# Patient Record
Sex: MALE | Race: WHITE | HISPANIC OR LATINO | Employment: STUDENT | URBAN - METROPOLITAN AREA
[De-identification: names, ages, dates, MRNs, and addresses within clinical notes are randomized per-mention and may not be internally consistent; named-entity substitution may affect disease eponyms.]

---

## 2018-01-26 DIAGNOSIS — M26.623 BILATERAL TEMPOROMANDIBULAR JOINT PAIN: Primary | ICD-10-CM

## 2019-08-06 ASSESSMENT — MIFFLIN-ST. JEOR: SCORE: 1820.25

## 2019-08-07 ASSESSMENT — MIFFLIN-ST. JEOR: SCORE: 1772.36

## 2019-10-07 ASSESSMENT — MIFFLIN-ST. JEOR: SCORE: 1863.08

## 2019-10-11 ENCOUNTER — ANESTHESIA - HEALTHEAST (OUTPATIENT)
Dept: SURGERY | Facility: CLINIC | Age: 24
End: 2019-10-11

## 2019-10-11 ENCOUNTER — SURGERY - HEALTHEAST (OUTPATIENT)
Dept: SURGERY | Facility: CLINIC | Age: 24
End: 2019-10-11

## 2019-10-11 ASSESSMENT — MIFFLIN-ST. JEOR: SCORE: 1806.1

## 2021-06-02 NOTE — ANESTHESIA CARE TRANSFER NOTE
Last vitals:   Vitals:    10/11/19 1629   BP: 134/63   Pulse: 97   Resp: 12   Temp: 36.1  C (97  F)   SpO2: 99%     Patient's level of consciousness is awake and drowsy  Spontaneous respirations: yes  Maintains airway independently: yes  Dentition unchanged: yes  Oropharynx: oropharynx clear of all foreign objects    QCDR Measures:  ASA# 20 - Surgical Safety Checklist: WHO surgical safety checklist completed prior to induction    PQRS# 430 - Adult PONV Prevention: 4558F - Pt received => 2 anti-emetic agents (different classes) preop & intraop  ASA# 8 - Peds PONV Prevention: NA - Not pediatric patient, not GA or 2 or more risk factors NOT present  PQRS# 424 - Kath-op Temp Management: 4559F - At least one body temp DOCUMENTED => 35.5C or 95.9F within required timeframe  PQRS# 426 - PACU Transfer Protocol: - Transfer of care checklist used  ASA# 14 - Acute Post-op Pain: ASA14B - Patient did NOT experience pain >= 7 out of 10

## 2021-06-02 NOTE — ANESTHESIA PREPROCEDURE EVALUATION
Anesthesia Evaluation      Patient summary reviewed   History of anesthetic complications     Airway   Mallampati: I  Neck ROM: full   Pulmonary - normal exam   (+) a smoker                         Cardiovascular - normal exam  Exercise tolerance: > or = 4 METS   Neuro/Psych    (+) depression, anxiety/panic attacks,     Comments: ADHD.    Endo/Other - negative ROS      GI/Hepatic/Renal - negative ROS           Dental - normal exam                        Anesthesia Plan  Planned anesthetic: general endotracheal  Decadron, Zofran.  Diprivan infusion.  Tylenol.  Ketamine.  ASA 1   Induction: intravenous   Anesthetic plan and risks discussed with: patient  Anesthesia plan special considerations: antiemetics,   Post-op plan: routine recovery

## 2021-06-02 NOTE — ANESTHESIA POSTPROCEDURE EVALUATION
Patient: Navi Morales  NASAL VALVE RECONSTRUCTION WITH CADAVERIC CARTILAGE GRAFT  Anesthesia type: general    Patient location: PACU  Last vitals:   Vitals Value Taken Time   /76 10/11/2019  5:01 PM   Temp 36.1  C (97  F) 10/11/2019  4:29 PM   Pulse 97 10/11/2019  5:07 PM   Resp 24 10/11/2019  5:07 PM   SpO2 93 % 10/11/2019  5:07 PM   Vitals shown include unvalidated device data.  Post vital signs: stable  Level of consciousness: awake and responds to simple questions  Post-anesthesia pain: pain controlled  Post-anesthesia nausea and vomiting: no  Pulmonary: unassisted, return to baseline  Cardiovascular: stable and blood pressure at baseline  Hydration: adequate  Anesthetic events: no    QCDR Measures:  ASA# 11 - Kath-op Cardiac Arrest: ASA11B - Patient did NOT experience unanticipated cardiac arrest  ASA# 12 - Kath-op Mortality Rate: ASA12B - Patient did NOT die  ASA# 13 - PACU Re-Intubation Rate: ASA13B - Patient did NOT require a new airway mgmt  ASA# 10 - Composite Anes Safety: ASA10A - No serious adverse event    Additional Notes:

## 2021-06-03 VITALS — BODY MASS INDEX: 25.4 KG/M2 | WEIGHT: 177.44 LBS | HEIGHT: 70 IN

## 2021-12-27 ENCOUNTER — MEDICAL CORRESPONDENCE (OUTPATIENT)
Dept: HEALTH INFORMATION MANAGEMENT | Facility: CLINIC | Age: 26
End: 2021-12-27
Payer: COMMERCIAL

## 2021-12-28 ENCOUNTER — TRANSCRIBE ORDERS (OUTPATIENT)
Dept: MULTI SPECIALTY CLINIC | Facility: CLINIC | Age: 26
End: 2021-12-28
Payer: COMMERCIAL

## 2021-12-28 DIAGNOSIS — M25.50 MULTIPLE JOINT PAIN: Primary | ICD-10-CM

## 2023-06-30 ENCOUNTER — HOSPITAL ENCOUNTER (EMERGENCY)
Facility: CLINIC | Age: 28
Discharge: HOME | End: 2023-06-30
Attending: EMERGENCY MEDICINE | Admitting: EMERGENCY MEDICINE
Payer: COMMERCIAL

## 2023-06-30 VITALS
RESPIRATION RATE: 18 BRPM | SYSTOLIC BLOOD PRESSURE: 145 MMHG | HEIGHT: 70 IN | TEMPERATURE: 98.1 F | BODY MASS INDEX: 25.34 KG/M2 | WEIGHT: 177 LBS | HEART RATE: 54 BPM | DIASTOLIC BLOOD PRESSURE: 80 MMHG | OXYGEN SATURATION: 99 %

## 2023-06-30 DIAGNOSIS — R10.9 ABDOMINAL PAIN, UNSPECIFIED ABDOMINAL LOCATION: ICD-10-CM

## 2023-06-30 DIAGNOSIS — K22.6 MALLORY-WEISS TEAR: ICD-10-CM

## 2023-06-30 DIAGNOSIS — K92.0 HEMATEMESIS WITH NAUSEA: ICD-10-CM

## 2023-06-30 DIAGNOSIS — R11.2 NAUSEA AND VOMITING, UNSPECIFIED VOMITING TYPE: ICD-10-CM

## 2023-06-30 LAB
ALBUMIN SERPL BCG-MCNC: 5.1 G/DL (ref 3.5–5.2)
ALP SERPL-CCNC: 88 U/L (ref 40–129)
ALT SERPL W P-5'-P-CCNC: 40 U/L (ref 0–70)
ANION GAP SERPL CALCULATED.3IONS-SCNC: 13 MMOL/L (ref 7–15)
AST SERPL W P-5'-P-CCNC: 39 U/L (ref 0–45)
BASOPHILS # BLD AUTO: 0 10E3/UL (ref 0–0.2)
BASOPHILS NFR BLD AUTO: 1 %
BILIRUB SERPL-MCNC: 0.6 MG/DL
BUN SERPL-MCNC: 14 MG/DL (ref 6–20)
CALCIUM SERPL-MCNC: 10 MG/DL (ref 8.6–10)
CHLORIDE SERPL-SCNC: 99 MMOL/L (ref 98–107)
CREAT SERPL-MCNC: 0.93 MG/DL (ref 0.67–1.17)
DEPRECATED HCO3 PLAS-SCNC: 26 MMOL/L (ref 22–29)
EOSINOPHIL # BLD AUTO: 0.1 10E3/UL (ref 0–0.7)
EOSINOPHIL NFR BLD AUTO: 2 %
ERYTHROCYTE [DISTWIDTH] IN BLOOD BY AUTOMATED COUNT: 12.1 % (ref 10–15)
GFR SERPL CREATININE-BSD FRML MDRD: >90 ML/MIN/1.73M2
GLUCOSE SERPL-MCNC: 84 MG/DL (ref 70–99)
HCT VFR BLD AUTO: 44 % (ref 40–53)
HGB BLD-MCNC: 14.9 G/DL (ref 13.3–17.7)
HOLD SPECIMEN: NORMAL
HOLD SPECIMEN: NORMAL
IMM GRANULOCYTES # BLD: 0 10E3/UL
IMM GRANULOCYTES NFR BLD: 0 %
LIPASE SERPL-CCNC: 20 U/L (ref 13–60)
LYMPHOCYTES # BLD AUTO: 2.1 10E3/UL (ref 0.8–5.3)
LYMPHOCYTES NFR BLD AUTO: 27 %
MCH RBC QN AUTO: 31.7 PG (ref 26.5–33)
MCHC RBC AUTO-ENTMCNC: 33.9 G/DL (ref 31.5–36.5)
MCV RBC AUTO: 94 FL (ref 78–100)
MONOCYTES # BLD AUTO: 0.5 10E3/UL (ref 0–1.3)
MONOCYTES NFR BLD AUTO: 7 %
NEUTROPHILS # BLD AUTO: 5 10E3/UL (ref 1.6–8.3)
NEUTROPHILS NFR BLD AUTO: 63 %
NRBC # BLD AUTO: 0 10E3/UL
NRBC BLD AUTO-RTO: 0 /100
PLATELET # BLD AUTO: 170 10E3/UL (ref 150–450)
POTASSIUM SERPL-SCNC: 4.1 MMOL/L (ref 3.4–5.3)
PROT SERPL-MCNC: 7.3 G/DL (ref 6.4–8.3)
RBC # BLD AUTO: 4.7 10E6/UL (ref 4.4–5.9)
SODIUM SERPL-SCNC: 138 MMOL/L (ref 136–145)
WBC # BLD AUTO: 7.7 10E3/UL (ref 4–11)

## 2023-06-30 PROCEDURE — 250N000013 HC RX MED GY IP 250 OP 250 PS 637: Performed by: EMERGENCY MEDICINE

## 2023-06-30 PROCEDURE — 258N000003 HC RX IP 258 OP 636: Performed by: EMERGENCY MEDICINE

## 2023-06-30 PROCEDURE — 83690 ASSAY OF LIPASE: CPT | Performed by: EMERGENCY MEDICINE

## 2023-06-30 PROCEDURE — 99284 EMERGENCY DEPT VISIT MOD MDM: CPT | Mod: 25

## 2023-06-30 PROCEDURE — 96361 HYDRATE IV INFUSION ADD-ON: CPT

## 2023-06-30 PROCEDURE — 250N000011 HC RX IP 250 OP 636: Mod: JZ | Performed by: EMERGENCY MEDICINE

## 2023-06-30 PROCEDURE — 85025 COMPLETE CBC W/AUTO DIFF WBC: CPT | Performed by: EMERGENCY MEDICINE

## 2023-06-30 PROCEDURE — 96374 THER/PROPH/DIAG INJ IV PUSH: CPT

## 2023-06-30 PROCEDURE — 93005 ELECTROCARDIOGRAM TRACING: CPT | Mod: RTG

## 2023-06-30 PROCEDURE — 80053 COMPREHEN METABOLIC PANEL: CPT | Performed by: EMERGENCY MEDICINE

## 2023-06-30 PROCEDURE — 36415 COLL VENOUS BLD VENIPUNCTURE: CPT | Performed by: EMERGENCY MEDICINE

## 2023-06-30 RX ORDER — MAGNESIUM HYDROXIDE/ALUMINUM HYDROXICE/SIMETHICONE 120; 1200; 1200 MG/30ML; MG/30ML; MG/30ML
30 SUSPENSION ORAL ONCE
Status: COMPLETED | OUTPATIENT
Start: 2023-06-30 | End: 2023-06-30

## 2023-06-30 RX ORDER — ONDANSETRON 2 MG/ML
4 INJECTION INTRAMUSCULAR; INTRAVENOUS ONCE
Status: COMPLETED | OUTPATIENT
Start: 2023-06-30 | End: 2023-06-30

## 2023-06-30 RX ORDER — FAMOTIDINE 20 MG/1
20 TABLET, FILM COATED ORAL 2 TIMES DAILY
Qty: 14 TABLET | Refills: 0 | Status: SHIPPED | OUTPATIENT
Start: 2023-06-30 | End: 2023-07-07

## 2023-06-30 RX ORDER — ONDANSETRON 4 MG/1
4 TABLET, ORALLY DISINTEGRATING ORAL EVERY 8 HOURS PRN
Qty: 10 TABLET | Refills: 0 | Status: SHIPPED | OUTPATIENT
Start: 2023-06-30 | End: 2023-07-03

## 2023-06-30 RX ADMIN — SODIUM CHLORIDE, POTASSIUM CHLORIDE, SODIUM LACTATE AND CALCIUM CHLORIDE 1000 ML: 600; 310; 30; 20 INJECTION, SOLUTION INTRAVENOUS at 10:49

## 2023-06-30 RX ADMIN — ONDANSETRON 4 MG: 2 INJECTION INTRAMUSCULAR; INTRAVENOUS at 10:49

## 2023-06-30 RX ADMIN — ALUMINUM HYDROXIDE, MAGNESIUM HYDROXIDE, AND DIMETHICONE 30 ML: 200; 20; 200 SUSPENSION ORAL at 10:14

## 2023-06-30 ASSESSMENT — ACTIVITIES OF DAILY LIVING (ADL)
ADLS_ACUITY_SCORE: 35
ADLS_ACUITY_SCORE: 35

## 2023-06-30 NOTE — ED PROVIDER NOTES
"[facility] ED Provider Note  [facility] Emergency Department  4:43 PM  6/30/2023    Navi HO Andrew  27 year oldmale    Chief Complaint   Patient presents with     Vomiting       HPI:  27-year-old male otherwise healthy presenting with epigastric abdominal pain and multiple episodes of emesis since this morning.  He has had small amount of blood present in the emesis.  He does state he was drinking alcohol last evening.  No black or bloody stools.  No previous abdominal surgery.  No dysuria frequency urgency.  No fever.        ROS: ROS is negative other than mentioned above in HPI        Current Outpatient Medications   Medication Instructions     acetaminophen (TYLENOL) 1,000 mg, EVERY 6 HOURS PRN     ascorbic acid 1,000 mg, DAILY     busPIRone (BUSPAR) 15 mg, 2 TIMES DAILY     famotidine (PEPCID) 20 mg, Oral, 2 TIMES DAILY     magnesium chloride (SLOW-MAG) 64 mg TbEC delayed-release tablet 64 mg, DAILY     neomycin-bacitracin-polymyxin (NEOSPORIN) ointment 1 Application., 3 TIMES DAILY PRN     ondansetron (ZOFRAN ODT) 4 mg, Oral, EVERY 8 HOURS PRN     Vitamin D3 (CHOLECALCIFEROL) 2,000 Units, DAILY     zinc gluconate 100 mg, DAILY           No Known Allergies      Physical Exam  BP (!) 145/80   Pulse 54   Temp 98.1  F (36.7  C)   Resp 18   Ht 1.778 m (5' 10\")   Wt 80.3 kg (177 lb)   SpO2 99%   BMI 25.40 kg/m        CV: ppi, regular   Resp: speaking in full sentences without any resp distress  Skin: warm dry well perfused  Neuro: Alert, no gross motor or sensory deficits,  gait stable      Labs and Imaging:    Labs Ordered and Resulted from Time of ED Arrival to Time of ED Departure   COMPREHENSIVE METABOLIC PANEL - Normal       Result Value    Sodium 138      Potassium 4.1      Chloride 99      Carbon Dioxide (CO2) 26      Anion Gap 13      Urea Nitrogen 14.0      Creatinine 0.93      Calcium 10.0      Glucose 84      Alkaline Phosphatase 88      AST 39      ALT 40      Protein Total 7.3      Albumin 5.1      " Bilirubin Total 0.6      GFR Estimate >90     LIPASE - Normal    Lipase 20     CBC WITH PLATELETS AND DIFFERENTIAL    WBC Count 7.7      RBC Count 4.70      Hemoglobin 14.9      Hematocrit 44.0      MCV 94      MCH 31.7      MCHC 33.9      RDW 12.1      Platelet Count 170      % Neutrophils 63      % Lymphocytes 27      % Monocytes 7      % Eosinophils 2      % Basophils 1      % Immature Granulocytes 0      NRBCs per 100 WBC 0      Absolute Neutrophils 5.0      Absolute Lymphocytes 2.1      Absolute Monocytes 0.5      Absolute Eosinophils 0.1      Absolute Basophils 0.0      Absolute Immature Granulocytes 0.0      Absolute NRBCs 0.0           No orders to display           Medications Administered in ED:  Medications   ondansetron (ZOFRAN) injection 4 mg (4 mg Intravenous $Given 23 1049)   lactated ringers BOLUS 1,000 mL (0 mLs Intravenous Stopped 23 1150)   alum & mag hydroxide-simethicone (MAALOX) suspension 30 mL (30 mLs Oral $Given 23 1014)             Independent Historian:      Review of External Notes:     Independent Interpretation (X-rays, CTs, rhythm strip):       Consultations/Discussion of Management or Tests:       Social Determinants of Health affecting care:  None          Medical Decision Makin-year-old male here with epigastric discomfort multiple episodes of emesis some with reported hematemesis after drinking alcohol last night.  Well-appearing not on anticoagulation no abdominal tenderness on examination.  Suspect Christina-Reed tear.  Basic blood tests are unremarkable.  I think he is safe to be discharged home with supportive care.  Outpatient endoscopy should he not improve as expected.  If symptoms are worsening he will return here to emergency room.      Diagnosis:    ICD-10-CM    1. Abdominal pain, unspecified abdominal location  R10.9 Adult GI  Referral - Procedure Only      2. Hematemesis with nausea  K92.0 Adult GI  Referral - Procedure Only       3. Nausea and vomiting, unspecified vomiting type  R11.2       4. Christina-Reed tear  K22.6 Adult GI  Referral - Procedure Only          Disposition:  Home      Greyson Sparks MD  [facility]  [facility]       Greyson Sparks MD  06/30/23 9699

## 2023-06-30 NOTE — ED TRIAGE NOTES
Patient reports he was drinking last night which caused him to vomit. Patient states he noted a small amount of blood in his vomit.  Patient denies pain or vomiting since last night.      Triage Assessment     Row Name 06/30/23 0915       Triage Assessment (Adult)    Airway WDL WDL       Respiratory WDL    Respiratory WDL WDL       Skin Circulation/Temperature WDL    Skin Circulation/Temperature WDL WDL       Cardiac WDL    Cardiac WDL WDL       Peripheral/Neurovascular WDL    Peripheral Neurovascular WDL WDL       Cognitive/Neuro/Behavioral WDL    Cognitive/Neuro/Behavioral WDL WDL

## 2023-07-03 LAB
ATRIAL RATE - MUSE: 56 BPM
DIASTOLIC BLOOD PRESSURE - MUSE: NORMAL MMHG
INTERPRETATION ECG - MUSE: NORMAL
P AXIS - MUSE: 33 DEGREES
PR INTERVAL - MUSE: 124 MS
QRS DURATION - MUSE: 92 MS
QT - MUSE: 412 MS
QTC - MUSE: 397 MS
R AXIS - MUSE: 69 DEGREES
SYSTOLIC BLOOD PRESSURE - MUSE: NORMAL MMHG
T AXIS - MUSE: 34 DEGREES
VENTRICULAR RATE- MUSE: 56 BPM

## 2023-07-05 ENCOUNTER — TELEPHONE (OUTPATIENT)
Dept: GASTROENTEROLOGY | Facility: CLINIC | Age: 28
End: 2023-07-05
Payer: COMMERCIAL

## 2023-07-05 NOTE — TELEPHONE ENCOUNTER
"  Pre Assessment Chart Review  Scheduling Recommendations:    Procedure:    Upper endoscopy (EGD)     Sedation:   MAC/Deep Sedation    Location/Preferred location per order:   No facility restrictions     Pre op required?   No    Bowel prep recommendation:   BMI: Estimated body mass index is 25.4 kg/m  as calculated from the following:    Height as of 6/30/23: 1.778 m (5' 10\").    Weight as of 6/30/23: 80.3 kg (177 lb).     Miralax prep without magnesium citrate     Reason for bowel prep recommendation: No Restrictions    Medication HOLDING Reminders:  N/A    Additional information:  N/A    ----------------------------------------------------------------------------------------------------------    Order Review:    Sedation ordered: Moderate Sedation    Ordering provider: Greyson Sparks    Indication/additional information: hematemesis    ----------------------------------------------------------------------------------------------------------    Cardiac Review:  Cardiac history reviewed.      Pulmonary Review:  Pulmonary history reviewed.       Sleep apnea/BRIANNA Review  No sleep apnea documented upon chart review.      Stroke/TIA Review  No documentation of recent TIA/Stroke in the last 6 months.      Hepatology Review  Hepatology history reviewed.       Renal Review:  Renal history reviewed.       Diabetes?  No       Medication Review:  Medications reviewed.   Please note holding recommendations above if applicable.       Sallie Dumont RN  Endoscopy Procedure Pre Assessment RN         "

## 2023-09-21 ENCOUNTER — VIRTUAL VISIT (OUTPATIENT)
Dept: PSYCHIATRY | Facility: CLINIC | Age: 28
End: 2023-09-21
Payer: COMMERCIAL

## 2023-09-21 DIAGNOSIS — F29 PSYCHOSIS, UNSPECIFIED PSYCHOSIS TYPE (H): Primary | ICD-10-CM

## 2023-09-21 NOTE — PROGRESS NOTES
"[facility] Clinician Phone Screen  A Part of the [facility] First Episode of Psychosis Program    Patient: Navi Morales (1995, 28 year old)     MRN: [id number]  Date:  9/21/23  Clinician: Lashaun bAel     Length of Actual Contact: Start Time: 1100; End Time: 1133    Reviewed limits to confidentiality: Yes    Phone screen completed with:  Navi; Relation to the patient: self  If we move forward with scheduling appointments, who should we coordinate appointments with? Navi, Phone: phone  Is it OK to leave a detailed voicemail? Yes  If we move forward with scheduling appointments via video, where would you like the link sent? email    Demographics:   What is patient's phone number: [phone] (home)   Patient's Address?  [address]  [place] [address]  Patient's Email Address?  [email or web address]    If age 18 or older, does the adult patient consent to this referral and is the patient willing to attend appointments? Yes    Diagnostic Information:  Briefly, in a few sentences, what would you/the patient like to be seen for?  \"I guess I need some guidance. It has been really rough over the past... it has been a while. I've been going through some stuff.     Have you/the patient experienced symptoms of psychosis? Yes  If yes, for how long and please describe? 6-7  In particular, are you/the patient experiencing/have experienced any of the following?   -Changes in thinking (odd ideas, grandiosity, suspiciousness, difficulty concentrating): Yes  -Changes in perception (auditory/visual/tactile/olfactory abnormalities): Yes He has a lot of negative commentary in his mind; voices; \"I'm being watched and the observations are too specific\"  -Changes in speech (disorganized communication, tangential speech): Yes  -Emotional changes (depression, mood swings, irritability, flat affect): Yes  -Dramatic reduction of overall functioning: Yes    What mental health diagnosis(es) have you/the patient received in the past? " "  MDD  Social anxiety  ADD (\"but I got cleared of it when I was 23\")      In particular, have you/the patient ever been diagnosed with:   -Autism spectrum disorder: No   -Borderline personality disorder: No    Any history of developmental delays?  No     Are any of the following applicable to the patient?   -IQ below 70? No  -Non-verbal due to developmental delays? No  -Living in a group home because of developmental disability? No  -Has a guardian because of a developmental disability? No    Service History:   Are you/the patient taking antipsychotics or have you/the patient taken antipsychotics in the past? Yes            If yes, for how long cumulatively? Seroquel (\"on and off depending on my sleep\" for 6 months)    Are you/the patient seeing any mental health providers currently? Yes              If yes, who/where? Dr. Del Cid, psychiatrist at [facility]    Specifically, have you/the patient had an ACT team in the past?  No    Have you been enrolled in a first episode psychosis outpatient program before, such as [facility], HOPE Program at [facility], or at the [facility] in [place]? No    Any current thoughts of harming yourself or others? No    What services are you/the patient interested in receiving in our clinics?   Medication management: Yes   Individual therapy: Yes   Family therapy:  \"possibly\"   Group therapy: No   Work/school support: Yes    Plan:  Is this patient eligible for a comprehensive assessment with First Episode of Psychosis Services? Yes      Lashaun Abel [facility]          Hello,     Thank you for your interest in our first episode of psychosis services. As discussed, it seems you might be eligible for one of our first episode of psychosis programs. Therefore, you were offered a series of assessment appointments (details below). Please note, enrollment in one of our first episode psychosis programs is dependent on the outcome of these assessments. " These appointments are not considered an intake into a program and enrollment is not guaranteed. Additionally, if eligible there might be wait times for enrollment into our programs. Wait times would be discussed with you during your feedback appointment. If you already have established care with community providers, continue to work with those providers until you have appointments with our program. If you do not have care established elsewhere, please consider establishing care in the interim. If you requested information on other community resources outside of our organization, those are listed below.     First Episode Psychosis Assessment Appointments:     -Diagnostic Assessment appointment with Brandie LONG and Latonia Schrader on 9/28/2023 at 3:00 PM for 120 minutes via Rodati. If via video, the video visit link will be sent via email.  A diagnostic assessment is a comprehensive structured assessment that is completed with everyone seeking services in our clinic. It helps us get to know people and determine what services might be the best fit. For this appointment you will meet with an experienced clinician and psychometrist (i.e., someone who administers questionnaires and structured assessments). During this appointment the assessment team will review your social, medical, and psychiatric history.     -Feedback appointment with Brandie LONG will be scheduled at your first visit.  A feedback appointment is where you will hear about professional impressions and treatment recommendations.     In preparation for future appointments we ask that all behavioral health records be faxed to [phone] (for adult patients) / [phone] (for child/adolescent patients).    If you have follow-up questions or need to cancel/reschedule appointments, please contact one of our clinics at [phone] (for adult patients) / [phone] (for child/adolescent patients).    As a reminder, if you need  urgent help, please call your local crisis number, 911, or go to your nearest ED. A list of crisis hotlines has been included below. Additionally, we have a new mental health emergency area at [facility] called [facility] that is very calming and helpful if you need additional support. ([address] [address]  [phone]). This is a team of mental health providers who can assess your needs and connect you with resources and medication in a timely manner and provide transitional support until obtaining a consistent provider ([facility]- [phone]).    Crisis Resources:  Crisis Hotlines:  National Suicide Prevention Lifeline at 988  Throughout  [place]: call **CRISIS (**358178)  Crisis Text Line: is available for free, 24/7 by texting [place] to 774001  With Lifeline Chat as option - connect with a counselor for emotional support and other services via web chat https://suicidepreventionlifeline.org/chat/    [place] (Child & Adult): [phone]   [place] (Child & Adult): [phone]   [place] (Child & Adult): [phone]   [place] - Child: [phone]   [place] - Adult: [phone]  [place] - Child: [phone]   [place] - Adult: [phone]  [place] (Child & Adult): [phone]   [place] (Child & Adult): [phone]  [facility] Crisis Response ([facility], [place], [place], [facility] and [facility]): [phone]    [facility] Crisis Services Fact Sheet: [email or web address]-content/[email or web address]/[email or web address]/Hdtrje-Bzyhci-Vwgkjocq__1653.06.03.pdf    Behavioral Health Emergency Room  [facility]  Phone: [phone] (Emergency Room)  Address: [address] [place] [address]    [facility] EmPATH (Emergency Psychiatric Assessment, Treatment, and Healing) is like an emergency room mental health unit. EmPATH is an innovative approach to emergency mental health care that is designed to guide people safely through a crisis while  helping them build coping skills for the future. The unit is designed for acute psychiatric patients to receive assessment and evaluation in a therapeutic and least restrictive setting.    Complementing the emergency department, the new adult EmPATH unit provides a calm and comforting environment, allowing the movement and human interaction that is vital in the first 24 hours of treatment. After a short medical evaluation in the emergency department, patients come to a calming, living room-style open space with comfortable recliners and self-serve refreshment stations. Open nursing stations and unlocked rooms create an atmosphere of trust and allyship with the staff, while the mix of daylighting, views to nature, or appropriate imagery establishes a sense of hope. In addition to providing a conducive environment for treatment, the EmPATH unit provides a gentle and benign setting for the care team to constantly evaluate a patient and discharge them with an appropriate treatment plan.

## 2023-09-28 ENCOUNTER — VIRTUAL VISIT (OUTPATIENT)
Dept: PSYCHIATRY | Facility: CLINIC | Age: 28
End: 2023-09-28
Payer: COMMERCIAL

## 2023-09-28 DIAGNOSIS — F25.1 SCHIZOAFFECTIVE DISORDER, DEPRESSIVE TYPE (H): Primary | ICD-10-CM

## 2023-09-28 NOTE — PROGRESS NOTES
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 life threatening or urgent symptoms present. Please note, writer did not receive all pertinent medical records as of the time of this assessment. Navi did not sign TAWANA's for additional records.    Billing for \"Interactive Complexity\"?    No    Plan   Next steps include intention of completing a continued multi-disciplinary assessment utilizing today's evaluation. The expertise of a PharmD, Psychologist, and Psychiatric consultation may be next steps. Informed Navi that if deemed appropriate for the First Episode of Psychosis services, care will be provided with goal of reducing distressing symptoms and improving functional recovery.    Medication Management: Navi is in need of Medication Management.  Medications will be addressed further during an MTM visit and new patient medication evaluation.  Continue to follow recommendations of current outpatient prescriber until recommendations are provided.     Therapy: Navi was interested in meeting with a therapist. Navi would benefit from therapy.   Navi was and Family was interested in participating in the Family PsychoEducation Program.     Supported Employment & Education: Navi is in need of employment and education support.     Case Management: Navi is not followed by a " .  Case Management is an identified need at this time. This writer will assist in short-term case management support as needed until care is established with ongoing providers.     Other Psychosocial Supports: Jack is interested in the  Young Adult Group.  Family would benefit from  Family Psychoeducation & Support Group.    Medical Referrals: none     Referral information for the above mentioned supports will be discussed further at our upcoming feedback visit.   Without the recommended intervention, Jack is likely to experience possible increase in psychotic symptoms requiring hospitalization.     TREATMENT RISK STATEMENT:  The risks, benefits, alternatives and potential adverse effects have been discussed and are understood by the pt. The pt understands the risks of using street drugs or alcohol. There are no medical contraindications, the pt agrees to treatment with the ability to do so. The pt knows to call the clinic for any problems or to access emergency care if needed.  Medical and substance use concerns are documented above.     PROVIDER: MERCEDES Mchugh  SUPERVISOR: Delta Pérez, PhD

## 2023-10-02 NOTE — PROGRESS NOTES
.RAMP DA Consultation Outcome    Patient Name: Navi Morales    Diagnostic Assessment Date: 9/28/23     Discussed information gathered in the diagnostic assessment process in multidisciplinary consultation on 10/2/2023 for the purpose of preparing the DA clinician for a future feedback session.     Diagnostically: A provisional diagnosis of schizoaffective disorder, depressive type, is appropriate, with additional diagnoses of PTSD and substance use disorder (currently cannabinoids, previously polysubstance). Additional dx by history include unspecified eating disorder r/o Bulimia Nervosa and unspecified anxiety disorder r/o MELY.      Treatment Recommendations:   - Continue care with currently established outpatient mental health providers  - [facility] NAVIGATE (Schedule enrollment with Brandie Catalan during feedback visit)  - [facility] Treatment/IOP, consider dual diagnosis track.    As a reminder, the smarphrase FEPRESOURCES identifies psychosis-specific resources and referrals in the community outside of [facility].     SANJEEV VAUGHN, PhD

## 2023-10-06 ENCOUNTER — VIRTUAL VISIT (OUTPATIENT)
Dept: PSYCHIATRY | Facility: CLINIC | Age: 28
End: 2023-10-06
Payer: COMMERCIAL

## 2023-10-06 DIAGNOSIS — F25.1 SCHIZOAFFECTIVE DISORDER, DEPRESSIVE TYPE (H): Primary | ICD-10-CM

## 2023-10-06 NOTE — Clinical Note
Navi Blanco has his orientation scheduled for 10/31 at 1pm. Please email him the link.  Delta, ready to be signed. LYDIA Malhotra only. Brandie

## 2023-10-06 NOTE — PROGRESS NOTES
[facility] Clinician Feedback Session  A Part of the [facility] First Episode of Psychosis Program    Patient: Navi Morales (1995, 28 year old)     MRN: [id number]  Date:  10/06/23  Clinician: MERCEDES Mchugh      People present:   Writer  Client: Leonard Mcelroy  Length of Actual Contact: Start Time: 1115; End Time: 1143     Location of contact:  Originating Location (patient location): patient home, located in [place], [place]  Distant Location (provider location): Home office, located in [place], [place], using appropriate privacy considerations and procedures  Mode of communication: [employer] (HIPAA compliant, secure platform)  Primary diagnosis:  A provisional primary diagnosis of schizoaffective disorder, depressive type, is appropriate, with additional diagnoses of PTSD and substance use disorder (currently cannabinoids, previously polysubstance). Additional dx by history include unspecified eating disorder r/o Bulimia Nervosa and unspecified anxiety disorder r/o MELY.     Navi Morales is currently participating in therapy and medication management services with [facility]. They do seem appropriate for [facility] F[facility] services. This writer has provided clinical impressions, verbal and/or written information about [facility] First Episode Pscyhosis programs in the context of treating schizophrenia spectrum and other disorders associated with psychosis. If Navigate program was identified as an option, this writer discussed Pt's ability to start NAVIGATE with later transfer of care if diagnostic clarity reveals a diagnosis other than a schizophrenia spectrum illness.    Is this patient agreeable to start services with First Episode of Psychosis Services? Yes   If Yes; which program? Navigate      What services is the Pt interested in receiving in our clinics?   Medication management: Yes   Individual therapy: Yes   Family therapy: Yes   Group therapy: Yes   Work/school support: Yes     Place Pt on waitlist(s)?  Yes    With whom can someone follow up for scheduling, etc.? Navi    Method of follow up communication preferred? Email or phone      MERCEDES Mchugh

## 2023-10-31 ENCOUNTER — VIRTUAL VISIT (OUTPATIENT)
Dept: PSYCHIATRY | Facility: CLINIC | Age: 28
End: 2023-10-31
Payer: COMMERCIAL

## 2023-10-31 DIAGNOSIS — F25.1 SCHIZOAFFECTIVE DISORDER, DEPRESSIVE TYPE (H): Primary | ICD-10-CM

## 2023-11-06 ENCOUNTER — PATIENT OUTREACH (OUTPATIENT)
Dept: PSYCHIATRY | Facility: CLINIC | Age: 28
End: 2023-11-06

## 2023-11-06 ENCOUNTER — TELEPHONE (OUTPATIENT)
Dept: PSYCHIATRY | Facility: CLINIC | Age: 28
End: 2023-11-06

## 2023-11-06 NOTE — PROGRESS NOTES
NAVIGATE Telephone Call or E-mail Correspondence    NAVIGATE Enrollee: Navi Morales (1995)     MRN: [id number]    Writer called Pt to introduce self and offere a visit to discuss family support service in NAVIGATE. No answer, writer left voice message with contact information. Writer invited Navi to call me back to either have a phone conversation or schedule a video visit to discuss family support, answer any questions Navi has, and determine if Navi is interested in family involvement at this time.     LEENA Lay    Health NAVIGATE

## 2023-11-09 ENCOUNTER — VIRTUAL VISIT (OUTPATIENT)
Dept: PSYCHIATRY | Facility: CLINIC | Age: 28
End: 2023-11-09
Payer: COMMERCIAL

## 2023-11-09 DIAGNOSIS — F29 PSYCHOSIS, UNSPECIFIED PSYCHOSIS TYPE (H): Primary | ICD-10-CM
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
## 2023-11-14 NOTE — PROGRESS NOTES
NAVIGATE Enrollment  A Part of the [facility] First Episode of Psychosis Program     Patient Name: Navi Morales  /Age:  1995 (28 year old)    Met with patient virtually to discuss Navigate services and enrollment. Discussed the Navigate services listed below and the patient's agreeableness/interest in and goals for each service. Writer provided verbal and/or written information about [facility] NAVIGATE in the context of treating schizophrenia spectrum disorders. Identified ability to start NAVIGATE with later transfer of care if diagnostic clarity reveals a diagnosis other than a schizophrenia spectrum illness. Informed of program expectations to include at minimum monthly psychiatry and psychotherapy visits and the need to transfer previous community services (e.g. med mgmt and psychotherapy) to Navigate providers. Address questions/concerns.     NAVIGATE Services:  -Medication Management (Psychiatry)  -Individual Resiliency Training/IRT (Psychotherapy or Counseling)  -Supported Employment and Education/SEE  -Family Psychoeducation and Support  -Family Peer Support  -Social Work Care Coordination  -Psychometry  -Groups    Plan:  Collaboratively planned to the patient to enroll in Navigate.     SANTOS Blevins, [facility]  NAVIGATE

## 2023-11-16 ENCOUNTER — VIRTUAL VISIT (OUTPATIENT)
Dept: PSYCHIATRY | Facility: CLINIC | Age: 28
End: 2023-11-16

## 2023-11-16 DIAGNOSIS — F45.22 BODY DYSMORPHIC DISORDER WITH MUSCLE DYSMORPHIA: ICD-10-CM

## 2023-11-16 DIAGNOSIS — F29 PSYCHOSIS, UNSPECIFIED PSYCHOSIS TYPE (H): Primary | ICD-10-CM

## 2023-11-16 DIAGNOSIS — F12.90 CANNABIS USE DISORDER: ICD-10-CM

## 2023-11-16 DIAGNOSIS — F32.A DEPRESSION, UNSPECIFIED DEPRESSION TYPE: ICD-10-CM
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
## 2023-11-20 ENCOUNTER — PATIENT OUTREACH (OUTPATIENT)
Dept: PSYCHIATRY | Facility: CLINIC | Age: 28
End: 2023-11-20

## 2023-11-20 NOTE — PROGRESS NOTES
NAVIGATE Telephone Call or E-mail Correspondence    NAVIGATE Enrollee: Navi HO Lincoln (1995)     MRN: [id number]    Writer called Pt to discuss family support and whether Navi wants family involvement. No answer, left voice message invited pt to call back to discuss. Offered to have a phone conversation or a visits with just Pt and this writer to discuss what family support looks like. Also indicated that I would send an email about connecting and invited pt to return this call, respond to the email I will send, or discuss with IRT.     Email sent 1:30 on 11/20/23:  Anh Mcelroy,   My name is Tesha Hartmann and I do family education with the NAVIGATE program. My supervisor asked me to reach out to you to discuss whether you would be interested in me working with some of your family. It would be great if you and I could have a brief call or a visit to discuss things. My direct number is [phone]. If you call and get my voicemail, please let me know what times of day may be best for having a brief phone call. Thanks and have a great day!  Warmest regards,   SANTOS Lay, LEENA Ordoñez   [facility]IG[facility]

## 2023-11-21 ENCOUNTER — TELEPHONE (OUTPATIENT)
Dept: PSYCHIATRY | Facility: CLINIC | Age: 28
End: 2023-11-21

## 2023-11-28 ENCOUNTER — TELEPHONE (OUTPATIENT)
Dept: PSYCHIATRY | Facility: CLINIC | Age: 28
End: 2023-11-28

## 2023-12-11 ENCOUNTER — VIRTUAL VISIT (OUTPATIENT)
Dept: PSYCHIATRY | Facility: CLINIC | Age: 28
End: 2023-12-11
Payer: COMMERCIAL

## 2023-12-11 DIAGNOSIS — F29 PSYCHOSIS, UNSPECIFIED PSYCHOSIS TYPE (H): Primary | ICD-10-CM

## 2023-12-11 DIAGNOSIS — F12.90 CANNABIS USE DISORDER: ICD-10-CM

## 2023-12-11 RX ORDER — QUETIAPINE FUMARATE 50 MG/1
50 TABLET, FILM COATED ORAL 2 TIMES DAILY PRN
Qty: 60 TABLET | Refills: 1 | Status: SHIPPED | OUTPATIENT
Start: 2023-12-11 | End: 2024-02-19

## 2023-12-11 RX ORDER — FLUPHENAZINE HYDROCHLORIDE 2.5 MG/1
TABLET ORAL 2 TIMES DAILY PRN
COMMUNITY
Start: 2023-10-09 | End: 2023-12-27

## 2023-12-11 RX ORDER — QUETIAPINE FUMARATE 400 MG/1
600 TABLET, FILM COATED ORAL AT BEDTIME
Qty: 45 TABLET | Refills: 1 | Status: SHIPPED | OUTPATIENT
Start: 2023-12-11 | End: 2023-12-27

## 2023-12-11 RX ORDER — QUETIAPINE FUMARATE 400 MG/1
1 TABLET, FILM COATED ORAL AT BEDTIME
COMMUNITY
End: 2023-12-11

## 2023-12-11 NOTE — PROGRESS NOTES
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  Clinician Rating Form in [facility] - Side Effect Assessment  Address side effects reported by the patient and rate using this scale  0 Not present   1 Minimal, may be extreme normal   2 Mild   3 Moderate   4 Severe   U Unable to assess   P Present, but not related      Feeling dizzy or faint: 0  Blurred vision: 0  Dry mouth: 0  Too much saliva/droolin  Nausea:  0  Constipation: 0  Increased appetite: 0  Weight gain: 0  Weight loss: 0  Feeling tired/fatigue: 2  Daytime sedation: 3  Hypersomnia: 3  Insomnia: 0  Low libido: 0  Other problems with sex: 0  Breast enlargement or discharge:  0  Irregular Menstruation or amenorrhea: 0  Other (please list and rate): 0     RECENT SUBSTANCE USE:  Clinician Rating Form in [facility] - Substance Use Assessment  Alcohol Use Severity: Ratin  0 none   1 use without impairment: drinks but no immediate social or medical impairment   2 use with impairment: e.g. becomes grossly intoxicated; alcohol use or withdrawal compromises school, work or social functioning; alcohol use or withdrawal exacerbates symptoms (e.g. gets depressed when drinking)      Marijuana Use Severity: Rating: 3  0 none   1 occasional use without impairment: e.g. uses marijuana a few days a month and has no immediate social or medical impairment   2 frequent use without impairment: e.g. uses marijuana  several or more days a week but has no immediate social or medical impairment   3 use with impairment: e.g. becomes grossly intoxicated; marijuana use compromises school, work or social functioning; marijuana use exacerbates symptoms (e.g. gets paranoid when using)      Other Drug Use Severity: Ratin  0 none   1 occasional use without impairment: e.g. uses drug(s) a few days a month and has no immediate social or medical impairment   2 frequent use without impairment: e.g. uses drug(s) several or more days a week but has no immediate social or medical impairment   3 use with impairment: e.g. becomes grossly intoxicated; drug use compromises school, work or social functioning; drug use exacerbates symptoms (e.g. gets paranoid when using)       PSYCHOTROPIC DRUG INTERACTIONS     Quetiapine/fluphenazine: additive QTc prolongation and CNS depression    MANAGEMENT:  limit med redundancy and use lowest therapeutic doses of both    RISK STATEMENT for SAFETY   Navi A Burke endorsed suicidal ideation. SUICIDE RISK ASSESSMENT-  Risk factors for self-harm: previous suicide attempt, substance use/pending treatment, hallucinations, severe anxiety, and lives alone/ isolated.  Mitigating factors: h/o seeking help , recent symptom improvement, feeling hopeful, future oriented, and stable housing.  The patient does not appear to be at imminent risk for self-harm, hospitalization is not recommended which the pt does  agree to. No hospitalization will be arranged. Based on degree of symptoms close psych follow-up was/were recommended which the pt does  agree to. Additional steps to minimize risk: med changes.      DIAGNOSES                                                                                  Psychosis: schizophrenia vs schizoaffective disorder, depressed type  H/o Body dysmorphic disorder--somatic delusions remain on differential for this  Alcohol use disorder, severe, with active use  Cannabis use disorder, with  "active use    ASSESSMENT                                                                                 Navi Morales is a 28 year old single White Not  or  male who presented for a comprehensive assessment of psychiatric symptoms by the First Episode of Psychosis Navigate Program. He presented as tangential and disorganized on interview.     Navi reports first onset of psychiatric symptoms at age 7 (\"social anxiety\"), and psychotic symptoms at age 23. The above duration of untreated psychosis was approximately 4 years.  Prodromal symptoms seem to have been present since at least college (notable substance use) though patient does note a substantially longer history of depression and physical health concerns. Unclear if reported physical health conditions contributed to symptoms of depression or are actually a product of psychosis. Patient was quite fixated on this during the interview, stating \"my schizophrenia is directly related to my jaw.\"      Based on today's assessment past symptoms of mental illness represent  depression, anxiety, substance use disorder. Presenting symptoms appear to include hallucinations, delusional thoughts. Navi attributes symptoms to physical health issues and history of trauma.  Substance use does seem to be a present concern. There are possible medical comorbidities which impact this treatment [suicide attempt , suicidal ideation, SIB , psychosis , aggression, and substance use: alcohol].      Navi s reported symptoms of psychosis could be consistent with an episode of major depression with psychotic features, bipolar disorder with psychotic features, schizoaffective disorder or a manifestation of positive/negative symptoms in a schizophrenia spectrum disorder.  A substance induced component is also possible given history of cannabis, cocaine, hallucinogenic and opiod use. As this is reportedly Navi s first psychotic episode and he is unable to give a clear history, " more time and information is required to distinguish between these conditions. Diagnosis of schizoaffective disorder, depressed type seems supported by patient report, collateral records.       Currently he reports little to no benefit from fluphenazine but thinks quetiapine helps with sleep when he takes it. Interested in a trial of taking this consistently and at a higher dose.    [place]:   .Indicates no controlled prescriptions in the past 12 months.    Navi agrees to treatment with the capacity to do so. Agrees to call clinic for any problems. The patient understands to call 911 or come to the nearest ED if life threatening or urgent symptoms present. Please note, writer did receive all pertinent medical records as of the time of this assessment. Navi did not sign TAWANA's for additional records.  PLAN                                                                                               1) Medications  - Increase quetiapine to 450 mg PO at bedtime and take consistently on a nightly basis  - stop fluphenazine and switch to quetiapine 50 mg PO BID PRN    2) Therapy-  start IRT with new therapist    3) Next Due   Labs- due 6/2024  EKG- last done on current meds  Rating scales- AIMS: due     4) Referrals  none      5) RTC: 2 weeks    6) Crisis Numbers:   Provided routinely in AVS     After hours:  [phone]      TREATMENT RISK STATEMENT:  The risks, benefits, alternatives and potential adverse effects have been discussed and are understood by the pt. The pt understands the risks of using street drugs or alcohol. There are no medical contraindications, the pt agrees to treatment with the ability to do so. The pt knows to call the clinic for any problems or to access emergency care if needed.  Medical and substance use concerns are documented above.  Psychotropic drug interaction check was done, including changes made today.    PROVIDER: Heidi Vega MD

## 2023-12-11 NOTE — NURSING NOTE
Unable to complete any rooming of Allergies, Medications, Vitals, Nicotine, PHQ-9, [facility] due to time restraint.       Is the patient currently in the state of [place]? YES    Visit mode:VIDEO    If the visit is dropped, the patient can be reconnected by: VIDEO VISIT: Text to cell phone:   Telephone Information:   Mobile [phone]       Will anyone else be joining the visit? NO  (If patient encounters technical issues they should call [phone])    How would you like to obtain your AVS? MyChart    Are changes needed to the allergy or medication list?  Unable to review allergies and medications due to time restraint.    Reason for visit: OSWALD GURROLAF

## 2023-12-11 NOTE — PROGRESS NOTES
"Virtual Visit Details    Type of service:  Video Visit   Video Start Time: {video visit start/end time for provider to select:228461}  Video End Time:{video visit start/end time for provider to select:707118}    Originating Location (pt. Location): {video visit patient location:417906::\"Home\"}  {PROVIDER LOCATION On-site should be selected for visits conducted from your clinic location or adjoining [facility] hospital, academic office, or other nearby [facility] building. Off-site should be selected for all other provider locations, including home:537763}  Distant Location (provider location):  {virtual location provider:234568}  Platform used for Video Visit: {Virtual Visit Platforms:962990::\"[employer]\"}    "

## 2023-12-18 ENCOUNTER — PATIENT OUTREACH (OUTPATIENT)
Dept: PSYCHIATRY | Facility: CLINIC | Age: 28
End: 2023-12-18

## 2023-12-18 NOTE — PROGRESS NOTES
LILIANA Telephone Call or E-mail Correspondence    NAVIGATE Enrollee: Navi Morales (1995)     MRN: [id number]    Writer called Pt to introduce self and discuss scheduling individual therapy visits. No answer, left voice message introducing myself and indicating that I hope he was expecting a call from me based on a previous message from the prior individual therapist. Indicated that I am calling to discuss scheduling individual therapy and left my direct number, inviting Pt to call me back.     LEENA Lay   [facility] NAVIGGALINDO

## 2023-12-22 ENCOUNTER — PATIENT OUTREACH (OUTPATIENT)
Dept: PSYCHIATRY | Facility: CLINIC | Age: 28
End: 2023-12-22

## 2023-12-22 ENCOUNTER — TELEPHONE (OUTPATIENT)
Dept: PSYCHIATRY | Facility: CLINIC | Age: 28
End: 2023-12-22

## 2023-12-22 NOTE — TELEPHONE ENCOUNTER
Writer returned call to pts mother. Mother was concerned that what patient was telling her differed from what patient was telling doctor and psychiatrist. Pt mother was also concerned about pt drinking when on current medication of seroquel and vaping. Pts mother also expressed concern over increased sedation and altered sleep schedule when patient takes high dose of seroquel, making it harder for patient to do chores and other activities throughout the day. Pt mother also verbalized knowledge that we do not have consent to communicate, and she understands that. Writer actively listened and told mother that her observations/concerns would be passed on to Dr Vega.

## 2023-12-22 NOTE — TELEPHONE ENCOUNTER
Navi HO Van's mother Mona is calling to advise the following :      Patient was admitted to [facility] on 12/01/2023 after having an episode possible induced by drinking on the evening before. Mom called police and patient was taken by ambulance to [facility] where he stayed overnight. Mona also has concerns with patient's prescription for seroquel. Mona states that patient sleeps excessively and is not able to help with daily chores, patient has been texting mother thoughts of hopelessness and hasn't been in a good space.

## 2023-12-22 NOTE — PROGRESS NOTES
NAVIGATE Outreach  A Part of the [facility] First Episode of Psychosis Program     Patient Name: Navi Morales  /Age:  1995 (28 year old)    Writer sent email to Pt to notify him that I will be out of the office - and to reiterate that request to connect and discuss scheduling IRT:    Anh Mcelroy,   My name is Tesha Hartmann. I reached out previously to introduce myself in my role as family clinician and now I am reaching out in my role as an individual therapist in the NAVIGATE program. I understand that you have spoken with Dr. Stephenie Jon, and she explained that you would be transferring your individual therapy from working with her, to working with me in that capacity. I am hoping to connect to discuss scheduling individual therapy visits.   I will be out of the office from 23 through 24, returning to the office on 24 and hope to be able to speak with you about scheduling at that time.   If you are in need of immediate assistance, please call the clinic at [phone]. I look forward to speaking with you soon.   Warmest regards,   SANTOS Lay, LICSW        LEENA Lay [facility] [place] [facility]

## 2023-12-27 ENCOUNTER — VIRTUAL VISIT (OUTPATIENT)
Dept: PSYCHIATRY | Facility: CLINIC | Age: 28
End: 2023-12-27
Payer: COMMERCIAL

## 2023-12-27 ENCOUNTER — TELEPHONE (OUTPATIENT)
Dept: PSYCHIATRY | Facility: CLINIC | Age: 28
End: 2023-12-27

## 2023-12-27 DIAGNOSIS — F10.90 ALCOHOL USE DISORDER: Primary | ICD-10-CM

## 2023-12-27 DIAGNOSIS — F29 PSYCHOSIS, UNSPECIFIED PSYCHOSIS TYPE (H): ICD-10-CM

## 2023-12-27 RX ORDER — NALTREXONE HYDROCHLORIDE 50 MG/1
50 TABLET, FILM COATED ORAL DAILY
Qty: 30 TABLET | Refills: 1 | Status: SHIPPED | OUTPATIENT
Start: 2023-12-27 | End: 2024-03-18

## 2023-12-27 RX ORDER — TADALAFIL 10 MG/1
10 TABLET ORAL DAILY
COMMUNITY
Start: 2023-10-13

## 2023-12-27 RX ORDER — KETOCONAZOLE 20 MG/ML
SHAMPOO TOPICAL DAILY PRN
COMMUNITY

## 2023-12-27 RX ORDER — QUETIAPINE FUMARATE 400 MG/1
800 TABLET, FILM COATED ORAL AT BEDTIME
Qty: 30 TABLET | Refills: 1 | Status: SHIPPED | OUTPATIENT
Start: 2023-12-27 | End: 2024-01-16

## 2023-12-27 NOTE — PROGRESS NOTES
"Virtual Visit Details    Originating Location (pt. Location): Home    Distant Location (provider location):  On-site  Platform used for Video Visit: Mirriad    [employer] Medication Management Progress Note  A Part of the [facility] First Episode of Psychosis Program    NAVIGATE Enrollee: Navi Morales (1995)     MRN: [id number]  Date:  23         Contributors to the Assessment     Chart Reviewed.   Interview completed with Navi Morales.  Collateral information obtained from n/a.         Chief Complaint      \"No change\"         Interim History      Navi Morales is a 28 year old male who was last seen in MD clinic on 23 at which time Seroquel was increased to 600 mg. The patient reports adequate treatment adherence; occasionally taking 800 mg. History was provided by Navi who was a good historian.  Since the last visit:  Has been consistently taking the 600 mg of Seroquel, but now feels it is more sedating than when he takes 800 mg.  Has been feeling worse about himself, thinks this might be related to the holidays, having to socialize more  Has been drinking less recently, didn't have any alcohol over the last few weeks, except a beer on the holidays. Feels he has never found the right amount of alcohol, either doesn't feel anything or gets blackout drunk.  He has been thinking about SI more often, triggered by AH typically. Does not want to act on these thoughts but feels desperate, tortured by AH    PSYCH ROS:  Clinician Rating Form in COMPASS  1. Depressed Mood: Ratin  0 Not reported     1 Very mild occasionally feels sad or  down ; of questionable clinical significance   2 Mild occasionally feels moderately depressed or often feels sad or  down    3 Moderate occasionally feels very depressed or often feels moderately depressed   4 Moderately severe often feels very depressed   5 Severe feels very depressed most of the time   6 Very severe constant extremely painful feelings of depression   U " Unable to assess        2. Anxiety/Worry: Ratin  0 Not reported     1 Very mild occasionally feels a little anxious; of questionable clinical significance   2 Mild occasionally feels moderately anxious or often feels a little anxious or worried   3 Moderate occasionally feels very anxious or often feels moderately anxious   4 Moderately severe often feels very anxious or often feels moderately anxious   5 Severe feels very anxious or worried most of the time   6 Very severe patient is continually preoccupied with severe anxiety   U Unable to assess        3. Suicidal Ideation/Behavior: Ratin          0 Not reported     1 Very mild occasional thoughts of dying,  I d be better off dead  or  I wish I were dead    2 Mild frequents thoughts of dying or occasional thoughts of killing self, without plan or method   3 Moderate often thinks of suicide or has though of a specific method   4 Moderately severe has mentally rehearsed a specific method of suicide or has made a suicide attempt with questionable intent to die (e.r. takes aspirins and then tells family)   5 Severe has made preparations for a potentially lethal suicide attempt (e.g acquires a gun and bullets for an attempt)   6 Very severe has made a suicide attempt with an intent to die   U Unable to assess                      4. Elevated/Expansive Mood: Ratin  0 Not at all     1 Very mild questionable; more cheerful than most people in his/her circumstances but of only possible clinical significance   2 Mild brief elevated/expansive mood but only somewhat out of proportion to the circumstances   3 Moderate brief/occasional elevation of mood which is clearly out of proportion to the circumstances   4 Moderately severe sustained/frequent elevation of mood which is clearly out of proportion to the circumstances   5 Severe mood is euphoric most of the time   6 Very severe sustained elevation;  everything is wonderful  almost all of the time   U Unable to  assess        5. Hostility/Anger/Irritability/Aggressiveness: Ratin  0 Not at all     1 Very mild occasional irritability of doubtful clinical significance   2 Mild occasionally feels angry or mild or indirect expressions of anger, e.g. sarcasm, disrespect or hostile gestures   3 Moderate frequently feels angry, frequent irritability or occasional direct expression of anger, e.g. yelling at others   4 Moderately severe often feels very angry, often yells at others or occasionally threatens to harm others   5 Severe has acted on his anger by becoming physically abusive on one or two occasions or makes frequent threats to harm others or is very angry most of the time   6 Very severe has been physically aggressive and/or required intervention to prevent assaultiveness on several occasions; or any serious assaultive act   U Unable to assess        6. Impulsive Behavior: Ratin  0 Not at all     1 Very mild one instance of impulsive behavior which is of doubtful clinical significance   2 Mild occasional impulsive acts, e.g. making phone calls at odd hours   3 Moderate occasional impulsive acts with some potential negative consequence, e.g. leaving work abruptly; changing plans without thinking   4 Moderately severe impulsive acts with definite negative consequences, e.g. overspending on non-essentials; repeated reckless sexual behavior   5 Severe impulsive acts with direct negative consequences, e.g. spends entire income on nonessentials without regard for basic needs   6 Very severe impulsive behavior which is potentially life threatening, e.g. jumps from dangerous height (without suicidal intent) or criminal behavior, e.g. impulsive robbery   U Unable to assess        7. Suspiciousness: Ratin  0 Not present     1 Very mild Seems on guard. Reluctant to respond to some  personal  questions. Reports being overly self-conscious in public   2 Mild Describes incidents in which others have harmed or wanted to harm  him/her that sound plausible. Patient feels as if others are watching, laughing, or criticizing him/her in public, but this occurs only occasionally or rarely. Little or no preoccupation   3 Moderate Says others are talking about him/her maliciously, have negative intentions, or may harm him/her. Beyond the likelihood of plausibility, but not delusional. Incidents of suspected persecution occur occasionally (less than once per week) with some preoccupation   4 Moderately severe Same as 3, but incidents occur frequently such as more than once a week. Patient is moderately preoccupied with ideas of persecution OR patient reports persecutory delusions expressed with much doubt (e.g. partial delusion)   5 Severe Delusional -- speaks of Mafia plots, the FBI, or others poisoning his/her food, persecution by supernatural forces   6 Extremely severe Same as 5, but the beliefs are bizarre or more preoccupying. Patient tends to disclose or act on persecutory delusions.   U Unable to assess        8. Unusual Thought Content: Ratin  0 Not present     1 Very mild Ideas of reference (people may stare or may laugh at him), ideas of persecution (people may mistreat him). Unusual beliefs in psychic tyler, spirits, UFOs, or unrealistic beliefs in one's own abilities. Not strongly held. Some doubt   2 Mild Same as 1, but degree of reality distortion is more severe as indicated by highly unusual ideas or greater conviction. Content may be typical of delusions (even bizarre), but without full conviction. The delusion does not seem to have fully formed, but is considered as one possible explanation for an unusual experience   3 Moderate Delusion present but no preoccupation or functional impairment. May be an encapsulated delusion or a firmly endorsed absurd belief about past delusional circumstances   4 Moderately severe Full delusion(s) present with some preoccupation OR some areas of functioning disrupted by delusional thinking    5 Severe Full delusion(s) present with much preoccupation OR many areas of functioning are disrupted by delusional thinking   6 Extremely severe Full delusions present with almost   U Unable to assess        9. Hallucinations: Ratin  0 Not present     1 Very mild While resting or going to sleep, sees visions, smells odors, or hears voices, sounds or whispers in the absence of external stimulation, but no impairment in functioning   2 Mild While in a clear state of consciousness, hears a voice calling the subject s name, experiences non-verbal auditory hallucinations (e.g., sounds or whispers), formless visual hallucinations, or has sensory experiences in the presence of a modality-relevant stimulus (e.g., visual illusions) infrequently (e.g., 1-2 times per week) and with no functional impairment   3 Moderate Occasional verbal, visual, gustatory, olfactory, or tactile hallucinations with no functional impairment OR non-verbal auditory hallucinations/visual illusions more than infrequently or with impairment   4 Moderately severe Experiences daily hallucinations OR some areas of functioning are disrupted by hallucinations   5 Severe Experiences verbal or visual hallucinations several times a day OR many areas of functioning are disrupted by these hallucinations   6 Extremely severe Persistent verbal or visual hallucinations throughout the day OR most areas of functioning are disrupted by these hallucinations   U Unable to assess        10. Conceptual Disorganization: Ratin  0 Not present     1 Very mild Peculiar use of words or rambling but speech is comprehensible   2 Mild Speech a bit hard to understand or make sense of due to tangentiality, circumstantiality, or sudden topic shifts   3 Moderate Speech difficult to understand due to tangentiality, circumstantiality, idiosyncratic speech, or topic shifts on many occasions OR 1-2 instances of incoherent phrases   4 Moderately severe Speech difficult to  understand due to circumstantiality, tangentiality, neologisms, blocking, or topic shifts most of the time OR 3-5 instances of incoherent phrases   5 Severe Speech is incomprehensible due to severe impairments most of the time. Many PSRS items cannot be rated by self-report alone   6 Extremely severe Speech is incomprehensible throughout interview   U Unable to assess        11. Avolition/Apathy: Ratin  0 Not at all     1 Very mild questionable decrease in time spent in goal-directed activities   2 Mild spends less time in goal-directed activities than is appropriate for situation and age   3 Moderate initiates activities at times but does not follow through   4 Moderately severe rarely initiates activity but will passively engage with encouragement   5 Severe almost never initiates activities; requires assistance to accomplish basic activities   6 Very severe does not initiate or persist in any goal-directed activity even with outside assistance   U Unable to assess        12. Asociality/Low Social Drive: Ratin  0 Not at all     1 Very mild questionable   2 Mild slow to initiate social interactions but usually responds to overtures by others   3 Moderate rarely initiates social interactions; sometimes responds to overtures by others   4 Moderately severe does not initiate but sometimes responds to overtures by others; little social interaction outside close family members   5 Severe never initiates and rarely encourages conversations or activities; avoids being with others unless prodded, may have contacts with family   6 Very severe avoids being with others (even family members) whenever possible, extreme social isolation   U Unable to assess        13. Adherence: Days: 0  The longest, continuous amount of time in days, since the last visit when the subject did not take medication      14. EPS Part I: Ratin  Rate Elbow Rigidity for all subjects  0 Normal   1 Slight stiffness and resistance   2 Moderate  stiffness and resistance   3 Marked rigidity with difficulty in passive movement   4 Extreme stiffness and rigidity with almost a frozen joint   U Unable to assess            EPS Part 2: Signs of EPS: 0  Are there are other signs of EPS (eg diminished arm swing, postural instability, cogwheeling, tremor, akinesia) present based upon patient report or exam?  0 No   1 Yes      15. Akathesia: Ratin  0 No restlessness reported or observed   1 Mild restlessness observed; e.g., occasional jiggling of the foot occurs when subject is seated   2 Moderate restlessness observed; e.g., on several occasions, jiggles foot, crosses and uncrosses legs or twists a part of the body   3 Restlessness is frequently observed; e.g., the foot or legs moving most of the time   4 Restlessness persistently observed; e.g., subject cannot sit still, may get up and walk   U Unable to assess      16. Dyskinetic Movement Ratings: Ratin  0 None   1 Minimal, may be extreme normal   2 Mild   3 Moderate   4 Severe   U Unable to assess      SIDE EFFECT ASSESSMENT:  Clinician Rating Form in COMPASS - Side Effect Assessment  Address side effects reported by the patient and rate using this scale  0 Not present   1 Minimal, may be extreme normal   2 Mild   3 Moderate   4 Severe   U Unable to assess   P Present, but not related      Feeling dizzy or faint: 0  Blurred vision: 0  Dry mouth: 0  Too much saliva/droolin  Nausea:  0  Constipation: 0  Increased appetite: 0  Weight gain: 0  Weight loss: 0  Feeling tired/fatigue: 2  Daytime sedation: 2  Hypersomnia: 2  Insomnia: 0  Low libido: 0  Other problems with sex: 0  Breast enlargement or discharge:  0  Irregular Menstruation or amenorrhea: 0  Other (please list and rate): 0     RECENT SUBSTANCE USE:  Clinician Rating Form in [facility] - Substance Use Assessment  Alcohol Use Severity: Ratin  0 none   1 use without impairment: drinks but no immediate social or medical impairment   2 use with  impairment: e.g. becomes grossly intoxicated; alcohol use or withdrawal compromises school, work or social functioning; alcohol use or withdrawal exacerbates symptoms (e.g. gets depressed when drinking)      Marijuana Use Severity: Rating: 3  0 none   1 occasional use without impairment: e.g. uses marijuana a few days a month and has no immediate social or medical impairment   2 frequent use without impairment: e.g. uses marijuana several or more days a week but has no immediate social or medical impairment   3 use with impairment: e.g. becomes grossly intoxicated; marijuana use compromises school, work or social functioning; marijuana use exacerbates symptoms (e.g. gets paranoid when using)      Other Drug Use Severity: Ratin  0 none   1 occasional use without impairment: e.g. uses drug(s) a few days a month and has no immediate social or medical impairment   2 frequent use without impairment: e.g. uses drug(s) several or more days a week but has no immediate social or medical impairment   3 use with impairment: e.g. becomes grossly intoxicated; drug use compromises school, work or social functioning; drug use exacerbates symptoms (e.g. gets paranoid when using)      RECENT SOCIAL HISTORY:  SEE HPI    Medical ROS:  none         First Episode of Psychosis History      DUP (duration untreated psychosis):  4 years  Route to initial care: outpatient  Medication adherence overall:  See above, Clinician Rating Form in COMPASS Item 13  General frequency of visits:  weekly IRT, monthly med management  Participation in groups:  No  Cognitive Remediation:  No  Other treatment history:      Reviewed for completion of First Episode work-up:  Yes  First episode workup:  Not Done (if completed, see LABS for results)  MATRICS Consensus Cognitive Battery:  Not Done (if completed, see LABS for results)         Medical/Surgical History     Patient has no known allergies.    Patient Active Problem List   Diagnosis   (none) - all  problems resolved or deleted            Medications     Current Outpatient Medications   Medication Sig Dispense Refill    cholecalciferol, vitamin D3, 1,000 unit (25 mcg) tablet [CHOLECALCIFEROL, VITAMIN D3, 1,000 UNIT (25 MCG) TABLET] Take 2,000 Units by mouth daily.      ketoconazole (NIZORAL) 2 % external shampoo Apply topically daily as needed LATHER INTO SCALP AND RINSE AFTER 2-3 MINUTES. USE THREE TIMES A WEEK.      magnesium chloride (SLOW-MAG) 64 mg TbEC delayed-release tablet [MAGNESIUM CHLORIDE (SLOW-MAG) 64 MG TBEC DELAYED-RELEASE TABLET] Take 64 mg by mouth daily.      naltrexone (DEPADE/REVIA) 50 MG tablet Take 1 tablet (50 mg) by mouth daily 30 tablet 1    tadalafil (CIALIS) 10 MG tablet Take 10 mg by mouth daily      zinc gluconate 50 mg tablet [ZINC GLUCONATE 50 MG TABLET] Take 100 mg by mouth daily.      dutasteride (AVODART) 0.5 MG capsule Take 0.5 mg by mouth daily      OLANZapine (ZYPREXA) 10 MG tablet Take 2.5 tablets (25 mg) by mouth at bedtime 75 tablet 1    QUEtiapine (SEROQUEL) 100 MG tablet Take 200 mg at bedtime for one week, then decrease to 100 mg at bedtime. 60 tablet 1    QUEtiapine (SEROQUEL) 400 MG tablet Take 2 tablets (800 mg) by mouth at bedtime 60 tablet 1             Vitals     There were no vitals taken for this visit.      Weight prior to medication: unknown         Mental Status Exam     Alertness: alert  and oriented  Appearance: well groomed  Behavior/Demeanor: cooperative, pleasant, and calm, with good  eye contact   Speech:  regular rate, normal volume, not pressured  Language: no obvious problem  Psychomotor: normal or unremarkable  Mood: depressed and anxious  Affect: blunted and appropriate; was congruent to mood; was congruent to content  Thought Process/Associations:  still circumstantial at times but somewhat improved since intake  Thought Content:  Reports suicidal ideation without plan; without intent [details in Interim History];  Denies violent ideation and  "delusions  Perception:  Reports auditory hallucinations;  Denies visual hallucinations  Insight: good  Judgment: good and adequate for safety  Cognition: does  appear grossly intact; formal cognitive testing was not done         Labs and Data     RATING SCALES:  AIMS: due    PHQ9 TODAY =       2/6/2024     3:00 PM 2/19/2024     1:44 PM 2/27/2024     3:00 PM   PHQ-9 SCORE   PHQ-9 Total Score MyChart 21 (Severe depression) 20 (Severe depression) 24 (Severe depression)   PHQ-9 Total Score 21 20    20 24       ANTIPSYCHOTIC LABS ROUTINE    [glu, A1C, lipids (focus LDL), liver enzymes, WBC, ANEU, Hgb, plts]   q12 mo  Recent Labs   Lab Test 06/30/23  1048   GLC 84     No lab results found.  Recent Labs   Lab Test 06/30/23  1048   AST 39   ALT 40   ALKPHOS 88     Recent Labs   Lab Test 06/30/23  1048   WBC 7.7   HGB 14.9               Psychiatric Diagnoses     Psychosis, schizophrenia vs schizoaffective disorder, r/o substance-induced  AUD with active use  Cannabis use disorder with active use         Assessment     From intake, 12/11/23: Navi HO Camillus is a 28 year old single White Not  or  male who presented for a comprehensive assessment of psychiatric symptoms by the First Episode of Psychosis Navigate Program. He presented as tangential and disorganized on interview.      Navi reports first onset of psychiatric symptoms at age 7 (\"social anxiety\"), and psychotic symptoms at age 23. The above duration of untreated psychosis was approximately 4 years.  Prodromal symptoms seem to have been present since at least college (notable substance use) though patient does note a substantially longer history of depression and physical health concerns. Unclear if reported physical health conditions contributed to symptoms of depression or are actually a product of psychosis. Patient was quite fixated on this during the interview, stating \"my schizophrenia is directly related to my jaw.\"       Based on today's " assessment past symptoms of mental illness represent  depression, anxiety, substance use disorder. Presenting symptoms appear to include hallucinations, delusional thoughts. Navi attributes symptoms to physical health issues and history of trauma.  Substance use does seem to be a present concern (alcohol, THC). Navi s reported symptoms of psychosis could be consistent with an episode of major depression with psychotic features, bipolar disorder with psychotic features, schizoaffective disorder or a manifestation of positive/negative symptoms in a schizophrenia spectrum disorder.  A substance induced component is also possible given history of cannabis, cocaine, hallucinogenic and opiod use. As this is reportedly Navi s first psychotic episode and he is unable to give a clear history, more time and information is required to distinguish between these conditions. Diagnosis of schizoaffective disorder, depressed type seems supported by patient report, collateral records    TODAY Navi reports no significant improvement in psychosis or mood symptoms, but does feel he is tolerating the quetiapine well apart from sedation; he has been taking 600 mg and 800 mg sometimes and finds the 800 mg less sedating than the 600 mg. He has been taking it every night.     I noted that his mother had called to give information and expressed her concern about him taking medications at the same time as drinking and using THC. I advised him that I wish to partner with him in reducing substance use when he is ready, and that the risk in this scenario is related to additive sedation from combining alcohol and quetiapine, but not suppression of central respiratory drive by quetiapine. I encouraged him to take his medication every day regardless of whether or not he uses substances. He voiced his understanding of this.     He did express interest in cutting back on alcohol use, which I support as I think it will help his depression. We  discussed naltrexone to help facilitate this and he agrees. Discussed the importance of him letting medical providers know he is on this in case opioids are in his treatment plan in the future and he voiced his understanding.              SUICIDE RISK ASSESSMENT-  Risk factors for self-harm: previous suicide attempt, substance use/pending treatment, recent symptom worsening, hallucinations, recent loss, and lives alone/ isolated.  Mitigating factors: describes a safety plan, h/o seeking help , future oriented, commitment to family, and stable housing.  The patient does not appear to be at imminent risk for self-harm, hospitalization is not recommended which the pt does  agree with. No hospitalization will be arranged. Based on degree of symptoms close psych follow-up was/were recommended which the pt does  agree to. Additional steps to minimize risk: med changes.     [place] PRESCRIPTION MONITORING PROGRAM [] was not checked today: not using controlled substances.    PSYCHOTROPIC DRUG INTERACTIONS:   None.  MANAGEMENT:  N/A         Plan     1) PSYCHOTROPIC MEDICATIONS:  - Increase quetiapine to 800 mg PO at bedtime   - continue quetiapine 50 mg PO BID PRN   - start naltrexone 50 mg PO daily    2) THERAPY:  Continue    3) NEXT DUE:    Labs- FEP workup due  Rating Scales- AIMS due at in person appt    4) REFERRALS:    none    5) RTC: 1 month    6) CRISIS NUMBERS:   Provided routinely in AVS.  Especially emphasized:  National Suicide Prevention Lifeline: 0-336-153-TALK (183-373-9719)  [facility]/resources for a list of additional resources (SOS)            [facility] - [phone]   Urgent Care Adult Mental [facility]th[facility]t-[phone] mobile unit/ 24/7 crisis line  [facility] -[phone]   COPE 24/7 [place] Mobile Team -[phone] (adults)/ [phone] (child)  Poison Control Center - [phone]    OR  go to nearest ER  Crisis Text Line for any crisis 24/7 send this-   To:  [phone]   [facility] ([facility]) [facility]  [phone]    TREATMENT RISK STATEMENT:  The risks, benefits, alternatives and potential adverse effects have been discussed and are understood by the pt. The pt understands the risks of using street drugs or alcohol. There are no medical contraindications, the pt agrees to treatment with the ability to do so. The pt knows to call the clinic for any problems or to access emergency care if needed.  Medical and substance use concerns are documented above.  Psychotropic drug interaction check was done, including changes made today.    PROVIDER:  Heidi Vega MD     The longitudinal plan of care for the diagnosis(es)/condition(s) as documented were addressed during this visit. Due to the added complexity in care, I will continue to support Navi in the subsequent management and with ongoing continuity of care.

## 2023-12-27 NOTE — NURSING NOTE
Unable to complete QNRS due to time restraint.     Is the patient currently in the state of [place]? YES    Visit mode:VIDEO    If the visit is dropped, the patient can be reconnected by: VIDEO VISIT: Send to e-mail at: [email or web address]    Will anyone else be joining the visit? NO  (If patient encounters technical issues they should call [phone]    How would you like to obtain your AVS? Mail a copy    Are changes needed to the allergy or medication list? Yes Pt states taking Dutasteride 0.5MG capsule- 1 capsule daily.     Reason for visit: RECHECK    Medications and allergies have been reviewed.     [place] BASURTO

## 2023-12-29 ENCOUNTER — TELEPHONE (OUTPATIENT)
Dept: PSYCHIATRY | Facility: CLINIC | Age: 28
End: 2023-12-29

## 2023-12-29 NOTE — TELEPHONE ENCOUNTER
Writer returned call to Navi.      Navi reports that he wants to get some appointments scheduled.  We reviewed his Navigate team and davidr scheduled him with Tesha.

## 2023-12-29 NOTE — TELEPHONE ENCOUNTER
Navi HO [place] is calling stating that he has things he needs to address with his Navigate providers. Patient is requesting a call back at [phone] or a [employer] message to discuss further.

## 2024-01-02 ENCOUNTER — VIRTUAL VISIT (OUTPATIENT)
Dept: PSYCHIATRY | Facility: CLINIC | Age: 29
End: 2024-01-02
Payer: COMMERCIAL

## 2024-01-02 DIAGNOSIS — F29 PSYCHOSIS, UNSPECIFIED PSYCHOSIS TYPE (H): Primary | ICD-10-CM

## 2024-01-03 NOTE — PROGRESS NOTES
NAVIGGALINDO Clinician Contact & Progress Note  For Individual Resiliency Training (IRT)  A Part of the [facility] First Episode of Psychosis Program    NAVIGATE Enrollee: Navi Morales (1995)     MRN: [id number]  Date:  1/02/24  Diagnosis: Psychosis, unspecifed  Clinician: LILIANA Individual Resiliency Trainer, LEENA Lay     1. Type of contact: (majority of time spent)  IRT Session via telehealth  Mode of communication: American Well (HIPAA compliant, secure platform). Patient consented verbally to this mode of therapy today.  Reason for telehealth: COVID-19. This patient visit was converted to a telehealth visit to minimize exposure to COVID-19.    2. People  present:   Writer  Client: Yes     3. Length of Actual Contact: Start Time: 3:03; End Time: 4:01     4. Location of contact:  Originating Location (patient location): family home, located in [place], [place]  Distant Location (provider location): Home office, located in [place], [place], using appropriate privacy considerations and procedures    5. Did the client complete the home practice option(s) from the previous session: Not Applicable    6. Motivational Teaching Strategies:  Connect info and skills with personal goals  Promote hope and positive expectations  Explore pros and cons of change  Re-frame experiences in positive light    7. Educational Teaching Strategies:  Review of written material/education  Relate information to client's experience    8. CBT Teaching Strategies:  Reinforcement and shaping (positive feedback for steps towards goals and gains in knowledge & skills)    9. IRT Module(s) Addressed:  Module 1 - Orientation    10. Techniques utilized:   [place] announced at beginning of session  Present new material  Summarize progress made in current session  Other techniques (please specify):   -Built rapport with patient by inviting him to share his experiences and discuss his concerns  -Clarified patient's feelings and  perspectives  -Discussed crisis intervention plan and safety plan  -Elicited more details about current and recent circumstances  -Emotional support via active and reflective listening, responding to feelings etc.  -Normalized and validated feelings and experiences  -Provided verbal overview of First Episode Program NAVIGATE services  -Provided positive feedback and encouragement  -Provided psychoeducation related to psychosis and related concerns.   -Supportive counseling    11. Measures:    Mental Status Exam  Alertness: alert  and oriented  Behavior/Demeanor: cooperative and pleasant  Speech: regular rate and rhythm  Language: no obvious problem.   Mood: description consistent with euthymia  Thought Process/Associations: unremarkable  Thought Content:  Reports none;  Denies suicidal ideation and violent ideation  Perception:  Reports auditory hallucinations;  Denies none  Insight: adequate  Judgment: adequate for safety  Cognition: does  appear grossly intact; formal cognitive testing was not done    MELY-7  Not completed today.      PHQ-9  Not completed today.      [place] Protocol Risk Identification  Not completed today.      12. Assessment/Progress Note:     Writer and client met for IRT visit via [employer]. Set agenda to introduce new provider, review program components and gauge interest and discuss IRT specific work. During check in, client reported having some confusion over who is providers are and what their roles are. Writer spent some time clarifying my previous role of family clinician having shifted to that of IRT. Writer also discussed role of SEE, family clinician, and psychometrist. Reported current symptoms to include: AH. Explored symptoms and coping from a stress-vulnerability lens. Current stressors include: symptoms. In regards to medications, client reports: some concerns about dosage of meds and amount of sedation experienced. Client noted he was at 400 mg and was sleeping a typical 7-8  hours, then increased to 600mg and was sleeping 12+ hours, then increased to 800mg and is sleeping 7-8 hours a night again. Client plans to continue to discuss this with psychiatrist.  Substance use: history of issues steming from substance use. Will continue to discuss and assess. Assessed safety. Client reports SI, denies intent, and denies plan. HI was not present. SIB was not present.  Safety plan has not been created in the past. Safety plan was reviewed today and includes calling 911, going to the emergency department (client specified [facility] as his preferred hospital if needed) and calling crisis lines if needed if SI/HI/SIB becomes overwhelming, worsens and/or becomes active. Client discussed his experiences in the past using crisis phone lines and [facility]. Client did not identify urgent concerns to be addressed today.     Continued IRT material on NAVIGATE program and it's components. Client appeared engaged in conversation and content. Client identified a desire to work with SEE - writer will send email with SEE contact information in it. Client stated that his mother may want to receive family support. Writer identified and need for a consent to communicate form and TAWANA - client  Indicated that he had physical copies of those forms that he plans to fill out and mail in. Writer will check in again with client about status of consents. Client discussed his experiences in the past with therapy and psychiatry and had questions about documentation and how information is shared within NAVIGATE program. Writer identified next step for IRT as reviewed areas of life satisfaction and making a treatment plan with client's goals.    Writer used relational and interpersonal approach to explore feelings, motivations, and behavior. Writer offered support, feedback, validation, and reinforced use of skills taught in IRT from modalities including cognitive behavioral therapy, psycho education, and skills  training. Promoted understanding of their experiences of psychosis and how it impacts them in important areas of their life and in recovery goals. Reflected on client's strengths and resiliency factors and facilitated discussion on how these can assist in symptom management, recovery, and well-being.     Home practice identified as: NA    Follow-up email sent 1/3/24:  Anh Mcelroy,     Thanks again for meeting with me yesterday. We discussed me sending you contact information for the supported education and  - here that is:  Delmi Davis, SEE. Work mobile: [phone]    We also talked about crisis resources at the very end and I mentioned a few phone numbers. I know you mentioned that you are not certain they seem like resources you would need, but since I am sending other contact information I wanted to share crisis resources as well just in case you ever needed to refer to a short list of options (see below).    CRISIS RESOURCES:      Immediate safety concerns (medical or safety emergency)   o Call [address] [place] [address] Emergency Department  o First consider going to the nearest hospital emergency department if it is a medical emergency  o Then consider [facility], as it is your preferred hospital.   - [address] [place] [id number]  - [phone]  o Also consider going to [facility] emergency department  -  to the EmPATH unit within the ED  - Specialty ED department for persons 18 and older experiencing a mental health crisis, staffed by mental health professionals  - EmPath helps get people stabilize (acute crisis, SI, psychosis, etc.) and get set up with outpatient services if they don t already have them.   - [facility] [phone]. Address is [facility] [place] [address]  - [email or web address]   o Or the [facility] emergency  "department  - Has the inpatient behavioral health units on site for consultations or inpatient admissions if needed .  - [facility] [phone]. Address is [address], [place] [address]    Crisis lines  o [facility] Crisis Line: [phone] (call 24/7)  o [place] Suicide Prevention Hotline: 131-550-RHOX (8209)    - [place]  new national number - call 988     o [place] Crisis Text Line: Text  [place]  to 587 581 (if outside of your home county - to be connected to the nearest county crisis staff)    o [facility] [place] warm line:  Call [phone], or text  Support  to [address]. Provides a snje-xc-nkyh approach to mental health recovery, support and wellness. Calls are answered by a team of professionally trained Certified Peer Specialists, who have first-hand experience living with a mental health condition. The Warmline provides a safe, anonymous and confidential environment to connect with people who are here to listen and help. Open Monday-Saturday, 5:00 to 10 :00 p.m.   o [facility] warmline: you can call or text to 973-225-9736 from 5:00 p.m. to 9:00 a.m.      [place] After Hours number:  o [phone]. For evenings and weekends, when NAVIGATE is closed. Ask for the resident on call, they can field medication questions after hours.      Warmest regards,     SANTOS Lay, LEENA       13. Plan/Referrals:     Next visit schedule for one week from today.     Billing for \"Interactive Complexity\"?    No      LEENA Lay Individual Resiliency Trainer    "

## 2024-01-09 ENCOUNTER — VIRTUAL VISIT (OUTPATIENT)
Dept: PSYCHIATRY | Facility: CLINIC | Age: 29
End: 2024-01-09
Payer: COMMERCIAL

## 2024-01-09 DIAGNOSIS — F29 PSYCHOSIS, UNSPECIFIED PSYCHOSIS TYPE (H): Primary | ICD-10-CM

## 2024-01-09 ASSESSMENT — ANXIETY QUESTIONNAIRES
GAD7 TOTAL SCORE: 17
8. IF YOU CHECKED OFF ANY PROBLEMS, HOW DIFFICULT HAVE THESE MADE IT FOR YOU TO DO YOUR WORK, TAKE CARE OF THINGS AT HOME, OR GET ALONG WITH OTHER PEOPLE?: EXTREMELY DIFFICULT
7. FEELING AFRAID AS IF SOMETHING AWFUL MIGHT HAPPEN: MORE THAN HALF THE DAYS
6. BECOMING EASILY ANNOYED OR IRRITABLE: MORE THAN HALF THE DAYS
7. FEELING AFRAID AS IF SOMETHING AWFUL MIGHT HAPPEN: MORE THAN HALF THE DAYS
1. FEELING NERVOUS, ANXIOUS, OR ON EDGE: MORE THAN HALF THE DAYS
IF YOU CHECKED OFF ANY PROBLEMS ON THIS QUESTIONNAIRE, HOW DIFFICULT HAVE THESE PROBLEMS MADE IT FOR YOU TO DO YOUR WORK, TAKE CARE OF THINGS AT HOME, OR GET ALONG WITH OTHER PEOPLE: EXTREMELY DIFFICULT
7. FEELING AFRAID AS IF SOMETHING AWFUL MIGHT HAPPEN: MORE THAN HALF THE DAYS
6. BECOMING EASILY ANNOYED OR IRRITABLE: MORE THAN HALF THE DAYS
1. FEELING NERVOUS, ANXIOUS, OR ON EDGE: MORE THAN HALF THE DAYS
7. FEELING AFRAID AS IF SOMETHING AWFUL MIGHT HAPPEN: MORE THAN HALF THE DAYS
3. WORRYING TOO MUCH ABOUT DIFFERENT THINGS: NEARLY EVERY DAY
5. BEING SO RESTLESS THAT IT IS HARD TO SIT STILL: MORE THAN HALF THE DAYS
GAD7 TOTAL SCORE: 17
2. NOT BEING ABLE TO STOP OR CONTROL WORRYING: NEARLY EVERY DAY
GAD7 TOTAL SCORE: 17
5. BEING SO RESTLESS THAT IT IS HARD TO SIT STILL: MORE THAN HALF THE DAYS
2. NOT BEING ABLE TO STOP OR CONTROL WORRYING: NEARLY EVERY DAY
3. WORRYING TOO MUCH ABOUT DIFFERENT THINGS: NEARLY EVERY DAY
8. IF YOU CHECKED OFF ANY PROBLEMS, HOW DIFFICULT HAVE THESE MADE IT FOR YOU TO DO YOUR WORK, TAKE CARE OF THINGS AT HOME, OR GET ALONG WITH OTHER PEOPLE?: EXTREMELY DIFFICULT
4. TROUBLE RELAXING: NEARLY EVERY DAY
4. TROUBLE RELAXING: NEARLY EVERY DAY
GAD7 TOTAL SCORE: 17
IF YOU CHECKED OFF ANY PROBLEMS ON THIS QUESTIONNAIRE, HOW DIFFICULT HAVE THESE PROBLEMS MADE IT FOR YOU TO DO YOUR WORK, TAKE CARE OF THINGS AT HOME, OR GET ALONG WITH OTHER PEOPLE: EXTREMELY DIFFICULT

## 2024-01-09 NOTE — TELEPHONE ENCOUNTER
Writer returned call to patients mother. Mother wanted to express a few concerns she has been noticing with patient recently to be passed along to care team. Patients mother has been noticing patient having a more negative attitude. Additionally, patients mother has noticed that patient has been fixated on eliminating social media presence and digital footprint. This includes wanting to change phone number, email, and being fixated on what others can see if they were to obtain a background check.     Patients mother also mentioned discussion she had with patient about release of information. Per moms understanding, patient has sent in TAWANA document but is only wanting billing information to be shared and nothing else.     Writer explained to patients mother that they will pass along message to patients care team as an FYI.

## 2024-01-09 NOTE — TELEPHONE ENCOUNTER
Navi A Clutier's mother, Mona is calling stating that she has information she'd like to provide to Navigate Team. Mona declined to provide any further information. Mona can be reached at [phone]

## 2024-01-10 ENCOUNTER — DOCUMENTATION ONLY (OUTPATIENT)
Dept: PSYCHIATRY | Facility: CLINIC | Age: 29
End: 2024-01-10

## 2024-01-10 ENCOUNTER — TELEPHONE (OUTPATIENT)
Dept: PSYCHIATRY | Facility: CLINIC | Age: 29
End: 2024-01-10

## 2024-01-10 NOTE — PROGRESS NOTES
NAVIGGALINDO Clinician Contact & Progress Note  For Individual Resiliency Training (IRT)  A Part of the [facility] First Episode of Psychosis Program    NAVIGATE Enrollee: Navi Morales (1995)     MRN: [id number]  Date:  1/09/24  Diagnosis: Psychosis, unspecifed  Clinician: LILIANA Individual Resiliency Trainer, LEENA Lay     1. Type of contact: (majority of time spent)  IRT Session via telehealth  Mode of communication: American Well (HIPAA compliant, secure platform). Patient consented verbally to this mode of therapy today.  Reason for telehealth: COVID-19. This patient visit was converted to a telehealth visit to minimize exposure to COVID-19.    2. People  present:   Writer  Client: Yes     3. Length of Actual Contact: Start Time: 3:01; End Time: 4:03     4. Location of contact:  Originating Location (patient location): family home, located in [place], [place]  Distant Location (provider location): Home office, located in [place], [place], using appropriate privacy considerations and procedures    5. Did the client complete the home practice option(s) from the previous session: Not Applicable    6. Motivational Teaching Strategies:  Connect info and skills with personal goals  Promote hope and positive expectations  Explore pros and cons of change  Re-frame experiences in positive light    7. Educational Teaching Strategies:  Review of written material/education  Relate information to client's experience    8. CBT Teaching Strategies:  Reinforcement and shaping (positive feedback for steps towards goals and gains in knowledge & skills)    9. IRT Module(s) Addressed:  Module 1 - Orientation    10. Techniques utilized:   [place] announced at beginning of session  Present new material  Summarize progress made in current session  Other techniques (please specify):   -Built rapport with patient by inviting him to share his experiences and discuss his concerns  -Clarified patient's feelings and  perspectives  -Discussed crisis intervention plan and safety plan  -Elicited more details about current and recent circumstances  -Emotional support via active and reflective listening, responding to feelings etc.  -Normalized and validated feelings and experiences  -Provided verbal overview of First Episode Program NAVIGATE services  -Provided positive feedback and encouragement  -Provided psychoeducation related to psychosis and related concerns.   -Supportive counseling    11. Measures:    Mental Status Exam  Alertness: alert  and oriented  Behavior/Demeanor: cooperative and pleasant  Speech: regular rate and rhythm  Language: no obvious problem.   Mood: description consistent with euthymia  Thought Process/Associations: unremarkable  Thought Content:  Reports none;  Denies suicidal ideation and violent ideation  Perception:  Reports auditory hallucinations;  Denies none  Insight: adequate  Judgment: adequate for safety  Cognition: does  appear grossly intact; formal cognitive testing was not done    MELY-7  Not completed today.      PHQ-9  Not completed today.      [place] Protocol Risk Identification  Not completed today.      12. Assessment/Progress Note:     Writer and client met for IRT visit via [employer]. Set agenda to introduce new provider, review program components and gauge interest and discuss IRT specific work. During check in, client reported having some confusion over who is providers are and what their roles are. Writer spent some time clarifying my previous role of family clinician having shifted to that of IRT. Writer also discussed role of SEE, family clinician, and psychometrist. Reported current symptoms to include: AH. Explored symptoms and coping from a stress-vulnerability lens. Current stressors include: symptoms. In regards to medications, client reports: some concerns about dosage of meds and amount of sedation experienced. Client noted he was at 400 mg and was sleeping a typical 7-8  "hours, then increased to 600mg and was sleeping 12+ hours, then increased to 800mg and is sleeping 7-8 hours a night again. Client plans to continue to discuss this with psychiatrist.  Substance use: history of issues steming from substance use. Will continue to discuss and assess. Assessed safety. Client reports SI, denies intent, and denies plan. HI was not present. SIB was not present.  Safety plan has not been created in the past. Safety plan was reviewed today and includes calling 911, going to the emergency department (client specified [facility] as his preferred hospital if needed) and calling crisis lines if needed if SI/HI/SIB becomes overwhelming, worsens and/or becomes active. Client discussed his experiences in the past using crisis phone lines and [facility]. Client did not identify urgent concerns to be addressed today.     Continued IRT material on NAVIGATE program and it's components, as well as rapport building and assessment. Client appeared engaged in conversation and content.  Client stated that his mother may want to receive family support. Client submitted electronic consent to communicate form with \"billing information only\" having checked only \"billing information\" as topicsallowable to communicate with his mother about.  Client discussed his experiences of both physical and mental health supports related to various symptoms. Writer used active listening skills to build and increase understanding of Patient's experiences. Writer identified next step for IRT as reviewed areas of life satisfaction and making a treatment plan with client's goals.    Writer used relational and interpersonal approach to explore feelings, motivations, and behavior. Writer offered support, feedback, validation, and reinforced use of skills taught in IRT from modalities including cognitive behavioral therapy, psycho education, and skills training. Promoted understanding of their experiences of psychosis and how it " "impacts them in important areas of their life and in recovery goals. Reflected on client's strengths and resiliency factors and facilitated discussion on how these can assist in symptom management, recovery, and well-being.     Home practice identified as: NA    13. Plan/Referrals:     Next visit schedule for one week from today.     Billing for \"Interactive Complexity\"?    No      LEENA Lay    NAVIGATE Individual Resiliency Trainer  "

## 2024-01-10 NOTE — PROGRESS NOTES
Sent the following email in preparation for psychometry appt:    Vern Mcelroy,    Here is the link for the cognitive testing:    [email or web address]    This should take about 15-20 minutes, and can be done on a computer, tablet, or phone! Please complete this in a quiet space and by yourself. If you could complete this by the time that we meet, that would be great :)     Let me know if you have any questions,  Marya

## 2024-01-15 DIAGNOSIS — F29 PSYCHOSIS, UNSPECIFIED PSYCHOSIS TYPE (H): ICD-10-CM

## 2024-01-16 ENCOUNTER — VIRTUAL VISIT (OUTPATIENT)
Dept: PSYCHIATRY | Facility: CLINIC | Age: 29
End: 2024-01-16
Payer: COMMERCIAL

## 2024-01-16 DIAGNOSIS — F29 PSYCHOSIS, UNSPECIFIED PSYCHOSIS TYPE (H): Primary | ICD-10-CM

## 2024-01-16 RX ORDER — QUETIAPINE FUMARATE 400 MG/1
800 TABLET, FILM COATED ORAL AT BEDTIME
Qty: 60 TABLET | Refills: 1 | Status: SHIPPED | OUTPATIENT
Start: 2024-01-16 | End: 2024-03-18

## 2024-01-17 ENCOUNTER — VIRTUAL VISIT (OUTPATIENT)
Dept: PSYCHIATRY | Facility: CLINIC | Age: 29
End: 2024-01-17

## 2024-01-17 DIAGNOSIS — F29 PSYCHOSIS, UNSPECIFIED PSYCHOSIS TYPE (H): Primary | ICD-10-CM

## 2024-01-17 NOTE — PROGRESS NOTES
NAVIGGALINDO Clinician Contact & Progress Note  For Individual Resiliency Training (IRT)  A Part of the [facility] First Episode of Psychosis Program    NAVIGATE Enrollee: Navi Morales (1995)     MRN: [id number]  Date:  1/16/24  Diagnosis: Psychosis, unspecifed  Clinician: LILIANA Individual Resiliency Trainer, LEENA Lay     1. Type of contact: (majority of time spent)  IRT Session via telehealth  Mode of communication: American Well (HIPAA compliant, secure platform). Patient consented verbally to this mode of therapy today.  Reason for telehealth: COVID-19. This patient visit was converted to a telehealth visit to minimize exposure to COVID-19.    2. People  present:   Writer  Client: Yes     3. Length of Actual Contact: Start Time: 3:01; End Time: 4:03     4. Location of contact:  Originating Location (patient location): family home, located in [place], [place]  Distant Location (provider location): Home office, located in [place], [place], using appropriate privacy considerations and procedures    5. Did the client complete the home practice option(s) from the previous session: Not Applicable    6. Motivational Teaching Strategies:  Connect info and skills with personal goals  Promote hope and positive expectations  Explore pros and cons of change  Re-frame experiences in positive light    7. Educational Teaching Strategies:  Review of written material/education  Relate information to client's experience    8. CBT Teaching Strategies:  Reinforcement and shaping (positive feedback for steps towards goals and gains in knowledge & skills)    9. IRT Module(s) Addressed:  Module 1 - Orientation    10. Techniques utilized:   [place] announced at beginning of session  Present new material  Summarize progress made in current session  Other techniques (please specify):   -Built rapport with patient by inviting him to share his experiences and discuss his concerns  -Clarified patient's feelings and  perspectives  -Discussed crisis intervention plan and safety plan  -Elicited more details about current and recent circumstances  -Emotional support via active and reflective listening, responding to feelings etc.  -Normalized and validated feelings and experiences  -Provided verbal overview of First Episode Program NAVIGATE services  -Provided positive feedback and encouragement  -Provided psychoeducation related to psychosis and related concerns.   -Supportive counseling    11. Measures:    Mental Status Exam  Alertness: alert  and oriented  Behavior/Demeanor: cooperative and pleasant  Speech: regular rate and rhythm  Language: no obvious problem.   Mood: description consistent with euthymia  Thought Process/Associations: unremarkable  Thought Content:  Reports none;  Denies suicidal ideation and violent ideation  Perception:  Reports auditory hallucinations;  Denies none  Insight: adequate  Judgment: adequate for safety  Cognition: does  appear grossly intact; formal cognitive testing was not done    MELY-7  Not completed today.      PHQ-9  Not completed today.      [place] Protocol Risk Identification  Not completed today.      12. Assessment/Progress Note:     Writer and client met for IRT visit via Simpler. Set agenda to check in, discuss treatment plan and client's goals, and discuss IRT specific work. Client consented to this agenda. Reported current symptoms to include: AH and depression, both slightly increased. Explored symptoms and coping from a stress-vulnerability lens. Current stressors include: symptoms. In regards to medications, client reports: some concerns about dosage of meds and amount of sedation experienced. Client noted he was at 400 mg and was sleeping a typical 7-8 hours, then increased to 600mg and was sleeping 12+ hours, then increased to 800mg and is sleeping 7-8 hours a night again. Client plans to continue to discuss this with psychiatrist.  Substance use: history of issues steming  from substance use. Reports vaping tobacco and cannabis (delta 9) products as a coping strategy. Will continue to discuss and assess. Assessed safety. Client reports SI, denies intent, and denies plan. HI was not present. SIB was not present.  Safety plan has been created in the past. Safety plan was not  reviewed today and includes calling 911, going to the emergency department (client specified [facility] as his preferred hospital if needed) and calling crisis lines if needed if SI/HI/SIB becomes overwhelming, worsens and/or becomes active. Client did not identify urgent concerns to be addressed today.     Today's visit:  Continued IRT material on NAVIGATE program and it's components, as well as rapport building and assessment. Client appeared engaged in conversation and content.  Client discussed his experiences of both physical and mental health symptoms. Writer used active listening skills to build and increase understanding of Patient's experiences. Writer clarified my understanding of Navi's thoughts and perspectives. Writer requested to discuss and identify goals - Navi agreed and discuss how he has an overall goal of more stability mentally and physically. Writer discussed various components of education in the program and goals that could be linked to those components and worked with Navi to identify three goals (see treatment plan below).    Writer used relational and interpersonal approach to explore feelings, motivations, and behavior. Writer offered support, feedback, validation, and reinforced use of skills taught in IRT from modalities including cognitive behavioral therapy, psycho education, and skills training. Promoted understanding of their experiences of psychosis and how it impacts them in important areas of their life and in recovery goals. Reflected on client's strengths and resiliency factors and facilitated discussion on how these can assist in symptom management, recovery, and well-being.      Home practice identified as: NA    Email sent 1/3/2024 regarding crisis resources, see below:  CRISIS RESOURCES:      Immediate safety concerns (medical or safety emergency)   o Call 911      Hospital Emergency Department  o First consider going to the nearest hospital emergency department if it is a medical emergency  o Then consider [facility], as it is your preferred hospital.   - [address] [place] [address]  - [phone]  o Also consider going to [facility] emergency department  -  to the EmPATH unit within the ED  - Specialty ED department for persons 18 and older experiencing a mental health crisis, staffed by mental health professionals  - EmPath helps get people stabilize (acute crisis, SI, psychosis, etc.) and get set up with outpatient services if they don t already have them.   - [facility] [phone]. Address is [facility] [place] [address]  - [email or web address]   o Or the [facility] emergency department  - Has the inpatient behavioral health units on site for consultations or inpatient admissions if needed .  - [facility] [phone]. Address is [address] [place] [address]    Crisis lines  o [facility] Crisis Line: [phone] (call 24/7)  o National Suicide Prevention Hotline: 180-838-WENE (8455)    - OR  new national number - call 988     o [place] Crisis Text Line: Text  [place]  to 736 764 (if outside of your home county - to be connected to the nearest [facility] crisis staff)    o [place] warm line:  Call 878-007-7975, or text  Support  to 6[address]. Provides a uiqf-cc-rxak approach to mental health recovery, support and wellness. Calls are answered by a team of professionally trained Certified Peer Specialists, who have first-hand experience living with a mental health condition. The Warmline provides a safe, anonymous and  "confidential environment to connect with people who are here to listen and help. Open Monday-Saturday, 5:00 to 10 :00 p.m.   o [facility] warmline: you can call or text to 840-944-1475 from 5:00 p.m. to 9:00 a.m.      [place] After Hours number:  o [phone]. For evenings and weekends, when NAVIGATE is closed. Ask for the resident on call, they can field medication questions after hours.    13. Treatment Plan:     Treatment Plan________________________________________________________________________  Reviewed 1/16/2024    Pt s measurable objectives (list 3)  o Reduce intensity of psychosis symptoms  o Demonstrate understanding of symptoms regarding causes, treatment  o Learn two skills to manage symptoms of psychosis    In Pt s own words   o  I want to have stability both mentally and psychically.     Interventions  o IRT  o Medication Management  o SEE  o Family support and education  o Social work care coordination    Target date of discharge  o 12-36 months from enrollment    Discharge criteria  o Marked and sustained symptom improvement  o Demonstrated understanding of mental illness  o Successful implementation of strategies to cope with stressors/symptoms to mitigate risk for increase in symptoms severity or relapse    Gains made (listed out objectives accomplished)    Frequency of sessions and expected duration of treatment:   o 6-12 months of weekly IRT/Individual Psychotherapy followed by 12-24 months of biweekly or monthly IRT    Participants in therapy plan: Navi Morales and IRT LEENA Lay    Support System: parents, particularly mother and providers    14. Plan/Referrals:     Next visit schedule for one week from today.     Billing for \"Interactive Complexity\"?    No      LEENA Lay Individual Resiliency Trainer  "

## 2024-01-17 NOTE — PROGRESS NOTES
[facility]  Psychiatry Clinic  Psychological Testing by Psychometrist     Navi Morales MRN# [id number]   Age: 28 year old YOB: 1995     Date:  1/17/24    Video- Visit Details   Type of service:  video visit  Video start time:  2:00pm  Video end time:  3:10pm  Originating location (patient location):  [facility]   Location- Home  Distant Site (provider location): HIPAA compliant location Off-site  Platform used for video visit:  Secure real time interactive audio and visual telecommunication system via [employer]     Attendees: Patient attended the session alone  : No, English    Identifying Information/Reason for Referral  Navi Morales is a 28 year old male who prefers the name Navi & pronouns he, him, his.  Navi has a history of possible psychosis  .  The patient was seen for psychological testing. The purpose of the current psychological evaluation was to provide information about Navi's social, emotional and cognitive functioning, to assist with diagnostic clarification and to guide his treatment and recovery planning. Results of the following assessments are to be used in conjunction with the standard diagnostic evaluation.    A total of 70 minutes were spent in test administration and scoring by this writer, veronica Boyd.  See Testing Evaluation Report on future date for a full interpretation of the findings and data.     Summary of Services/Procedures  Navi was administered a psychological test battery tailored to his age and the referral questions.     Tests Administered  [place] Assessment of Need - Short Appraisal Schedule (CANSAS)  [place] Symptom Index  MIRECC Global Assessment of Functioning  Illness Management and Recovery - Practitioner Rating  Illness Management and Recovery - Self Rating  [place] Symptom Severity Scale  Post Traumatic Stress Disorder Checklist (PCL-5 8 Item)  Audit-C  DAST-10  PROMIS-Sleep  "Disturbance  International Physical Activity Questionnaire (IPAQ)  Behavioral Inhibition and Activation Scale- BIS/BAS  Brief [place]-28 Item  Life Event Checklist- LEC Intake / Follow Up  EPINET Core Assessment Battery Intake / Ongoing  Shared Decision Making in Clinical Encounters  Intersectional Discrimination Index    Behavioral Observations  In this appt, Jack did mention some SI. Writer checked in, and he said that his thoughts were \"at ease\" today. No need to check in with clinicians, but did mention that he should maybe talk to Tesha Hartmann about a safety plan if he has not already. Overall, Jack demonstrated good effort throughout testing. Therefore, the current evaluation results are thought to provide a accurate representation of his functioning in the areas assessed.     Plan  Testing is NOT complete. Patient is scheduled to return to complete additional testing on 1/22/2024    Will coordinate care with other treatment providers to assist with planning as needed.      Latonia Schrader  Psychometrist      Billing for this encounter (CPT 57489, 78922) will occur on a later date when a qualified health professional reviews the findings with the patient in a psychological evaluation appointment (CPT 55039, 63065).  "

## 2024-01-22 ENCOUNTER — VIRTUAL VISIT (OUTPATIENT)
Dept: PSYCHIATRY | Facility: CLINIC | Age: 29
End: 2024-01-22

## 2024-01-22 DIAGNOSIS — F29 PSYCHOSIS, UNSPECIFIED PSYCHOSIS TYPE (H): Primary | ICD-10-CM

## 2024-01-23 ENCOUNTER — VIRTUAL VISIT (OUTPATIENT)
Dept: PSYCHIATRY | Facility: CLINIC | Age: 29
End: 2024-01-23
Payer: COMMERCIAL

## 2024-01-23 DIAGNOSIS — F29 PSYCHOSIS, UNSPECIFIED PSYCHOSIS TYPE (H): Primary | ICD-10-CM

## 2024-01-23 ASSESSMENT — ANXIETY QUESTIONNAIRES
1. FEELING NERVOUS, ANXIOUS, OR ON EDGE: NEARLY EVERY DAY
8. IF YOU CHECKED OFF ANY PROBLEMS, HOW DIFFICULT HAVE THESE MADE IT FOR YOU TO DO YOUR WORK, TAKE CARE OF THINGS AT HOME, OR GET ALONG WITH OTHER PEOPLE?: EXTREMELY DIFFICULT
GAD7 TOTAL SCORE: 20
GAD7 TOTAL SCORE: 20
7. FEELING AFRAID AS IF SOMETHING AWFUL MIGHT HAPPEN: NEARLY EVERY DAY
2. NOT BEING ABLE TO STOP OR CONTROL WORRYING: NEARLY EVERY DAY
6. BECOMING EASILY ANNOYED OR IRRITABLE: NEARLY EVERY DAY
4. TROUBLE RELAXING: NEARLY EVERY DAY
5. BEING SO RESTLESS THAT IT IS HARD TO SIT STILL: NEARLY EVERY DAY
3. WORRYING TOO MUCH ABOUT DIFFERENT THINGS: MORE THAN HALF THE DAYS
7. FEELING AFRAID AS IF SOMETHING AWFUL MIGHT HAPPEN: NEARLY EVERY DAY
IF YOU CHECKED OFF ANY PROBLEMS ON THIS QUESTIONNAIRE, HOW DIFFICULT HAVE THESE PROBLEMS MADE IT FOR YOU TO DO YOUR WORK, TAKE CARE OF THINGS AT HOME, OR GET ALONG WITH OTHER PEOPLE: EXTREMELY DIFFICULT

## 2024-01-24 NOTE — PROGRESS NOTES
NAVIGGALINDO Clinician Contact & Progress Note  For Individual Resiliency Training (IRT)  A Part of the [facility] First Episode of Psychosis Program    NAVIGATE Enrollee: Navi Morales (1995)     MRN: [id number]  Date:  1/23/24  Diagnosis: Psychosis, unspecifed  Clinician: LILIANA Individual Resiliency Trainer, LEENA Lay     1. Type of contact: (majority of time spent)  IRT Session via telehealth  Mode of communication: American Well (HIPAA compliant, secure platform). Patient consented verbally to this mode of therapy today.  Reason for telehealth: COVID-19. This patient visit was converted to a telehealth visit to minimize exposure to COVID-19.    2. People  present:   Writer  Client: Yes     3. Length of Actual Contact: Start Time: 3:12; End Time: 4:02     4. Location of contact:  Originating Location (patient location): family home, located in [place], [place]  Distant Location (provider location): Home office, located in [place], [place], using appropriate privacy considerations and procedures    5. Did the client complete the home practice option(s) from the previous session: Not Applicable    6. Motivational Teaching Strategies:  Connect info and skills with personal goals  Promote hope and positive expectations  Explore pros and cons of change  Re-frame experiences in positive light    7. Educational Teaching Strategies:  Review of written material/education  Relate information to client's experience    8. CBT Teaching Strategies:  Reinforcement and shaping (positive feedback for steps towards goals and gains in knowledge & skills)    9. IRT Module(s) Addressed:  Module 1 - Orientation    10. Techniques utilized:   [place] announced at beginning of session  Present new material  Summarize progress made in current session  Other techniques (please specify):   -Built rapport with patient by inviting him to share his experiences and discuss his concerns  -Clarified patient's feelings and  perspectives  -Discussed crisis intervention plan and safety plan  -Elicited more details about current and recent circumstances  -Emotional support via active and reflective listening, responding to feelings etc.  -Normalized and validated feelings and experiences  -Provided verbal overview of First Episode Program NAVIGATE services  -Provided positive feedback and encouragement  -Provided psychoeducation related to psychosis and related concerns.   -Supportive counseling    11. Measures:    Mental Status Exam  Alertness: alert  and oriented  Behavior/Demeanor: cooperative and pleasant  Speech: regular rate and rhythm  Language: no obvious problem.   Mood: description consistent with euthymia  Thought Process/Associations: unremarkable  Thought Content:  Reports none;  Denies suicidal ideation and violent ideation  Perception:  Reports auditory hallucinations;  Denies none  Insight: adequate  Judgment: adequate for safety  Cognition: does  appear grossly intact; formal cognitive testing was not done    MELY-7  Not completed today.      PHQ-9  Not completed today.      [place] Protocol Risk Identification  Not completed today.      12. Assessment/Progress Note:     Writer and client met for IRT visit via IP Ghoster. Set agenda to check in, discuss treatment plan and client's goals, and discuss IRT specific work. Client consented to this agenda. Reported current symptoms to include: AH and depression, both at an increased level. Explored symptoms and coping from a stress-vulnerability lens. Current stressors include: symptoms. In regards to medications, client reports: some concerns about dosage of meds and amount of sedation experienced. Client noted he was at 400 mg and was sleeping a typical 7-8 hours, then increased to 600mg and was sleeping 12+ hours, then increased to 800mg and is sleeping 7-8 hours a night again. Client plans to continue to discuss this with psychiatrist.  Substance use: history of issues steming  from substance use. Reports vaping tobacco and cannabis (delta 9) products as a coping strategy. Will continue to discuss and assess. Assessed safety. Client reports SI, denies intent, and denies plan. HI was not present. SIB was not present.  Safety plan has been created in the past. Safety plan was not  reviewed today and includes calling 911, going to the emergency department (client specified [facility] as his preferred hospital if needed) and calling crisis lines if needed if SI/HI/SIB becomes overwhelming, worsens and/or becomes active.     Client did identify urgent concerns to be addressed today - particularly client reported that his dog has been very ill for a couple of days and the dog was euthanized at home with the client an hour before this visit - client wanted to process this experience and it's impacts.     Today's visit:  Continued IRT material on NAVIGATE program and it's components, as well as rapport building and assessment. Client appeared engaged in conversation and content. Client discussed th events of the previous few days that led to the loss of his dog Milo. Client shared history with the dog, the experience of learning about the ill health of the dog and difficulty coming to terms with it, and the grief process, both as an individual and with his family. Client discussed previous experiences with loss and grief (the loss of his grandparents) and the impact that grief has on his psychosis symptoms, as well as the impact of on-going baseline psychosis symptoms on his expeience of loss and grief.     Writer and client explicitly discuss safety - client endorse SI (which he has identities as passive and daily as his baseline). Client denied plan or intent. Writer and client dicussed coping strategies of distraction, soothing, and redirecting attention. Writer sent client information about grounding techniques of using an ice pack on the sternum, the 5-4-3-2-1 sensory grounding technique and the  physiological sigh, discussed it's impact on soothing the vagus nerve. Will continue at next visit.     Client mentioned substance use (regular vaping of nicotine and delta 9 and occasional alcohol use, with a Hx of issues related to use). Writer inquired about Navi's openness to discussion of his substance use with this writer. Navi reports that he feels that substance use is an effective way to cope with symptoms and seems to also report having experienced negative consequences of use in the past. Navi was open to further analysis and discussion with this writer in the future.     Writer used relational and interpersonal approach to explore feelings, motivations, and behavior. Writer offered support, feedback, validation, and reinforced use of skills taught in IRT from modalities including cognitive behavioral therapy, psycho education, and skills training. Promoted understanding of their experiences of psychosis and how it impacts them in important areas of their life and in recovery goals. Reflected on client's strengths and resiliency factors and facilitated discussion on how these can assist in symptom management, recovery, and well-being.     Home practice identified as: NA      Email sent 1/3/2024 regarding crisis resources, see below:  CRISIS RESOURCES:      Immediate safety concerns (medical or safety emergency)   o Call 911      Hospital Emergency Department  o First consider going to the nearest hospital emergency department if it is a medical emergency  o Then consider [facility], as it is your preferred hospital.   - [address] [place] [address]  - [phone]  o Also consider going to [facility] emergency department  -  to the EmPATH unit within the ED  - Specialty ED department for persons 18 and older experiencing a mental health crisis, staffed by mental health professionals  - EmPath helps get people stabilize (acute crisis, SI, psychosis, etc.) and get set up with  outpatient services if they don t already have them.   - [facility] [phone]. Address is [address] [place] [address]  - [email or web address]   o Or the [facility] emergency department  - Has the inpatient behavioral health units on site for consultations or inpatient admissions if needed .  - [facility] [phone]. Address is [address] [place] [address]    Crisis lines  o [facility] Crisis Line: [phone] (call 24/7)  o [place] Suicide Prevention Hotline: 447-550-BXHY (0336)    - OR  new national number - call 988     o [place] Crisis Text [place]: Text  [place]  to 135 734 (if outside of your home county - to be connected to the nearest county crisis staff)    o [place] warm line:  Call 619-438-6081, or text  Support  to [address]. Provides a aiow-ds-toma approach to mental health recovery, support and wellness. Calls are answered by a team of professionally trained Certified Peer Specialists, who have first-hand experience living with a mental health condition. The Warmline provides a safe, anonymous and confidential environment to connect with people who are here to listen and help. Open Monday-Saturday, 5:00 to 10 :00 p.m.   o [facility] warmline: you can call or text to 768-454-6378 from 5:00 p.m. to 9:00 a.m.      [place] After Hours number:  o [phone]. For evenings and weekends, when NAVIGATE is closed. Ask for the resident on call, they can field medication questions after hours.    13. Treatment Plan:     Treatment Plan________________________________________________________________________  Reviewed 1/16/2024    Pt s measurable objectives (list 3)  o Reduce intensity of psychosis symptoms  o Demonstrate understanding of symptoms regarding causes, treatment  o Learn two skills to manage symptoms of psychosis    In Pt s own words   o  I want to have  "stability both mentally and psychically.     Interventions  o IRT  o Medication Management  o SEE  o Family support and education  o Social work care coordination    Target date of discharge  o 12-36 months from enrollment    Discharge criteria  o Marked and sustained symptom improvement  o Demonstrated understanding of mental illness  o Successful implementation of strategies to cope with stressors/symptoms to mitigate risk for increase in symptoms severity or relapse    Gains made (listed out objectives accomplished)    Frequency of sessions and expected duration of treatment:   o 6-12 months of weekly IRT/Individual Psychotherapy followed by 12-24 months of biweekly or monthly IRT    Participants in therapy plan: Navi Morales and IRT LEENA Lay    Support System: parents, particularly mother and providers    14. Plan/Referrals:     Next visit schedule for one week from today.     Billing for \"Interactive Complexity\"?    No      LEENA Lay    NAVIGATE Individual Resiliency Trainer  "

## 2024-01-30 ENCOUNTER — VIRTUAL VISIT (OUTPATIENT)
Dept: PSYCHIATRY | Facility: CLINIC | Age: 29
End: 2024-01-30
Payer: COMMERCIAL

## 2024-01-30 ENCOUNTER — TELEPHONE (OUTPATIENT)
Dept: PSYCHIATRY | Facility: CLINIC | Age: 29
End: 2024-01-30

## 2024-01-30 DIAGNOSIS — F29 PSYCHOSIS, UNSPECIFIED PSYCHOSIS TYPE (H): Primary | ICD-10-CM

## 2024-01-30 NOTE — TELEPHONE ENCOUNTER
Navi A Seaford's mother, Mona called in to inform clinical staff that patient's family pet recently passed away and patient seemed to be having a hard time with this. Mona states that patient has initiated hanging out with the family more but after pet passed away he retracted and wanted to be in his room more, away from family. Mona states that Navi's increased activity with family lasted for 2 days,

## 2024-01-31 ENCOUNTER — VIRTUAL VISIT (OUTPATIENT)
Dept: PSYCHIATRY | Facility: CLINIC | Age: 29
End: 2024-01-31
Payer: COMMERCIAL

## 2024-01-31 VITALS — WEIGHT: 160 LBS | BODY MASS INDEX: 22.9 KG/M2 | HEIGHT: 70 IN

## 2024-01-31 DIAGNOSIS — F10.90 ALCOHOL USE DISORDER: ICD-10-CM

## 2024-01-31 DIAGNOSIS — F29 PSYCHOSIS, UNSPECIFIED PSYCHOSIS TYPE (H): Primary | ICD-10-CM

## 2024-01-31 DIAGNOSIS — F12.90 CANNABIS USE DISORDER: ICD-10-CM

## 2024-01-31 RX ORDER — DUTASTERIDE 0.5 MG/1
0.5 CAPSULE, LIQUID FILLED ORAL DAILY
COMMUNITY
Start: 2024-01-03

## 2024-01-31 RX ORDER — OLANZAPINE 15 MG/1
TABLET ORAL
Qty: 30 TABLET | Refills: 1 | Status: SHIPPED | OUTPATIENT
Start: 2024-01-31 | End: 2024-02-19

## 2024-01-31 ASSESSMENT — PAIN SCALES - GENERAL: PAINLEVEL: NO PAIN (0)

## 2024-01-31 ASSESSMENT — PATIENT HEALTH QUESTIONNAIRE - PHQ9: SUM OF ALL RESPONSES TO PHQ QUESTIONS 1-9: 15

## 2024-01-31 NOTE — PROGRESS NOTES
"Virtual Visit Details    Type of service:  Video Visit   Video Start Time:  3:30  Video End Time: 4:00    Originating Location (pt. Location): Home    Distant Location (provider location):  On-site  Platform used for Video Visit: Unifyo    [facility] Medication Management Progress Note  A Part of the [facility] First Episode of Psychosis Program    NAVIGATE Enrollee: Navi Morales (1995)     MRN: [id number]  Date:  24         Contributors to the Assessment     Chart Reviewed.   Interview completed with Navi Morales.  Collateral information obtained from none.         Chief Complaint     \"The same\"         Interim History      Navi Morales is a 28 year old male who was last seen in [facility] clinic on 23 at which time naltrexone was started. The patient reports good treatment adherence. History was provided by Navi who was a good historian.  Since the last visit:  -feels \"the same\" with even worse self-concept concept than before.   -\"Just going through a lot of other stuff where I have spent an enormous amount of time trying to be OK with.\" Struggling after his dog had to be put down last week. It was a huge loss. He spent 48 hours crying. With his dog \"something just connected with me,\" being able to express himself emotionally and be there. It felt   -Feels like he can just \"put all my depression into\" the grief. Feels like he has never been able to put it into that before  -Feels like \"everything is just a huge obstacle.\"   -Has been taking the 800 mg of quetiapine nightly with no real change in symptoms. Only has missed 4 times in the past month    PSYCH ROS:  Clinician Rating Form in COMPASS  1. Depressed Mood: Ratin  0 Not reported     1 Very mild occasionally feels sad or  down ; of questionable clinical significance   2 Mild occasionally feels moderately depressed or often feels sad or  down    3 Moderate occasionally feels very depressed or often feels moderately depressed   4 Moderately severe " often feels very depressed   5 Severe feels very depressed most of the time   6 Very severe constant extremely painful feelings of depression   U Unable to assess        2. Anxiety/Worry: Ratin  0 Not reported     1 Very mild occasionally feels a little anxious; of questionable clinical significance   2 Mild occasionally feels moderately anxious or often feels a little anxious or worried   3 Moderate occasionally feels very anxious or often feels moderately anxious   4 Moderately severe often feels very anxious or often feels moderately anxious   5 Severe feels very anxious or worried most of the time   6 Very severe patient is continually preoccupied with severe anxiety   U Unable to assess        3. Suicidal Ideation/Behavior: Ratin          0 Not reported     1 Very mild occasional thoughts of dying,  I d be better off dead  or  I wish I were dead    2 Mild frequents thoughts of dying or occasional thoughts of killing self, without plan or method   3 Moderate often thinks of suicide or has though of a specific method   4 Moderately severe has mentally rehearsed a specific method of suicide or has made a suicide attempt with questionable intent to die (e.r. takes aspirins and then tells family)   5 Severe has made preparations for a potentially lethal suicide attempt (e.g acquires a gun and bullets for an attempt)   6 Very severe has made a suicide attempt with an intent to die   U Unable to assess                      4. Elevated/Expansive Mood: Ratin  0 Not at all     1 Very mild questionable; more cheerful than most people in his/her circumstances but of only possible clinical significance   2 Mild brief elevated/expansive mood but only somewhat out of proportion to the circumstances   3 Moderate brief/occasional elevation of mood which is clearly out of proportion to the circumstances   4 Moderately severe sustained/frequent elevation of mood which is clearly out of proportion to the circumstances    5 Severe mood is euphoric most of the time   6 Very severe sustained elevation;  everything is wonderful  almost all of the time   U Unable to assess        5. Hostility/Anger/Irritability/Aggressiveness: Ratin  0 Not at all     1 Very mild occasional irritability of doubtful clinical significance   2 Mild occasionally feels angry or mild or indirect expressions of anger, e.g. sarcasm, disrespect or hostile gestures   3 Moderate frequently feels angry, frequent irritability or occasional direct expression of anger, e.g. yelling at others   4 Moderately severe often feels very angry, often yells at others or occasionally threatens to harm others   5 Severe has acted on his anger by becoming physically abusive on one or two occasions or makes frequent threats to harm others or is very angry most of the time   6 Very severe has been physically aggressive and/or required intervention to prevent assaultiveness on several occasions; or any serious assaultive act   U Unable to assess        6. Impulsive Behavior: Ratin  0 Not at all     1 Very mild one instance of impulsive behavior which is of doubtful clinical significance   2 Mild occasional impulsive acts, e.g. making phone calls at odd hours   3 Moderate occasional impulsive acts with some potential negative consequence, e.g. leaving work abruptly; changing plans without thinking   4 Moderately severe impulsive acts with definite negative consequences, e.g. overspending on non-essentials; repeated reckless sexual behavior   5 Severe impulsive acts with direct negative consequences, e.g. spends entire income on nonessentials without regard for basic needs   6 Very severe impulsive behavior which is potentially life threatening, e.g. jumps from dangerous height (without suicidal intent) or criminal behavior, e.g. impulsive robbery   U Unable to assess        7. Suspiciousness: Ratin  0 Not present     1 Very mild Seems on guard. Reluctant to respond to  some  personal  questions. Reports being overly self-conscious in public   2 Mild Describes incidents in which others have harmed or wanted to harm him/her that sound plausible. Patient feels as if others are watching, laughing, or criticizing him/her in public, but this occurs only occasionally or rarely. Little or no preoccupation   3 Moderate Says others are talking about him/her maliciously, have negative intentions, or may harm him/her. Beyond the likelihood of plausibility, but not delusional. Incidents of suspected persecution occur occasionally (less than once per week) with some preoccupation   4 Moderately severe Same as 3, but incidents occur frequently such as more than once a week. Patient is moderately preoccupied with ideas of persecution OR patient reports persecutory delusions expressed with much doubt (e.g. partial delusion)   5 Severe Delusional -- speaks of Mafia plots, the FBI, or others poisoning his/her food, persecution by supernatural forces   6 Extremely severe Same as 5, but the beliefs are bizarre or more preoccupying. Patient tends to disclose or act on persecutory delusions.   U Unable to assess        8. Unusual Thought Content: Ratin  0 Not present     1 Very mild Ideas of reference (people may stare or may laugh at him), ideas of persecution (people may mistreat him). Unusual beliefs in psychic tyler, spirits, UFOs, or unrealistic beliefs in one's own abilities. Not strongly held. Some doubt   2 Mild Same as 1, but degree of reality distortion is more severe as indicated by highly unusual ideas or greater conviction. Content may be typical of delusions (even bizarre), but without full conviction. The delusion does not seem to have fully formed, but is considered as one possible explanation for an unusual experience   3 Moderate Delusion present but no preoccupation or functional impairment. May be an encapsulated delusion or a firmly endorsed absurd belief about past delusional  circumstances   4 Moderately severe Full delusion(s) present with some preoccupation OR some areas of functioning disrupted by delusional thinking   5 Severe Full delusion(s) present with much preoccupation OR many areas of functioning are disrupted by delusional thinking   6 Extremely severe Full delusions present with almost   U Unable to assess        9. Hallucinations: Ratin  0 Not present     1 Very mild While resting or going to sleep, sees visions, smells odors, or hears voices, sounds or whispers in the absence of external stimulation, but no impairment in functioning   2 Mild While in a clear state of consciousness, hears a voice calling the subject s name, experiences non-verbal auditory hallucinations (e.g., sounds or whispers), formless visual hallucinations, or has sensory experiences in the presence of a modality-relevant stimulus (e.g., visual illusions) infrequently (e.g., 1-2 times per week) and with no functional impairment   3 Moderate Occasional verbal, visual, gustatory, olfactory, or tactile hallucinations with no functional impairment OR non-verbal auditory hallucinations/visual illusions more than infrequently or with impairment   4 Moderately severe Experiences daily hallucinations OR some areas of functioning are disrupted by hallucinations   5 Severe Experiences verbal or visual hallucinations several times a day OR many areas of functioning are disrupted by these hallucinations   6 Extremely severe Persistent verbal or visual hallucinations throughout the day OR most areas of functioning are disrupted by these hallucinations   U Unable to assess        10. Conceptual Disorganization: Ratin  0 Not present     1 Very mild Peculiar use of words or rambling but speech is comprehensible   2 Mild Speech a bit hard to understand or make sense of due to tangentiality, circumstantiality, or sudden topic shifts   3 Moderate Speech difficult to understand due to tangentiality,  circumstantiality, idiosyncratic speech, or topic shifts on many occasions OR 1-2 instances of incoherent phrases   4 Moderately severe Speech difficult to understand due to circumstantiality, tangentiality, neologisms, blocking, or topic shifts most of the time OR 3-5 instances of incoherent phrases   5 Severe Speech is incomprehensible due to severe impairments most of the time. Many PSRS items cannot be rated by self-report alone   6 Extremely severe Speech is incomprehensible throughout interview   U Unable to assess        11. Avolition/Apathy: Ratin  0 Not at all     1 Very mild questionable decrease in time spent in goal-directed activities   2 Mild spends less time in goal-directed activities than is appropriate for situation and age   3 Moderate initiates activities at times but does not follow through   4 Moderately severe rarely initiates activity but will passively engage with encouragement   5 Severe almost never initiates activities; requires assistance to accomplish basic activities   6 Very severe does not initiate or persist in any goal-directed activity even with outside assistance   U Unable to assess        12. Asociality/Low Social Drive: Ratin  0 Not at all     1 Very mild questionable   2 Mild slow to initiate social interactions but usually responds to overtures by others   3 Moderate rarely initiates social interactions; sometimes responds to overtures by others   4 Moderately severe does not initiate but sometimes responds to overtures by others; little social interaction outside close family members   5 Severe never initiates and rarely encourages conversations or activities; avoids being with others unless prodded, may have contacts with family   6 Very severe avoids being with others (even family members) whenever possible, extreme social isolation   U Unable to assess        13. Adherence: Days: 4  The longest, continuous amount of time in days, since the last visit when the  subject did not take medication      14. EPS Part I: Rating: U  Rate Elbow Rigidity for all subjects  0 Normal   1 Slight stiffness and resistance   2 Moderate stiffness and resistance   3 Marked rigidity with difficulty in passive movement   4 Extreme stiffness and rigidity with almost a frozen joint   U Unable to assess            EPS Part 2: Signs of EPS: 0  Are there are other signs of EPS (eg diminished arm swing, postural instability, cogwheeling, tremor, akinesia) present based upon patient report or exam?  0 No   1 Yes      15. Akathesia: Ratin  0 No restlessness reported or observed   1 Mild restlessness observed; e.g., occasional jiggling of the foot occurs when subject is seated   2 Moderate restlessness observed; e.g., on several occasions, jiggles foot, crosses and uncrosses legs or twists a part of the body   3 Restlessness is frequently observed; e.g., the foot or legs moving most of the time   4 Restlessness persistently observed; e.g., subject cannot sit still, may get up and walk   U Unable to assess      16. Dyskinetic Movement Ratings: Ratin  0 None   1 Minimal, may be extreme normal   2 Mild   3 Moderate   4 Severe   U Unable to assess      SIDE EFFECT ASSESSMENT:  Clinician Rating Form in COMPASS - Side Effect Assessment  Address side effects reported by the patient and rate using this scale  0 Not present   1 Minimal, may be extreme normal   2 Mild   3 Moderate   4 Severe   U Unable to assess   P Present, but not related      Feeling dizzy or faint: 2  Blurred vision: 0  Dry mouth: 2  Too much saliva/droolin  Nausea:  0  Constipation: 0  Increased appetite: 0  Weight gain: 0  Weight loss: 0  Feeling tired/fatigue: 0  Daytime sedation: 0  Hypersomnia: 2  Insomnia: 2  Low libido: 2  Other problems with sex: 2  Breast enlargement or discharge:  0  Irregular Menstruation or amenorrhea: 0  Other (please list and rate):      RECENT SUBSTANCE USE:  Clinician Rating Form in COMPASS -  Substance Use Assessment  Alcohol Use Severity: Ratin  0 none   1 use without impairment: drinks but no immediate social or medical impairment   2 use with impairment: e.g. becomes grossly intoxicated; alcohol use or withdrawal compromises school, work or social functioning; alcohol use or withdrawal exacerbates symptoms (e.g. gets depressed when drinking)      Marijuana Use Severity: Rating: 3  0 none   1 occasional use without impairment: e.g. uses marijuana a few days a month and has no immediate social or medical impairment   2 frequent use without impairment: e.g. uses marijuana several or more days a week but has no immediate social or medical impairment   3 use with impairment: e.g. becomes grossly intoxicated; marijuana use compromises school, work or social functioning; marijuana use exacerbates symptoms (e.g. gets paranoid when using)      Other Drug Use Severity: Ratin  0 none   1 occasional use without impairment: e.g. uses drug(s) a few days a month and has no immediate social or medical impairment   2 frequent use without impairment: e.g. uses drug(s) several or more days a week but has no immediate social or medical impairment   3 use with impairment: e.g. becomes grossly intoxicated; drug use compromises school, work or social functioning; drug use exacerbates symptoms (e.g. gets paranoid when using)      RECENT SOCIAL HISTORY:  SEE HPI    Medical ROS:  none         First Episode of Psychosis History      DUP (duration untreated psychosis):  4 years  Route to initial care: outpatient  Medication adherence overall:  See above, Clinician Rating Form in COMPASS Item 13  General frequency of visits:  weekly IRT, monthly med management  Participation in groups:  No  Cognitive Remediation:  No  Other treatment history:     Reviewed for completion of First Episode work-up:  Yes  First episode workup:  Not Done (if completed, see LABS for results)  MATRICS Consensus Cognitive Battery:  Not Done (if  "completed, see LABS for results)         Medical/Surgical History     Patient has no known allergies.    Patient Active Problem List   Diagnosis   (none) - all problems resolved or deleted            Medications     Current Outpatient Medications   Medication Sig Dispense Refill    cholecalciferol, vitamin D3, 1,000 unit (25 mcg) tablet [CHOLECALCIFEROL, VITAMIN D3, 1,000 UNIT (25 MCG) TABLET] Take 2,000 Units by mouth daily.      dutasteride (AVODART) 0.5 MG capsule Take 0.5 mg by mouth daily      ketoconazole (NIZORAL) 2 % external shampoo Apply topically daily as needed LATHER INTO SCALP AND RINSE AFTER 2-3 MINUTES. USE THREE TIMES A WEEK.      magnesium chloride (SLOW-MAG) 64 mg TbEC delayed-release tablet [MAGNESIUM CHLORIDE (SLOW-MAG) 64 MG TBEC DELAYED-RELEASE TABLET] Take 64 mg by mouth daily.      naltrexone (DEPADE/REVIA) 50 MG tablet Take 1 tablet (50 mg) by mouth daily 30 tablet 1    QUEtiapine (SEROQUEL) 400 MG tablet Take 2 tablets (800 mg) by mouth at bedtime 60 tablet 1    tadalafil (CIALIS) 10 MG tablet Take 10 mg by mouth daily      zinc gluconate 50 mg tablet [ZINC GLUCONATE 50 MG TABLET] Take 100 mg by mouth daily.      QUEtiapine (SEROQUEL) 50 MG tablet Take 1 tablet (50 mg) by mouth 2 times daily as needed (Patient not taking: Reported on 12/27/2023) 60 tablet 1             Vitals     Ht 1.778 m (5' 10\")   Wt 72.6 kg (160 lb)   BMI 22.96 kg/m        Weight prior to medication: unknown         Mental Status Exam     Alertness: alert  and oriented  Appearance: well groomed  Behavior/Demeanor: cooperative, pleasant, and calm, with good  eye contact   Speech: regular rate and rhythm, not pressured  Language: no obvious problem  Psychomotor: normal or unremarkable  Mood: depressed  Affect: blunted; was congruent to mood; was congruent to content  Thought Process/Associations:  more linear than at last assessment  Thought Content:  Reports suicidal ideation without plan; without intent [details in " Interim History];  Denies violent ideation  Perception:  Reports auditory hallucinations;  Denies visual hallucinations  Insight: good  Judgment: fair and adequate for safety  Cognition: does  appear grossly intact; formal cognitive testing was not done         Labs and Data     RATING SCALES:  AIMS: next in person visit    PHQ9 TODAY =       1/31/2024     3:03 PM   PHQ-9 SCORE   PHQ-9 Total Score 15       ANTIPSYCHOTIC LABS ROUTINE    [glu, A1C, lipids (focus LDL), liver enzymes, WBC, ANEU, Hgb, plts]   q12 mo  Recent Labs   Lab Test 06/30/23  1048   GLC 84     No lab results found.  Recent Labs   Lab Test 06/30/23  1048   AST 39   ALT 40   ALKPHOS 88     Recent Labs   Lab Test 06/30/23  1048   WBC 7.7   HGB 14.9               Psychiatric Diagnoses     Psychosis, schizophrenia vs schizoaffective disorder, r/o substance-induced  AUD in early remission  Cannabis use disorder with active use         Assessment     TODAY Navi reports worsening depression in the context of losing his dog last week. He is also very tormented by AH still. However, objectively on MSE his thought process is more linear and speech pattern easier to follow than at the last appointment. However, he is requesting a medication change as 800 mg of Seroquel for one month has not helped with AH. I do feel this is reasonable especially as he is experiencing worsening depression in response to the AH. Proposed cross-taper to Zyprexa, to which he was amenable.     We discussed the utility of antidepressants in this situation, and he described adverse psychiatric responses to fluoxetine and sertraline, no significant benefit/decreased high from THC on bupropion, and fluvoxamine ineffective. I recommended against use of SSRIs for depressive symptoms that are caused by or in response to positive symptoms of psychosis, as it is very likely that mood symptoms will independently improve with treatment of the underlying thought disorder and SSRIs are  not an evidence-based treatment for depressive symptoms in schizophrenia or for schizoaffective disorder. Evidence supports antidepressant use in patients with schizoaffective disorder, depressed type, who experience a major depressive episode while in full remission from psychosis, or for treatment of OCD. He voiced his understanding of this.              SUICIDE RISK ASSESSMENT-  Risk factors for self-harm: previous suicide attempt, substance use/pending treatment, recent symptom worsening, hallucinations, recent loss, and lives alone/ isolated.  Mitigating factors: describes a safety plan, h/o seeking help , future oriented, commitment to family, and stable housing.  The patient does not appear to be at imminent risk for self-harm, hospitalization is not recommended which the pt does  agree with. No hospitalization will be arranged. Based on degree of symptoms close psych follow-up was/were recommended which the pt does  agree to. Additional steps to minimize risk: med changes.      [place] PRESCRIPTION MONITORING PROGRAM [] was not checked today: not using controlled substances.    PSYCHOTROPIC DRUG INTERACTIONS:   Seroquel/Zyprexa: additive CNS depression, QTc prolongation.    MANAGEMENT:  use lowest therapeutic doses of both and routine monitoring         Plan     1) PSYCHOTROPIC MEDICATIONS:  - quetiapine 600 mg at bedtime and olanzapine 7.5 mg at bedtime x 1 week, then  - quetiapine 400 mg at bedtime and olanzapine 15 mg at bedtime until next appointment  - naltrexone 50 mg daily    2) THERAPY:  Continue weekly IRT with LEENA Lay    3) NEXT DUE:    Labs- FEP workup due  Rating Scales- AIMS due    4) REFERRALS:    none    5) RTC: 3 weeks    6) CRISIS NUMBERS:   Provided routinely in AVS.  Especially emphasized:  National Suicide Prevention Lifeline: 0-742-810-TALK (341-854-7625)  SpeakingStoryful/resources for a list of additional resources (SOS)            [facility] - [phone]    Urgent Care Adult Mental [facility]-[phone] mobile unit/ 24/7 crisis line  [facility] -[phone]   COPE 24/7 [place] Mobile Team -[phone] (adults)/ [phone] (child)  Poison Control Center - [phone]    OR  go to nearest ER  Crisis Text Line for any crisis 24/7 send this-   To: [phone]   [facility] ([facility]) [facility]  [phone]    TREATMENT RISK STATEMENT:  The risks, benefits, alternatives and potential adverse effects have been discussed and are understood by the pt. The pt understands the risks of using street drugs or alcohol. There are no medical contraindications, the pt agrees to treatment with the ability to do so. The pt knows to call the clinic for any problems or to access emergency care if needed.  Medical and substance use concerns are documented above.  Psychotropic drug interaction check was done, including changes made today.      PROVIDER:  Heidi Vega MD

## 2024-01-31 NOTE — PROGRESS NOTES
NAVIGGALINDO Clinician Contact & Progress Note  For Individual Resiliency Training (IRT)  A Part of the [facility] First Episode of Psychosis Program    NAVIGATE Enrollee: Navi Morales (1995)     MRN: [id number]  Date:  1/30/24  Diagnosis: Psychosis, unspecifed  Clinician: LILIANA Individual Resiliency Trainer, LEENA Lay     1. Type of contact: (majority of time spent)  IRT Session via telehealth  Mode of communication: [employer] (HIPAA compliant, secure platform). Patient consented verbally to this mode of therapy today.  Reason for telehealth: COVID-19. This patient visit was converted to a telehealth visit to minimize exposure to COVID-19.    2. People  present:   Writer  Client: Yes     3. Length of Actual Contact: Start Time: 3:02; End Time: 3:59     4. Location of contact:  Originating Location (patient location): family home, located in [place], [place]  Distant Location (provider location): Home office, located in [place], [place], using appropriate privacy considerations and procedures    5. Did the client complete the home practice option(s) from the previous session: Not Applicable    6. Motivational Teaching Strategies:  Connect info and skills with personal goals  Promote hope and positive expectations  Explore pros and cons of change  Re-frame experiences in positive light    7. Educational Teaching Strategies:  Review of written material/education  Relate information to client's experience    8. CBT Teaching Strategies:  Reinforcement and shaping (positive feedback for steps towards goals and gains in knowledge & skills)    9. IRT Module(s) Addressed:  Module 1 - Orientation    10. Techniques utilized:   [place] announced at beginning of session  Present new material  Summarize progress made in current session  Other techniques (please specify):   -Built rapport with patient by inviting him to share his experiences and discuss his concerns  -Clarified patient's feelings and  perspectives  -Discussed crisis intervention plan and safety plan  -Elicited more details about current and recent circumstances  -Emotional support via active and reflective listening, responding to feelings etc.  -Normalized and validated feelings and experiences  -Provided verbal overview of First Episode Program NAVIGATE services  -Provided positive feedback and encouragement  -Provided psychoeducation related to psychosis and related concerns.   -Supportive counseling    11. Measures:    Mental Status Exam  Alertness: alert  and oriented  Behavior/Demeanor: cooperative and pleasant  Speech: regular rate and rhythm  Language: no obvious problem.   Mood: description consistent with euthymia  Thought Process/Associations: unremarkable  Thought Content:  Reports none;  Denies suicidal ideation and violent ideation  Perception:  Reports auditory hallucinations;  Denies none  Insight: adequate  Judgment: adequate for safety  Cognition: does  appear grossly intact; formal cognitive testing was not done    MELY-7  Not completed today.      PHQ-9  Not completed today.      [place] Protocol Risk Identification  Not completed today.      12. Assessment/Progress Note:     Writer and client met for IRT visit via [employer]. Set agenda to check in, discuss treatment plan and client's goals, and discuss IRT specific work. Client consented to this agenda. Reported current symptoms to include: AH and depression, both at an increased level. Explored symptoms and coping from a stress-vulnerability lens. Current stressors include: symptoms. In regards to medications, client reports: some concerns about dosage of meds and amount of sedation experienced. Client noted he was at 400 mg and was sleeping a typical 7-8 hours, then increased to 600mg and was sleeping 12+ hours, then increased to 800mg and is sleeping 7-8 hours a night again. Client plans to continue to discuss this with psychiatrist.  Substance use: history of issues steming  from substance use. Reports vaping tobacco and cannabis (delta 9) products as a coping strategy. Will continue to discuss and assess. Assessed safety. Client reports SI, denies intent, and denies plan. HI was not present. SIB was not present.  Safety plan has been created in the past. Safety plan was not  reviewed today and includes calling 911, going to the emergency department (client specified [facility] as his preferred hospital if needed) and calling crisis lines if needed if SI/HI/SIB becomes overwhelming, worsens and/or becomes active.     Client did not identify urgent concerns to be addressed today.    Today's visit:  Continued IRT material on NAVIGATE program and it's components, as well as rapport building and assessment. Client appeared engaged in conversation and content.Client expressed preference to check in and talk about recent events. Writer and client reviewed symptoms, medications, and coping strategies. Client expressed interest in checking in weekly about progress related to breathing practices (diaphramatic breathing). Explored various coping strategies and their efficacy. Discuss medication and the things that client wished to speak with the doctor about at the next medication management visit. Writer inquired about family and client's level of interacting with them - client stated that they have been inviting him to spend time with them, but that he has not wanted to do so. Writer inquired about family participating in NAVIGATE family support - client agreed it would be good and that he is open to it. Writer resent a blank consent to communicate form to client with instructions on filling a new one out that allows us to discuss more than just billing information.   Writer and client explicitly discuss safety - client endorse SI (which he has identities as passive and daily as his baseline). Client denied plan or intent. Writer and client dicussed coping strategies of distraction, soothing, and  redirecting attention. Writer sent client information about grounding techniques of using an ice pack on the sternum, the 5-4-3-2-1 sensory grounding technique and the physiological sigh, discussed it's impact on soothing the vagus nerve.     Client mentioned substance use (regular vaping of nicotine and delta 9 and occasional alcohol use, with a Hx of issues related to use). Writer inquired about Navi's openness to discussion of his substance use with this writer. Navi reports that he feels that substance use is an effective way to cope with symptoms and seems to also report having experienced negative consequences of use in the past. Writer provided education about the impact of substance use on efficacy of antipsychotic medications (e.g. can reduce efficacy). Navi was open to further analysis and discussion with this writer in the future and also expressed some reservation about discontinuing use of substances.     Writer used relational and interpersonal approach to explore feelings, motivations, and behavior. Writer offered support, feedback, validation, and reinforced use of skills taught in IRT from modalities including cognitive behavioral therapy, psycho education, and skills training. Promoted understanding of their experiences of psychosis and how it impacts them in important areas of their life and in recovery goals. Reflected on client's strengths and resiliency factors and facilitated discussion on how these can assist in symptom management, recovery, and well-being.     Home practice identified as: NA    Emailed Pt on 1/31:  Anh Mcelroy,   I have attached a blank consent form here. You provided one previously and in  box 2 person-to-person communication  the only thing checked was  billing information. If you would fill this out again and check all topics of information in both boxes, we will be able to reach out to your mother and offer the education and support element of NAVIGATE. You can also  specify any specific restrictions about what is shared by hand writing in to the second bullet point below the boxes.   You can complete this and submit it via [employer] like last time, or if it would be helpful, we can discuss it further at our next visit on Tuesday next week.   Warmest regards,   SANTOS Lay, [facility]     Email sent 1/3/2024 regarding crisis resources, see below:  CRISIS RESOURCES:      Immediate safety concerns (medical or safety emergency)   o Call 911      Hospital Emergency Department  o First consider going to the nearest \[facility] [place] [facility]\"" emergency department if it is a medical emergency  o Then consider [facility], as it is your preferred hospital.   - [address] [place] [id number]  - [phone]  o Also consider going to [facility] emergency department  -  to the EmPATH unit within the ED  - Specialty ED department for persons 18 and older experiencing a mental health crisis, staffed by mental health professionals  - EmPath helps get people stabilize (acute crisis, SI, psychosis, etc.) and get set up with outpatient services if they don t already have them.   - [facility] [phone]. Address is [facility] [place] [address]  - [email or web address]   o Or the [facility] emergency department  - Has the inpatient behavioral health units on site for consultations or inpatient admissions if needed .  - [facility] [phone]. Address is [facility] [address] [address]    Crisis lines  o [facility] Crisis Line: [phone] (call 24/7)  o National Suicide Prevention Hotline: 634-976-YXPA (6112)    - OR  new national number - call 988     o [place] Crisis Text Line: Text  [place]  to 617 990 (if outside of your home county - to be connected to the nearest [facility] crisis staff)    o [facility] warm line:  Call [phone], or text  Support  to  [address]. Provides a veyr-gg-ghur approach to mental health recovery, support and wellness. Calls are answered by a team of professionally trained Certified Peer Specialists, who have first-hand experience living with a mental health condition. The Warmline provides a safe, anonymous and confidential environment to connect with people who are here to listen and help. Open Monday-Saturday, 5:00 to 10 :00 p.m.   o [facility]: you can call or text to [phone] from 5:00 p.m. to 9:00 a.m.      [place] After Hours number:  o [phone]. For evenings and weekends, when NAVIGATE is closed. Ask for the resident on call, they can field medication questions after hours.    13. Treatment Plan:     Treatment Plan________________________________________________________________________  Reviewed 1/16/2024    Pt s measurable objectives (list 3)  o Reduce intensity of psychosis symptoms  o Demonstrate understanding of symptoms regarding causes, treatment  o Learn two skills to manage symptoms of psychosis    In Pt s own words   o  I want to have stability both mentally and psychically.     Interventions  o IRT  o Medication Management  o SEE  o Family support and education  o Social work care coordination    Target date of discharge  o 12-36 months from enrollment    Discharge criteria  o Marked and sustained symptom improvement  o Demonstrated understanding of mental illness  o Successful implementation of strategies to cope with stressors/symptoms to mitigate risk for increase in symptoms severity or relapse    Gains made (listed out objectives accomplished)    Frequency of sessions and expected duration of treatment:   o 6-12 months of weekly IRT/Individual Psychotherapy followed by 12-24 months of biweekly or monthly IRT    Participants in therapy plan: Navi Morales and [facility]    Support System: parents, particularly mother and providers    14. Plan/Referrals:     Next visit schedule for  "one week from today.     Billing for \"Interactive Complexity\"?    No      LEENA Lay    NAVIGATE Individual Resiliency Trainer  "

## 2024-01-31 NOTE — PATIENT INSTRUCTIONS
Navi-It was nice to meet with you today. Here is what we discussed:    Cross taper:  -week 1: Seroquel 600 mg (1.5 pills) and Zyprexa 7.5 mg (1/2 pill)  -week 2: Seroquel 400 mg (1 pill) and Zyprexa 15 mg (1 pill)    -See you 2/19 at 1:45 PM!    Heidi Vega MD  Navigate Psychiatrist  , Department of Psychiatry and Behavioral Sciences  [facility]      **For crisis resources, please see the information at the end of this document**   Patient Education    Thank you for coming to the [facility].     Lab Testing:  If you had lab testing today and your results are reassuring or normal they will be mailed to you or sent through Lollipuff within 7 days. If the lab tests need quick action we will call you with the results. The phone number we will call with results is # [phone]. If this is not the best number please call our clinic and change the number.     Medication Refills:  If you need any refills please call your pharmacy and they will contact us. Our fax number for refills is [phone].   Three business days of notice are needed for general medication refill requests.   Five business days of notice are needed for controlled substance refill requests.   If you need to change to a different pharmacy, please contact the new pharmacy directly. The new pharmacy will help you get your medications transferred.     Contact Us:  Please call [phone] during business hours (8-5:00 M-F).   If you have medication related questions after clinic hours, or on the weekend, please call [phone].     Financial Assistance [phone]   Medical Records [phone]       MENTAL HEALTH CRISIS RESOURCES:  For a emergency help, please call 911 or go to the nearest Emergency Department.     Emergency Walk-In Options:   EmPATH Unit @ [facility]): [phone] - Specialized mental health emergency area designed to be calming  [facility]  [facility] ([place]): [phone]  [facility] ([place]): [phone]  [facility] ([place]): [phone]    [facility] Crisis Information:   [place]: [phone]  [place]: [phone]  [place] ([place]) - Adult: [phone]     Child: [phone]  [place] - Adult: [phone]     Child: [phone]  [place]: [phone]  List of all [facility] resources:   [email or web address]://[email or web address]/dhs/people-we-serve/adults/health-care/mental-health/resources/crisis-contacts.jsp    National Crisis Information:   Crisis Text Line: Text  [place]  to 411483  Suicide & Crisis Lifeline: 988  National Suicide Prevention Lifeline: 6-806-794-TALK (1-366.380.6250)       For online chat options, visit https://suicidepreventionlifeline.org/chat/  Poison Control Center: 5-324-165-9632  Trans Lifeline: 1-312-718-2092 - Hotline for transgender people of all ages  The [employer]: 1-103.310.1526 - Hotline for LGBT youth     For Non-Emergency Support:   Fast Tracker: Mental Health & Substance Use Disorder Resources -   [email or web address]

## 2024-01-31 NOTE — NURSING NOTE
PHQ-9 score is 15 and #9 is 1, several days.     Is the patient currently in the state of [place]? YES    Visit mode:VIDEO    If the visit is dropped, the patient can be reconnected by: VIDEO VISIT: Text to cell phone:   Telephone Information:   Mobile [phone]       Will anyone else be joining the visit? NO  (If patient encounters technical issues they should call [phone] :[phone])    How would you like to obtain your AVS? MyChart    Are changes needed to the allergy or medication list? No    Reason for visit: RECHECK    Medications and allergies have been reviewed.     Houston Gillespie VVF    Depression Response    Patient completed the PHQ-9 assessment for depression and scored >9? Yes  Question 9 on the PHQ-9 was positive for suicidality? Yes  Does patient have current mental health provider? Yes    Is this a virtual visit? Yes   Does patient have suicidal ideation (positive question 9)? Yes, pt has a current mental health provider.     I personally notified the following: visit provider- PHQ-9 score placed in the message column for the provider.      NAVIGATE Patient Self-Rating Form    Since your last medication management visit--    Have you been feeling depressed, sad, or down? Yes  Have you been feeling anxious, worried or nervous? Yes  Have you been thinking about death or have you had any feelings that you would be better off dead? Yes   Have you been feeling particularly good? No  Have you been feeling annoyed, angry, or resentful (whether you showed it or not)? Yes  Did you do anything that could have gotten you in trouble? No  Have you felt dizzy or faint?  Sometimes  Have you had blurred vision? No  Have you had dry mouth? Yes  Have you had too much saliva in your mouth or had drooling? No  Have you felt nauseous? No  Have you been constipated? No  Has you appetite for food been increased? No  Have you gained weight? No  Have you lost weight?  I don't know per pt   Have you felt restless or like you  cannot sit still? No  Any shaking of your hands, legs, or other muscles? No  Any problems walking or moving or any problems feeling stiff or rigid? No  Have you felt tired or fatigued? Yes  Have you felt drowsy during the day? Yes  Have you been sleeping too much at night?  Sometimes  Have you been sleeping too little or had problems sleeping at night?  Sometimes  Any decrease in your interest in sex? Yes  Any other problems with sex? Yes  Any problems with your breasts such as swelling or discharge? No  For women, any problems with your period? N/A  Are there other medical or side effect problems you wish to discuss with your prescriber?  Discuss depression per pt   Since your last visit, how many days have you not taken your medication? 4 days  Have you had trouble remembering to take your medication? No  Do you find the number of medicines or the times when you are supposed to take then confusing or burdensome?  Possibly burdensome   Are you afraid of the medication? No  Do you think that you have an illness that requires taking medication?  Pt would say so   Do you think that other people would think poorly of you if they knew that you take medication?  Maybe sometimes   On average, how many cigarettes do you smoke per day? 0  Since you last visit, did you drink alcohol? No  Since your last visit, have you used any marijuana? Yes  Since your last visit, have you used any stress drugs other than marijuana? No  Between now and your next visit, do you think we should keep your medication the same or consider changing the medications? Consider changing medication.

## 2024-02-06 ENCOUNTER — VIRTUAL VISIT (OUTPATIENT)
Dept: PSYCHIATRY | Facility: CLINIC | Age: 29
End: 2024-02-06
Payer: COMMERCIAL

## 2024-02-06 DIAGNOSIS — F29 PSYCHOSIS, UNSPECIFIED PSYCHOSIS TYPE (H): Primary | ICD-10-CM

## 2024-02-06 ASSESSMENT — ANXIETY QUESTIONNAIRES
2. NOT BEING ABLE TO STOP OR CONTROL WORRYING: NEARLY EVERY DAY
3. WORRYING TOO MUCH ABOUT DIFFERENT THINGS: NEARLY EVERY DAY
8. IF YOU CHECKED OFF ANY PROBLEMS, HOW DIFFICULT HAVE THESE MADE IT FOR YOU TO DO YOUR WORK, TAKE CARE OF THINGS AT HOME, OR GET ALONG WITH OTHER PEOPLE?: EXTREMELY DIFFICULT
4. TROUBLE RELAXING: NEARLY EVERY DAY
1. FEELING NERVOUS, ANXIOUS, OR ON EDGE: NEARLY EVERY DAY
7. FEELING AFRAID AS IF SOMETHING AWFUL MIGHT HAPPEN: MORE THAN HALF THE DAYS
GAD7 TOTAL SCORE: 17
IF YOU CHECKED OFF ANY PROBLEMS ON THIS QUESTIONNAIRE, HOW DIFFICULT HAVE THESE PROBLEMS MADE IT FOR YOU TO DO YOUR WORK, TAKE CARE OF THINGS AT HOME, OR GET ALONG WITH OTHER PEOPLE: EXTREMELY DIFFICULT
6. BECOMING EASILY ANNOYED OR IRRITABLE: MORE THAN HALF THE DAYS
GAD7 TOTAL SCORE: 17
5. BEING SO RESTLESS THAT IT IS HARD TO SIT STILL: SEVERAL DAYS
7. FEELING AFRAID AS IF SOMETHING AWFUL MIGHT HAPPEN: MORE THAN HALF THE DAYS
GAD7 TOTAL SCORE: 17

## 2024-02-06 ASSESSMENT — PATIENT HEALTH QUESTIONNAIRE - PHQ9
SUM OF ALL RESPONSES TO PHQ QUESTIONS 1-9: 21
10. IF YOU CHECKED OFF ANY PROBLEMS, HOW DIFFICULT HAVE THESE PROBLEMS MADE IT FOR YOU TO DO YOUR WORK, TAKE CARE OF THINGS AT HOME, OR GET ALONG WITH OTHER PEOPLE: EXTREMELY DIFFICULT
SUM OF ALL RESPONSES TO PHQ QUESTIONS 1-9: 21

## 2024-02-07 NOTE — PROGRESS NOTES
NAVIGGALINDO Clinician Contact & Progress Note  For Individual Resiliency Training (IRT)  A Part of the [facility] First Episode of Psychosis Program    NAVIGATE Enrollee: Navi Morales (1995)     MRN: [id number]  Date:  2/06/24  Diagnosis: Psychosis, unspecifed  Clinician: LILIANA Individual Resiliency Trainer, LEENA Lay     1. Type of contact: (majority of time spent)  IRT Session via telehealth  Mode of communication: [employer] (HIPAA compliant, secure platform). Patient consented verbally to this mode of therapy today.  Reason for telehealth: COVID-19. This patient visit was converted to a telehealth visit to minimize exposure to COVID-19.    2. People  present:   Writer  Client: Yes     3. Length of Actual Contact: Start Time: 3:03; End Time: 4:00     4. Location of contact:  Originating Location (patient location): family home, located in [place], [place]  Distant Location (provider location): Home office, located in [place], [place], using appropriate privacy considerations and procedures    5. Did the client complete the home practice option(s) from the previous session: Not Applicable    6. Motivational Teaching Strategies:  Connect info and skills with personal goals  Promote hope and positive expectations  Explore pros and cons of change  Re-frame experiences in positive light    7. Educational Teaching Strategies:  Review of written material/education  Relate information to client's experience    8. CBT Teaching Strategies:  Reinforcement and shaping (positive feedback for steps towards goals and gains in knowledge & skills)    9. IRT Module(s) Addressed:  Module 1 - Orientation    10. Techniques utilized:   [place] announced at beginning of session  Present new material  Summarize progress made in current session  Other techniques (please specify):   -Built rapport with patient by inviting him to share his experiences and discuss his concerns  -Clarified patient's feelings and  perspectives  -Discussed crisis intervention plan and safety plan  -Elicited more details about current and recent circumstances  -Emotional support via active and reflective listening, responding to feelings etc.  -Normalized and validated feelings and experiences  -Provided verbal overview of First Episode Program NAVIGATE services  -Provided positive feedback and encouragement  -Provided psychoeducation related to psychosis and related concerns.   -Supportive counseling    11. Measures:    Mental Status Exam  Alertness: alert  and oriented  Behavior/Demeanor: cooperative and pleasant  Speech: regular rate and rhythm  Language: no obvious problem.   Mood: description consistent with euthymia  Thought Process/Associations: unremarkable  Thought Content:  Reports none;  Denies suicidal ideation and violent ideation  Perception:  Reports auditory hallucinations;  Denies none  Insight: adequate  Judgment: adequate for safety  Cognition: does  appear grossly intact; formal cognitive testing was not done    MELY-7  Not completed today.      PHQ-9  Not completed today.      Gray Protocol Risk Identification  Not completed today.      12. Assessment/Progress Note:     Writer and client met for IRT visit via [employer]. Set agenda to check in, discuss treatment plan and client's goals, and discuss IRT specific work. Client consented to this agenda. Reported current symptoms to include: AH and depression, both at an increased level. Explored symptoms and coping from a stress-vulnerability lens. Current stressors include: symptoms. In regards to medications, client reports: some concerns about dosage of meds and amount of sedation experienced. Client noted he has begun titrating from Seroquel to Zyprexa with the doctor's supervision. Substance use: history of issues steming from substance use. Reports vaping tobacco and cannabis (delta 9) products as a coping strategy. Will continue to discuss and assess. Assessed safety.  "Client reports SI, denies intent, and denies plan. HI was not present. SIB was not present. Safety plan was not reviewed today and includes calling 911, going to the emergency department (client specified [facility] as his preferred hospital if needed) and calling crisis lines if needed if SI/HI/SIB becomes overwhelming, worsens and/or becomes active.     Client did not identify urgent concerns to be addressed today.    Today's visit:  Continued IRT material on NAVIGATE program and it's components, as well as rapport building and assessment. Client appeared engaged in conversation and content. Writer and client reviewed symptoms, medications, and coping strategies. Client previously expressed interest in checking in weekly about progress related to breathing practices (diaphramatic breathing). Explored various coping strategies and their efficacy. Discuss medication  - client reports that he has started titrating from one antipsychotic medication to a new antipsychotic medication, and noted that hit is too early to tell if there are benefits. He does report sedation noting that hit \"feels more natural a tiredness than on the other one\" when discussing it. Writer inquired about family and client's level of interacting with family - client stated that they have been inviting him to spend time with them, but that he has not wanted to do so. Writer inquired about family participating in NAVIGATE family support - client agreed it would be good and that he is open to it. Confirmed receiving a blank consent to communicate form tbut has not yet filled it out to submit. Writer and client explicitly discussed safety - client endorse SI (which he has identities as passive and daily as his baseline). Client denied plan or intent. Client shared more detail of his experiences of symptoms, will continue to discuss at future visits. Writer will bring back the FBR to review at next visit.     Writer and client previously discussed " coping strategies of distraction, soothing, and redirecting attention. Writer sent client information about grounding techniques of using an ice pack on the sternum, the 5-4-3-2-1 sensory grounding technique and the physiological sigh, discussed it's impact on soothing the vagus nerve.     Previously discussed substance use (regular vaping of nicotine and delta 9 and occasional alcohol use, with a Hx of issues related to use). Writer inquired about Navi's openness to discussion of his substance use with this writer. Navi reports that he feels that substance use is an effective way to cope with symptoms and seems to also report having experienced negative consequences of use in the past. Writer provided education about the impact of substance use on efficacy of antipsychotic medications (e.g. can reduce efficacy). Navi was open to further analysis and discussion with this writer in the future and also expressed some reservation about discontinuing use of substances.     Writer used relational and interpersonal approach to explore feelings, motivations, and behavior. Writer offered support, feedback, validation, and reinforced use of skills taught in IRT from modalities including cognitive behavioral therapy, psycho education, and skills training. Promoted understanding of their experiences of psychosis and how it impacts them in important areas of their life and in recovery goals. Reflected on client's strengths and resiliency factors and facilitated discussion on how these can assist in symptom management, recovery, and well-being.     Home practice identified as: pay attention to and make note of the coping strategies you use.     13. Treatment Plan:     Treatment Plan________________________________________________________________________  Reviewed 1/16/2024    Pt s measurable objectives (list 3)  o Reduce intensity of psychosis symptoms  o Demonstrate understanding of symptoms regarding causes, treatment  o Learn  "two skills to manage symptoms of psychosis    In Pt s own words   o  I want to have stability both mentally and psychically.     Interventions  o IRT  o Medication Management  o SEE  o Family support and education  o Social work care coordination    Target date of discharge  o 12-36 months from enrollment    Discharge criteria  o Marked and sustained symptom improvement  o Demonstrated understanding of mental illness  o Successful implementation of strategies to cope with stressors/symptoms to mitigate risk for increase in symptoms severity or relapse    Gains made (listed out objectives accomplished)    Frequency of sessions and expected duration of treatment:   o 6-12 months of weekly IRT/Individual Psychotherapy followed by 12-24 months of biweekly or monthly IRT    Participants in therapy plan: Navi Morales and IRT LEENA Lay    Support System: parents, particularly mother and providers    14. Plan/Referrals:     Next visit schedule for one week from today.     Billing for \"Interactive Complexity\"?    No      LEENA Lay    NAVIGATE Individual Resiliency Trainer  "

## 2024-02-18 ENCOUNTER — HEALTH MAINTENANCE LETTER (OUTPATIENT)
Age: 29
End: 2024-02-18

## 2024-02-19 ENCOUNTER — VIRTUAL VISIT (OUTPATIENT)
Dept: PSYCHIATRY | Facility: CLINIC | Age: 29
End: 2024-02-19
Payer: COMMERCIAL

## 2024-02-19 DIAGNOSIS — F12.90 CANNABIS USE DISORDER: ICD-10-CM

## 2024-02-19 DIAGNOSIS — F10.90 ALCOHOL USE DISORDER: ICD-10-CM

## 2024-02-19 DIAGNOSIS — F29 PSYCHOSIS, UNSPECIFIED PSYCHOSIS TYPE (H): Primary | ICD-10-CM

## 2024-02-19 RX ORDER — QUETIAPINE FUMARATE 100 MG/1
TABLET, FILM COATED ORAL
Qty: 60 TABLET | Refills: 1 | Status: SHIPPED | OUTPATIENT
Start: 2024-02-19 | End: 2024-04-01

## 2024-02-19 RX ORDER — OLANZAPINE 10 MG/1
25 TABLET ORAL AT BEDTIME
Qty: 75 TABLET | Refills: 1 | Status: SHIPPED | OUTPATIENT
Start: 2024-02-19 | End: 2024-04-01

## 2024-02-19 ASSESSMENT — ANXIETY QUESTIONNAIRES
IF YOU CHECKED OFF ANY PROBLEMS ON THIS QUESTIONNAIRE, HOW DIFFICULT HAVE THESE PROBLEMS MADE IT FOR YOU TO DO YOUR WORK, TAKE CARE OF THINGS AT HOME, OR GET ALONG WITH OTHER PEOPLE: EXTREMELY DIFFICULT
4. TROUBLE RELAXING: NEARLY EVERY DAY
8. IF YOU CHECKED OFF ANY PROBLEMS, HOW DIFFICULT HAVE THESE MADE IT FOR YOU TO DO YOUR WORK, TAKE CARE OF THINGS AT HOME, OR GET ALONG WITH OTHER PEOPLE?: EXTREMELY DIFFICULT
5. BEING SO RESTLESS THAT IT IS HARD TO SIT STILL: SEVERAL DAYS
3. WORRYING TOO MUCH ABOUT DIFFERENT THINGS: NEARLY EVERY DAY
7. FEELING AFRAID AS IF SOMETHING AWFUL MIGHT HAPPEN: MORE THAN HALF THE DAYS
GAD7 TOTAL SCORE: 17
GAD7 TOTAL SCORE: 17
7. FEELING AFRAID AS IF SOMETHING AWFUL MIGHT HAPPEN: MORE THAN HALF THE DAYS
6. BECOMING EASILY ANNOYED OR IRRITABLE: MORE THAN HALF THE DAYS
1. FEELING NERVOUS, ANXIOUS, OR ON EDGE: NEARLY EVERY DAY
GAD7 TOTAL SCORE: 17
2. NOT BEING ABLE TO STOP OR CONTROL WORRYING: NEARLY EVERY DAY

## 2024-02-19 ASSESSMENT — PATIENT HEALTH QUESTIONNAIRE - PHQ9
10. IF YOU CHECKED OFF ANY PROBLEMS, HOW DIFFICULT HAVE THESE PROBLEMS MADE IT FOR YOU TO DO YOUR WORK, TAKE CARE OF THINGS AT HOME, OR GET ALONG WITH OTHER PEOPLE: VERY DIFFICULT
SUM OF ALL RESPONSES TO PHQ QUESTIONS 1-9: 20
SUM OF ALL RESPONSES TO PHQ QUESTIONS 1-9: 20
10. IF YOU CHECKED OFF ANY PROBLEMS, HOW DIFFICULT HAVE THESE PROBLEMS MADE IT FOR YOU TO DO YOUR WORK, TAKE CARE OF THINGS AT HOME, OR GET ALONG WITH OTHER PEOPLE: VERY DIFFICULT
SUM OF ALL RESPONSES TO PHQ QUESTIONS 1-9: 20
SUM OF ALL RESPONSES TO PHQ QUESTIONS 1-9: 20

## 2024-02-19 NOTE — PATIENT INSTRUCTIONS
Navi-It was nice to meet with you today. Here is what we discussed:    -Increase olanzapine to 25 mg at bedtime (2.5 pills)    -Decrease quetiapine to 200 mg for one week, then decrease further to 100 mg.    -Follow up on Friday, March 3/8 at 8:30 AM      Heidi Vega MD  Navigate Psychiatrist  , Department of Psychiatry and Behavioral Sciences  [employer]    **For crisis resources, please see the information at the end of this document**   Patient Education    Thank you for coming to the [facility].     Lab Testing:  If you had lab testing today and your results are reassuring or normal they will be mailed to you or sent through musiXmatch within 7 days. If the lab tests need quick action we will call you with the results. The phone number we will call with results is # [phone]. If this is not the best number please call our clinic and change the number.     Medication Refills:  If you need any refills please call your pharmacy and they will contact us. Our fax number for refills is [phone].   Three business days of notice are needed for general medication refill requests.   Five business days of notice are needed for controlled substance refill requests.   If you need to change to a different pharmacy, please contact the new pharmacy directly. The new pharmacy will help you get your medications transferred.     Contact Us:  Please call [phone] during business hours (8-5:00 M-F).   If you have medication related questions after clinic hours, or on the weekend, please call [phone].     Financial Assistance [phone]   Medical Records [phone]       MENTAL HEALTH CRISIS RESOURCES:  For a emergency help, please call 911 or go to the nearest Emergency Department.     Emergency Walk-In Options:   EmPATH Unit @ [facility] ([place]): [phone] - Specialized mental health emergency area designed to be calming  [facility]  [facility] ([place]): [phone]  [facility] ([place]): [phone]  [facility] ([place]): [phone]    [facility] Crisis Information:   [place]: [phone]  [place]: [phone]  [place] ([place]) - Adult: [phone]     Child: [phone]  [place] - Adult: [phone]     Child: [phone]  [place]: [phone]  List of all [facility] resources:   https://[place].[facility]/dhs/people-we-serve/adults/health-care/mental-health/resources/crisis-contacts.jsp    National Crisis Information:   Crisis Text Line: Text  [place]  to 956773  Suicide & Crisis Lifeline: 988  National Suicide Prevention Lifeline: 5-945-304-TALK (1-254.946.9528)       For online chat options, visit https://suicidepreventionlifeline.org/chat/  Poison Control Center: 1-774.943.4626  Trans Lifeline: 4-588-992-7392 - Hotline for transgender people of all ages  [employer]: 1-396.950.3032 - Hotline for LGBT youth     For Non-Emergency Support:   Fast Tracker: Mental Health & Substance Use Disorder Resources -   https://www.[employer].org/

## 2024-02-19 NOTE — PROGRESS NOTES
"Virtual Visit Details    Type of service:  Video Visit   Video Start Time: {video visit start/end time for provider to select:489589}  Video End Time:{video visit start/end time for provider to select:878637}    Originating Location (pt. Location): {video visit patient location:974815::\"Home\"}  {PROVIDER LOCATION On-site should be selected for visits conducted from your clinic location or adjoining [facility] hospital, academic office, or other nearby [facility] building. Off-site should be selected for all other provider locations, including home:926256}  Distant Location (provider location):  {virtual location provider:562436}  Platform used for Video Visit: {Virtual Visit Platforms:968107::\"[employer]\"}    "

## 2024-02-19 NOTE — NURSING NOTE
Unable to complete any rooming of allergies, medications, tobacco, vitals, PHQ-9, [facility] due to time restraint.       Is the patient currently in the state of [place]? YES    Visit mode:VIDEO    If the visit is dropped, the patient can be reconnected by: VIDEO VISIT: Send to e-mail at: [email or web address] - [email or web address]    Will anyone else be joining the visit? NO  (If patient encounters technical issues they should call [phone])    How would you like to obtain your AVS? MyChart    Are changes needed to the allergy or medication list?  Unable to review due ot time restraint.    Reason for visit: RECHJAYLON BASURTO

## 2024-02-19 NOTE — PROGRESS NOTES
"Virtual Visit Details  Type of service:  Video Visit   Video Start Time: 1:45pm  Video End Time: 2:15pm  Originating Location (pt. Location): Home  Provider Location: On-site   Distant Location (provider location):  On-site  Platform used for Video Visit: [facility] Medication Management Progress Note  A Part of the [facility] First Episode of Psychosis Program    NAVIGATE Enrollee: Navi Morales (1995)     MRN: [id number]  Date:  24         Contributors to the Assessment     Chart Reviewed.   Interview completed with Navi Morales.  Collateral information obtained from none.         Chief Complaint     \"The same\"         Interim History      Navi Morales is a 28 year old male who was last seen in [facility] on 24 at which time a cross-taper was initiated to work towards discontinuation of quetiapine and initiation of olanzapine. The patient reports good treatment adherence. History was provided by Navi who was a good historian.  Since the last visit:  - going through with cross-taper. Had some increased hunger at first but that seems to have resolved. Also had some increased sedation. Sleeps about 7-9 hours and still feels drowsy/tired throughout the day. Drinks up to 12 cups of coffee throughout the day. This is similar to how much coffee he was drinking when he first started taking 800mg of quetiapine but that seemed to improve over time  - \"overall pretty mild side effects\"  - has noticed a little more restlessness  - hasn't noticed any significant change in AH, says it seems like it may be too early to tell. Voices don't seem to be as constant but may be more intense when he does hear them  - has not used any PRN quetiapine    PSYCH ROS:  Clinician Rating Form in COMPASS  1. Depressed Mood: Ratin  0 Not reported     1 Very mild occasionally feels sad or  down ; of questionable clinical significance   2 Mild occasionally feels moderately depressed or often feels sad or  down    3 Moderate " occasionally feels very depressed or often feels moderately depressed   4 Moderately severe often feels very depressed   5 Severe feels very depressed most of the time   6 Very severe constant extremely painful feelings of depression   U Unable to assess        2. Anxiety/Worry: Ratin  0 Not reported     1 Very mild occasionally feels a little anxious; of questionable clinical significance   2 Mild occasionally feels moderately anxious or often feels a little anxious or worried   3 Moderate occasionally feels very anxious or often feels moderately anxious   4 Moderately severe often feels very anxious or often feels moderately anxious   5 Severe feels very anxious or worried most of the time   6 Very severe patient is continually preoccupied with severe anxiety   U Unable to assess        3. Suicidal Ideation/Behavior: Ratin          0 Not reported     1 Very mild occasional thoughts of dying,  I d be better off dead  or  I wish I were dead    2 Mild frequents thoughts of dying or occasional thoughts of killing self, without plan or method   3 Moderate often thinks of suicide or has though of a specific method   4 Moderately severe has mentally rehearsed a specific method of suicide or has made a suicide attempt with questionable intent to die (e.r. takes aspirins and then tells family)   5 Severe has made preparations for a potentially lethal suicide attempt (e.g acquires a gun and bullets for an attempt)   6 Very severe has made a suicide attempt with an intent to die   U Unable to assess                      4. Elevated/Expansive Mood: Ratin  0 Not at all     1 Very mild questionable; more cheerful than most people in his/her circumstances but of only possible clinical significance   2 Mild brief elevated/expansive mood but only somewhat out of proportion to the circumstances   3 Moderate brief/occasional elevation of mood which is clearly out of proportion to the circumstances   4 Moderately severe  sustained/frequent elevation of mood which is clearly out of proportion to the circumstances   5 Severe mood is euphoric most of the time   6 Very severe sustained elevation;  everything is wonderful  almost all of the time   U Unable to assess        5. Hostility/Anger/Irritability/Aggressiveness: Ratin  0 Not at all     1 Very mild occasional irritability of doubtful clinical significance   2 Mild occasionally feels angry or mild or indirect expressions of anger, e.g. sarcasm, disrespect or hostile gestures   3 Moderate frequently feels angry, frequent irritability or occasional direct expression of anger, e.g. yelling at others   4 Moderately severe often feels very angry, often yells at others or occasionally threatens to harm others   5 Severe has acted on his anger by becoming physically abusive on one or two occasions or makes frequent threats to harm others or is very angry most of the time   6 Very severe has been physically aggressive and/or required intervention to prevent assaultiveness on several occasions; or any serious assaultive act   U Unable to assess        6. Impulsive Behavior: Ratin  0 Not at all     1 Very mild one instance of impulsive behavior which is of doubtful clinical significance   2 Mild occasional impulsive acts, e.g. making phone calls at odd hours   3 Moderate occasional impulsive acts with some potential negative consequence, e.g. leaving work abruptly; changing plans without thinking   4 Moderately severe impulsive acts with definite negative consequences, e.g. overspending on non-essentials; repeated reckless sexual behavior   5 Severe impulsive acts with direct negative consequences, e.g. spends entire income on nonessentials without regard for basic needs   6 Very severe impulsive behavior which is potentially life threatening, e.g. jumps from dangerous height (without suicidal intent) or criminal behavior, e.g. impulsive robbery   U Unable to assess        7.  Suspiciousness: Ratin  0 Not present     1 Very mild Seems on guard. Reluctant to respond to some  personal  questions. Reports being overly self-conscious in public   2 Mild Describes incidents in which others have harmed or wanted to harm him/her that sound plausible. Patient feels as if others are watching, laughing, or criticizing him/her in public, but this occurs only occasionally or rarely. Little or no preoccupation   3 Moderate Says others are talking about him/her maliciously, have negative intentions, or may harm him/her. Beyond the likelihood of plausibility, but not delusional. Incidents of suspected persecution occur occasionally (less than once per week) with some preoccupation   4 Moderately severe Same as 3, but incidents occur frequently such as more than once a week. Patient is moderately preoccupied with ideas of persecution OR patient reports persecutory delusions expressed with much doubt (e.g. partial delusion)   5 Severe Delusional -- speaks of Mafia plots, the FBI, or others poisoning his/her food, persecution by supernatural forces   6 Extremely severe Same as 5, but the beliefs are bizarre or more preoccupying. Patient tends to disclose or act on persecutory delusions.   U Unable to assess        8. Unusual Thought Content: Ratin  0 Not present     1 Very mild Ideas of reference (people may stare or may laugh at him), ideas of persecution (people may mistreat him). Unusual beliefs in psychic tyler, spirits, UFOs, or unrealistic beliefs in one's own abilities. Not strongly held. Some doubt   2 Mild Same as 1, but degree of reality distortion is more severe as indicated by highly unusual ideas or greater conviction. Content may be typical of delusions (even bizarre), but without full conviction. The delusion does not seem to have fully formed, but is considered as one possible explanation for an unusual experience   3 Moderate Delusion present but no preoccupation or functional  impairment. May be an encapsulated delusion or a firmly endorsed absurd belief about past delusional circumstances   4 Moderately severe Full delusion(s) present with some preoccupation OR some areas of functioning disrupted by delusional thinking   5 Severe Full delusion(s) present with much preoccupation OR many areas of functioning are disrupted by delusional thinking   6 Extremely severe Full delusions present with almost   U Unable to assess        9. Hallucinations: Ratin  0 Not present     1 Very mild While resting or going to sleep, sees visions, smells odors, or hears voices, sounds or whispers in the absence of external stimulation, but no impairment in functioning   2 Mild While in a clear state of consciousness, hears a voice calling the subject s name, experiences non-verbal auditory hallucinations (e.g., sounds or whispers), formless visual hallucinations, or has sensory experiences in the presence of a modality-relevant stimulus (e.g., visual illusions) infrequently (e.g., 1-2 times per week) and with no functional impairment   3 Moderate Occasional verbal, visual, gustatory, olfactory, or tactile hallucinations with no functional impairment OR non-verbal auditory hallucinations/visual illusions more than infrequently or with impairment   4 Moderately severe Experiences daily hallucinations OR some areas of functioning are disrupted by hallucinations   5 Severe Experiences verbal or visual hallucinations several times a day OR many areas of functioning are disrupted by these hallucinations   6 Extremely severe Persistent verbal or visual hallucinations throughout the day OR most areas of functioning are disrupted by these hallucinations   U Unable to assess        10. Conceptual Disorganization: Ratin  0 Not present     1 Very mild Peculiar use of words or rambling but speech is comprehensible   2 Mild Speech a bit hard to understand or make sense of due to tangentiality, circumstantiality, or  sudden topic shifts   3 Moderate Speech difficult to understand due to tangentiality, circumstantiality, idiosyncratic speech, or topic shifts on many occasions OR 1-2 instances of incoherent phrases   4 Moderately severe Speech difficult to understand due to circumstantiality, tangentiality, neologisms, blocking, or topic shifts most of the time OR 3-5 instances of incoherent phrases   5 Severe Speech is incomprehensible due to severe impairments most of the time. Many PSRS items cannot be rated by self-report alone   6 Extremely severe Speech is incomprehensible throughout interview   U Unable to assess        11. Avolition/Apathy: Ratin  0 Not at all     1 Very mild questionable decrease in time spent in goal-directed activities   2 Mild spends less time in goal-directed activities than is appropriate for situation and age   3 Moderate initiates activities at times but does not follow through   4 Moderately severe rarely initiates activity but will passively engage with encouragement   5 Severe almost never initiates activities; requires assistance to accomplish basic activities   6 Very severe does not initiate or persist in any goal-directed activity even with outside assistance   U Unable to assess        12. Asociality/Low Social Drive: Ratin  0 Not at all     1 Very mild questionable   2 Mild slow to initiate social interactions but usually responds to overtures by others   3 Moderate rarely initiates social interactions; sometimes responds to overtures by others   4 Moderately severe does not initiate but sometimes responds to overtures by others; little social interaction outside close family members   5 Severe never initiates and rarely encourages conversations or activities; avoids being with others unless prodded, may have contacts with family   6 Very severe avoids being with others (even family members) whenever possible, extreme social isolation   U Unable to assess        13. Adherence:  Days: 4  The longest, continuous amount of time in days, since the last visit when the subject did not take medication      14. EPS Part I: Rating: U  Rate Elbow Rigidity for all subjects  0 Normal   1 Slight stiffness and resistance   2 Moderate stiffness and resistance   3 Marked rigidity with difficulty in passive movement   4 Extreme stiffness and rigidity with almost a frozen joint   U Unable to assess            EPS Part 2: Signs of EPS: 0  Are there are other signs of EPS (eg diminished arm swing, postural instability, cogwheeling, tremor, akinesia) present based upon patient report or exam?  0 No   1 Yes      15. Akathesia: Ratin  0 No restlessness reported or observed   1 Mild restlessness observed; e.g., occasional jiggling of the foot occurs when subject is seated   2 Moderate restlessness observed; e.g., on several occasions, jiggles foot, crosses and uncrosses legs or twists a part of the body   3 Restlessness is frequently observed; e.g., the foot or legs moving most of the time   4 Restlessness persistently observed; e.g., subject cannot sit still, may get up and walk   U Unable to assess      16. Dyskinetic Movement Ratings: Ratin  0 None   1 Minimal, may be extreme normal   2 Mild   3 Moderate   4 Severe   U Unable to assess      SIDE EFFECT ASSESSMENT:  Clinician Rating Form in [facility]PASS - Side Effect Assessment  Address side effects reported by the patient and rate using this scale  0 Not present   1 Minimal, may be extreme normal   2 Mild   3 Moderate   4 Severe   U Unable to assess   P Present, but not related      Feeling dizzy or faint: 2  Blurred vision: 0  Dry mouth: 2  Too much saliva/droolin  Nausea:  0  Constipation: 0  Increased appetite: 0  Weight gain: 0  Weight loss: 0  Feeling tired/fatigue: 0  Daytime sedation: 0  Hypersomnia: 2  Insomnia: 2  Low libido: 2  Other problems with sex: 2  Breast enlargement or discharge:  0  Irregular Menstruation or amenorrhea: 0  Other  (please list and rate):      RECENT SUBSTANCE USE:  Clinician Rating Form in [facility]PASS - Substance Use Assessment  Alcohol Use Severity: Ratin  0 none   1 use without impairment: drinks but no immediate social or medical impairment   2 use with impairment: e.g. becomes grossly intoxicated; alcohol use or withdrawal compromises school, work or social functioning; alcohol use or withdrawal exacerbates symptoms (e.g. gets depressed when drinking)      Marijuana Use Severity: Rating: 3  0 none   1 occasional use without impairment: e.g. uses marijuana a few days a month and has no immediate social or medical impairment   2 frequent use without impairment: e.g. uses marijuana several or more days a week but has no immediate social or medical impairment   3 use with impairment: e.g. becomes grossly intoxicated; marijuana use compromises school, work or social functioning; marijuana use exacerbates symptoms (e.g. gets paranoid when using)      Other Drug Use Severity: Ratin  0 none   1 occasional use without impairment: e.g. uses drug(s) a few days a month and has no immediate social or medical impairment   2 frequent use without impairment: e.g. uses drug(s) several or more days a week but has no immediate social or medical impairment   3 use with impairment: e.g. becomes grossly intoxicated; drug use compromises school, work or social functioning; drug use exacerbates symptoms (e.g. gets paranoid when using)      RECENT SOCIAL HISTORY:  SEE HPI    Medical ROS:  none         First Episode of Psychosis History      DUP (duration untreated psychosis):  4 years  Route to initial care: outpatient  Medication adherence overall:  See above, Clinician Rating Form in COMPASS Item 13  General frequency of visits:  weekly IRT, monthly med management  Participation in groups:  No  Cognitive Remediation:  No  Other treatment history:     Reviewed for completion of First Episode work-up:  Yes  First episode workup:  Not Done (if  completed, see LABS for results)  MATRICS Consensus Cognitive Battery:  Not Done (if completed, see LABS for results)       Medical/Surgical History     Patient has no known allergies.    Patient Active Problem List   Diagnosis   (none) - all problems resolved or deleted            Medications     Current Outpatient Medications   Medication Sig Dispense Refill    cholecalciferol, vitamin D3, 1,000 unit (25 mcg) tablet [CHOLECALCIFEROL, VITAMIN D3, 1,000 UNIT (25 MCG) TABLET] Take 2,000 Units by mouth daily.      dutasteride (AVODART) 0.5 MG capsule Take 0.5 mg by mouth daily      ketoconazole (NIZORAL) 2 % external shampoo Apply topically daily as needed LATHER INTO SCALP AND RINSE AFTER 2-3 MINUTES. USE THREE TIMES A WEEK.      magnesium chloride (SLOW-MAG) 64 mg TbEC delayed-release tablet [MAGNESIUM CHLORIDE (SLOW-MAG) 64 MG TBEC DELAYED-RELEASE TABLET] Take 64 mg by mouth daily.      naltrexone (DEPADE/REVIA) 50 MG tablet Take 1 tablet (50 mg) by mouth daily 30 tablet 1    OLANZapine (ZYPREXA) 15 MG tablet Take 7.5 mg (1/2 tab) for one week, then increase to 15 mg (1 tab) at bedtime. 30 tablet 1    QUEtiapine (SEROQUEL) 400 MG tablet Take 2 tablets (800 mg) by mouth at bedtime 60 tablet 1    QUEtiapine (SEROQUEL) 50 MG tablet Take 1 tablet (50 mg) by mouth 2 times daily as needed (Patient not taking: Reported on 12/27/2023) 60 tablet 1    tadalafil (CIALIS) 10 MG tablet Take 10 mg by mouth daily      zinc gluconate 50 mg tablet [ZINC GLUCONATE 50 MG TABLET] Take 100 mg by mouth daily.               Vitals     There were no vitals taken for this visit.      Weight prior to medication: unknown         Mental Status Exam     Alertness: alert  and oriented  Appearance: well groomed  Behavior/Demeanor: cooperative, pleasant, and calm, with good  eye contact   Speech: regular rate and rhythm, not pressured  Language: no obvious problem  Psychomotor: normal or unremarkable  Mood: depressed  Affect: blunted; was  "congruent to mood; was congruent to content  Thought Process/Associations:  more linear than at last assessment  Thought Content:  Reports suicidal ideation without plan; without intent [details in Interim History];  Denies violent ideation  Perception:  Reports auditory hallucinations;  Denies visual hallucinations  Insight: good  Judgment: fair and adequate for safety  Cognition: does  appear grossly intact; formal cognitive testing was not done         Labs and Data     RATING SCALES:  AIMS: next in person visit    PHQ9 TODAY =       1/31/2024     3:03 PM 2/6/2024     3:00 PM   PHQ-9 SCORE   PHQ-9 Total Score MyChart  21 (Severe depression)   PHQ-9 Total Score 15 21     ANTIPSYCHOTIC LABS ROUTINE    [glu, A1C, lipids (focus LDL), liver enzymes, WBC, ANEU, Hgb, plts]   q12 mo  Recent Labs   Lab Test 06/30/23  1048   GLC 84     No lab results found.  Recent Labs   Lab Test 06/30/23  1048   AST 39   ALT 40   ALKPHOS 88     Recent Labs   Lab Test 06/30/23  1048   WBC 7.7   HGB 14.9               Psychiatric Diagnoses     Psychosis, schizophrenia vs schizoaffective disorder, r/o substance-induced  AUD in early remission  Cannabis use disorder with active use         Assessment   Navi HO Andrew is a 28 year old male with first onset of psychiatric symptoms at age 7 (\"social anxiety\"), and psychotic symptoms at age 23. The above duration of untreated psychosis was approximately 4 years.  Prodromal symptoms seem to have been present since at least college (notable substance use) though patient does note a substantially longer history of depression and physical health concerns. Presenting symptoms appear to include hallucinations, delusional thoughts. Navi attributes symptoms to physical health issues and history of trauma.  Substance use does seem to be a present concern. There are possible medical comorbidities which impact this treatment [suicide attempt, suicidal ideation, SIB, psychosis, aggression, and " substance use: alcohol].     Today, Navi reports continued AH that are extremely distressing, no significant change in side effects on olanzapine. Plan to continue cross-titration. He is drinking a large amount of coffee and we discussed how that may be affecting anxiety.                SUICIDE RISK ASSESSMENT-  Risk factors for self-harm: previous suicide attempt, substance use/pending treatment, recent symptom worsening, hallucinations, recent loss, and lives alone/ isolated.  Mitigating factors: describes a safety plan, h/o seeking help , future oriented, commitment to family, and stable housing.  The patient does not appear to be at imminent risk for self-harm, hospitalization is not recommended which the pt does  agree with. No hospitalization will be arranged. Based on degree of symptoms close psych follow-up was/were recommended which the pt does  agree to. Additional steps to minimize risk: med changes.      [place] PRESCRIPTION MONITORING PROGRAM [] was not checked today: not using controlled substances.    PSYCHOTROPIC DRUG INTERACTIONS:   Seroquel/Zyprexa: additive CNS depression, QTc prolongation.    MANAGEMENT:  use lowest therapeutic doses of both and routine monitoring         Plan     1) PSYCHOTROPIC MEDICATIONS:  - Decrease quetiapine from 400mg to 200mg qhs  - Increase olanzapine from 15mg to 25mg qhs  - naltrexone 50 mg daily    2) THERAPY:  Continue weekly IRT with LEENA Lay    3) NEXT DUE:    Labs: FEP workup due  Rating Scales: AIMS due    4) REFERRALS:    none    5) RTC: 3 weeks    6) CRISIS NUMBERS:   Provided routinely in AVS.    TREATMENT RISK STATEMENT:  The risks, benefits, alternatives and potential adverse effects have been discussed and are understood by the pt. The pt understands the risks of using street drugs or alcohol. There are no medical contraindications, the pt agrees to treatment with the ability to do so. The pt knows to call the clinic for any problems or to access  emergency care if needed.  Medical and substance use concerns are documented above.  Psychotropic drug interaction check was done, including changes made today.    PROVIDER:  Paula Dutta MD    Patient staffed in clinic with Dr. Vega who will sign the note.  Supervisor is Dr. Vega.      TELEHEALTH ATTENDING ATTESTATION  Following the [facility] guidelines on telemedicine and direct supervision, I was concurrently participating in and/or monitoring the patient care through appropriate telecommunication technology - including participation during a portion of the video session that involved clinical data collection and treatment planning discussion.  I discussed the key portions of the service with the resident, including history, presentation, the mental status examination and developing the plan of care. I agree with the findings and plan as documented in this note.  Heidi Vega MD     The longitudinal plan of care for the diagnosis(es)/condition(s) as documented were addressed during this visit. Due to the added complexity in care, I will continue to support Navi in the subsequent management and with ongoing continuity of care.

## 2024-02-20 ENCOUNTER — VIRTUAL VISIT (OUTPATIENT)
Dept: PSYCHIATRY | Facility: CLINIC | Age: 29
End: 2024-02-20
Payer: COMMERCIAL

## 2024-02-20 DIAGNOSIS — F29 PSYCHOSIS, UNSPECIFIED PSYCHOSIS TYPE (H): Primary | ICD-10-CM

## 2024-02-21 NOTE — PROGRESS NOTES
NAVIGGALINDO Clinician Contact & Progress Note  For Individual Resiliency Training (IRT)  A Part of the [facility] First Episode of Psychosis Program    NAVIGATE Enrollee: Navi Morales (1995)     MRN: [id number]  Date:  2/20/24  Diagnosis: Psychosis, unspecifed  Clinician: LILIANA Individual Resiliency Trainer, LEENA Lay     1. Type of contact: (majority of time spent)  IRT Session via telehealth  Mode of communication: [employer] (HIPAA compliant, secure platform). Patient consented verbally to this mode of therapy today.  Reason for telehealth: COVID-19. This patient visit was converted to a telehealth visit to minimize exposure to COVID-19.    2. People  present:   Writer  Client: Yes     3. Length of Actual Contact: Start Time: 3:04; End Time: 4:00     4. Location of contact:  Originating Location (patient location): family home, located in [place], [place]  Distant Location (provider location): Home office, located in [place], [place], using appropriate privacy considerations and procedures    5. Did the client complete the home practice option(s) from the previous session: Not Applicable    6. Motivational Teaching Strategies:  Connect info and skills with personal goals  Promote hope and positive expectations  Explore pros and cons of change  Re-frame experiences in positive light    7. Educational Teaching Strategies:  Review of written material/education  Relate information to client's experience    8. CBT Teaching Strategies:  Reinforcement and shaping (positive feedback for steps towards goals and gains in knowledge & skills)    9. IRT Module(s) Addressed:  Module 1 - Orientation    10. Techniques utilized:   [place] announced at beginning of session  Present new material  Summarize progress made in current session  Other techniques (please specify):   -Built rapport with patient by inviting him to share his experiences and discuss his concerns  -Clarified patient's feelings and  perspectives  -Discussed crisis intervention plan and safety plan  -Elicited more details about current and recent circumstances  -Emotional support via active and reflective listening, responding to feelings etc.  -Normalized and validated feelings and experiences  -Provided verbal overview of First Episode Program NAVIGATE services  -Provided positive feedback and encouragement  -Provided psychoeducation related to psychosis and related concerns.   -Supportive counseling    11. Measures:    Mental Status Exam  Alertness: alert  and oriented  Behavior/Demeanor: cooperative and pleasant  Speech: regular rate and rhythm  Language: no obvious problem.   Mood: description consistent with euthymia  Thought Process/Associations: unremarkable  Thought Content:  Reports none;  Denies suicidal ideation and violent ideation  Perception:  Reports auditory hallucinations;  Denies none  Insight: adequate  Judgment: adequate for safety  Cognition: does  appear grossly intact; formal cognitive testing was not done    MELY-7  Not completed today.      PHQ-9  Not completed today.      [place] Protocol Risk Identification  Not completed today.      12. Assessment/Progress Note:     Writer and client met for IRT visit via [facility]. Set agenda to check in, discuss treatment plan and client's goals, and discuss IRT specific work. Client consented to this agenda. Reported current symptoms to include: AH and depression, both at an increased level. Explored symptoms and coping from a stress-vulnerability lens. Current stressors include: symptoms. In regards to medications, client reports: some concerns about dosage of meds and amount of sedation experienced. Client noted he has begun titrating from Seroquel to Zyprexa with the doctor's supervision. Substance use: history of issues steming from substance use. Reports vaping tobacco and cannabis (delta 9) products as a coping strategy. Will continue to discuss and assess. Assessed safety.  "Client reports SI, denies intent, and denies plan. HI was not present. SIB was not present. Safety plan was not reviewed today and includes calling 911, going to the emergency department (client specified [facility] as his preferred hospital if needed) and calling crisis lines if needed if SI/HI/SIB becomes overwhelming, worsens and/or becomes active.     Client did not identify urgent concerns to be addressed today.    Today's visit:  Continued IRT material on NAVIGATE program and it's components, as well as rapport building and assessment. Client appeared engaged in conversation and content. Writer and client reviewed symptoms, medications, and coping strategies. Client previously expressed interest in checking in weekly about progress related to breathing practices (diaphramatic breathing). Explored various coping strategies and their efficacy. Discuss medication  - client reports that he has started titrating from one antipsychotic medication to a new antipsychotic medication, and noted that it is too early to tell if there are benefits. He does report sedation, noting that hit \"feels more natural a tiredness than on the other one\" when discussing it. Also reports an increased ease when reading over the past two weeks. Writer inquired about family and client's level of interacting with family - client reported that there is going to be a baby shower for his brother's wife soon at the house, discussed feeling uncomfortable with participation in this event. Writer and client began reviewing FBR. Client expressing that it seems accurate but symptoms may have been reported too high at the time. Will continue to review FBR at next visit.     Writer and client previously discussed coping strategies of distraction, soothing, and redirecting attention. Writer sent client information about grounding techniques of using an ice pack on the sternum, the 5-4-3-2-1 sensory grounding technique and the physiological sigh, " discussed it's impact on soothing the vagus nerve.     Previously discussed substance use (regular vaping of nicotine and delta 9 and occasional alcohol use, with a Hx of issues related to use). Writer inquired about Navi's openness to discussion of his substance use with this writer. Navi reports that he feels that substance use is an effective way to cope with symptoms and seems to also report having experienced negative consequences of use in the past. Writer provided education about the impact of substance use on efficacy of antipsychotic medications (e.g. can reduce efficacy). Navi was open to further analysis and discussion with this writer in the future and also expressed some reservation about discontinuing use of substances.     Writer used relational and interpersonal approach to explore feelings, motivations, and behavior. Writer offered support, feedback, validation, and reinforced use of skills taught in IRT from modalities including cognitive behavioral therapy, psycho education, and skills training. Promoted understanding of their experiences of psychosis and how it impacts them in important areas of their life and in recovery goals. Reflected on client's strengths and resiliency factors and facilitated discussion on how these can assist in symptom management, recovery, and well-being.     Home practice identified as: pay attention to and make note of the coping strategies you use.     13. Treatment Plan:     Treatment Plan________________________________________________________________________  Reviewed 1/16/2024    Pt s measurable objectives (list 3)  o Reduce intensity of psychosis symptoms  o Demonstrate understanding of symptoms regarding causes, treatment  o Learn two skills to manage symptoms of psychosis    In Pt s own words   o  I want to have stability both mentally and psychically.     Interventions  o IRT  o Medication Management  o SEE  o Family support and education  o Social work care  "coordination    Target date of discharge  o 12-36 months from enrollment    Discharge criteria  o Marked and sustained symptom improvement  o Demonstrated understanding of mental illness  o Successful implementation of strategies to cope with stressors/symptoms to mitigate risk for increase in symptoms severity or relapse    Gains made (listed out objectives accomplished)    Frequency of sessions and expected duration of treatment:   o 6-12 months of weekly IRT/Individual Psychotherapy followed by 12-24 months of biweekly or monthly IRT    Participants in therapy plan: Navi Morales and IRT LEENA Lay    Support System: parents, particularly mother and providers    14. Plan/Referrals:     Next visit schedule for one week from today.     Billing for \"Interactive Complexity\"?    No      LEENA Lay    NAVIGATE Individual Resiliency Trainer  "

## 2024-02-27 ENCOUNTER — VIRTUAL VISIT (OUTPATIENT)
Dept: PSYCHIATRY | Facility: CLINIC | Age: 29
End: 2024-02-27
Payer: COMMERCIAL

## 2024-02-27 DIAGNOSIS — F29 PSYCHOSIS, UNSPECIFIED PSYCHOSIS TYPE (H): Primary | ICD-10-CM

## 2024-02-27 ASSESSMENT — PATIENT HEALTH QUESTIONNAIRE - PHQ9
10. IF YOU CHECKED OFF ANY PROBLEMS, HOW DIFFICULT HAVE THESE PROBLEMS MADE IT FOR YOU TO DO YOUR WORK, TAKE CARE OF THINGS AT HOME, OR GET ALONG WITH OTHER PEOPLE: VERY DIFFICULT
SUM OF ALL RESPONSES TO PHQ QUESTIONS 1-9: 24
SUM OF ALL RESPONSES TO PHQ QUESTIONS 1-9: 24

## 2024-02-28 NOTE — PROGRESS NOTES
NAVIGGALINDO Clinician Contact & Progress Note  For Individual Resiliency Training (IRT)  A Part of the [facility] First Episode of Psychosis Program    NAVIGATE Enrollee: Navi Morales (1995)     MRN: [id number]  Date:  2/27/24  Diagnosis: Psychosis, unspecifed  Clinician: LILIANA Individual Resiliency Trainer, LEENA Lay     1. Type of contact: (majority of time spent)  IRT Session via telehealth  Mode of communication: [employer] (HIPAA compliant, secure platform). Patient consented verbally to this mode of therapy today.  Reason for telehealth: COVID-19. This patient visit was converted to a telehealth visit to minimize exposure to COVID-19.    2. People  present:   Writer  Client: Yes     3. Length of Actual Contact: Start Time: 3:02; End Time: 3:59     4. Location of contact:  Originating Location (patient location): family home, located in [place], [place]  Distant Location (provider location): Home office, located in [place], [place], using appropriate privacy considerations and procedures    5. Did the client complete the home practice option(s) from the previous session: Not Applicable    6. Motivational Teaching Strategies:  Connect info and skills with personal goals  Promote hope and positive expectations  Explore pros and cons of change  Re-frame experiences in positive light    7. Educational Teaching Strategies:  Review of written material/education  Relate information to client's experience    8. CBT Teaching Strategies:  Reinforcement and shaping (positive feedback for steps towards goals and gains in knowledge & skills)    9. IRT Module(s) Addressed:  Module 1 - Orientation    10. Techniques utilized:   [place] announced at beginning of session  Present new material  Summarize progress made in current session  Other techniques (please specify):   -Built rapport with patient by inviting him to share his experiences and discuss his concerns  -Clarified patient's feelings and  perspectives  -Discussed crisis intervention plan and safety plan  -Elicited more details about current and recent circumstances  -Emotional support via active and reflective listening, responding to feelings etc.  -Normalized and validated feelings and experiences  -Provided verbal overview of First Episode Program NAVIGATE services  -Provided positive feedback and encouragement  -Provided psychoeducation related to psychosis and related concerns.   -Supportive counseling    11. Measures:    Mental Status Exam  Alertness: alert  and oriented  Behavior/Demeanor: cooperative and pleasant  Speech: regular rate and rhythm  Language: no obvious problem.   Mood: description consistent with euthymia  Thought Process/Associations: unremarkable  Thought Content:  Reports none;  Denies suicidal ideation and violent ideation  Perception:  Reports auditory hallucinations;  Denies none  Insight: adequate  Judgment: adequate for safety  Cognition: does  appear grossly intact; formal cognitive testing was not done    MELY-7  Not completed today.      PHQ-9  Not completed today.      [place] Protocol Risk Identification  Not completed today.      12. Assessment/Progress Note:     Writer and client met for IRT visit via [employer]. Set agenda to check in, discuss treatment plan and client's goals, and discuss IRT specific work. Client consented to this agenda. Reported current symptoms to include: AH and depression, both at an increased level. Explored symptoms and coping from a stress-vulnerability lens. Current stressors include: symptoms. In regards to medications, client reports: some concerns about dosage of meds and amount of sedation experienced. Client noted he has begun titrating from Seroquel to Zyprexa with the doctor's supervision. Substance use: history of issues steming from substance use. Reports vaping tobacco and cannabis (delta 9) products as a coping strategy. Will continue to discuss and assess. Assessed safety.  "Client reports SI, denies intent, and denies plan. HI was not present. SIB was not present. Safety plan was not reviewed today and includes calling 911, going to the emergency department (client specified [facility] as his preferred hospital if needed) and calling crisis lines if needed if SI/HI/SIB becomes overwhelming, worsens and/or becomes active.     Client did not identify urgent concerns to be addressed today.    Today's visit:  Continued IRT material on NAVIGATE program and it's components, as well as rapport building and assessment. Client appeared engaged in conversation and content. Writer and client reviewed symptoms, medications, and coping strategies. Client previously expressed interest in checking in weekly about progress related to breathing practices (diaphramatic breathing). Discussed medication  - client reports that he has started titrating from one antipsychotic medication to a new antipsychotic medication, and noted that he is experiencing less frequent AH and less depression, but an increase in anxiety. He does report sedation, noting that hit \"feels more natural a tiredness than on the other one\" when discussing it. Also reports an increased ease when reading over the past two weeks. Regarding safety - client reported no change from last week, endorsed SI and idea of a plan, denied intent, denied action towards any plan. Writer inquired about family and client's level of interacting with family - client reported avoiding interactions with family or leaving the house. Writer and client continued reviewing FBR. Client expressing that it seems accurate but symptoms may have been reported too high at the time. Will continue to review FBR at next visit.     Home practice identified as: practice diaphragmatic breathing and notice how it impacts symptoms.     Writer and client previously discussed coping strategies of distraction, soothing, and redirecting attention. Writer sent client information " about grounding techniques of using an ice pack on the sternum, the 5-4-3-2-1 sensory grounding technique and the physiological sigh, discussed it's impact on soothing the vagus nerve.     Previously discussed substance use (regular vaping of nicotine and delta 9 and occasional alcohol use, with a Hx of issues related to use). Writer inquired about Navi's openness to discussion of his substance use with this writer. Navi reports that he feels that substance use is an effective way to cope with symptoms and seems to also report having experienced negative consequences of use in the past. Writer provided education about the impact of substance use on efficacy of antipsychotic medications (e.g. can reduce efficacy). Navi was open to further analysis and discussion with this writer in the future and also expressed some reservation about discontinuing use of substances.     Writer used relational and interpersonal approach to explore feelings, motivations, and behavior. Writer offered support, feedback, validation, and reinforced use of skills taught in IRT from modalities including cognitive behavioral therapy, psycho education, and skills training. Promoted understanding of their experiences of psychosis and how it impacts them in important areas of their life and in recovery goals. Reflected on client's strengths and resiliency factors and facilitated discussion on how these can assist in symptom management, recovery, and well-being.       13. Treatment Plan:     Treatment Plan________________________________________________________________________  Reviewed 1/16/2024    Pt s measurable objectives (list 3)  o Reduce intensity of psychosis symptoms  o Demonstrate understanding of symptoms regarding causes, treatment  o Learn two skills to manage symptoms of psychosis    In Pt s own words   o  I want to have stability both mentally and psychically.     Interventions  o IRT  o Medication Management  o SEE  o Family  "support and education  o Social work care coordination    Target date of discharge  o 12-36 months from enrollment    Discharge criteria  o Marked and sustained symptom improvement  o Demonstrated understanding of mental illness  o Successful implementation of strategies to cope with stressors/symptoms to mitigate risk for increase in symptoms severity or relapse    Gains made (listed out objectives accomplished)    Frequency of sessions and expected duration of treatment:   o 6-12 months of weekly IRT/Individual Psychotherapy followed by 12-24 months of biweekly or monthly IRT    Participants in therapy plan: Navi Morales and IRT LEENA Lay    Support System: parents, particularly mother and providers    14. Plan/Referrals:     Next visit schedule for one week from today.     Billing for \"Interactive Complexity\"?    No      LEENA Lay    NAVIGATE Individual Resiliency Trainer  "

## 2024-03-05 ENCOUNTER — VIRTUAL VISIT (OUTPATIENT)
Dept: PSYCHIATRY | Facility: CLINIC | Age: 29
End: 2024-03-05
Payer: COMMERCIAL

## 2024-03-05 DIAGNOSIS — F29 PSYCHOSIS, UNSPECIFIED PSYCHOSIS TYPE (H): Primary | ICD-10-CM

## 2024-03-05 ASSESSMENT — PATIENT HEALTH QUESTIONNAIRE - PHQ9
SUM OF ALL RESPONSES TO PHQ QUESTIONS 1-9: 22
10. IF YOU CHECKED OFF ANY PROBLEMS, HOW DIFFICULT HAVE THESE PROBLEMS MADE IT FOR YOU TO DO YOUR WORK, TAKE CARE OF THINGS AT HOME, OR GET ALONG WITH OTHER PEOPLE: VERY DIFFICULT
SUM OF ALL RESPONSES TO PHQ QUESTIONS 1-9: 22

## 2024-03-05 ASSESSMENT — ANXIETY QUESTIONNAIRES
8. IF YOU CHECKED OFF ANY PROBLEMS, HOW DIFFICULT HAVE THESE MADE IT FOR YOU TO DO YOUR WORK, TAKE CARE OF THINGS AT HOME, OR GET ALONG WITH OTHER PEOPLE?: EXTREMELY DIFFICULT
IF YOU CHECKED OFF ANY PROBLEMS ON THIS QUESTIONNAIRE, HOW DIFFICULT HAVE THESE PROBLEMS MADE IT FOR YOU TO DO YOUR WORK, TAKE CARE OF THINGS AT HOME, OR GET ALONG WITH OTHER PEOPLE: EXTREMELY DIFFICULT
GAD7 TOTAL SCORE: 19
6. BECOMING EASILY ANNOYED OR IRRITABLE: MORE THAN HALF THE DAYS
IF YOU CHECKED OFF ANY PROBLEMS ON THIS QUESTIONNAIRE, HOW DIFFICULT HAVE THESE PROBLEMS MADE IT FOR YOU TO DO YOUR WORK, TAKE CARE OF THINGS AT HOME, OR GET ALONG WITH OTHER PEOPLE: EXTREMELY DIFFICULT
GAD7 TOTAL SCORE: 19
GAD7 TOTAL SCORE: 19
7. FEELING AFRAID AS IF SOMETHING AWFUL MIGHT HAPPEN: NEARLY EVERY DAY
GAD7 TOTAL SCORE: 19
5. BEING SO RESTLESS THAT IT IS HARD TO SIT STILL: MORE THAN HALF THE DAYS
7. FEELING AFRAID AS IF SOMETHING AWFUL MIGHT HAPPEN: NEARLY EVERY DAY
1. FEELING NERVOUS, ANXIOUS, OR ON EDGE: NEARLY EVERY DAY
6. BECOMING EASILY ANNOYED OR IRRITABLE: MORE THAN HALF THE DAYS
GAD7 TOTAL SCORE: 19
6. BECOMING EASILY ANNOYED OR IRRITABLE: MORE THAN HALF THE DAYS
7. FEELING AFRAID AS IF SOMETHING AWFUL MIGHT HAPPEN: NEARLY EVERY DAY
3. WORRYING TOO MUCH ABOUT DIFFERENT THINGS: NEARLY EVERY DAY
GAD7 TOTAL SCORE: 19
IF YOU CHECKED OFF ANY PROBLEMS ON THIS QUESTIONNAIRE, HOW DIFFICULT HAVE THESE PROBLEMS MADE IT FOR YOU TO DO YOUR WORK, TAKE CARE OF THINGS AT HOME, OR GET ALONG WITH OTHER PEOPLE: EXTREMELY DIFFICULT
4. TROUBLE RELAXING: NEARLY EVERY DAY
2. NOT BEING ABLE TO STOP OR CONTROL WORRYING: NEARLY EVERY DAY
GAD7 TOTAL SCORE: 19
1. FEELING NERVOUS, ANXIOUS, OR ON EDGE: NEARLY EVERY DAY
3. WORRYING TOO MUCH ABOUT DIFFERENT THINGS: NEARLY EVERY DAY
1. FEELING NERVOUS, ANXIOUS, OR ON EDGE: NEARLY EVERY DAY
7. FEELING AFRAID AS IF SOMETHING AWFUL MIGHT HAPPEN: NEARLY EVERY DAY
2. NOT BEING ABLE TO STOP OR CONTROL WORRYING: NEARLY EVERY DAY
4. TROUBLE RELAXING: NEARLY EVERY DAY
5. BEING SO RESTLESS THAT IT IS HARD TO SIT STILL: MORE THAN HALF THE DAYS
8. IF YOU CHECKED OFF ANY PROBLEMS, HOW DIFFICULT HAVE THESE MADE IT FOR YOU TO DO YOUR WORK, TAKE CARE OF THINGS AT HOME, OR GET ALONG WITH OTHER PEOPLE?: EXTREMELY DIFFICULT
5. BEING SO RESTLESS THAT IT IS HARD TO SIT STILL: MORE THAN HALF THE DAYS
7. FEELING AFRAID AS IF SOMETHING AWFUL MIGHT HAPPEN: NEARLY EVERY DAY
3. WORRYING TOO MUCH ABOUT DIFFERENT THINGS: NEARLY EVERY DAY
7. FEELING AFRAID AS IF SOMETHING AWFUL MIGHT HAPPEN: NEARLY EVERY DAY
8. IF YOU CHECKED OFF ANY PROBLEMS, HOW DIFFICULT HAVE THESE MADE IT FOR YOU TO DO YOUR WORK, TAKE CARE OF THINGS AT HOME, OR GET ALONG WITH OTHER PEOPLE?: EXTREMELY DIFFICULT
2. NOT BEING ABLE TO STOP OR CONTROL WORRYING: NEARLY EVERY DAY
4. TROUBLE RELAXING: NEARLY EVERY DAY

## 2024-03-05 NOTE — Clinical Note
Update regarding meds - Navi is reporting 20% decrease in AH, decrease in depression. Reports an increase in anxiety, but also discussed that if he keeps to 2 cups of a coffee a day that his anxiety is improved. He is still smoking both nicotine and cannabis, but a bit less with the improved AH.He described occasionally skipping the seroquel due to sedation. He expressed worry that these positive impacts will only last a couple of months and they the olanzapine with 'stop workking'.  Thanks! Tesha

## 2024-03-06 NOTE — PROGRESS NOTES
NAVIGGALINDO Clinician Contact & Progress Note  For Individual Resiliency Training (IRT)  A Part of the [facility] First Episode of Psychosis Program    NAVIGATE Enrollee: Navi Morales (1995)     MRN: [id number]  Date:  3/05/24  Diagnosis: Psychosis, unspecifed  Clinician: LILIANA Individual Resiliency Trainer, LEENA Lay     1. Type of contact: (majority of time spent)  IRT Session via telehealth  Mode of communication: [employer] (HIPAA compliant, secure platform). Patient consented verbally to this mode of therapy today.  Reason for telehealth: COVID-19. This patient visit was converted to a telehealth visit to minimize exposure to COVID-19.    2. People  present:   Writer  Client: Yes     3. Length of Actual Contact: Start Time: 3:02; End Time: 3:59     4. Location of contact:  Originating Location (patient location): family home, located in [place], [place]  Distant Location (provider location): Home office, located in [place], [place], using appropriate privacy considerations and procedures    5. Did the client complete the home practice option(s) from the previous session: Not Applicable    6. Motivational Teaching Strategies:  Connect info and skills with personal goals  Promote hope and positive expectations  Explore pros and cons of change  Re-frame experiences in positive light    7. Educational Teaching Strategies:  Review of written material/education  Relate information to client's experience    8. CBT Teaching Strategies:  Reinforcement and shaping (positive feedback for steps towards goals and gains in knowledge & skills)    9. IRT Module(s) Addressed:  Module 1 - Orientation    10. Techniques utilized:   [place] announced at beginning of session  Present new material  Summarize progress made in current session  Other techniques (please specify):   -Built rapport with patient by inviting him to share his experiences and discuss his concerns  -Clarified patient's feelings and  perspectives  -Discussed crisis intervention plan and safety plan  -Elicited more details about current and recent circumstances  -Emotional support via active and reflective listening, responding to feelings etc.  -Normalized and validated feelings and experiences  -Provided verbal overview of First Episode Program NAVIGATE services  -Provided positive feedback and encouragement  -Provided psychoeducation related to psychosis and related concerns.   -Supportive counseling    11. Measures:    Mental Status Exam  Alertness: alert  and oriented  Behavior/Demeanor: cooperative and pleasant  Speech: regular rate and rhythm  Language: no obvious problem.   Mood: description consistent with euthymia  Thought Process/Associations: unremarkable  Thought Content:  Reports none;  Denies suicidal ideation and violent ideation  Perception:  Reports auditory hallucinations;  Denies none  Insight: adequate  Judgment: adequate for safety  Cognition: does  appear grossly intact; formal cognitive testing was not done    MELY-7  Not completed today.      PHQ-9  Not completed today.      [place] Protocol Risk Identification  Not completed today.      12. Assessment/Progress Note:     Writer and client met for IRT visit via [email or web address]. Set agenda to check in, discuss treatment plan and client's goals, and discuss IRT specific work. Client consented to this agenda. Reported current symptoms to include: AH and depression, both at an increased level. Explored symptoms and coping from a stress-vulnerability lens. Current stressors include: symptoms. In regards to medications, client reports: some concerns about dosage of meds and amount of sedation experienced. Client noted he has begun titrating from Seroquel to Zyprexa with the doctor's supervision. Substance use: history of issues steming from substance use. Reports vaping tobacco and cannabis (delta 9) products as a coping strategy. Will continue to discuss and assess. Assessed safety.  Client reports SI, denies intent, and denies plan. HI was not present. SIB was not present. Safety plan was not reviewed today and includes calling 911, going to the emergency department (client specified [facility] as his preferred hospital if needed) and calling crisis lines if needed if SI/HI/SIB becomes overwhelming, worsens and/or becomes active.     Client did not identify urgent concerns to be addressed today.    Today's visit:  Continued IRT material on NAVIGATE program and it's components, as well as rapport building and assessment. Client appeared engaged in conversation and content. Writer and client reviewed symptoms, medications, and coping strategies. Client previously expressed interest in checking in weekly about progress related to breathing practices (diaphramatic breathing). Discussed medication  - client reports that he continues to titrate from one antipsychotic medication to a new antipsychotic medication, and noted that he is experiencing less frequent AH and less depression, but an increase in anxiety. He further discussed discovering that his caffeine intake has been increasing his anxiety dn he is subsequently decreasing his caffeine intake. He does report sedation, noting that the Seroquel is more sedating that olanzapine, such that he occasionally skips a dose of seroquel. Writer encouraged josé miguel to report all of this to the doctor and the next medication management visit. Regarding safety - client reported no change from last week, endorsed SI and idea of a plan, denied intent, denied action towards any plan. Writer inquired about family and client's level of interacting with family - client reported avoiding interactions with family or leaving the house. José Miguel still plans to fill out the consent to communicate form and resubmit to allow us to work with his mother. Writer and client completed reviewing FBR. Client expressing that it seems accurate but symptoms may have been reported too high  at the time.     Home practice identified as: practice diaphragmatic breathing and notice how it impacts symptoms.     Writer and client previously discussed coping strategies of distraction, soothing, and redirecting attention. Writer sent client information about grounding techniques of using an ice pack on the sternum, the 5-4-3-2-1 sensory grounding technique and the physiological sigh, discussed it's impact on soothing the vagus nerve.     Previously discussed substance use (regular vaping of nicotine and delta 9 and occasional alcohol use, with a Hx of issues related to use). Writer inquired about Navi's openness to discussion of his substance use with this writer. Navi reports that he feels that substance use is an effective way to cope with symptoms and seems to also report having experienced negative consequences of use in the past. Writer provided education about the impact of substance use on efficacy of antipsychotic medications (e.g. can reduce efficacy). Navi was open to further analysis and discussion with this writer in the future and also expressed some reservation about discontinuing use of substances.     Writer used relational and interpersonal approach to explore feelings, motivations, and behavior. Writer offered support, feedback, validation, and reinforced use of skills taught in IRT from modalities including cognitive behavioral therapy, psycho education, and skills training. Promoted understanding of their experiences of psychosis and how it impacts them in important areas of their life and in recovery goals. Reflected on client's strengths and resiliency factors and facilitated discussion on how these can assist in symptom management, recovery, and well-being.     Email sent 3/6:  Vern Mcelroy,   Here is an electronic copy of the feedback report we have been reviewing.   Warmest regards,   SANTOS Lay, LICSW     13. Treatment Plan:     Treatment  "Plan________________________________________________________________________  Reviewed 1/16/2024    Pt s measurable objectives (list 3)  o Reduce intensity of psychosis symptoms  o Demonstrate understanding of symptoms regarding causes, treatment  o Learn two skills to manage symptoms of psychosis    In Pt s own words   o  I want to have stability both mentally and psychically.     Interventions  o IRT  o Medication Management  o SEE  o Family support and education  o Social work care coordination    Target date of discharge  o 12-36 months from enrollment    Discharge criteria  o Marked and sustained symptom improvement  o Demonstrated understanding of mental illness  o Successful implementation of strategies to cope with stressors/symptoms to mitigate risk for increase in symptoms severity or relapse    Gains made (listed out objectives accomplished)    Frequency of sessions and expected duration of treatment:   o 6-12 months of weekly IRT/Individual Psychotherapy followed by 12-24 months of biweekly or monthly IRT    Participants in therapy plan: Navi Morales and IRT LEENA Lay    Support System: parents, particularly mother and providers    14. Plan/Referrals:     Next visit schedule for one week from today.     Billing for \"Interactive Complexity\"?    No      LEENA Lay    NAVIGATE Individual Resiliency Trainer  "

## 2024-03-08 ENCOUNTER — TELEPHONE (OUTPATIENT)
Dept: PSYCHIATRY | Facility: CLINIC | Age: 29
End: 2024-03-08

## 2024-03-08 NOTE — TELEPHONE ENCOUNTER
What is the concern that needs to be addressed by a nurse? Mom Mona called to report that she thinks Navi has been drinking. Has higher anxiety,more depressed,anxious being around people, barley say hi, might be vaping.     May a detailed message be left on voicemail?     Date of last office visit: 3/8/24    Message routed to: Raza Adamson

## 2024-03-11 NOTE — TELEPHONE ENCOUNTER
No consent to communicate on file.       Writer called mom back, however received voicemail.  Left message asking for a return call.  Clinic number provided.

## 2024-03-12 ENCOUNTER — VIRTUAL VISIT (OUTPATIENT)
Dept: PSYCHIATRY | Facility: CLINIC | Age: 29
End: 2024-03-12
Payer: COMMERCIAL

## 2024-03-12 DIAGNOSIS — F29 PSYCHOSIS, UNSPECIFIED PSYCHOSIS TYPE (H): Primary | ICD-10-CM

## 2024-03-12 ASSESSMENT — PATIENT HEALTH QUESTIONNAIRE - PHQ9
10. IF YOU CHECKED OFF ANY PROBLEMS, HOW DIFFICULT HAVE THESE PROBLEMS MADE IT FOR YOU TO DO YOUR WORK, TAKE CARE OF THINGS AT HOME, OR GET ALONG WITH OTHER PEOPLE: EXTREMELY DIFFICULT
SUM OF ALL RESPONSES TO PHQ QUESTIONS 1-9: 25
SUM OF ALL RESPONSES TO PHQ QUESTIONS 1-9: 25

## 2024-03-12 NOTE — Clinical Note
FYI - update after IRT: -currently at 25mg of Olanzapine and 100mg of seroquel. He feels that the olanzapine is better at controling AH and depression, but he is having more anxiety on it. He feels that the Seroquel is more sedating and as such it helps with sleep some and it helps with anxiety. He also noticed that more than 2 cups of coffee seems to make anxiety worse.   -Also reported drinking alcohol last week several days (to treat anxiety) and noticed a significant return of AH after that. Is worried that we will be mad at him for the drinking.   LMK if you have questions for me Tesha

## 2024-03-13 NOTE — PROGRESS NOTES
NAVIGGALINDO Clinician Contact & Progress Note  For Individual Resiliency Training (IRT)  A Part of the [facility] First Episode of Psychosis Program    NAVIGATE Enrollee: Navi Morales (1995)     MRN: [id number]  Date:  3/12/24  Diagnosis: Psychosis, unspecifed  Clinician: LILIANA Individual Resiliency Trainer, LEENA Lay     1. Type of contact: (majority of time spent)  IRT Session via telehealth  Mode of communication: [employer] (HIPAA compliant, secure platform). Patient consented verbally to this mode of therapy today.  Reason for telehealth: COVID-19. This patient visit was converted to a telehealth visit to minimize exposure to COVID-19.    2. People  present:   Writer  Client: Yes     3. Length of Actual Contact: Start Time: 3:01; End Time: 3:59     4. Location of contact:  Originating Location (patient location): family home, located in [place], [place]  Distant Location (provider location): Home office, located in [place], [place], using appropriate privacy considerations and procedures    5. Did the client complete the home practice option(s) from the previous session: Not Applicable    6. Motivational Teaching Strategies:  Connect info and skills with personal goals  Promote hope and positive expectations  Explore pros and cons of change  Re-frame experiences in positive light    7. Educational Teaching Strategies:  Review of written material/education  Relate information to client's experience    8. CBT Teaching Strategies:  Reinforcement and shaping (positive feedback for steps towards goals and gains in knowledge & skills)    9. IRT Module(s) Addressed:  Module 1 - Orientation    10. Techniques utilized:   [place] announced at beginning of session  Present new material  Summarize progress made in current session  Other techniques (please specify):   -Built rapport with patient by inviting him to share his experiences and discuss his concerns  -Clarified patient's feelings and  perspectives  -Discussed crisis intervention plan and safety plan  -Elicited more details about current and recent circumstances  -Emotional support via active and reflective listening, responding to feelings etc.  -Normalized and validated feelings and experiences  -Provided verbal overview of First Episode Program NAVIGATE services  -Provided positive feedback and encouragement  -Provided psychoeducation related to psychosis and related concerns.   -Supportive counseling    11. Measures:    Mental Status Exam  Alertness: alert  and oriented  Behavior/Demeanor: cooperative and pleasant  Speech: regular rate and rhythm  Language: no obvious problem.   Mood: description consistent with euthymia  Thought Process/Associations: unremarkable  Thought Content:  Reports none;  Denies suicidal ideation and violent ideation  Perception:  Reports auditory hallucinations;  Denies none  Insight: adequate  Judgment: adequate for safety  Cognition: does  appear grossly intact; formal cognitive testing was not done    MELY-7  Not completed today.      PHQ-9  Not completed today.      [place] Protocol Risk Identification  Not completed today.      12. Assessment/Progress Note:     Writer and client met for IRT visit via [employer]. Set agenda to check in, discuss treatment plan and client's goals, and discuss IRT specific work. Client consented to this agenda. Reported current symptoms to include: AH and depression, both at an increased level. Explored symptoms and coping from a stress-vulnerability lens. Current stressors include: symptoms. In regards to medications, client reports: some concerns about dosage of meds and amount of sedation experienced. Client noted he has begun titrating from Seroquel to Zyprexa with the doctor's supervision. Substance use: history of issues steming from substance use. Reports vaping tobacco and cannabis (delta 9) products as a coping strategy. Will continue to discuss and assess. Assessed safety.  Client reports SI, denies intent, and denies plan. HI was not present. SIB was not present. Safety plan was not reviewed today and includes calling 911, going to the emergency department (client specified [facility] as his preferred hospital if needed) and calling crisis lines if needed if SI/HI/SIB becomes overwhelming, worsens and/or becomes active.     Client did not identify urgent concerns to be addressed today.    Today's visit:  Continued IRT material on NAVIGATE program and it's components, as well as rapport building and assessment. Client appeared engaged in conversation and content. Writer and client reviewed symptoms, medications, and coping strategies. Client previously expressed interest in checking in weekly about progress related to breathing practices (diaphramatic breathing). Discussed medication  - client reports that he continues to titrate from Serquel to Olanzapine, and noted that he was experiencing less frequent AH and less depression, but an increase in anxiety. He further discussed discovering that his caffeine intake has been increasing his anxiety dn he is subsequently decreasing his caffeine intake. He does report sedation, noting that the Seroquel is more sedating that olanzapine, such that he occasionally skips a dose of seroquel. He feels that his can be helpful at times, and also helpful with his anxiety. Writer encouraged José Miguel to report all of this to the doctor and the next medication management visit. Regarding safety - client reported no change from last week, endorsed SI and idea of a plan, denied intent, denied action towards any plan. Writer inquired about family and client's level of interacting with family - client reported avoiding interactions with family or leaving the house. José Miguel still plans to fill out the consent to communicate form and resubmit to allow us to work with his mother. Regarding substances, josé miguel discussed having gotten alcohol last week and drinking over the  "course of \"a few days\" noting that the decrease in AH was changed at that time and he is currently experiencing a high level of AH again since drinking alcohol. Writer and client explored his motivations for drinking alcohol and writer provided education about the negative health outcomes of consuming alcohol. Regarding sleep, Navi reports sleeping about 11-12 hours a day and wakes 1-2 times currently.     Home practice identified as: practice diaphragmatic breathing and notice how it impacts symptoms. Today, Navi reports nightly practice of about 5 minutes before going to sleep.     Writer and client previously discussed coping strategies of distraction, soothing, and redirecting attention. Writer sent client information about grounding techniques of using an ice pack on the sternum, the 5-4-3-2-1 sensory grounding technique and the physiological sigh, discussed it's impact on soothing the vagus nerve.     Previously discussed substance use (regular vaping of nicotine and delta 9 and occasional alcohol use, with a Hx of issues related to use). Writer inquired about Navi's openness to discussion of his substance use with this writer. Navi reports that he feels that substance use is an effective way to cope with symptoms and seems to also report having experienced negative consequences of use in the past. Writer provided education about the impact of substance use on efficacy of antipsychotic medications (e.g. can reduce efficacy). Navi was open to further analysis and discussion with this writer in the future and also expressed some reservation about discontinuing use of substances.     Writer used relational and interpersonal approach to explore feelings, motivations, and behavior. Writer offered support, feedback, validation, and reinforced use of skills taught in IRT from modalities including cognitive behavioral therapy, psycho education, and skills training. Promoted understanding of their experiences of " "psychosis and how it impacts them in important areas of their life and in recovery goals. Reflected on client's strengths and resiliency factors and facilitated discussion on how these can assist in symptom management, recovery, and well-being.       13. Treatment Plan:     Treatment Plan________________________________________________________________________  Reviewed 1/16/2024    Pt s measurable objectives (list 3)  o Reduce intensity of psychosis symptoms  o Demonstrate understanding of symptoms regarding causes, treatment  o Learn two skills to manage symptoms of psychosis    In Pt s own words   o  I want to have stability both mentally and psychically.     Interventions  o IRT  o Medication Management  o SEE  o Family support and education  o Social work care coordination    Target date of discharge  o 12-36 months from enrollment    Discharge criteria  o Marked and sustained symptom improvement  o Demonstrated understanding of mental illness  o Successful implementation of strategies to cope with stressors/symptoms to mitigate risk for increase in symptoms severity or relapse    Gains made (listed out objectives accomplished)    Frequency of sessions and expected duration of treatment:   o 6-12 months of weekly IRT/Individual Psychotherapy followed by 12-24 months of biweekly or monthly IRT    Participants in therapy plan: Navi Morales and LEENA Puckett    Support System: parents, particularly mother and providers    14. Plan/Referrals:     Next visit schedule for one week from today.     Billing for \"Interactive Complexity\"?    No      LEENA Lay    NAVIGATE Individual Resiliency Trainer  "

## 2024-03-18 ENCOUNTER — VIRTUAL VISIT (OUTPATIENT)
Dept: PSYCHIATRY | Facility: CLINIC | Age: 29
End: 2024-03-18
Payer: COMMERCIAL

## 2024-03-18 VITALS — WEIGHT: 160 LBS | BODY MASS INDEX: 22.9 KG/M2 | HEIGHT: 70 IN

## 2024-03-18 DIAGNOSIS — F29 PSYCHOSIS, UNSPECIFIED PSYCHOSIS TYPE (H): Primary | ICD-10-CM

## 2024-03-18 DIAGNOSIS — F10.90 ALCOHOL USE DISORDER: ICD-10-CM

## 2024-03-18 DIAGNOSIS — F12.90 CANNABIS USE DISORDER: ICD-10-CM

## 2024-03-18 RX ORDER — NALTREXONE HYDROCHLORIDE 50 MG/1
50 TABLET, FILM COATED ORAL DAILY
Qty: 30 TABLET | Refills: 1 | Status: SHIPPED | OUTPATIENT
Start: 2024-03-18 | End: 2024-06-19

## 2024-03-18 RX ORDER — OLANZAPINE 15 MG/1
30 TABLET ORAL AT BEDTIME
Qty: 60 TABLET | Refills: 2 | Status: SHIPPED | OUTPATIENT
Start: 2024-03-18 | End: 2024-06-17

## 2024-03-18 ASSESSMENT — PATIENT HEALTH QUESTIONNAIRE - PHQ9: SUM OF ALL RESPONSES TO PHQ QUESTIONS 1-9: 20

## 2024-03-18 ASSESSMENT — PAIN SCALES - GENERAL: PAINLEVEL: NO PAIN (0)

## 2024-03-18 NOTE — PROGRESS NOTES
"NAVIGATE Medication Management Progress Note  A Part of the [facility] First Episode of Psychosis Program    NAVIGATE Enrollee: Navi Morales (1995)     MRN: [id number]  Date:  3/18/24         Contributors to the Assessment     Chart Reviewed.   Interview completed with Navi Morales.  Collateral information obtained from none.         Chief Complaint     \"Same old, same old\"         Interim History      Navi Morales is a 28 year old male who was last seen in [facility] clinic on 2/19/24 at which time cross-taper from quetiapine to olanzapine was continued. The patient reports good treatment adherence. History was provided by Navi who was a good historian.  Since the last visit:  - He reports that for a while he was doing better and AH were very rare, though \"sharper\" when they did break through. Then he had a time when he drank for 2-4 days in a row, consumed a handle of liquor in total, and missed meds during that time (reports this is due to concerns about additive sedation despite me counseling him multiple times to take his medications whether or not he drinks)  - anxiety is really bad, especially after coffee, noticing that he is having so much anxiety, restlessness--  - He had a period of time where he was better, was on olanzapine, AH were much less frequent  - Disappointed in himself for drinking, still \"bringing me down\"  - Anxiety was higher and that led to him drinking,  - cannabis is unchanged but he is using lower potency juice because that is all that is available  - Will take naltrexone sometimes when he is \"provoked\" by the hallucinations  - he has been drinking by himself, there is no social regulation.   - Mom is in [place] right now, could have gone with her but still not confident in hallucinations being controlled enough--and also paranoia. doesn't feel safe when going outside, feels that he is constantly being watched. Even when no AH, she   - \"don't think I'll ever let go of the shame\" related to " not just drinking with living with parents, etd  -talks about black and white thinking  - feels stuck, overwhelmed, like he isn't able to leave    PSYCH ROS:  Clinician Rating Form in COMPASS  1. Depressed Mood: Ratin  0 Not reported     1 Very mild occasionally feels sad or  down ; of questionable clinical significance   2 Mild occasionally feels moderately depressed or often feels sad or  down    3 Moderate occasionally feels very depressed or often feels moderately depressed   4 Moderately severe often feels very depressed   5 Severe feels very depressed most of the time   6 Very severe constant extremely painful feelings of depression   U Unable to assess        2. Anxiety/Worry: Ratin  0 Not reported     1 Very mild occasionally feels a little anxious; of questionable clinical significance   2 Mild occasionally feels moderately anxious or often feels a little anxious or worried   3 Moderate occasionally feels very anxious or often feels moderately anxious   4 Moderately severe often feels very anxious or often feels moderately anxious   5 Severe feels very anxious or worried most of the time   6 Very severe patient is continually preoccupied with severe anxiety   U Unable to assess        3. Suicidal Ideation/Behavior: Ratin          0 Not reported     1 Very mild occasional thoughts of dying,  I d be better off dead  or  I wish I were dead    2 Mild frequents thoughts of dying or occasional thoughts of killing self, without plan or method   3 Moderate often thinks of suicide or has though of a specific method   4 Moderately severe has mentally rehearsed a specific method of suicide or has made a suicide attempt with questionable intent to die (e.r. takes aspirins and then tells family)   5 Severe has made preparations for a potentially lethal suicide attempt (e.g acquires a gun and bullets for an attempt)   6 Very severe has made a suicide attempt with an intent to die   U Unable to assess                       4. Elevated/Expansive Mood: Ratin  0 Not at all     1 Very mild questionable; more cheerful than most people in his/her circumstances but of only possible clinical significance   2 Mild brief elevated/expansive mood but only somewhat out of proportion to the circumstances   3 Moderate brief/occasional elevation of mood which is clearly out of proportion to the circumstances   4 Moderately severe sustained/frequent elevation of mood which is clearly out of proportion to the circumstances   5 Severe mood is euphoric most of the time   6 Very severe sustained elevation;  everything is wonderful  almost all of the time   U Unable to assess        5. Hostility/Anger/Irritability/Aggressiveness: Ratin  0 Not at all     1 Very mild occasional irritability of doubtful clinical significance   2 Mild occasionally feels angry or mild or indirect expressions of anger, e.g. sarcasm, disrespect or hostile gestures   3 Moderate frequently feels angry, frequent irritability or occasional direct expression of anger, e.g. yelling at others   4 Moderately severe often feels very angry, often yells at others or occasionally threatens to harm others   5 Severe has acted on his anger by becoming physically abusive on one or two occasions or makes frequent threats to harm others or is very angry most of the time   6 Very severe has been physically aggressive and/or required intervention to prevent assaultiveness on several occasions; or any serious assaultive act   U Unable to assess        6. Impulsive Behavior: Ratin  0 Not at all     1 Very mild one instance of impulsive behavior which is of doubtful clinical significance   2 Mild occasional impulsive acts, e.g. making phone calls at odd hours   3 Moderate occasional impulsive acts with some potential negative consequence, e.g. leaving work abruptly; changing plans without thinking   4 Moderately severe impulsive acts with definite negative consequences, e.g.  overspending on non-essentials; repeated reckless sexual behavior   5 Severe impulsive acts with direct negative consequences, e.g. spends entire income on nonessentials without regard for basic needs   6 Very severe impulsive behavior which is potentially life threatening, e.g. jumps from dangerous height (without suicidal intent) or criminal behavior, e.g. impulsive robbery   U Unable to assess        7. Suspiciousness: Ratin  0 Not present     1 Very mild Seems on guard. Reluctant to respond to some  personal  questions. Reports being overly self-conscious in public   2 Mild Describes incidents in which others have harmed or wanted to harm him/her that sound plausible. Patient feels as if others are watching, laughing, or criticizing him/her in public, but this occurs only occasionally or rarely. Little or no preoccupation   3 Moderate Says others are talking about him/her maliciously, have negative intentions, or may harm him/her. Beyond the likelihood of plausibility, but not delusional. Incidents of suspected persecution occur occasionally (less than once per week) with some preoccupation   4 Moderately severe Same as 3, but incidents occur frequently such as more than once a week. Patient is moderately preoccupied with ideas of persecution OR patient reports persecutory delusions expressed with much doubt (e.g. partial delusion)   5 Severe Delusional -- speaks of Mafia plots, the FBI, or others poisoning his/her food, persecution by supernatural forces   6 Extremely severe Same as 5, but the beliefs are bizarre or more preoccupying. Patient tends to disclose or act on persecutory delusions.   U Unable to assess        8. Unusual Thought Content: Ratin  0 Not present     1 Very mild Ideas of reference (people may stare or may laugh at him), ideas of persecution (people may mistreat him). Unusual beliefs in psychic tyler, spirits, UFOs, or unrealistic beliefs in one's own abilities. Not strongly held.  Some doubt   2 Mild Same as 1, but degree of reality distortion is more severe as indicated by highly unusual ideas or greater conviction. Content may be typical of delusions (even bizarre), but without full conviction. The delusion does not seem to have fully formed, but is considered as one possible explanation for an unusual experience   3 Moderate Delusion present but no preoccupation or functional impairment. May be an encapsulated delusion or a firmly endorsed absurd belief about past delusional circumstances   4 Moderately severe Full delusion(s) present with some preoccupation OR some areas of functioning disrupted by delusional thinking   5 Severe Full delusion(s) present with much preoccupation OR many areas of functioning are disrupted by delusional thinking   6 Extremely severe Full delusions present with almost   U Unable to assess        9. Hallucinations: Ratin  0 Not present     1 Very mild While resting or going to sleep, sees visions, smells odors, or hears voices, sounds or whispers in the absence of external stimulation, but no impairment in functioning   2 Mild While in a clear state of consciousness, hears a voice calling the subject s name, experiences non-verbal auditory hallucinations (e.g., sounds or whispers), formless visual hallucinations, or has sensory experiences in the presence of a modality-relevant stimulus (e.g., visual illusions) infrequently (e.g., 1-2 times per week) and with no functional impairment   3 Moderate Occasional verbal, visual, gustatory, olfactory, or tactile hallucinations with no functional impairment OR non-verbal auditory hallucinations/visual illusions more than infrequently or with impairment   4 Moderately severe Experiences daily hallucinations OR some areas of functioning are disrupted by hallucinations   5 Severe Experiences verbal or visual hallucinations several times a day OR many areas of functioning are disrupted by these hallucinations   6  Extremely severe Persistent verbal or visual hallucinations throughout the day OR most areas of functioning are disrupted by these hallucinations   U Unable to assess        10. Conceptual Disorganization: Ratin  0 Not present     1 Very mild Peculiar use of words or rambling but speech is comprehensible   2 Mild Speech a bit hard to understand or make sense of due to tangentiality, circumstantiality, or sudden topic shifts   3 Moderate Speech difficult to understand due to tangentiality, circumstantiality, idiosyncratic speech, or topic shifts on many occasions OR 1-2 instances of incoherent phrases   4 Moderately severe Speech difficult to understand due to circumstantiality, tangentiality, neologisms, blocking, or topic shifts most of the time OR 3-5 instances of incoherent phrases   5 Severe Speech is incomprehensible due to severe impairments most of the time. Many PSRS items cannot be rated by self-report alone   6 Extremely severe Speech is incomprehensible throughout interview   U Unable to assess        11. Avolition/Apathy: Ratin  0 Not at all     1 Very mild questionable decrease in time spent in goal-directed activities   2 Mild spends less time in goal-directed activities than is appropriate for situation and age   3 Moderate initiates activities at times but does not follow through   4 Moderately severe rarely initiates activity but will passively engage with encouragement   5 Severe almost never initiates activities; requires assistance to accomplish basic activities   6 Very severe does not initiate or persist in any goal-directed activity even with outside assistance   U Unable to assess        12. Asociality/Low Social Drive: Ratin  0 Not at all     1 Very mild questionable   2 Mild slow to initiate social interactions but usually responds to overtures by others   3 Moderate rarely initiates social interactions; sometimes responds to overtures by others   4 Moderately severe does not  initiate but sometimes responds to overtures by others; little social interaction outside close family members   5 Severe never initiates and rarely encourages conversations or activities; avoids being with others unless prodded, may have contacts with family   6 Very severe avoids being with others (even family members) whenever possible, extreme social isolation   U Unable to assess        13. Adherence: Days: 4  The longest, continuous amount of time in days, since the last visit when the subject did not take medication      14. EPS Part I: Rating: U  Rate Elbow Rigidity for all subjects  0 Normal   1 Slight stiffness and resistance   2 Moderate stiffness and resistance   3 Marked rigidity with difficulty in passive movement   4 Extreme stiffness and rigidity with almost a frozen joint   U Unable to assess            EPS Part 2: Signs of EPS: 0  Are there are other signs of EPS (eg diminished arm swing, postural instability, cogwheeling, tremor, akinesia) present based upon patient report or exam?  0 No   1 Yes      15. Akathesia: Ratin  0 No restlessness reported or observed   1 Mild restlessness observed; e.g., occasional jiggling of the foot occurs when subject is seated   2 Moderate restlessness observed; e.g., on several occasions, jiggles foot, crosses and uncrosses legs or twists a part of the body   3 Restlessness is frequently observed; e.g., the foot or legs moving most of the time   4 Restlessness persistently observed; e.g., subject cannot sit still, may get up and walk   U Unable to assess      16. Dyskinetic Movement Ratings: Ratin  0 None   1 Minimal, may be extreme normal   2 Mild   3 Moderate   4 Severe   U Unable to assess      SIDE EFFECT ASSESSMENT:  Clinician Rating Form in [facility] - Side Effect Assessment  Address side effects reported by the patient and rate using this scale  0 Not present   1 Minimal, may be extreme normal   2 Mild   3 Moderate   4 Severe   U Unable to assess   P  Present, but not related      Feeling dizzy or faint: 2  Blurred vision: 0  Dry mouth: 2  Too much saliva/droolin  Nausea:  0  Constipation: 0  Increased appetite: 0  Weight gain: 0  Weight loss: 0  Feeling tired/fatigue: 0  Daytime sedation: 0  Hypersomnia: 2  Insomnia: 2  Low libido: 2  Other problems with sex: 2  Breast enlargement or discharge:  0  Irregular Menstruation or amenorrhea: 0  Other (please list and rate):      RECENT SUBSTANCE USE:  Clinician Rating Form in [facility]PASS - Substance Use Assessment  Alcohol Use Severity: Ratin  0 none   1 use without impairment: drinks but no immediate social or medical impairment   2 use with impairment: e.g. becomes grossly intoxicated; alcohol use or withdrawal compromises school, work or social functioning; alcohol use or withdrawal exacerbates symptoms (e.g. gets depressed when drinking)      Marijuana Use Severity: Rating: 3  0 none   1 occasional use without impairment: e.g. uses marijuana a few days a month and has no immediate social or medical impairment   2 frequent use without impairment: e.g. uses marijuana several or more days a week but has no immediate social or medical impairment   3 use with impairment: e.g. becomes grossly intoxicated; marijuana use compromises school, work or social functioning; marijuana use exacerbates symptoms (e.g. gets paranoid when using)      Other Drug Use Severity: Ratin  0 none   1 occasional use without impairment: e.g. uses drug(s) a few days a month and has no immediate social or medical impairment   2 frequent use without impairment: e.g. uses drug(s) several or more days a week but has no immediate social or medical impairment   3 use with impairment: e.g. becomes grossly intoxicated; drug use compromises school, work or social functioning; drug use exacerbates symptoms (e.g. gets paranoid when using)      RECENT SOCIAL HISTORY:  SEE HPI    Medical ROS:  none         First Episode of Psychosis History      DUP  (duration untreated psychosis):  4 years  Route to initial care: outpatient  Medication adherence overall:  See above, Clinician Rating Form in COMPASS Item 13  General frequency of visits:  weekly IRT, monthly med management  Participation in groups:  No  Cognitive Remediation:  No  Other treatment history:     Reviewed for completion of First Episode work-up:  Yes  First episode workup:  Not Done (if completed, see LABS for results)  MATRICS Consensus Cognitive Battery:  Not Done (if completed, see LABS for results)       Medical/Surgical History     Patient has no known allergies.    Patient Active Problem List   Diagnosis   (none) - all problems resolved or deleted            Medications     Current Outpatient Medications   Medication Sig Dispense Refill    cholecalciferol, vitamin D3, 1,000 unit (25 mcg) tablet [CHOLECALCIFEROL, VITAMIN D3, 1,000 UNIT (25 MCG) TABLET] Take 2,000 Units by mouth daily.      dutasteride (AVODART) 0.5 MG capsule Take 0.5 mg by mouth daily      ketoconazole (NIZORAL) 2 % external shampoo Apply topically daily as needed LATHER INTO SCALP AND RINSE AFTER 2-3 MINUTES. USE THREE TIMES A WEEK.      magnesium chloride (SLOW-MAG) 64 mg TbEC delayed-release tablet [MAGNESIUM CHLORIDE (SLOW-MAG) 64 MG TBEC DELAYED-RELEASE TABLET] Take 64 mg by mouth daily.      naltrexone (DEPADE/REVIA) 50 MG tablet Take 1 tablet (50 mg) by mouth daily 30 tablet 1    OLANZapine (ZYPREXA) 10 MG tablet Take 2.5 tablets (25 mg) by mouth at bedtime 75 tablet 1    QUEtiapine (SEROQUEL) 100 MG tablet Take 200 mg at bedtime for one week, then decrease to 100 mg at bedtime. (Patient taking differently: Take 100 mg by mouth at bedtime Take 200 mg at bedtime for one week, then decrease to 100 mg at bedtime.) 60 tablet 1    tadalafil (CIALIS) 10 MG tablet Take 10 mg by mouth daily      zinc gluconate 50 mg tablet [ZINC GLUCONATE 50 MG TABLET] Take 100 mg by mouth daily.      QUEtiapine (SEROQUEL) 400 MG tablet Take 2  "tablets (800 mg) by mouth at bedtime (Patient not taking: Reported on 3/18/2024) 60 tablet 1             Vitals     Ht 1.778 m (5' 10\")   Wt 72.6 kg (160 lb)   BMI 22.96 kg/m        Weight prior to medication: unknown         Mental Status Exam     Alertness: alert  and oriented  Appearance: well groomed  Behavior/Demeanor: cooperative, pleasant, and calm, with good  eye contact   Speech: regular rate and rhythm, not pressured  Language: no obvious problem  Psychomotor: normal or unremarkable  Mood: depressed  Affect: blunted; was congruent to mood; was congruent to content  Thought Process/Associations:  overinclusive at times  Thought Content:  Reports suicidal ideation without plan; without intent [details in Interim History];  Denies violent ideation  Perception:  Reports auditory hallucinations;  Denies visual hallucinations  Insight: good  Judgment: fair and adequate for safety  Cognition: does  appear grossly intact; formal cognitive testing was not done         Labs and Data     RATING SCALES:  AIMS: next in person visit    PHQ9 TODAY =       3/5/2024     2:14 PM 3/12/2024     2:54 PM 3/18/2024     3:32 PM   PHQ-9 SCORE   PHQ-9 Total Score MyChart 22 (Severe depression) 25 (Severe depression)    PHQ-9 Total Score 22 25 20     ANTIPSYCHOTIC LABS ROUTINE    [glu, A1C, lipids (focus LDL), liver enzymes, WBC, ANEU, Hgb, plts]   q12 mo  Recent Labs   Lab Test 06/30/23  1048   GLC 84     No lab results found.  Recent Labs   Lab Test 06/30/23  1048   AST 39   ALT 40   ALKPHOS 88     Recent Labs   Lab Test 06/30/23  1048   WBC 7.7   HGB 14.9               Psychiatric Diagnoses     Psychosis, schizophrenia vs schizoaffective disorder, r/o substance-induced  AUD in early remission  Cannabis use disorder with active use         Assessment   Navi A Andrew is a 28 year old male with first onset of psychiatric symptoms at age 7 (\"social anxiety\"), and psychotic symptoms at age 23. The above duration of untreated " "psychosis was approximately 4 years.  Prodromal symptoms seem to have been present since at least college (notable substance use) though patient does note a substantially longer history of depression and physical health concerns. Presenting symptoms appear to include hallucinations, delusional thoughts. Navi attributes symptoms to physical health issues and history of trauma.  Substance use does seem to be a present concern. There are possible medical comorbidities which impact this treatment [suicide attempt, suicidal ideation, SIB, psychosis, aggression, and substance use: alcohol].     Today, Navi reports that he had a period of time when his AH were greatly reduced, but he had an episode of drinking for 2-4 days in a row and not taking meds, and subsequently AH flared up again. He is very disappointed with himself and feeling \"stuck\" about this. Also feeling stuck in light of paranoia that prevents him from traveling or leaving the house when he wants ti. We named and discussed shame as a painful emotion that can keep people stuck in addictions, and discussed reframes including how easy it is to get back on the wagon after \"falling off.\" He has been taking naltrexone when he gets an urge to use a substance in response to distress. Discussed that this is best taken scheduled while also commending him for noticing the behavior chain around substance use. Given his insight and use of term black and white thinking, I brought up DBT and will ask him to discuss it further with his IRT.              SUICIDE RISK ASSESSMENT-  Risk factors for self-harm: previous suicide attempt, substance use/pending treatment, recent symptom worsening, hallucinations, recent loss, and lives alone/ isolated.  Mitigating factors: describes a safety plan, h/o seeking help , future oriented, commitment to family, and stable housing.  The patient does not appear to be at imminent risk for self-harm, hospitalization is not recommended which " the pt does  agree with. No hospitalization will be arranged. Based on degree of symptoms close psych follow-up was/were recommended which the pt does  agree to. Additional steps to minimize risk: med changes.      [place] PRESCRIPTION MONITORING PROGRAM [] was not checked today: not using controlled substances.    PSYCHOTROPIC DRUG INTERACTIONS:   Seroquel/Zyprexa: additive CNS depression, QTc prolongation.    MANAGEMENT:  use lowest therapeutic doses of both and routine monitoring         Plan     1) PSYCHOTROPIC MEDICATIONS:  - Continue quetiapine 100 mg PO at bedtime   - Increase olanzapine to 30 mg PO at bedtime   - naltrexone 50 mg daily    2) THERAPY:  Continue weekly IRT with LEENA Lay    3) NEXT DUE:    Labs: FEP workup due  Rating Scales: AIMS due    4) REFERRALS:    none    5) RTC: 3 weeks    6) CRISIS NUMBERS:   Provided routinely in AVS.    TREATMENT RISK STATEMENT:  The risks, benefits, alternatives and potential adverse effects have been discussed and are understood by the pt. The pt understands the risks of using street drugs or alcohol. There are no medical contraindications, the pt agrees to treatment with the ability to do so. The pt knows to call the clinic for any problems or to access emergency care if needed.  Medical and substance use concerns are documented above.  Psychotropic drug interaction check was done, including changes made today.    PROVIDER:     Heidi Vega MD  [facility]  , Department of Psychiatry and Behavioral Sciences  [facility]    Answers submitted by the patient for this visit:  MELY-7 (Submitted on 3/5/2024)  MELY 7 TOTAL SCORE: 19    Psychiatry Individual Psychotherapy Note   Psychotherapy start time 4:20 PM  Psychotherapy end time -4:50 PM  Date treatment plan last reviewed with patient - 1/16/24, with LEENA Lay  Subjective: This supportive psychotherapy session addressed issues related to  goals of therapy and current psychosocial stressors. Patient's reaction: Preparatory and Relapse in the context of mental status appropriate for ambulatory setting.    Interactive complexity indicated? No  Plan: RTC in timeframe noted above  Psychotherapy services during this visit included myself and the patient.   Treatment Plan      SYMPTOMS; PROBLEMS   MEASURABLE GOALS;    FUNCTIONAL IMPROVEMENT / GAINS INTERVENTIONS DISCHARGE CRITERIA   Depression: depressed mood, concentration problems, suicidal ideation, and feeling hopelesss  Psychosis: auditory hallucinations without commands [details in Interim History] and paranoia  Substance Use: alcohol  and cannabis      Pt s measurable objectives (list 3)  Reduce intensity of psychosis symptoms  Demonstrate understanding of symptoms regarding causes, treatment  Learn two skills to manage symptoms of psychosis  In Pt s own words    I want to have stability both mentally and psychically.   Interventions  IRT  Medication Management  SEE  Family support and education  Social work care coordination    Supportive / CBT marked symptom improvement and transition to stepdown therapy     The longitudinal plan of care for the diagnosis(es)/condition(s) as documented were addressed during this visit. Due to the added complexity in care, I will continue to support Navi in the subsequent management and with ongoing continuity of care.

## 2024-03-18 NOTE — PROGRESS NOTES
"Virtual Visit Details    Type of service:  Video Visit   Video Start Time: {video visit start/end time for provider to select:802315}  Video End Time:{video visit start/end time for provider to select:083125}    Originating Location (pt. Location): {video visit patient location:912480::\"Home\"}  {PROVIDER LOCATION On-site should be selected for visits conducted from your clinic location or adjoining [facility] hospital, academic office, or other nearby [facility] building. Off-site should be selected for all other provider locations, including home:488206}  Distant Location (provider location):  {virtual location provider:731196}  Platform used for Video Visit: {Virtual Visit Platforms:556448::\"[employer]\"}  "

## 2024-03-18 NOTE — NURSING NOTE
PHQ-9 score is 20 and #9 is 2, more than half they days.    Is the patient currently in the state of [place]? YES    Visit mode:VIDEO    If the visit is dropped, the patient can be reconnected by: VIDEO VISIT: Send to e-mail at: [email or web address]    Will anyone else be joining the visit? NO  (If patient encounters technical issues they should call [phone])    How would you like to obtain your AVS? MyChart    Are changes needed to the allergy or medication list? No    Reason for visit: RECHECK    Medications and allergies have been reviewed.      [facility] Gillespie VVF    Depression Response    Patient completed the PHQ-9 assessment for depression and scored >9? Yes  Question 9 on the PHQ-9 was positive for suicidality? Yes  Does patient have current mental health provider? Yes    Is this a virtual visit? Yes   Does patient have suicidal ideation (positive question 9)? Yes, patient has a current mental health provider.     I personally notified the following: visit provider- PHQ-9 score placed in the message column for the provider.       NAVIGATE Patient Self-Rating Form    Since your last medication management visit--    Have you been feeling depressed, sad, or down? Yes  Have you been feeling anxious, worried or nervous? Yes  Have you been thinking about death or have you had any feelings that you would be better off dead? Yes   Have you been feeling particularly good? No  Have you been feeling annoyed, angry, or resentful (whether you showed it or not)?  Not sure  Did you do anything that could have gotten you in trouble? No  Have you felt dizzy or faint? No  Have you had blurred vision? No  Have you had dry mouth? Yes  Have you had too much saliva in your mouth or had drooling? No  Have you felt nauseous? No  Have you been constipated? No  Has you appetite for food been increased? Yes  Have you gained weight? Yes  Have you lost weight? No  Have you felt restless or like you cannot sit still? No  Any shaking  of your hands, legs, or other muscles? No  Any problems walking or moving or any problems feeling stiff or rigid? No  Have you felt tired or fatigued? Yes  Have you felt drowsy during the day? Yes  Have you been sleeping too much at night? Yes  Have you been sleeping too little or had problems sleeping at night? Yes  Any decrease in your interest in sex? Yes  Any other problems with sex?  Not that pt is aware of.   Any problems with your breasts such as swelling or discharge? No  For women, any problems with your period? N/A  Are there other medical or side effect problems you wish to discuss with your prescriber? No  Since your last visit, how many days have you not taken your medication? 2   Have you had trouble remembering to take your medication? No  Do you find the number of medicines or the times when you are supposed to take then confusing or burdensome?  A little bit  Are you afraid of the medication? No  Do you think that you have an illness that requires taking medication? Yes  Do you think that other people would think poorly of you if they knew that you take medication? Yes  On average, how many cigarettes do you smoke per day? 0  Since you last visit, did you drink alcohol? Yes  Since your last visit, have you used any marijuana? Yes  Since your last visit, have you used any stress drugs other than marijuana? No  Between now and your next visit, do you think we should keep your medication the same or consider changing the medications? Keep medication the same.

## 2024-03-18 NOTE — PROGRESS NOTES
Virtual Visit Details    Type of service:  Video Visit   Video Start Time:  4:00 PM  Video End Time: 4:50 PM    Originating Location (pt. Location): Home    Distant Location (provider location):  On-site  Platform used for Video Visit: [employer]  Answers submitted by the patient for this visit:  MELY-7 (Submitted on 3/5/2024)  MELY 7 TOTAL SCORE: 19

## 2024-03-19 ENCOUNTER — VIRTUAL VISIT (OUTPATIENT)
Dept: PSYCHIATRY | Facility: CLINIC | Age: 29
End: 2024-03-19
Payer: COMMERCIAL

## 2024-03-19 DIAGNOSIS — F29 PSYCHOSIS, UNSPECIFIED PSYCHOSIS TYPE (H): Primary | ICD-10-CM

## 2024-03-20 NOTE — PROGRESS NOTES
NAVIGGALINDO Clinician Contact & Progress Note  For Individual Resiliency Training (IRT)  A Part of the [facility] First Episode of Psychosis Program    NAVIGATE Enrollee: Navi Morales (1995)     MRN: [id number]  Date:  3/19/24  Diagnosis: Psychosis, unspecifed  Clinician: LILIANA Individual Resiliency Trainer, LEENA Lay     1. Type of contact: (majority of time spent)  IRT Session via telehealth  Mode of communication: [employer] (HIPAA compliant, secure platform). Patient consented verbally to this mode of therapy today.  Reason for telehealth: COVID-19. This patient visit was converted to a telehealth visit to minimize exposure to COVID-19.    2. People  present:   Writer  Client: Yes     3. Length of Actual Contact: Start Time: 3:08; End Time: 3:58     4. Location of contact:  Originating Location (patient location): family home, located in [place], [place]  Distant Location (provider location): Home office, located in [place], [place], using appropriate privacy considerations and procedures    5. Did the client complete the home practice option(s) from the previous session: Not Applicable    6. Motivational Teaching Strategies:  Connect info and skills with personal goals  Promote hope and positive expectations  Explore pros and cons of change  Re-frame experiences in positive light    7. Educational Teaching Strategies:  Review of written material/education  Relate information to client's experience    8. CBT Teaching Strategies:  Reinforcement and shaping (positive feedback for steps towards goals and gains in knowledge & skills)    9. IRT Module(s) Addressed:  Module 3 - What is Psychosis    10. Techniques utilized:   [place] announced at beginning of session  Present new material  Summarize progress made in current session  Other techniques (please specify):   -Built rapport with patient by inviting him to share his experiences and discuss his concerns  -Clarified patient's feelings and  perspectives  -Discussed crisis intervention plan and safety plan  -Elicited more details about current and recent circumstances  -Emotional support via active and reflective listening, responding to feelings etc.  -Normalized and validated feelings and experiences  -Provided verbal overview of First Episode Program NAVIGATE services  -Provided positive feedback and encouragement  -Provided psychoeducation related to psychosis and related concerns.   -Supportive counseling    11. Measures:    Mental Status Exam  Alertness: alert  and oriented  Behavior/Demeanor: cooperative and pleasant  Speech: regular rate and rhythm  Language: no obvious problem.   Mood: description consistent with euthymia  Thought Process/Associations: unremarkable  Thought Content:  Reports none;  Denies suicidal ideation and violent ideation  Perception:  Reports auditory hallucinations;  Denies none  Insight: adequate  Judgment: adequate for safety  Cognition: does  appear grossly intact; formal cognitive testing was not done    MEYL-7  Not completed today.      PHQ-9  Not completed today.      [place] Protocol Risk Identification  Not completed today.      12. Assessment/Progress Note:     Writer and client met for IRT visit via [employer]. Set agenda to check in, discuss treatment plan and client's goals, and discuss IRT specific work. Client consented to this agenda. Reported current symptoms to include: AH, depression, anxiety all at an increased level. Explored symptoms and coping from a stress-vulnerability lens. Current stressors include: symptoms. In regards to medications, client reports: . Client noted he continues titrating from Seroquel to Zyprexa with the doctor's supervision, reports having had a helpful conversation with  about shame. Substance use: history of issues steming from substance use. Reports vaping tobacco and cannabis (delta 9) products as a coping strategy. Will continue to discuss and assess. Assessed safety.  Client reports SI, denies intent, and denies plan. HI was not present. SIB was not present. Safety plan was not reviewed today and includes calling 911, going to the emergency department (client specified [facility] as his preferred hospital if needed) and calling crisis lines if needed if SI/HI/SIB becomes overwhelming, worsens and/or becomes active.     Client did not identify urgent concerns to be addressed today.    Today's visit:  Continued IRT material on NAVIGATE program and it's components, as well as rapport building and assessment. Client appeared engaged in conversation and content. Writer and client reviewed symptoms, medications, and coping strategies. Client reports continued breathing practice nightly as he is falling asleep of diaphramatic breathing. Discussed medication  - client reports that he continues to titrate from Serquel to Olanzapine, and noted that he was experiencing less frequent AH. NO medication concerns at this time. He discussed  caffeine intake with effort regarding decreasing his caffeine intake.  Regarding safety - client reported no change from last week, endorsed SI and idea of a plan, denied intent, denied action towards any plan. Writer inquired about family and client's level of interacting with family - client reported avoiding interactions with family or leaving the house. Navi still plans to fill out the consent to communicate form and resubmit to allow us to work with his mother. Regarding substances, denied alcohol use in past week, reports continued cannabis and nicotine vaping in past week. Regarding sleep, Navi reports sleeping about 11-12 hours a day and wakes 1-2 times currently. Reports continued sedation. Began discussing symptoms of psychosis. Writer provided definition, description of average age on onset and rate of occuranc ein the population. Described hallucinations, will continue at next visit.    Home practice identified as: practice diaphragmatic breathing  and notice how it impacts symptoms. Today, Navi reports nightly practice of about 5 minutes before going to sleep.     Writer and client previously discussed coping strategies of distraction, soothing, and redirecting attention. Writer has sent client information about grounding techniques of using an ice pack on the sternum, the 5-4-3-2-1 sensory grounding technique and the physiological sigh, discussed it's impact on soothing the vagus nerve.     Previously discussed substance use (regular vaping of nicotine and delta 9 and occasional alcohol use, with a Hx of issues related to use). Writer inquired about Navi's openness to discussion of his substance use with this writer. Navi reports that he feels that substance use is an effective way to cope with symptoms and seems to also report having experienced negative consequences of use in the past. Writer provided education about the impact of substance use on efficacy of antipsychotic medications (e.g. can reduce efficacy). Navi was open to further analysis and discussion with this writer in the future and also expressed some reservation about discontinuing use of substances.     Writer used relational and interpersonal approach to explore feelings, motivations, and behavior. Writer offered support, feedback, validation, and reinforced use of skills taught in IRT from modalities including cognitive behavioral therapy, psycho education, and skills training. Promoted understanding of their experiences of psychosis and how it impacts them in important areas of their life and in recovery goals. Reflected on client's strengths and resiliency factors and facilitated discussion on how these can assist in symptom management, recovery, and well-being.       13. Treatment Plan:     Treatment Plan________________________________________________________________________  Reviewed 1/16/2024    Pt s measurable objectives (list 3)  o Reduce intensity of psychosis  "symptoms  o Demonstrate understanding of symptoms regarding causes, treatment  o Learn two skills to manage symptoms of psychosis    In Pt s own words   o  I want to have stability both mentally and psychically.     Interventions  o IRT  o Medication Management  o SEE  o Family support and education  o Social work care coordination    Target date of discharge  o 12-36 months from enrollment    Discharge criteria  o Marked and sustained symptom improvement  o Demonstrated understanding of mental illness  o Successful implementation of strategies to cope with stressors/symptoms to mitigate risk for increase in symptoms severity or relapse    Gains made (listed out objectives accomplished)    Frequency of sessions and expected duration of treatment:   o 6-12 months of weekly IRT/Individual Psychotherapy followed by 12-24 months of biweekly or monthly IRT    Participants in therapy plan: Navi Morales and IRT LEENA Lya    Support System: parents, particularly mother and providers    14. Plan/Referrals:     Next visit schedule for one week from today.     Billing for \"Interactive Complexity\"?    No      LEENA Lay    NAVIGATE Individual Resiliency Trainer  "

## 2024-03-26 ENCOUNTER — VIRTUAL VISIT (OUTPATIENT)
Dept: PSYCHIATRY | Facility: CLINIC | Age: 29
End: 2024-03-26
Payer: COMMERCIAL

## 2024-03-26 DIAGNOSIS — F29 PSYCHOSIS, UNSPECIFIED PSYCHOSIS TYPE (H): Primary | ICD-10-CM

## 2024-03-26 ASSESSMENT — ANXIETY QUESTIONNAIRES
5. BEING SO RESTLESS THAT IT IS HARD TO SIT STILL: MORE THAN HALF THE DAYS
GAD7 TOTAL SCORE: 19
4. TROUBLE RELAXING: NEARLY EVERY DAY
1. FEELING NERVOUS, ANXIOUS, OR ON EDGE: NEARLY EVERY DAY
IF YOU CHECKED OFF ANY PROBLEMS ON THIS QUESTIONNAIRE, HOW DIFFICULT HAVE THESE PROBLEMS MADE IT FOR YOU TO DO YOUR WORK, TAKE CARE OF THINGS AT HOME, OR GET ALONG WITH OTHER PEOPLE: EXTREMELY DIFFICULT
7. FEELING AFRAID AS IF SOMETHING AWFUL MIGHT HAPPEN: NEARLY EVERY DAY
7. FEELING AFRAID AS IF SOMETHING AWFUL MIGHT HAPPEN: NEARLY EVERY DAY
GAD7 TOTAL SCORE: 19
8. IF YOU CHECKED OFF ANY PROBLEMS, HOW DIFFICULT HAVE THESE MADE IT FOR YOU TO DO YOUR WORK, TAKE CARE OF THINGS AT HOME, OR GET ALONG WITH OTHER PEOPLE?: EXTREMELY DIFFICULT
GAD7 TOTAL SCORE: 19
8. IF YOU CHECKED OFF ANY PROBLEMS, HOW DIFFICULT HAVE THESE MADE IT FOR YOU TO DO YOUR WORK, TAKE CARE OF THINGS AT HOME, OR GET ALONG WITH OTHER PEOPLE?: EXTREMELY DIFFICULT
3. WORRYING TOO MUCH ABOUT DIFFERENT THINGS: NEARLY EVERY DAY
GAD7 TOTAL SCORE: 19
7. FEELING AFRAID AS IF SOMETHING AWFUL MIGHT HAPPEN: NEARLY EVERY DAY
6. BECOMING EASILY ANNOYED OR IRRITABLE: MORE THAN HALF THE DAYS
6. BECOMING EASILY ANNOYED OR IRRITABLE: MORE THAN HALF THE DAYS
3. WORRYING TOO MUCH ABOUT DIFFERENT THINGS: NEARLY EVERY DAY
2. NOT BEING ABLE TO STOP OR CONTROL WORRYING: NEARLY EVERY DAY
5. BEING SO RESTLESS THAT IT IS HARD TO SIT STILL: MORE THAN HALF THE DAYS
7. FEELING AFRAID AS IF SOMETHING AWFUL MIGHT HAPPEN: NEARLY EVERY DAY
1. FEELING NERVOUS, ANXIOUS, OR ON EDGE: NEARLY EVERY DAY
GAD7 TOTAL SCORE: 19
2. NOT BEING ABLE TO STOP OR CONTROL WORRYING: NEARLY EVERY DAY
4. TROUBLE RELAXING: NEARLY EVERY DAY
IF YOU CHECKED OFF ANY PROBLEMS ON THIS QUESTIONNAIRE, HOW DIFFICULT HAVE THESE PROBLEMS MADE IT FOR YOU TO DO YOUR WORK, TAKE CARE OF THINGS AT HOME, OR GET ALONG WITH OTHER PEOPLE: EXTREMELY DIFFICULT

## 2024-03-26 ASSESSMENT — PATIENT HEALTH QUESTIONNAIRE - PHQ9
SUM OF ALL RESPONSES TO PHQ QUESTIONS 1-9: 23
SUM OF ALL RESPONSES TO PHQ QUESTIONS 1-9: 23
10. IF YOU CHECKED OFF ANY PROBLEMS, HOW DIFFICULT HAVE THESE PROBLEMS MADE IT FOR YOU TO DO YOUR WORK, TAKE CARE OF THINGS AT HOME, OR GET ALONG WITH OTHER PEOPLE: EXTREMELY DIFFICULT

## 2024-03-27 NOTE — PROGRESS NOTES
NAVIGGALINDO Clinician Contact & Progress Note  For Individual Resiliency Training (IRT)  A Part of the [facility] First Episode of Psychosis Program    NAVIGATE Enrollee: Navi Morales (1995)     MRN: [id number]  Date:  3/26/24  Diagnosis: Psychosis, unspecifed  Clinician: LILIANA Individual Resiliency Trainer, LEENA Lay     1. Type of contact: (majority of time spent)  IRT Session via telehealth  Mode of communication: [employer] (HIPAA compliant, secure platform). Patient consented verbally to this mode of therapy today.  Reason for telehealth: COVID-19. This patient visit was converted to a telehealth visit to minimize exposure to COVID-19.    2. People  present:   Writer  Client: Yes     3. Length of Actual Contact: Start Time: 3:02; End Time: 3:58     4. Location of contact:  Originating Location (patient location): family home, located in [place], [place]  Distant Location (provider location): Home office, located in [place], [place], using appropriate privacy considerations and procedures    5. Did the client complete the home practice option(s) from the previous session: Not Applicable    6. Motivational Teaching Strategies:  Connect info and skills with personal goals  Promote hope and positive expectations  Explore pros and cons of change  Re-frame experiences in positive light    7. Educational Teaching Strategies:  Review of written material/education  Relate information to client's experience    8. CBT Teaching Strategies:  Reinforcement and shaping (positive feedback for steps towards goals and gains in knowledge & skills)    9. IRT Module(s) Addressed:  Module 3 - What is Psychosis    10. Techniques utilized:   [place] announced at beginning of session  Present new material  Summarize progress made in current session  Other techniques (please specify):   -Built rapport with patient by inviting him to share his experiences and discuss his concerns  -Clarified patient's feelings and  perspectives  -Discussed crisis intervention plan and safety plan  -Elicited more details about current and recent circumstances  -Emotional support via active and reflective listening, responding to feelings etc.  -Normalized and validated feelings and experiences  -Provided verbal overview of First Episode Program NAVIGATE services  -Provided positive feedback and encouragement  -Provided psychoeducation related to psychosis and related concerns.   -Supportive counseling    11. Measures:    Mental Status Exam  Alertness: alert  and oriented  Behavior/Demeanor: cooperative and pleasant  Speech: regular rate and rhythm  Language: no obvious problem.   Mood: description consistent with euthymia  Thought Process/Associations: unremarkable  Thought Content:  Reports none;  Denies suicidal ideation and violent ideation  Perception:  Reports auditory hallucinations;  Denies none  Insight: adequate  Judgment: adequate for safety  Cognition: does  appear grossly intact; formal cognitive testing was not done    MELY-7  Not completed today.      PHQ-9  Not completed today.      [place] Protocol Risk Identification  Not completed today.      12. Assessment/Progress Note:     Writer and client met for IRT visit via [employer]. Set agenda to check in, discuss treatment plan and client's goals, and discuss IRT specific work. Client consented to this agenda. Reported current symptoms to include: AH, depression, anxiety all at an increased level. Explored symptoms and coping from a stress-vulnerability lens. Current stressors include: symptoms. In regards to medications, client reports: . Client noted he continues titrating from Seroquel to Zyprexa with the doctor's supervision, reports having had a helpful conversation with  about shame. Substance use: history of issues steming from substance use. Reports vaping tobacco and cannabis (delta 9) products as a coping strategy. Will continue to discuss and assess. Assessed safety.  Client reports SI, denies intent, and denies plan. HI was not present. SIB was not present. Safety plan was not reviewed today and includes calling 911, going to the emergency department (client specified [facility] as his preferred hospital if needed) and calling crisis lines if needed if SI/HI/SIB becomes overwhelming, worsens and/or becomes active.     Client did not identify urgent concerns to be addressed today.    Today's visit:  Continued IRT material on NAVIGATE program and it's components, as well as rapport building and assessment. Client appeared engaged in conversation and content. Writer and client reviewed symptoms, medications, and coping strategies. Client reports continued breathing practice nightly as he is falling asleep of diaphramatic breathing. Discussed medication  - client reports that he continues to titrate from Serquel to Olanzapine, and noted that he was experiencing less frequent AH. No medication concerns at this time. He discussed  caffeine intake with effort regarding decreasing his caffeine intake.  Regarding safety - client reported no change from last week, endorsed SI and idea of a plan, denied intent, denied action towards any plan. Navi still plans to fill out the consent to communicate form and resubmit to allow us to work with his mother. Regarding substances, denied alcohol use in past week, reports continued cannabis and nicotine vaping in past week. Regarding sleep, Navi reports adjusting his schedule to laying down at midnight, asleep by 2:00 am, and waking around 10:00 am.. Reports continued sedation and need for extra coffee, which increases his anxiety. Continued discussing symptoms of psychosis. Writer provided definition, description of average age on onset and rate of occurrence in the population. Described hallucinations and delusions. Will continue at next visit.    Home practice identified as: practice diaphragmatic breathing and notice how it impacts symptoms.  Today, Navi reports nightly practice of about 5 minutes before going to sleep.     Writer and client previously discussed coping strategies of distraction, soothing, and redirecting attention. Writer has sent client information about grounding techniques of using an ice pack on the sternum, the 5-4-3-2-1 sensory grounding technique and the physiological sigh, discussed it's impact on soothing the vagus nerve.     Previously discussed substance use (regular vaping of nicotine and delta 9 and occasional alcohol use, with a Hx of issues related to use). Writer inquired about Navi's openness to discussion of his substance use with this writer. Navi reports that he feels that substance use is an effective way to cope with symptoms and seems to also report having experienced negative consequences of use in the past. Writer provided education about the impact of substance use on efficacy of antipsychotic medications (e.g. can reduce efficacy). Navi was open to further analysis and discussion with this writer in the future and also expressed some reservation about discontinuing use of substances.     Writer used relational and interpersonal approach to explore feelings, motivations, and behavior. Writer offered support, feedback, validation, and reinforced use of skills taught in IRT from modalities including cognitive behavioral therapy, psycho education, and skills training. Promoted understanding of their experiences of psychosis and how it impacts them in important areas of their life and in recovery goals. Reflected on client's strengths and resiliency factors and facilitated discussion on how these can assist in symptom management, recovery, and well-being.       13. Treatment Plan:     Treatment Plan________________________________________________________________________  Reviewed 1/16/2024    Pt s measurable objectives (list 3)  o Reduce intensity of psychosis symptoms  o Demonstrate understanding of symptoms  "regarding causes, treatment  o Learn two skills to manage symptoms of psychosis    In Pt s own words   o  I want to have stability both mentally and psychically.     Interventions  o IRT  o Medication Management  o SEE  o Family support and education  o Social work care coordination    Target date of discharge  o 12-36 months from enrollment    Discharge criteria  o Marked and sustained symptom improvement  o Demonstrated understanding of mental illness  o Successful implementation of strategies to cope with stressors/symptoms to mitigate risk for increase in symptoms severity or relapse    Gains made (listed out objectives accomplished)    Frequency of sessions and expected duration of treatment:   o 6-12 months of weekly IRT/Individual Psychotherapy followed by 12-24 months of biweekly or monthly IRT    Participants in therapy plan: Navi Morales and IRT LEENA Lay    Support System: parents, particularly mother and providers    14. Plan/Referrals:     Next visit schedule for one week from today.     Billing for \"Interactive Complexity\"?    No      LEENA Lay    NAVIGATE Individual Resiliency Trainer  "

## 2024-04-01 ENCOUNTER — VIRTUAL VISIT (OUTPATIENT)
Dept: PSYCHIATRY | Facility: CLINIC | Age: 29
End: 2024-04-01
Payer: COMMERCIAL

## 2024-04-01 VITALS — BODY MASS INDEX: 22.9 KG/M2 | WEIGHT: 160 LBS | HEIGHT: 70 IN

## 2024-04-01 DIAGNOSIS — F29 PSYCHOSIS, UNSPECIFIED PSYCHOSIS TYPE (H): Primary | ICD-10-CM

## 2024-04-01 DIAGNOSIS — F12.90 CANNABIS USE DISORDER: ICD-10-CM

## 2024-04-01 DIAGNOSIS — F10.90 ALCOHOL USE DISORDER: ICD-10-CM

## 2024-04-01 PROBLEM — S22.030A: Status: ACTIVE | Noted: 2018-12-15

## 2024-04-01 PROBLEM — F40.10 SOCIAL ANXIETY DISORDER: Status: ACTIVE | Noted: 2022-04-05

## 2024-04-01 PROBLEM — F10.10 ALCOHOL USE DISORDER, MILD, ABUSE: Status: ACTIVE | Noted: 2022-07-18

## 2024-04-01 PROBLEM — F41.1 GAD (GENERALIZED ANXIETY DISORDER): Status: ACTIVE | Noted: 2018-09-25

## 2024-04-01 PROBLEM — F33.1 MODERATE EPISODE OF RECURRENT MAJOR DEPRESSIVE DISORDER (H): Status: ACTIVE | Noted: 2022-04-05

## 2024-04-01 PROBLEM — S62.330B: Status: ACTIVE | Noted: 2017-03-07

## 2024-04-01 PROBLEM — N48.6 INDURATION PENIS PLASTICA: Status: ACTIVE | Noted: 2019-04-16

## 2024-04-01 RX ORDER — QUETIAPINE FUMARATE 100 MG/1
TABLET, FILM COATED ORAL
Qty: 60 TABLET | Refills: 3 | Status: SHIPPED | OUTPATIENT
Start: 2024-04-01 | End: 2024-06-19

## 2024-04-01 RX ORDER — QUETIAPINE FUMARATE 100 MG/1
100-200 TABLET, FILM COATED ORAL AT BEDTIME
Status: SHIPPED
Start: 2024-04-01 | End: 2024-04-01

## 2024-04-01 ASSESSMENT — PATIENT HEALTH QUESTIONNAIRE - PHQ9
SUM OF ALL RESPONSES TO PHQ QUESTIONS 1-9: 23
10. IF YOU CHECKED OFF ANY PROBLEMS, HOW DIFFICULT HAVE THESE PROBLEMS MADE IT FOR YOU TO DO YOUR WORK, TAKE CARE OF THINGS AT HOME, OR GET ALONG WITH OTHER PEOPLE: EXTREMELY DIFFICULT
SUM OF ALL RESPONSES TO PHQ QUESTIONS 1-9: 23
10. IF YOU CHECKED OFF ANY PROBLEMS, HOW DIFFICULT HAVE THESE PROBLEMS MADE IT FOR YOU TO DO YOUR WORK, TAKE CARE OF THINGS AT HOME, OR GET ALONG WITH OTHER PEOPLE: EXTREMELY DIFFICULT

## 2024-04-01 ASSESSMENT — PAIN SCALES - GENERAL: PAINLEVEL: NO PAIN (0)

## 2024-04-01 NOTE — PROGRESS NOTES
"Virtual Visit Details  Type of service:  Video Visit   Video Start Time: 4:15pm  Video End Time: 4:45pm  Originating Location (patient location): Home  Distant Location (provider location):  On-site  Platform used for Video Visit: [facility] Medication Management Progress Note  A Part of the [facility] First Episode of Psychosis Program    NAVIGATE Enrollee: Navi Morales (1995)     MRN: [id number]  Date:  4/01/24         Contributors to the Assessment     Chart Reviewed.   Interview completed with Navi Morales.  Collateral information obtained from none.         Interim History      Navi Morales is a 28 year old male who was last seen in [facility] on 3/18/24 at which time cross-taper from quetiapine to olanzapine was continued. The patient reports good treatment adherence. History was provided by Navi who was a good historian.  Since the last visit:  - Doing well. Had family over for Easter this weekend  - mood is \"pretty good\". Depression is possibly a little better but anxiety seems to be worse. Thinks anxiety was better controlled on quetiapine but hallucinations and depression were worse. Now feels like that has been reversed  - feels more sensitive to caffeine now. If he drinks more than 2 cups he gets restless. Used to drink 6-12 cups of caffeine, now more like 1-2 cups daily.  Hard to describe restless feeling but doesn't think it's related to medication. Thinks it's more likely from caffeine and pre-existing back issues  - still experiencing sedation with olanzapine but not as bad as with quetiapine  - hallucinations: hearing people talking about him. Gives example of when he is inside he will hear a voice that is trying to convince him that it's outside by being quiet and sounding like other sounds outside. He says it's always trying to be \"elusive\" to make him believe it's real. He has reality-testing strategies like opening the door/window to see if there's someone out there, but the AH can be " "very convincing that they're real  - hallucinations \"have been the best they've been in awhile\" particularly in terms of frequency. Will still have some \"sharp\" comments that he hears clearly. Usually they are negative comments about him. This affects his mood and self-esteem  - At the worst, he would hear voices constantly, hundreds per hour or thousands per day. Now it's more like a couple hundred times a day. Usually he hears things when people stop talking in pauses during conversations. Estimates that overall hallucinations are about 10% of what they used to be  - still feels affected by the voices even though they're milder. Says it's like there being a hailstorm that stops, but after it's done you don't necessarily feel comfortable running outside yet    Side effects:  - not as sedated on olanzapine as compared to quetiapine  - did notice increased appetite for the first week or so after increasing dose, but that has subsided a bit in the last couple weeks    Substance use:  - Hasn't drank since 2-3 weeks ago. Has been taking naltrexone. Has some urges to drink  - Still smoking cannabis, using some with lower potency than previously and so probably not using as much  - still using nicotine, about the same amount as previously. Vaping, no cigarettes    PSYCH ROS:  Clinician Rating Form in [facility]  1. Depressed Mood: Ratin  0 Not reported     1 Very mild occasionally feels sad or  down ; of questionable clinical significance   2 Mild occasionally feels moderately depressed or often feels sad or  down    3 Moderate occasionally feels very depressed or often feels moderately depressed   4 Moderately severe often feels very depressed   5 Severe feels very depressed most of the time   6 Very severe constant extremely painful feelings of depression   U Unable to assess        2. Anxiety/Worry: Ratin  0 Not reported     1 Very mild occasionally feels a little anxious; of questionable clinical significance   2 " Mild occasionally feels moderately anxious or often feels a little anxious or worried   3 Moderate occasionally feels very anxious or often feels moderately anxious   4 Moderately severe often feels very anxious or often feels moderately anxious   5 Severe feels very anxious or worried most of the time   6 Very severe patient is continually preoccupied with severe anxiety   U Unable to assess        3. Suicidal Ideation/Behavior: Ratin          0 Not reported     1 Very mild occasional thoughts of dying,  I d be better off dead  or  I wish I were dead    2 Mild frequents thoughts of dying or occasional thoughts of killing self, without plan or method   3 Moderate often thinks of suicide or has though of a specific method   4 Moderately severe has mentally rehearsed a specific method of suicide or has made a suicide attempt with questionable intent to die (e.r. takes aspirins and then tells family)   5 Severe has made preparations for a potentially lethal suicide attempt (e.g acquires a gun and bullets for an attempt)   6 Very severe has made a suicide attempt with an intent to die   U Unable to assess                      4. Elevated/Expansive Mood: Ratin  0 Not at all     1 Very mild questionable; more cheerful than most people in his/her circumstances but of only possible clinical significance   2 Mild brief elevated/expansive mood but only somewhat out of proportion to the circumstances   3 Moderate brief/occasional elevation of mood which is clearly out of proportion to the circumstances   4 Moderately severe sustained/frequent elevation of mood which is clearly out of proportion to the circumstances   5 Severe mood is euphoric most of the time   6 Very severe sustained elevation;  everything is wonderful  almost all of the time   U Unable to assess        5. Hostility/Anger/Irritability/Aggressiveness: Ratin  0 Not at all     1 Very mild occasional irritability of doubtful clinical significance   2  Mild occasionally feels angry or mild or indirect expressions of anger, e.g. sarcasm, disrespect or hostile gestures   3 Moderate frequently feels angry, frequent irritability or occasional direct expression of anger, e.g. yelling at others   4 Moderately severe often feels very angry, often yells at others or occasionally threatens to harm others   5 Severe has acted on his anger by becoming physically abusive on one or two occasions or makes frequent threats to harm others or is very angry most of the time   6 Very severe has been physically aggressive and/or required intervention to prevent assaultiveness on several occasions; or any serious assaultive act   U Unable to assess        6. Impulsive Behavior: Ratin  0 Not at all     1 Very mild one instance of impulsive behavior which is of doubtful clinical significance   2 Mild occasional impulsive acts, e.g. making phone calls at odd hours   3 Moderate occasional impulsive acts with some potential negative consequence, e.g. leaving work abruptly; changing plans without thinking   4 Moderately severe impulsive acts with definite negative consequences, e.g. overspending on non-essentials; repeated reckless sexual behavior   5 Severe impulsive acts with direct negative consequences, e.g. spends entire income on nonessentials without regard for basic needs   6 Very severe impulsive behavior which is potentially life threatening, e.g. jumps from dangerous height (without suicidal intent) or criminal behavior, e.g. impulsive robbery   U Unable to assess        7. Suspiciousness: Ratin  0 Not present     1 Very mild Seems on guard. Reluctant to respond to some  personal  questions. Reports being overly self-conscious in public   2 Mild Describes incidents in which others have harmed or wanted to harm him/her that sound plausible. Patient feels as if others are watching, laughing, or criticizing him/her in public, but this occurs only occasionally or rarely.  Little or no preoccupation   3 Moderate Says others are talking about him/her maliciously, have negative intentions, or may harm him/her. Beyond the likelihood of plausibility, but not delusional. Incidents of suspected persecution occur occasionally (less than once per week) with some preoccupation   4 Moderately severe Same as 3, but incidents occur frequently such as more than once a week. Patient is moderately preoccupied with ideas of persecution OR patient reports persecutory delusions expressed with much doubt (e.g. partial delusion)   5 Severe Delusional -- speaks of [employer] plots, the FBI, or others poisoning his/her food, persecution by supernatural forces   6 Extremely severe Same as 5, but the beliefs are bizarre or more preoccupying. Patient tends to disclose or act on persecutory delusions.   U Unable to assess        8. Unusual Thought Content: Ratin  0 Not present     1 Very mild Ideas of reference (people may stare or may laugh at him), ideas of persecution (people may mistreat him). Unusual beliefs in psychic tyler, spirits, UFOs, or unrealistic beliefs in one's own abilities. Not strongly held. Some doubt   2 Mild Same as 1, but degree of reality distortion is more severe as indicated by highly unusual ideas or greater conviction. Content may be typical of delusions (even bizarre), but without full conviction. The delusion does not seem to have fully formed, but is considered as one possible explanation for an unusual experience   3 Moderate Delusion present but no preoccupation or functional impairment. May be an encapsulated delusion or a firmly endorsed absurd belief about past delusional circumstances   4 Moderately severe Full delusion(s) present with some preoccupation OR some areas of functioning disrupted by delusional thinking   5 Severe Full delusion(s) present with much preoccupation OR many areas of functioning are disrupted by delusional thinking   6 Extremely severe Full delusions  present with almost   U Unable to assess        9. Hallucinations: Ratin  0 Not present     1 Very mild While resting or going to sleep, sees visions, smells odors, or hears voices, sounds or whispers in the absence of external stimulation, but no impairment in functioning   2 Mild While in a clear state of consciousness, hears a voice calling the subject s name, experiences non-verbal auditory hallucinations (e.g., sounds or whispers), formless visual hallucinations, or has sensory experiences in the presence of a modality-relevant stimulus (e.g., visual illusions) infrequently (e.g., 1-2 times per week) and with no functional impairment   3 Moderate Occasional verbal, visual, gustatory, olfactory, or tactile hallucinations with no functional impairment OR non-verbal auditory hallucinations/visual illusions more than infrequently or with impairment   4 Moderately severe Experiences daily hallucinations OR some areas of functioning are disrupted by hallucinations   5 Severe Experiences verbal or visual hallucinations several times a day OR many areas of functioning are disrupted by these hallucinations   6 Extremely severe Persistent verbal or visual hallucinations throughout the day OR most areas of functioning are disrupted by these hallucinations   U Unable to assess        10. Conceptual Disorganization: Ratin  0 Not present     1 Very mild Peculiar use of words or rambling but speech is comprehensible   2 Mild Speech a bit hard to understand or make sense of due to tangentiality, circumstantiality, or sudden topic shifts   3 Moderate Speech difficult to understand due to tangentiality, circumstantiality, idiosyncratic speech, or topic shifts on many occasions OR 1-2 instances of incoherent phrases   4 Moderately severe Speech difficult to understand due to circumstantiality, tangentiality, neologisms, blocking, or topic shifts most of the time OR 3-5 instances of incoherent phrases   5 Severe Speech is  incomprehensible due to severe impairments most of the time. Many PSRS items cannot be rated by self-report alone   6 Extremely severe Speech is incomprehensible throughout interview   U Unable to assess        11. Avolition/Apathy: Ratin  0 Not at all     1 Very mild questionable decrease in time spent in goal-directed activities   2 Mild spends less time in goal-directed activities than is appropriate for situation and age   3 Moderate initiates activities at times but does not follow through   4 Moderately severe rarely initiates activity but will passively engage with encouragement   5 Severe almost never initiates activities; requires assistance to accomplish basic activities   6 Very severe does not initiate or persist in any goal-directed activity even with outside assistance   U Unable to assess        12. Asociality/Low Social Drive: Ratin  0 Not at all     1 Very mild questionable   2 Mild slow to initiate social interactions but usually responds to overtures by others   3 Moderate rarely initiates social interactions; sometimes responds to overtures by others   4 Moderately severe does not initiate but sometimes responds to overtures by others; little social interaction outside close family members   5 Severe never initiates and rarely encourages conversations or activities; avoids being with others unless prodded, may have contacts with family   6 Very severe avoids being with others (even family members) whenever possible, extreme social isolation   U Unable to assess        13. Adherence: Days: 4  The longest, continuous amount of time in days, since the last visit when the subject did not take medication      14. EPS Part I: Rating: U  Rate Elbow Rigidity for all subjects  0 Normal   1 Slight stiffness and resistance   2 Moderate stiffness and resistance   3 Marked rigidity with difficulty in passive movement   4 Extreme stiffness and rigidity with almost a frozen joint   U Unable to assess             EPS Part 2: Signs of EPS: 0  Are there are other signs of EPS (eg diminished arm swing, postural instability, cogwheeling, tremor, akinesia) present based upon patient report or exam?  0 No   1 Yes      15. Akathisia: Ratin  0 No restlessness reported or observed   1 Mild restlessness observed; e.g., occasional jiggling of the foot occurs when subject is seated   2 Moderate restlessness observed; e.g., on several occasions, jiggles foot, crosses and uncrosses legs or twists a part of the body   3 Restlessness is frequently observed; e.g., the foot or legs moving most of the time   4 Restlessness persistently observed; e.g., subject cannot sit still, may get up and walk   U Unable to assess      16. Dyskinetic Movement Ratings: Ratin  0 None   1 Minimal, may be extreme normal   2 Mild   3 Moderate   4 Severe   U Unable to assess      SIDE EFFECT ASSESSMENT:  Clinician Rating Form in [facility] - Side Effect Assessment  Address side effects reported by the patient and rate using this scale  0 Not present   1 Minimal, may be extreme normal   2 Mild   3 Moderate   4 Severe   U Unable to assess   P Present, but not related      Feeling dizzy or faint: 2  Blurred vision: 0  Dry mouth: 2  Too much saliva/droolin  Nausea:  0  Constipation: 0  Increased appetite: 0  Weight gain: 0  Weight loss: 0  Feeling tired/fatigue: 0  Daytime sedation: 0  Hypersomnia: 2  Insomnia: 2  Low libido: 2  Other problems with sex: 2  Breast enlargement or discharge:  0  Irregular Menstruation or amenorrhea: 0  Other (please list and rate):      RECENT SUBSTANCE USE:  Clinician Rating Form in [facility] - Substance Use Assessment  Alcohol Use Severity: Ratin  0 none   1 use without impairment: drinks but no immediate social or medical impairment   2 use with impairment: e.g. becomes grossly intoxicated; alcohol use or withdrawal compromises school, work or social functioning; alcohol use or withdrawal exacerbates symptoms (e.g.  gets depressed when drinking)      Marijuana Use Severity: Rating: 3  0 none   1 occasional use without impairment: e.g. uses marijuana a few days a month and has no immediate social or medical impairment   2 frequent use without impairment: e.g. uses marijuana several or more days a week but has no immediate social or medical impairment   3 use with impairment: e.g. becomes grossly intoxicated; marijuana use compromises school, work or social functioning; marijuana use exacerbates symptoms (e.g. gets paranoid when using)      Other Drug Use Severity: Ratin  0 none   1 occasional use without impairment: e.g. uses drug(s) a few days a month and has no immediate social or medical impairment   2 frequent use without impairment: e.g. uses drug(s) several or more days a week but has no immediate social or medical impairment   3 use with impairment: e.g. becomes grossly intoxicated; drug use compromises school, work or social functioning; drug use exacerbates symptoms (e.g. gets paranoid when using)      RECENT SOCIAL HISTORY:  SEE HPI    Medical ROS:  none         First Episode of Psychosis History      DUP (duration untreated psychosis):  4 years  Route to initial care: outpatient  Medication adherence overall:  See above, Clinician Rating Form in [facility] Item 13  General frequency of visits:  weekly IRT, monthly med management  Participation in groups:  No  Cognitive Remediation:  No  Other treatment history:     Reviewed for completion of First Episode work-up:  Yes  First episode workup:  Not Done (if completed, see LABS for results)  MATRICS Consensus Cognitive Battery:  Not Done (if completed, see LABS for results)       Medical/Surgical History     Patient has no known allergies.    Patient Active Problem List   Diagnosis    Alcohol use disorder, mild, abuse    MELY (generalized anxiety disorder)    Induration penis plastica    Moderate episode of recurrent major depressive disorder (H)    Open displaced  "fracture of neck of second metacarpal bone of right hand    Psychosis (H)    Social anxiety disorder    TMJ (temporomandibular joint syndrome)    Traumatic compression fracture of T3 vertebra (H)            Medications     Current Outpatient Medications   Medication Sig Dispense Refill    cholecalciferol, vitamin D3, 1,000 unit (25 mcg) tablet [CHOLECALCIFEROL, VITAMIN D3, 1,000 UNIT (25 MCG) TABLET] Take 2,000 Units by mouth daily.      dutasteride (AVODART) 0.5 MG capsule Take 0.5 mg by mouth daily      ketoconazole (NIZORAL) 2 % external shampoo Apply topically daily as needed LATHER INTO SCALP AND RINSE AFTER 2-3 MINUTES. USE THREE TIMES A WEEK.      magnesium chloride (SLOW-MAG) 64 mg TbEC delayed-release tablet [MAGNESIUM CHLORIDE (SLOW-MAG) 64 MG TBEC DELAYED-RELEASE TABLET] Take 64 mg by mouth daily.      naltrexone (DEPADE/REVIA) 50 MG tablet Take 1 tablet (50 mg) by mouth daily 30 tablet 1    OLANZapine (ZYPREXA) 15 MG tablet Take 2 tablets (30 mg) by mouth at bedtime 60 tablet 2    QUEtiapine (SEROQUEL) 100 MG tablet Take 200 mg at bedtime for one week, then decrease to 100 mg at bedtime. (Patient taking differently: Take 100 mg by mouth at bedtime Take 200 mg at bedtime for one week, then decrease to 100 mg at bedtime.) 60 tablet 1    tadalafil (CIALIS) 10 MG tablet Take 10 mg by mouth daily      zinc gluconate 50 mg tablet [ZINC GLUCONATE 50 MG TABLET] Take 100 mg by mouth daily.            Vitals     Ht 1.778 m (5' 10\")   Wt 72.6 kg (160 lb)   BMI 22.96 kg/m        Weight prior to medication: unknown         Mental Status Exam     Alertness: alert  and oriented  Appearance: well groomed  Behavior/Demeanor: cooperative, pleasant, and calm, with good  eye contact   Speech: regular rate and rhythm, not pressured  Language: no obvious problem  Psychomotor: normal or unremarkable  Mood: depressed  Affect: blunted; was congruent to mood; was congruent to content  Thought Process/Associations:  " "overinclusive at times  Thought Content:  Reports suicidal ideation without plan; without intent [details in Interim History];  Denies violent ideation  Perception:  Reports auditory hallucinations;  Denies visual hallucinations  Insight: good  Judgment: fair and adequate for safety  Cognition: does  appear grossly intact; formal cognitive testing was not done         Labs and Data     RATING SCALES:  AIMS: next in person visit    PHQ9 TODAY =       3/18/2024     3:32 PM 3/26/2024     2:43 PM 4/1/2024     3:05 PM   PHQ-9 SCORE   PHQ-9 Total Score MyChart  23 (Severe depression) 23 (Severe depression)   PHQ-9 Total Score 20 23 23    23     ANTIPSYCHOTIC LABS ROUTINE    [glu, A1C, lipids (focus LDL), liver enzymes, WBC, ANEU, Hgb, plts]   q12 mo  Recent Labs   Lab Test 06/30/23  1048   GLC 84     No lab results found.  Recent Labs   Lab Test 06/30/23  1048   AST 39   ALT 40   ALKPHOS 88     Recent Labs   Lab Test 06/30/23  1048   WBC 7.7   HGB 14.9             Psychiatric Diagnoses     Psychosis, schizophrenia vs schizoaffective disorder, r/o substance-induced  AUD in early remission  Cannabis use disorder with active use         Assessment   Navi HO Kalama is a 28 year old male with first onset of psychiatric symptoms at age 7 (\"social anxiety\"), and psychotic symptoms at age 23. The above duration of untreated psychosis was approximately 4 years.  Prodromal symptoms seem to have been present since at least college (notable substance use) though patient does note a substantially longer history of depression and physical health concerns. Presenting symptoms appear to include hallucinations, delusional thoughts. Navi attributes symptoms to physical health issues and history of trauma.  Substance use does seem to be a present concern. There are possible medical comorbidities which impact this treatment [suicide attempt, suicidal ideation, SIB, psychosis, aggression, and substance use: alcohol].     Today, Navi " reports he is doing well with the recent changes in medications. His hallucinations have been reducing in frequency and he feels less sedated on olanzapine compared to quetiapine. He has not drank alcohol in 2-3 weeks and is continuing to take naltrexone. He feels like the quetiapine is still helpful for anxiety and would like to continue taking it for now, with additional lower doses throughout the day to use PRN for anxiety.              SUICIDE RISK ASSESSMENT:  Risk factors for self-harm: previous suicide attempt, substance use/pending treatment, recent symptom worsening, hallucinations, recent loss, and lives alone/ isolated.  Mitigating factors: describes a safety plan, h/o seeking help , future oriented, commitment to family, and stable housing.  The patient does not appear to be at imminent risk for self-harm, hospitalization is not recommended which the pt does  agree with. No hospitalization will be arranged. Based on degree of symptoms close psych follow-up was/were recommended which the pt does  agree to. Additional steps to minimize risk: med changes.      [place] PRESCRIPTION MONITORING PROGRAM [] was not checked today: not using controlled substances.    PSYCHOTROPIC DRUG INTERACTIONS:   Quetiapine/olanzapine: additive CNS depression, QTc prolongation.    MANAGEMENT:  use lowest therapeutic doses of both and routine monitoring         Plan     1) PSYCHOTROPIC MEDICATIONS:  - Quetiapine 100 mg PO at bedtime + 50-100mg BID PRN for anxiety  - Olanzapine to 30 mg PO at bedtime   - Naltrexone 50 mg daily    2) THERAPY:  Continue weekly IRT with LEENA Lay    3) NEXT DUE:    Labs: FEP workup due  Rating Scales: AIMS due    4) REFERRALS:    none    5) RTC: 4 weeks    6) CRISIS NUMBERS:   Provided routinely in AVS.    TREATMENT RISK STATEMENT:  The risks, benefits, alternatives and potential adverse effects have been discussed and are understood by the pt. The pt understands the risks of using street  drugs or alcohol. There are no medical contraindications, the pt agrees to treatment with the ability to do so. The pt knows to call the clinic for any problems or to access emergency care if needed.  Medical and substance use concerns are documented above.  Psychotropic drug interaction check was done, including changes made today.    PROVIDER:   Skylar Dutta MD, PGY-4  Patient staffed with attending psychiatrist Dr. Vega, who will sign the note.    TELEHEALTH ATTENDING ATTESTATION  Following the ACGME guidelines on telemedicine and direct supervision, I was concurrently participating in and/or monitoring the patient care through appropriate telecommunication technology - including participation during a the entire video session that involved clinical data collection and treatment planning discussion.  I discussed the key portions of the service with the resident, including history, presentation, the mental status examination and developing the plan of care. I agree with the findings and plan as documented in this note.  Heidi Vega MD       Psychiatry Individual Psychotherapy Note   Psychotherapy start time 4:20 PM  Psychotherapy end time 4:50 PM  Date treatment plan last reviewed with patient - 1/16/24, with LEENA Lay  Subjective: This supportive psychotherapy session addressed issues related to goals of therapy and current psychosocial stressors. Patient's reaction: Preparatory and Relapse in the context of mental status appropriate for ambulatory setting.    Interactive complexity indicated? No  Plan: RTC in timeframe noted above  Psychotherapy services during this visit included myself and the patient.   Treatment Plan      SYMPTOMS; PROBLEMS   MEASURABLE GOALS;    FUNCTIONAL IMPROVEMENT / GAINS INTERVENTIONS DISCHARGE CRITERIA   Depression: depressed mood, concentration problems, suicidal ideation, and feeling hopelesss  Psychosis: auditory hallucinations without commands [details in  Interim History] and paranoia  Substance Use: alcohol  and cannabis      Pt s measurable objectives (list 3)  Reduce intensity of psychosis symptoms  Demonstrate understanding of symptoms regarding causes, treatment  Learn two skills to manage symptoms of psychosis  In Pt s own words    I want to have stability both mentally and psychically.   Interventions  IRT  Medication Management  SEE  Family support and education  Social work care coordination    Supportive / CBT marked symptom improvement and transition to stepdown therapy     The longitudinal plan of care for the diagnosis(es)/condition(s) as documented were addressed during this visit. Due to the added complexity in care, I will continue to support Navi in the subsequent management and with ongoing continuity of care.

## 2024-04-01 NOTE — NURSING NOTE
PHQ-9 score is 23 and #9 is 2, more than half the days.     Is the patient currently in the state of [place]? YES    Visit mode:VIDEO    If the visit is dropped, the patient can be reconnected by: VIDEO VISIT: Send to e-mail at: [email or web address]    Will anyone else be joining the visit? NO  (If patient encounters technical issues they should call [phone]    How would you like to obtain your AVS? MyChart    Are changes needed to the allergy or medication list? No    Reason for visit: RECHECK    Medications and allergies have been reviewed.      Houston Gillespie VVF    Depression Response    Patient completed the PHQ-9 assessment for depression and scored >9? Yes  Question 9 on the PHQ-9 was positive for suicidality? Yes  Does patient have current mental health provider? Yes    Is this a virtual visit? Yes   Does patient have suicidal ideation (positive question 9)? Yes, patient has a current mental health provider.     I personally notified the following: visit provider- PHQ-9 score placed in the message column for the provider.     NAVIGATE Patient Self-Rating Form    Since your last medication management visit--    Have you been feeling depressed, sad, or down? Yes  Have you been feeling anxious, worried or nervous? Yes  Have you been thinking about death or have you had any feelings that you would be better off dead?  Maybe    Have you been feeling particularly good? No  Have you been feeling annoyed, angry, or resentful (whether you showed it or not)?  Maybe  Did you do anything that could have gotten you in trouble? No  Have you felt dizzy or faint? No  Have you had blurred vision?  Maybe  Have you had dry mouth? Yes  Have you had too much saliva in your mouth or had drooling? No  Have you felt nauseous? No  Have you been constipated? No  Has you appetite for food been increased? Yes  Have you gained weight? Yes  Have you lost weight? No  Have you felt restless or like you cannot sit still? Maybe  Any  shaking of your hands, legs, or other muscles? No  Any problems walking or moving or any problems feeling stiff or rigid? No  Have you felt tired or fatigued? Yes  Have you felt drowsy during the day? Yes  Have you been sleeping too much at night? Yes  Have you been sleeping too little or had problems sleeping at night? Yes  Any decrease in your interest in sex? Yes  Any other problems with sex? No  Any problems with your breasts such as swelling or discharge? No  For women, any problems with your period? N/A  Are there other medical or side effect problems you wish to discuss with your prescriber?  Maybe, anxiety is higher.   Since your last visit, how many days have you not taken your medication? 0  Have you had trouble remembering to take your medication? No  Do you find the number of medicines or the times when you are supposed to take then confusing or burdensome?  Maybe  Are you afraid of the medication? No  Do you think that you have an illness that requires taking medication? Yes  Do you think that other people would think poorly of you if they knew that you take medication?  Maybe  On average, how many cigarettes do you smoke per day? 0  Since you last visit, did you drink alcohol? No  Since your last visit, have you used any marijuana? Yes  Since your last visit, have you used any stress drugs other than marijuana? No  Between now and your next visit, do you think we should keep your medication the same or consider changing the medications? Keep medication the same. Discuss medication to help with anxiety.

## 2024-04-02 ENCOUNTER — VIRTUAL VISIT (OUTPATIENT)
Dept: PSYCHIATRY | Facility: CLINIC | Age: 29
End: 2024-04-02
Payer: COMMERCIAL

## 2024-04-02 DIAGNOSIS — F29 PSYCHOSIS, UNSPECIFIED PSYCHOSIS TYPE (H): Primary | ICD-10-CM

## 2024-04-02 NOTE — Clinical Note
Vern Sanchez, Pt Navi resubmitted consent to communicate form for his mom (see 4/1 MyC medical advice note) and is now okay with us reaching out to mom to offer education/support. When you have time, would you please call mom (Mona) and introduce your self and family education? We can also chat about this case before you call, if preferred.  Thanks! Tesha

## 2024-04-03 ENCOUNTER — TELEPHONE (OUTPATIENT)
Dept: PSYCHIATRY | Facility: CLINIC | Age: 29
End: 2024-04-03

## 2024-04-03 NOTE — PROGRESS NOTES
NAVIGGALINDO Clinician Contact & Progress Note  For Individual Resiliency Training (IRT)  A Part of the [facility] First Episode of Psychosis Program    NAVIGATE Enrollee: Navi Morales (1995)     MRN: [id number]  Date:  4/02/24  Diagnosis: Psychosis, unspecifed  Clinician: LILIANA Individual Resiliency Trainer, LEENA Lay     1. Type of contact: (majority of time spent)  IRT Session via telehealth  Mode of communication: [employer] (HIPAA compliant, secure platform). Patient consented verbally to this mode of therapy today.  Reason for telehealth: COVID-19. This patient visit was converted to a telehealth visit to minimize exposure to COVID-19.    2. People  present:   Writer  Client: Yes     3. Length of Actual Contact: Start Time: 3:00; End Time: 3:57     4. Location of contact:  Originating Location (patient location): family home, located in [place], [place]  Distant Location (provider location): Home office, located in [place], [place], using appropriate privacy considerations and procedures    5. Did the client complete the home practice option(s) from the previous session: Not Applicable    6. Motivational Teaching Strategies:  Connect info and skills with personal goals  Promote hope and positive expectations  Explore pros and cons of change  Re-frame experiences in positive light    7. Educational Teaching Strategies:  Review of written material/education  Relate information to client's experience    8. CBT Teaching Strategies:  Reinforcement and shaping (positive feedback for steps towards goals and gains in knowledge & skills)    9. IRT Module(s) Addressed:  Module 3 - What is Psychosis    10. Techniques utilized:   Sp[place]man announced at beginning of session  Present new material  Summarize progress made in current session  Other techniques (please specify):   -Built rapport with patient by inviting him to share his experiences and discuss his concerns  -Clarified patient's feelings and  perspectives  -Discussed crisis intervention plan and safety plan  -Elicited more details about current and recent circumstances  -Emotional support via active and reflective listening, responding to feelings etc.  -Normalized and validated feelings and experiences  -Provided verbal overview of First Episode Program NAVIGATE services  -Provided positive feedback and encouragement  -Provided psychoeducation related to psychosis and related concerns.   -Supportive counseling    11. Measures:    Mental Status Exam  Alertness: alert  and oriented  Behavior/Demeanor: cooperative and pleasant  Speech: regular rate and rhythm  Language: no obvious problem.   Mood: description consistent with euthymia  Thought Process/Associations: unremarkable  Thought Content:  Reports none;  Denies suicidal ideation and violent ideation  Perception:  Reports auditory hallucinations;  Denies none  Insight: adequate  Judgment: adequate for safety  Cognition: does  appear grossly intact; formal cognitive testing was not done    MELY-7  Not completed today.      PHQ-9  Not completed today.      [place] Protocol Risk Identification  Not completed today.      12. Assessment/Progress Note:     Writer and client met for IRT visit via [employer]. Set agenda to check in, discuss treatment plan and client's goals, and discuss IRT specific work. Client consented to this agenda. Reported current symptoms to include: AH, depression, anxiety all at an increased level. Explored symptoms and coping from a stress-vulnerability lens. Current stressors include: symptoms. In regards to medications, client reports: . Client noted he continues titrating from Seroquel to Zyprexa with the doctor's supervision, reports having had a helpful conversation with  about shame. Substance use: history of issues steming from substance use. Reports vaping tobacco and cannabis (delta 9) products as a coping strategy. Will continue to discuss and assess. Assessed safety.  Client reports SI, denies intent, and denies plan. HI was not present. SIB was not present. Safety plan was not reviewed today and includes calling 911, going to the emergency department (client specified [facility] as his preferred hospital if needed) and calling crisis lines if needed if SI/HI/SIB becomes overwhelming, worsens and/or becomes active.     Client did not identify urgent concerns to be addressed today.    Today's visit:  Continued IRT material on NAVIGATE program and it's components, as well as rapport building and assessment. Client appeared engaged in conversation and content. Writer and client reviewed symptoms, medications, and coping strategies. Client reports continued breathing practice nightly as he is falling asleep of diaphramatic breathing. Regarding symptoms, Navi reports less hallucinations and depression but increased anxiety. Discussed different types of anxiety he experienced. Discussed medication  - client reports that he continues to titrate from Serquel to Olanzapine, and noted that he was given leave to use Seroquel throughout the day to try to address the anxiety if he wants to do so. No medication concerns at this time. Regarding safety - client reported no change from last week, endorsed SI and idea of a plan, denied intent, denied action towards any plan. Navi filled out the consent to communicate form and resubmitted it to allow NAVIGATE family clinician to work with his mother. Regarding substances, denied alcohol use in past week, reports continued cannabis and nicotine vaping in past week. Regarding sleep, Navi reports adjusting his schedule to laying down at midnight, asleep by 2:00 am, and waking around 10:00 am. Notes that sleep is a bit variable. Reports continued sedation and need for extra coffee, which increases his anxiety. Continued discussing symptoms of psychosis. Writer provided definition, description of average age on onset and rate of occurrence in the  population. Described hallucinations and delusions. Will continue at next visit. Spent time today discussed history of work and interest in work in future, discussed finances and independence, and how he experiences challenges in these areas.     Home practice identified as: practice diaphragmatic breathing and notice how it impacts symptoms. Today, Navi reports nightly practice of about 5 minutes before going to sleep.     Writer and client previously discussed coping strategies of distraction, soothing, and redirecting attention. Writer has sent client information about grounding techniques of using an ice pack on the sternum, the 5-4-3-2-1 sensory grounding technique and the physiological sigh, discussed it's impact on soothing the vagus nerve.     Previously discussed substance use (regular vaping of nicotine and delta 9 and occasional alcohol use, with a Hx of issues related to use). Writer inquired about Navi's openness to discussion of his substance use with this writer. Navi reports that he feels that substance use is an effective way to cope with symptoms and seems to also report having experienced negative consequences of use in the past. Writer provided education about the impact of substance use on efficacy of antipsychotic medications (e.g. can reduce efficacy). Navi was open to further analysis and discussion with this writer in the future and also expressed some reservation about discontinuing use of substances.     Writer used relational and interpersonal approach to explore feelings, motivations, and behavior. Writer offered support, feedback, validation, and reinforced use of skills taught in IRT from modalities including cognitive behavioral therapy, psycho education, and skills training. Promoted understanding of their experiences of psychosis and how it impacts them in important areas of their life and in recovery goals. Reflected on client's strengths and resiliency factors and facilitated  "discussion on how these can assist in symptom management, recovery, and well-being.       13. Treatment Plan:     Treatment Plan________________________________________________________________________  Reviewed 1/16/2024    Pt s measurable objectives (list 3)  o Reduce intensity of psychosis symptoms  o Demonstrate understanding of symptoms regarding causes, treatment  o Learn two skills to manage symptoms of psychosis    In Pt s own words   o  I want to have stability both mentally and psychically.     Interventions  o IRT  o Medication Management  o SEE  o Family support and education  o Social work care coordination    Target date of discharge  o 12-36 months from enrollment    Discharge criteria  o Marked and sustained symptom improvement  o Demonstrated understanding of mental illness  o Successful implementation of strategies to cope with stressors/symptoms to mitigate risk for increase in symptoms severity or relapse    Gains made (listed out objectives accomplished)    Frequency of sessions and expected duration of treatment:   o 6-12 months of weekly IRT/Individual Psychotherapy followed by 12-24 months of biweekly or monthly IRT    Participants in therapy plan: Navi Morlaes and IRT LEENA Lay    Support System: parents, particularly mother and providers    14. Plan/Referrals:     Next visit schedule for 4/16.     Billing for \"Interactive Complexity\"?    No      LEENA Lay    NAVIGATE Individual Resiliency Trainer  "

## 2024-04-03 NOTE — TELEPHONE ENCOUNTER
Writer called Navi's mom to reach out regarding providing family education and support. There was no answer. Writer left contact information and requested a return call.   Laura Maldonado MA, LP  *This is a non billable encounter.

## 2024-04-16 ENCOUNTER — VIRTUAL VISIT (OUTPATIENT)
Dept: PSYCHIATRY | Facility: CLINIC | Age: 29
End: 2024-04-16
Payer: COMMERCIAL

## 2024-04-16 DIAGNOSIS — F29 PSYCHOSIS, UNSPECIFIED PSYCHOSIS TYPE (H): Primary | ICD-10-CM

## 2024-04-16 ASSESSMENT — PATIENT HEALTH QUESTIONNAIRE - PHQ9
10. IF YOU CHECKED OFF ANY PROBLEMS, HOW DIFFICULT HAVE THESE PROBLEMS MADE IT FOR YOU TO DO YOUR WORK, TAKE CARE OF THINGS AT HOME, OR GET ALONG WITH OTHER PEOPLE: EXTREMELY DIFFICULT
SUM OF ALL RESPONSES TO PHQ QUESTIONS 1-9: 24
10. IF YOU CHECKED OFF ANY PROBLEMS, HOW DIFFICULT HAVE THESE PROBLEMS MADE IT FOR YOU TO DO YOUR WORK, TAKE CARE OF THINGS AT HOME, OR GET ALONG WITH OTHER PEOPLE: EXTREMELY DIFFICULT
SUM OF ALL RESPONSES TO PHQ QUESTIONS 1-9: 24

## 2024-04-16 ASSESSMENT — ANXIETY QUESTIONNAIRES
2. NOT BEING ABLE TO STOP OR CONTROL WORRYING: NEARLY EVERY DAY
GAD7 TOTAL SCORE: 19
8. IF YOU CHECKED OFF ANY PROBLEMS, HOW DIFFICULT HAVE THESE MADE IT FOR YOU TO DO YOUR WORK, TAKE CARE OF THINGS AT HOME, OR GET ALONG WITH OTHER PEOPLE?: EXTREMELY DIFFICULT
8. IF YOU CHECKED OFF ANY PROBLEMS, HOW DIFFICULT HAVE THESE MADE IT FOR YOU TO DO YOUR WORK, TAKE CARE OF THINGS AT HOME, OR GET ALONG WITH OTHER PEOPLE?: EXTREMELY DIFFICULT
7. FEELING AFRAID AS IF SOMETHING AWFUL MIGHT HAPPEN: NEARLY EVERY DAY
6. BECOMING EASILY ANNOYED OR IRRITABLE: MORE THAN HALF THE DAYS
6. BECOMING EASILY ANNOYED OR IRRITABLE: MORE THAN HALF THE DAYS
5. BEING SO RESTLESS THAT IT IS HARD TO SIT STILL: MORE THAN HALF THE DAYS
4. TROUBLE RELAXING: NEARLY EVERY DAY
7. FEELING AFRAID AS IF SOMETHING AWFUL MIGHT HAPPEN: NEARLY EVERY DAY
3. WORRYING TOO MUCH ABOUT DIFFERENT THINGS: NEARLY EVERY DAY
4. TROUBLE RELAXING: NEARLY EVERY DAY
5. BEING SO RESTLESS THAT IT IS HARD TO SIT STILL: MORE THAN HALF THE DAYS
GAD7 TOTAL SCORE: 19
1. FEELING NERVOUS, ANXIOUS, OR ON EDGE: NEARLY EVERY DAY
2. NOT BEING ABLE TO STOP OR CONTROL WORRYING: NEARLY EVERY DAY
3. WORRYING TOO MUCH ABOUT DIFFERENT THINGS: NEARLY EVERY DAY
IF YOU CHECKED OFF ANY PROBLEMS ON THIS QUESTIONNAIRE, HOW DIFFICULT HAVE THESE PROBLEMS MADE IT FOR YOU TO DO YOUR WORK, TAKE CARE OF THINGS AT HOME, OR GET ALONG WITH OTHER PEOPLE: EXTREMELY DIFFICULT
2. NOT BEING ABLE TO STOP OR CONTROL WORRYING: NEARLY EVERY DAY
6. BECOMING EASILY ANNOYED OR IRRITABLE: MORE THAN HALF THE DAYS
8. IF YOU CHECKED OFF ANY PROBLEMS, HOW DIFFICULT HAVE THESE MADE IT FOR YOU TO DO YOUR WORK, TAKE CARE OF THINGS AT HOME, OR GET ALONG WITH OTHER PEOPLE?: EXTREMELY DIFFICULT
4. TROUBLE RELAXING: NEARLY EVERY DAY
1. FEELING NERVOUS, ANXIOUS, OR ON EDGE: NEARLY EVERY DAY
5. BEING SO RESTLESS THAT IT IS HARD TO SIT STILL: MORE THAN HALF THE DAYS
7. FEELING AFRAID AS IF SOMETHING AWFUL MIGHT HAPPEN: NEARLY EVERY DAY
7. FEELING AFRAID AS IF SOMETHING AWFUL MIGHT HAPPEN: NEARLY EVERY DAY
1. FEELING NERVOUS, ANXIOUS, OR ON EDGE: NEARLY EVERY DAY
GAD7 TOTAL SCORE: 19
7. FEELING AFRAID AS IF SOMETHING AWFUL MIGHT HAPPEN: NEARLY EVERY DAY
7. FEELING AFRAID AS IF SOMETHING AWFUL MIGHT HAPPEN: NEARLY EVERY DAY
GAD7 TOTAL SCORE: 19
GAD7 TOTAL SCORE: 19
3. WORRYING TOO MUCH ABOUT DIFFERENT THINGS: NEARLY EVERY DAY
IF YOU CHECKED OFF ANY PROBLEMS ON THIS QUESTIONNAIRE, HOW DIFFICULT HAVE THESE PROBLEMS MADE IT FOR YOU TO DO YOUR WORK, TAKE CARE OF THINGS AT HOME, OR GET ALONG WITH OTHER PEOPLE: EXTREMELY DIFFICULT
IF YOU CHECKED OFF ANY PROBLEMS ON THIS QUESTIONNAIRE, HOW DIFFICULT HAVE THESE PROBLEMS MADE IT FOR YOU TO DO YOUR WORK, TAKE CARE OF THINGS AT HOME, OR GET ALONG WITH OTHER PEOPLE: EXTREMELY DIFFICULT

## 2024-04-17 ENCOUNTER — OFFICE VISIT (OUTPATIENT)
Dept: PSYCHIATRY | Facility: CLINIC | Age: 29
End: 2024-04-17
Payer: COMMERCIAL

## 2024-04-17 DIAGNOSIS — F29 PSYCHOSIS, UNSPECIFIED PSYCHOSIS TYPE (H): Primary | ICD-10-CM

## 2024-04-17 NOTE — PROGRESS NOTES
NAVIGGALINDO Clinician Contact & Progress Note  For Individual Resiliency Training (IRT)  A Part of the [facility] First Episode of Psychosis Program    NAVIGATE Enrollee: Navi Morales (1995)     MRN: [id number]  Date:  4/16/24  Diagnosis: Psychosis, unspecifed  Clinician: LILIANA Individual Resiliency Trainer, LEENA Lay     1. Type of contact: (majority of time spent)  IRT Session via telehealth  Mode of communication: [employer] (HIPAA compliant, secure platform). Patient consented verbally to this mode of therapy today.  Reason for telehealth: COVID-19. This patient visit was converted to a telehealth visit to minimize exposure to COVID-19.    2. People  present:   Writer  Client: Yes     3. Length of Actual Contact: Start Time: 3:02; End Time: 4:00     4. Location of contact:  Originating Location (patient location): family home, located in [place], [place]  Distant Location (provider location): Home office, located in [place], [place], using appropriate privacy considerations and procedures    5. Did the client complete the home practice option(s) from the previous session: Not Applicable    6. Motivational Teaching Strategies:  Connect info and skills with personal goals  Promote hope and positive expectations  Explore pros and cons of change  Re-frame experiences in positive light    7. Educational Teaching Strategies:  Review of written material/education  Relate information to client's experience    8. CBT Teaching Strategies:  Reinforcement and shaping (positive feedback for steps towards goals and gains in knowledge & skills)    9. IRT Module(s) Addressed:  Module 3 - What is Psychosis    10. Techniques utilized:   [place] announced at beginning of session  Present new material  Summarize progress made in current session  Other techniques (please specify):   -Built rapport with patient by inviting him to share his experiences and discuss his concerns  -Clarified patient's feelings and  perspectives  -Discussed crisis intervention plan and safety plan  -Elicited more details about current and recent circumstances  -Emotional support via active and reflective listening, responding to feelings etc.  -Normalized and validated feelings and experiences  -Provided verbal overview of First Episode Program NAVIGATE services  -Provided positive feedback and encouragement  -Provided psychoeducation related to psychosis and related concerns.   -Supportive counseling    11. Measures:    Mental Status Exam  Alertness: alert  and oriented  Behavior/Demeanor: cooperative and pleasant  Speech: regular rate and rhythm  Language: no obvious problem.   Mood: description consistent with euthymia  Thought Process/Associations: unremarkable  Thought Content:  Reports none;  Denies suicidal ideation and violent ideation  Perception:  Reports auditory hallucinations;  Denies none  Insight: adequate  Judgment: adequate for safety  Cognition: does  appear grossly intact; formal cognitive testing was not done    MELY-7  Not completed today.      PHQ-9  Not completed today.      [place] Protocol Risk Identification  Not completed today.      12. Assessment/Progress Note:     Writer and client met for IRT visit via [employer]. Set agenda to check in, discuss treatment plan and client's goals, and discuss IRT specific work. Client consented to this agenda. Reported current symptoms to include: AH, depression, anxiety all at an increased level. Explored symptoms and coping from a stress-vulnerability lens. Current stressors include: symptoms. In regards to medications, client reports: . Client noted he continues titrating from Seroquel to Zyprexa with the doctor's supervision, reports having had a helpful conversations with the doctor. Substance use: history of issues steming from substance use. Reports vaping tobacco and cannabis (delta 9) products as a coping strategy. Reports occasional alcohol use often in excess leading to  "\"blackouts\". Will continue to discuss and assess. Assessed safety. Client reports SI, denies intent, and denies plan. HI was not present. SIB was not present. Safety plan was not reviewed today and includes calling 911, going to the emergency department (client specified [facility] as his preferred hospital if needed) and calling crisis lines if needed if SI/HI/SIB becomes overwhelming, worsens and/or becomes active.     Client did not identify urgent concerns to be addressed today.    Today's visit:  Continued IRT material on NAVIGATE program and it's components, as well as rapport building and assessment. Client appeared engaged in conversation and content. Writer and client reviewed symptoms, medications, and coping strategies. Client reports continued breathing practice nightly as he is falling asleep of diaphramatic breathing. Regarding symptoms, Navi reports increase in hallucinations and depression but slightly less anxiety. Discussed different types of anxiety he experienced. Discussed medication  - client reports that he continues to titrate from Serquel to Olanzapine, and noted that he was given leave to use Seroquel throughout the day to try to address the anxiety if he wants to do so. No medication concerns at this time, though he feels it may be more sedating than it has previously been. Regarding safety - client reported no change from last week, endorsed SI and idea of a plan, denied intent, denied action towards any plan. Navi filled out the consent to communicate form and resubmitted it to allow [facility] family clinician to work with his mother. Client and writer discussed him attending a family visit - writer encouraged this and Navi stated he would think about it.  Regarding substances, denied alcohol use in past week, reports continued cannabis and nicotine vaping in past week. Regarding sleep, Navi reports adjusting his schedule to laying down at midnight, asleep by 2:00 am, and waking around 10:00 " am. Notes that sleep is a bit variable and feels that he has been sleeping longer and more sedated in past two weeks. Reports continued sedation and need for extra coffee, which increases his anxiety. Continued discussing symptoms of psychosis. Writer provided definition, description of average age on onset and rate of occurrence in the population. Described hallucinations and delusions. Will continue at next visit. Spent time today discussed history of work and interest in work in future, discussed finances and independence, and how he experiences challenges in these areas.     Home practice identified as: practice diaphragmatic breathing and notice how it impacts symptoms. Today, Navi reports nightly practice of about 5 minutes before going to sleep.     Writer and client previously discussed coping strategies of distraction, soothing, and redirecting attention. Writer has sent client information about grounding techniques of using an ice pack on the sternum, the 5-4-3-2-1 sensory grounding technique and the physiological sigh, discussed it's impact on soothing the vagus nerve.     Previously discussed substance use (regular vaping of nicotine and delta 9 and occasional alcohol use, with a Hx of issues related to use). Writer inquired about Navi's openness to discussion of his substance use with this writer. Navi reports that he feels that substance use is an effective way to cope with symptoms and seems to also report having experienced negative consequences of use in the past. Writer provided education about the impact of substance use on efficacy of antipsychotic medications (e.g. can reduce efficacy). Navi was open to further analysis and discussion with this writer in the future and also expressed some reservation about discontinuing use of substances.     Writer used relational and interpersonal approach to explore feelings, motivations, and behavior. Writer offered support, feedback, validation, and  "reinforced use of skills taught in IRT from modalities including cognitive behavioral therapy, psycho education, and skills training. Promoted understanding of their experiences of psychosis and how it impacts them in important areas of their life and in recovery goals. Reflected on client's strengths and resiliency factors and facilitated discussion on how these can assist in symptom management, recovery, and well-being.       13. Treatment Plan:     Treatment Plan________________________________________________________________________  Reviewed 1/16/2024    Pt s measurable objectives (list 3)  o Reduce intensity of psychosis symptoms  o Demonstrate understanding of symptoms regarding causes, treatment  o Learn two skills to manage symptoms of psychosis    In Pt s own words   o  I want to have stability both mentally and psychically.     Interventions  o IRT  o Medication Management  o SEE  o Family support and education  o Social work care coordination    Target date of discharge  o 12-36 months from enrollment    Discharge criteria  o Marked and sustained symptom improvement  o Demonstrated understanding of mental illness  o Successful implementation of strategies to cope with stressors/symptoms to mitigate risk for increase in symptoms severity or relapse    Gains made (listed out objectives accomplished)    Frequency of sessions and expected duration of treatment:   o 6-12 months of weekly IRT/Individual Psychotherapy followed by 12-24 months of biweekly or monthly IRT    Participants in therapy plan: Navi Morales and IRT LEENA Lay    Support System: parents, particularly mother and providers    14. Plan/Referrals:     Next visit schedule for next week.     Billing for \"Interactive Complexity\"?    No      LEENA Lay    NAVIGATE Individual Resiliency Trainer  "

## 2024-04-19 NOTE — PROGRESS NOTES
NAVIGATE Clinician Contact & Progress Note   For Family Education Program    NAVIGATE Enrollee: Navi Morales (1995)     MRN: [id number]  Date:  4/17/24  Diagnosis(es):   Psychosis Disorder  Clinician: LILIANA Individual Resiliency  & Family Clinician, Laura Maldonado LP     1. Type of contact: (majority of time spent)  Family Session    2. People present:   Writer  Client: No  Significant Other/Family/Friend:  Mother    3. Total number of persons who participated in contact: 2, including writer    4. Length of Actual Contact: Start Time: 11 am; End Time: 11:55am   Traveled?    No     5. Location of contact:  [facility] [place]    6. Did the client complete the home practice option(s) from the previous session: Not Applicable    7. Motivational Teaching Strategies:  Connect info and skills with personal goals  Promote hope and positive expectations    8. Educational Teaching Strategies:  Review of written material/education  Relate information to client's experience    9. CBT Teaching Strategies:  Reinforcement and shaping (positive feedback for steps towards goals)    10. Psychoeducational Topic(s) Addressed:  Family Education Orientation & Tip Sheet    11. Techniques utilized:   [place] announced at beginning of session  Present new material  Summarize progress made in current session      12. Assessment/Progress Note:     Writer met with Navi's mom for an introduction and orientation to  NAVIGATE family education and support in the clinic.     Reviewed NAVIGATE treatment components and associated team members. Provided an introduction to the NAVIGATE Family Program, and mom had many questions about the services provided by NAVIGATE. Writer provided information about the names and qualifications of the different providers working with Navi and their roles.     Mom reported information about past concerns about Navi's mental health (including talking to himself and  paranoia), but reported no current crisis or pressing concerns about Navi's mental health. Reports that Navi's mental health has improved dramatically since his last hospitalization.    Reviewed upcoming appointments. Family was open to meeting again next week. Writer provided supportive listening and validated Mom's frustration with not being included in treatment. Mom reported that Navi's father does not see psychosis in the same way as mom and that currently mom is the only family member who is interested in family education. Mom has also invited Navi to these appointments and he may attend in the future.     Writer and mom will meet again next week to complete the introduction and orientation and to review the tip sheet.     Overall family seemed very engaged in conversation. They did express interest in continuing to meet for family therapy and psychoeducation. As of today's appt their insight into Navi's mental illness appears adequate. They seem they would benefit from continued clinical intervention aimed at assisting them implement helpful strategies at home and increase their understanding of psychosis.      13. Plan/Referrals:     Will meet with family weekly as schedule allows for evidence based family psychoeducation and therapeutic support aimed at maximizing Navi's opportunity for recovery from psychosis.     The author of this note documented a reason for not sharing it with the patient.      Laura Maldonado LP   NAVIGATE Individual Resiliency [place] & Family Clinician

## 2024-04-23 ENCOUNTER — VIRTUAL VISIT (OUTPATIENT)
Dept: PSYCHIATRY | Facility: CLINIC | Age: 29
End: 2024-04-23
Payer: COMMERCIAL

## 2024-04-23 DIAGNOSIS — F29 PSYCHOSIS, UNSPECIFIED PSYCHOSIS TYPE (H): Primary | ICD-10-CM

## 2024-04-23 ASSESSMENT — PATIENT HEALTH QUESTIONNAIRE - PHQ9
SUM OF ALL RESPONSES TO PHQ QUESTIONS 1-9: 23
10. IF YOU CHECKED OFF ANY PROBLEMS, HOW DIFFICULT HAVE THESE PROBLEMS MADE IT FOR YOU TO DO YOUR WORK, TAKE CARE OF THINGS AT HOME, OR GET ALONG WITH OTHER PEOPLE: VERY DIFFICULT
SUM OF ALL RESPONSES TO PHQ QUESTIONS 1-9: 23
10. IF YOU CHECKED OFF ANY PROBLEMS, HOW DIFFICULT HAVE THESE PROBLEMS MADE IT FOR YOU TO DO YOUR WORK, TAKE CARE OF THINGS AT HOME, OR GET ALONG WITH OTHER PEOPLE: VERY DIFFICULT
SUM OF ALL RESPONSES TO PHQ QUESTIONS 1-9: 23
SUM OF ALL RESPONSES TO PHQ QUESTIONS 1-9: 23

## 2024-04-24 ENCOUNTER — VIRTUAL VISIT (OUTPATIENT)
Dept: PSYCHIATRY | Facility: CLINIC | Age: 29
End: 2024-04-24
Payer: COMMERCIAL

## 2024-04-24 DIAGNOSIS — F29 PSYCHOSIS, UNSPECIFIED PSYCHOSIS TYPE (H): Primary | ICD-10-CM

## 2024-04-24 NOTE — PROGRESS NOTES
NAVIGGALINDO Clinician Contact & Progress Note  For Individual Resiliency Training (IRT)  A Part of the [facility] First Episode of Psychosis Program    NAVIGATE Enrollee: Navi Morales (1995)     MRN: [id number]  Date:  4/23/24  Diagnosis: Psychosis, unspecifed  Clinician: LILIANA Individual Resiliency Trainer, LEENA Lay     1. Type of contact: (majority of time spent)  IRT Session via telehealth  Mode of communication: [employer] Well (HIPAA compliant, secure platform). Patient consented verbally to this mode of therapy today.  Reason for telehealth: COVID-19. This patient visit was converted to a telehealth visit to minimize exposure to COVID-19.    2. People  present:   Writer  Client: Yes     3. Length of Actual Contact: Start Time: 3:02; End Time: 4:02     4. Location of contact:  Originating Location (patient location): family home, located in [place], [place]  Distant Location (provider location): Home office, located in [place], [place], using appropriate privacy considerations and procedures    5. Did the client complete the home practice option(s) from the previous session: Not Applicable    6. Motivational Teaching Strategies:  Connect info and skills with personal goals  Promote hope and positive expectations  Explore pros and cons of change  Re-frame experiences in positive light    7. Educational Teaching Strategies:  Review of written material/education  Relate information to client's experience    8. CBT Teaching Strategies:  Reinforcement and shaping (positive feedback for steps towards goals and gains in knowledge & skills)    9. IRT Module(s) Addressed:  Module 3 - What is Psychosis    10. Techniques utilized:   [place] announced at beginning of session  Present new material  Summarize progress made in current session  Other techniques (please specify):   -Built rapport with patient by inviting him to share his experiences and discuss his concerns  -Clarified patient's feelings and  perspectives  -Discussed crisis intervention plan and safety plan  -Elicited more details about current and recent circumstances  -Emotional support via active and reflective listening, responding to feelings etc.  -Normalized and validated feelings and experiences  -Provided verbal overview of First Episode Program NAVIGATE services  -Provided positive feedback and encouragement  -Provided psychoeducation related to psychosis and related concerns.   -Supportive counseling    11. Measures:    Mental Status Exam  Alertness: alert  and oriented  Behavior/Demeanor: cooperative and pleasant  Speech: regular rate and rhythm  Language: no obvious problem.   Mood: description consistent with euthymia  Thought Process/Associations: unremarkable  Thought Content:  Reports none;  Denies suicidal ideation and violent ideation  Perception:  Reports auditory hallucinations;  Denies none  Insight: adequate  Judgment: adequate for safety  Cognition: does  appear grossly intact; formal cognitive testing was not done    MELY-7  Not completed today.      PHQ-9  Not completed today.      [place] Protocol Risk Identification  Not completed today.      12. Assessment/Progress Note:     Writer and client met for IRT visit via [employer]. Set agenda to check in, discuss treatment plan and client's goals, and discuss IRT specific work. Client consented to this agenda. Reported current symptoms to include: AH, depression, anxiety all at an increased level. Explored symptoms and coping from a stress-vulnerability lens. Current stressors include: symptoms. In regards to medications, client reports: . Client noted he continues titrating from Seroquel to Zyprexa with the doctor's supervision, reports having had a helpful conversations with the doctor. Substance use: history of issues steming from substance use. Reports vaping tobacco and cannabis (delta 9) products as a coping strategy. Reports occasional alcohol use often in excess leading to  "\"blackouts\". Provided education about negative impact of substance use on symptoms of psychosis and depression. Will continue to discuss and assess. Assessed safety. Client reports SI, denies intent, and denies plan. HI was not present. SIB was not present. Safety plan was not reviewed today and includes calling 911, going to the emergency department (client specified [facility] as his preferred hospital if needed) and calling crisis lines if needed if SI/HI/SIB becomes overwhelming, worsens and/or becomes active.     Client did not identify urgent concerns to be addressed today.    Today's visit:  Continued IRT material from [employer] program, as well as rapport building and assessment. Client appeared engaged in conversation and content. Writer and client reviewed symptoms, medications, and coping strategies. Client reports continued breathing practice nightly as he is falling asleep of diaphramatic breathing. Regarding symptoms, Navi reports increase in hallucinations, depression, and anxiety.  Regarding medication  - client reports that he has stopped using the Serquel and is using Olanzapine, noted that he was given leave to use Seroquel throughout the day to try to address the anxiety if he wants to do so. Did discuss medication concerns including concern that it is too sedating and has become more sedating in past week or two, and concerns related to his sensitivity to medications fluxuates. Regarding safety - client reported no change from last week, endorsed SI and \"an idea of a plan\" without specifics, denied intent, denied action towards any plan. Navi reports mother attended visit with [facility] family clinician to work with his mother. Client and writer discussed him attending a family visit - writer encouraged this and Navi stated he would think about it.  Regarding substances, denied alcohol use in past week, reports continued cannabis and nicotine vaping in past week. Increased caffeine use due to " sedation. Regarding sleep, Navi reports needing about 12 hours a night and that he is now often going to lay down around 1:00 am. Notes that sleep is a bit variable and feels that he has been sleeping longer and more sedated in past two weeks. Reports continued sedation and need for extra coffee. Continued discussing symptoms of psychosis. Writer provided definition, description of average age on onset and rate of occurrence in the population. Described hallucinations and delusions. Discussed cognitive disorganization. Will continue at next visit.     Home practice identified as: practice diaphragmatic breathing and notice how it impacts symptoms. Today, Navi reports nightly practice of about 5 minutes before going to sleep.     Writer and client previously discussed coping strategies of distraction, soothing, and redirecting attention. Writer has sent client information about grounding techniques of using an ice pack on the sternum, the 5-4-3-2-1 sensory grounding technique and the physiological sigh, discussed it's impact on soothing the vagus nerve.     Previously discussed substance use (regular vaping of nicotine and delta 9 and occasional alcohol use, with a Hx of issues related to use). Writer inquired about Navi's openness to discussion of his substance use with this writer. Navi reports that he feels that substance use is an effective way to cope with symptoms and seems to also report having experienced negative consequences of use in the past. Writer provided education about the impact of substance use on efficacy of antipsychotic medications (e.g. can reduce efficacy). Navi was open to further analysis and discussion with this writer in the future and also expressed some reservation about discontinuing use of substances.     Writer used relational and interpersonal approach to explore feelings, motivations, and behavior. Writer offered support, feedback, validation, and reinforced use of skills taught in  "IRT from modalities including cognitive behavioral therapy, psycho education, and skills training. Promoted understanding of their experiences of psychosis and how it impacts them in important areas of their life and in recovery goals. Reflected on client's strengths and resiliency factors and facilitated discussion on how these can assist in symptom management, recovery, and well-being.       13. Treatment Plan:     Treatment Plan________________________________________________________________________  Reviewed 1/16/2024    Pt s measurable objectives (list 3)  o Reduce intensity of psychosis symptoms  o Demonstrate understanding of symptoms regarding causes, treatment  o Learn two skills to manage symptoms of psychosis    In Pt s own words   o  I want to have stability both mentally and psychically.     Interventions  o IRT  o Medication Management  o SEE  o Family support and education  o Social work care coordination    Target date of discharge  o 12-36 months from enrollment    Discharge criteria  o Marked and sustained symptom improvement  o Demonstrated understanding of mental illness  o Successful implementation of strategies to cope with stressors/symptoms to mitigate risk for increase in symptoms severity or relapse    Gains made (listed out objectives accomplished)    Frequency of sessions and expected duration of treatment:   o 6-12 months of weekly IRT/Individual Psychotherapy followed by 12-24 months of biweekly or monthly IRT    Participants in therapy plan: Navi Morales and IRT LEENA Lay    Support System: parents, particularly mother and providers    14. Plan/Referrals:     Next visit schedule for next week.     Billing for \"Interactive Complexity\"?    No      LEENA Lay    NAVIGATE Individual Resiliency Trainer  "

## 2024-04-24 NOTE — PROGRESS NOTES
NAVIGATE Clinician Contact & Progress Note   For Family Education Program    NAVIGATE Enrollee: Navi Morales (1995)     MRN: [id number]  Date:  4/24/24  Diagnosis(es):   Psychosis unspecified  Clinician: [facility] Family Clinician, Laura Maldonado MA, SELWYN     1. Type of contact: (majority of time spent)  Family Session via telehealth  Mode of communication: [employer] (HIPAA compliant, secure platform). Family consented verbally to this mode of therapy today.  Reason for telehealth: COVID-19. This patient visit was converted to a telehealth visit to minimize exposure to COVID-19.    2. People present:   Writer  Client: No  Significant Other/Family/Friend:  Mother    3. Length of Actual Contact: Start Time: 11:05; End Time: Noon     4. Location of contact:  Originating Location (patient location):[place]   Distant Location (provider location): Psychiatry [facility], [facility]    5. Did the client complete the home practice option(s) from the previous session: Partially Completed    6. Motivational Teaching Strategies:  Connect info and skills with personal goals  Promote hope and positive expectations    7. Educational Teaching Strategies:  Review of written material/education  Relate information to client's experience  Ask questions to check comprehension  Break down information into small chunks    8. CBT Teaching Strategies:  Reinforcement and shaping (positive feedback for steps towards goals and gains in knowledge & skills)    9. Psychoeducational Topic(s) Addressed:  Family Education Orientation & Tip Sheet    10. Techniques utilized:   Tebbetts announced at beginning of session  Review of previous meeting  Present new material  Family Therapy  Motivational Interviewing (Connect info and skills with personal goals, Promote hope and positive expectations, Explore pros and cons of change, Re-frame experiences in a positive light)  Educational Teaching Strategies (Review written  "material/education on: Psychosis, Medications for psychosis, Coping with stress, Strategies to build resiliency, Relapse prevention planning, Developing a collaboration with mental health professionals, Effective communication, A relative s guide to supporting recovery from psychosis, Basic facts about alcohol and drugs)  CBT (Reinforcement and shaping, Social skills training, Relapse prevention planning, Coping skills training, Relaxation training, Cognitive restructuring, Behavioral tailoring)  11. Assessment/Progress Note:     Writer met with Navi's mom on this day to discuss mom's observations of Navi, review engagement in Navigate, and complete Navigate introduction and review the tip sheet.     Mom reports that Navi has been continuing to have high anxiety. She reports that she doesn't see Navi much as his sleep schedule has flipped so he is awake at night and sleeping during the day. Mom reports that last night Naiv was venting that he doesn't really believe in the \"mindfulness\" stuff he is learning in therapy; rather he thinks he just needs \"to get a job.\" Mom describes an experience taking Navi to the \"vape shop\" and that Navi indicated he was almost too anxious to go inside.     Discussed safety and suicide risk being increased individuals who experience psychosis. Explored crisis intervention strategies and resources including their local [facility] crisis line, local emergency room and calling 911.  Mom reports that she has been concerned at times in the past about Navi feeling hopeless. Mom reports she does not have safety concerns at this time. Mom denies having firearms in the house and has had past conversations about suicide with Navi. Mom also reports experiencing calling [facility] crisis line.     Discussed the \"Tip Sheet for Helping People in NAVIGATE\" and identified tips that seemed relevant to family's situation, particularly:    - Keeping expectations minimal, but don't let them all go  - Encourage " but do not nag. Choose your battles  - Help your relative keep to as close to a normal routine as possible  - Don't argue with a relative over worrisome thoughts  - Continue to do enjoyable activities together    Overall family seemed very engaged in conversation. They did express interest in continuing to meet for family therapy and psychoeducation. As of today's appt their insight into Navi's mental illness appears fair. They seem they would benefit from continued clinical intervention aimed at assisting them implement helpful strategies at home and increase their understanding of psychosis.    12. Plan/Referrals:     Will meet with family weekly as schedule allows for evidence based family psychoeducation and therapeutic support aimed at maximizing Navi's opportunity for recovery from psychosis.         Laura Maldonado MA, LP   NAVIGATE   The author of this note documented a reason for not sharing it with the patient.

## 2024-04-25 DIAGNOSIS — F29 PSYCHOSIS, UNSPECIFIED PSYCHOSIS TYPE (H): ICD-10-CM

## 2024-04-25 DIAGNOSIS — F10.90 ALCOHOL USE DISORDER: ICD-10-CM

## 2024-04-29 ENCOUNTER — VIRTUAL VISIT (OUTPATIENT)
Dept: PSYCHIATRY | Facility: CLINIC | Age: 29
End: 2024-04-29
Payer: COMMERCIAL

## 2024-04-29 VITALS — WEIGHT: 165 LBS | BODY MASS INDEX: 23.62 KG/M2 | HEIGHT: 70 IN

## 2024-04-29 DIAGNOSIS — F25.1 SCHIZOAFFECTIVE DISORDER, DEPRESSIVE TYPE (H): Primary | ICD-10-CM

## 2024-04-29 DIAGNOSIS — F10.90 ALCOHOL USE DISORDER: ICD-10-CM

## 2024-04-29 RX ORDER — OLANZAPINE 15 MG/1
30 TABLET ORAL AT BEDTIME
Qty: 60 TABLET | Refills: 2 | OUTPATIENT
Start: 2024-04-29

## 2024-04-29 ASSESSMENT — PATIENT HEALTH QUESTIONNAIRE - PHQ9
10. IF YOU CHECKED OFF ANY PROBLEMS, HOW DIFFICULT HAVE THESE PROBLEMS MADE IT FOR YOU TO DO YOUR WORK, TAKE CARE OF THINGS AT HOME, OR GET ALONG WITH OTHER PEOPLE: EXTREMELY DIFFICULT
SUM OF ALL RESPONSES TO PHQ QUESTIONS 1-9: 23
SUM OF ALL RESPONSES TO PHQ QUESTIONS 1-9: 23

## 2024-04-29 ASSESSMENT — PAIN SCALES - GENERAL: PAINLEVEL: NO PAIN (0)

## 2024-04-29 NOTE — NURSING NOTE
Patient scored 23 on PHQ-9, #9 2=more than half the days.       NAVIGATE Patient Self-Rating Form    Since your last medication management visit--    Have you been feeling depressed, sad, or down? Yes  Have you been feeling anxious, worried or nervous? Yes  Have you been thinking about death or have you had any feelings that you would be better off dead? Yes   Have you been feeling particularly good? No  Have you been feeling annoyed, angry, or resentful (whether you showed it or not)? Yes  Did you do anything that could have gotten you in trouble? No  Have you felt dizzy or faint? No  Have you had blurred vision? No  Have you had dry mouth? Yes  Have you had too much saliva in your mouth or had drooling? No  Have you felt nauseous? No  Have you been constipated? No  Has you appetite for food been increased? Yes  Have you gained weight? Yes  Have you lost weight? No  Have you felt restless or like you cannot sit still? No  Any shaking of your hands, legs, or other muscles? No  Any problems walking or moving or any problems feeling stiff or rigid? No  Have you felt tired or fatigued? Yes  Have you felt drowsy during the day? Yes  Have you been sleeping too much at night? Yes  Have you been sleeping too little or had problems sleeping at night? Yes  Any decrease in your interest in sex? Yes  Any other problems with sex? No  Any problems with your breasts such as swelling or discharge? No  For women, any problems with your period? N/A  Are there other medical or side effect problems you wish to discuss with your prescriber? No  Since your last visit, how many days have you not taken your medication? 0  Have you had trouble remembering to take your medication? No  Do you find the number of medicines or the times when you are supposed to take then confusing or burdensome? No  Are you afraid of the medication? No  Do you think that you have an illness that requires taking medication? Yes  Do you think that other people  "would think poorly of you if they knew that you take medication?  Depends  On average, how many cigarettes do you smoke per day? vape  Since you last visit, did you drink alcohol? No  Since your last visit, have you used any marijuana? Yes  Since your last visit, have you used any stress drugs other than marijuana? No  Between now and your next visit, do you think we should keep your medication the same or consider changing the medications? Patient said, \"ummmm, I guess I mean maybe change because drowsy.\"      Depression Response    Patient completed the PHQ-9 assessment for depression and scored >9? Yes  Question 9 on the PHQ-9 was positive for suicidality? Yes  Does patient have current mental health provider? Yes    Is this a virtual visit? Yes   Does patient have suicidal ideation (positive question 9)? Yes    I personally notified the following: visit provider Put PHQ-9 score in MSG Column      Is the patient currently in the state of [place]? YES    Visit mode:VIDEO    If the visit is dropped, the patient can be reconnected by: VIDEO VISIT: Text to cell phone:   Telephone Information:   Mobile [phone]       Will anyone else be joining the visit? NO  (If patient encounters technical issues they should call [phone] :[phone])    How would you like to obtain your AVS? MyChart    Are changes needed to the allergy or medication list? No    Are refills needed on medications prescribed by this physician? NO    Reason for visit: RECHECK (Medication for anxiety, drowsy.)    Gayle Pierce VVF                 "

## 2024-04-29 NOTE — PROGRESS NOTES
"Virtual Visit Details    Type of service:  Video Visit   Video Start Time: {video visit start/end time for provider to select:921474}  Video End Time:{video visit start/end time for provider to select:020540}    Originating Location (pt. Location): {video visit patient location:696541::\"Home\"}  {PROVIDER LOCATION On-site should be selected for visits conducted from your clinic location or adjoining [facility] hospital, academic office, or other nearby [facility] building. Off-site should be selected for all other provider locations, including home:439099}  Distant Location (provider location):  {virtual location provider:390246}  Platform used for Video Visit: {Virtual Visit Platforms:369300::\"Bubble [employer] Balm\"}    "

## 2024-04-29 NOTE — PROGRESS NOTES
"Virtual Visit Details  Type of service:  Video Visit   Video Start Time: 2:45pm  Video End Time: 3:15pm  Originating Location (patient location): Home  Distant Location (provider location):  On-site  Platform used for Video Visit: Xero    [employer] Medication Management Progress Note  A Part of the [facility] First Episode of Psychosis Program    NAVIGATE Enrollee: Navi Morales (1995)     MRN: [id number]  Date:  4/29/24         Contributors to the Assessment     Chart Reviewed.   Interview completed with Navi Morales.  Collateral information obtained from none.         Interim History      Navi Morales is a 28 year old male who was last seen in [facility] clinic on 4/1/24 at which time cross-taper from quetiapine to olanzapine was continued. The patient reports good treatment adherence. History was provided by Navi who was a good historian.  Since the last visit:  - has a new nephew (sister-in-law had a baby). He has been spending a lot of time with family. Feels some pressure to \"get my life together\" to be a good role model for his nephew \"before he becomes self-aware\". Some of this pressure is from family but most of it is from Navi himself  - anxiety has been more severe, he wonders if that's related to lower quetiapine and higher olanzapine. Still feels on-board with the medications he's on  - feels drowsy in the mornings and throughout the day, also physically fatigued. Usually sleeps for at least 12 hours every night  - hallucinations aren't too bad. They are less frequent, though when they do happen they are very \"sharp\" and clear. When this happens his anxiety gets more severe  - does think that hallucinations are much improved on olanzapine compared to quetiapine  - also has anxiety about social situations like going to the pharmacy to  medications. Felt too anxious to hold his baby nephew  - reports some of his hallucinations feel like his own thoughts and feel intrusive, \"almost like OCD\"  - suicidal " thoughts are still there in the background but have become less frequent in the last month. This still occurs most frequently when the voices are more intense and causing distress, also occasionally when he is reflecting on his life overall    Substance use:  - hasn't been taking naltrexone mostly because he forgot to keep taking it. Hasn't been drinking and hasn't had urges or cravings to drink. Did have a beer or two on one occasion, no binging  - THC vapes are getting more expensive recently so he hasn't been using as much. Has some concern about what he'll do for his anxiety if this continues    RECENT SOCIAL HISTORY:  Lives with parents. Finished some college at [employer] but didn't graduate. Not currently working    Medical ROS:  none    PSYCH ROS:  Clinician Rating Form in COMPASS  1. Depressed Mood: Ratin  0 Not reported     1 Very mild occasionally feels sad or  down ; of questionable clinical significance   2 Mild occasionally feels moderately depressed or often feels sad or  down    3 Moderate occasionally feels very depressed or often feels moderately depressed   4 Moderately severe often feels very depressed   5 Severe feels very depressed most of the time   6 Very severe constant extremely painful feelings of depression   U Unable to assess        2. Anxiety/Worry: Ratin  0 Not reported     1 Very mild occasionally feels a little anxious; of questionable clinical significance   2 Mild occasionally feels moderately anxious or often feels a little anxious or worried   3 Moderate occasionally feels very anxious or often feels moderately anxious   4 Moderately severe often feels very anxious or often feels moderately anxious   5 Severe feels very anxious or worried most of the time   6 Very severe patient is continually preoccupied with severe anxiety   U Unable to assess        3. Suicidal Ideation/Behavior: Ratin          0 Not reported     1 Very mild occasional thoughts of  dying,  I d be better off dead  or  I wish I were dead    2 Mild frequents thoughts of dying or occasional thoughts of killing self, without plan or method   3 Moderate often thinks of suicide or has though of a specific method   4 Moderately severe has mentally rehearsed a specific method of suicide or has made a suicide attempt with questionable intent to die (e.r. takes aspirins and then tells family)   5 Severe has made preparations for a potentially lethal suicide attempt (e.g acquires a gun and bullets for an attempt)   6 Very severe has made a suicide attempt with an intent to die   U Unable to assess                      4. Elevated/Expansive Mood: Ratin  0 Not at all     1 Very mild questionable; more cheerful than most people in his/her circumstances but of only possible clinical significance   2 Mild brief elevated/expansive mood but only somewhat out of proportion to the circumstances   3 Moderate brief/occasional elevation of mood which is clearly out of proportion to the circumstances   4 Moderately severe sustained/frequent elevation of mood which is clearly out of proportion to the circumstances   5 Severe mood is euphoric most of the time   6 Very severe sustained elevation;  everything is wonderful  almost all of the time   U Unable to assess        5. Hostility/Anger/Irritability/Aggressiveness: Ratin  0 Not at all     1 Very mild occasional irritability of doubtful clinical significance   2 Mild occasionally feels angry or mild or indirect expressions of anger, e.g. sarcasm, disrespect or hostile gestures   3 Moderate frequently feels angry, frequent irritability or occasional direct expression of anger, e.g. yelling at others   4 Moderately severe often feels very angry, often yells at others or occasionally threatens to harm others   5 Severe has acted on his anger by becoming physically abusive on one or two occasions or makes frequent threats to harm others or is very angry most of  the time   6 Very severe has been physically aggressive and/or required intervention to prevent assaultiveness on several occasions; or any serious assaultive act   U Unable to assess        6. Impulsive Behavior: Ratin  0 Not at all     1 Very mild one instance of impulsive behavior which is of doubtful clinical significance   2 Mild occasional impulsive acts, e.g. making phone calls at odd hours   3 Moderate occasional impulsive acts with some potential negative consequence, e.g. leaving work abruptly; changing plans without thinking   4 Moderately severe impulsive acts with definite negative consequences, e.g. overspending on non-essentials; repeated reckless sexual behavior   5 Severe impulsive acts with direct negative consequences, e.g. spends entire income on nonessentials without regard for basic needs   6 Very severe impulsive behavior which is potentially life threatening, e.g. jumps from dangerous height (without suicidal intent) or criminal behavior, e.g. impulsive robbery   U Unable to assess        7. Suspiciousness: Ratin  0 Not present     1 Very mild Seems on guard. Reluctant to respond to some  personal  questions. Reports being overly self-conscious in public   2 Mild Describes incidents in which others have harmed or wanted to harm him/her that sound plausible. Patient feels as if others are watching, laughing, or criticizing him/her in public, but this occurs only occasionally or rarely. Little or no preoccupation   3 Moderate Says others are talking about him/her maliciously, have negative intentions, or may harm him/her. Beyond the likelihood of plausibility, but not delusional. Incidents of suspected persecution occur occasionally (less than once per week) with some preoccupation   4 Moderately severe Same as 3, but incidents occur frequently such as more than once a week. Patient is moderately preoccupied with ideas of persecution OR patient reports persecutory delusions expressed  with much doubt (e.g. partial delusion)   5 Severe Delusional -- speaks of Mafia plots, the FBI, or others poisoning his/her food, persecution by supernatural forces   6 Extremely severe Same as 5, but the beliefs are bizarre or more preoccupying. Patient tends to disclose or act on persecutory delusions.   U Unable to assess        8. Unusual Thought Content: Ratin  0 Not present     1 Very mild Ideas of reference (people may stare or may laugh at him), ideas of persecution (people may mistreat him). Unusual beliefs in psychic tyler, spirits, UFOs, or unrealistic beliefs in one's own abilities. Not strongly held. Some doubt   2 Mild Same as 1, but degree of reality distortion is more severe as indicated by highly unusual ideas or greater conviction. Content may be typical of delusions (even bizarre), but without full conviction. The delusion does not seem to have fully formed, but is considered as one possible explanation for an unusual experience   3 Moderate Delusion present but no preoccupation or functional impairment. May be an encapsulated delusion or a firmly endorsed absurd belief about past delusional circumstances   4 Moderately severe Full delusion(s) present with some preoccupation OR some areas of functioning disrupted by delusional thinking   5 Severe Full delusion(s) present with much preoccupation OR many areas of functioning are disrupted by delusional thinking   6 Extremely severe Full delusions present with almost   U Unable to assess        9. Hallucinations: Ratin  0 Not present     1 Very mild While resting or going to sleep, sees visions, smells odors, or hears voices, sounds or whispers in the absence of external stimulation, but no impairment in functioning   2 Mild While in a clear state of consciousness, hears a voice calling the subject s name, experiences non-verbal auditory hallucinations (e.g., sounds or whispers), formless visual hallucinations, or has sensory experiences in  the presence of a modality-relevant stimulus (e.g., visual illusions) infrequently (e.g., 1-2 times per week) and with no functional impairment   3 Moderate Occasional verbal, visual, gustatory, olfactory, or tactile hallucinations with no functional impairment OR non-verbal auditory hallucinations/visual illusions more than infrequently or with impairment   4 Moderately severe Experiences daily hallucinations OR some areas of functioning are disrupted by hallucinations   5 Severe Experiences verbal or visual hallucinations several times a day OR many areas of functioning are disrupted by these hallucinations   6 Extremely severe Persistent verbal or visual hallucinations throughout the day OR most areas of functioning are disrupted by these hallucinations   U Unable to assess        10. Conceptual Disorganization: Ratin  0 Not present     1 Very mild Peculiar use of words or rambling but speech is comprehensible   2 Mild Speech a bit hard to understand or make sense of due to tangentiality, circumstantiality, or sudden topic shifts   3 Moderate Speech difficult to understand due to tangentiality, circumstantiality, idiosyncratic speech, or topic shifts on many occasions OR 1-2 instances of incoherent phrases   4 Moderately severe Speech difficult to understand due to circumstantiality, tangentiality, neologisms, blocking, or topic shifts most of the time OR 3-5 instances of incoherent phrases   5 Severe Speech is incomprehensible due to severe impairments most of the time. Many PSRS items cannot be rated by self-report alone   6 Extremely severe Speech is incomprehensible throughout interview   U Unable to assess        11. Avolition/Apathy: Ratin  0 Not at all     1 Very mild questionable decrease in time spent in goal-directed activities   2 Mild spends less time in goal-directed activities than is appropriate for situation and age   3 Moderate initiates activities at times but does not follow through   4  Moderately severe rarely initiates activity but will passively engage with encouragement   5 Severe almost never initiates activities; requires assistance to accomplish basic activities   6 Very severe does not initiate or persist in any goal-directed activity even with outside assistance   U Unable to assess        12. Asociality/Low Social Drive: Ratin  0 Not at all     1 Very mild questionable   2 Mild slow to initiate social interactions but usually responds to overtures by others   3 Moderate rarely initiates social interactions; sometimes responds to overtures by others   4 Moderately severe does not initiate but sometimes responds to overtures by others; little social interaction outside close family members   5 Severe never initiates and rarely encourages conversations or activities; avoids being with others unless prodded, may have contacts with family   6 Very severe avoids being with others (even family members) whenever possible, extreme social isolation   U Unable to assess        13. Adherence: Days: 4  The longest, continuous amount of time in days, since the last visit when the subject did not take medication      14. EPS Part I: Rating: U  Rate Elbow Rigidity for all subjects  0 Normal   1 Slight stiffness and resistance   2 Moderate stiffness and resistance   3 Marked rigidity with difficulty in passive movement   4 Extreme stiffness and rigidity with almost a frozen joint   U Unable to assess            EPS Part 2: Signs of EPS: 0  Are there are other signs of EPS (eg diminished arm swing, postural instability, cogwheeling, tremor, akinesia) present based upon patient report or exam?  0 No   1 Yes      15. Akathisia: Ratin  0 No restlessness reported or observed   1 Mild restlessness observed; e.g., occasional jiggling of the foot occurs when subject is seated   2 Moderate restlessness observed; e.g., on several occasions, jiggles foot, crosses and uncrosses legs or twists a part of the  body   3 Restlessness is frequently observed; e.g., the foot or legs moving most of the time   4 Restlessness persistently observed; e.g., subject cannot sit still, may get up and walk   U Unable to assess      16. Dyskinetic Movement Ratings: Ratin  0 None   1 Minimal, may be extreme normal   2 Mild   3 Moderate   4 Severe   U Unable to assess      SIDE EFFECT ASSESSMENT:  Clinician Rating Form in [facility] - Side Effect Assessment  Address side effects reported by the patient and rate using this scale  0 Not present   1 Minimal, may be extreme normal   2 Mild   3 Moderate   4 Severe   U Unable to assess   P Present, but not related      Feeling dizzy or faint: 2  Blurred vision: 0  Dry mouth: 2  Too much saliva/droolin  Nausea:  0  Constipation: 0  Increased appetite: 0  Weight gain: 0  Weight loss: 0  Feeling tired/fatigue: 0  Daytime sedation: 0  Hypersomnia: 2  Insomnia: 2  Low libido: 2  Other problems with sex: 2  Breast enlargement or discharge:  0  Irregular Menstruation or amenorrhea: 0  Other (please list and rate):      RECENT SUBSTANCE USE:  Clinician Rating Form in [facility] - Substance Use Assessment  Alcohol Use Severity: Ratin  0 none   1 use without impairment: drinks but no immediate social or medical impairment   2 use with impairment: e.g. becomes grossly intoxicated; alcohol use or withdrawal compromises school, work or social functioning; alcohol use or withdrawal exacerbates symptoms (e.g. gets depressed when drinking)      Marijuana Use Severity: Rating: 3  0 none   1 occasional use without impairment: e.g. uses marijuana a few days a month and has no immediate social or medical impairment   2 frequent use without impairment: e.g. uses marijuana several or more days a week but has no immediate social or medical impairment   3 use with impairment: e.g. becomes grossly intoxicated; marijuana use compromises school, work or social functioning; marijuana use exacerbates symptoms (e.g.  gets paranoid when using)      Other Drug Use Severity: Ratin  0 none   1 occasional use without impairment: e.g. uses drug(s) a few days a month and has no immediate social or medical impairment   2 frequent use without impairment: e.g. uses drug(s) several or more days a week but has no immediate social or medical impairment   3 use with impairment: e.g. becomes grossly intoxicated; drug use compromises school, work or social functioning; drug use exacerbates symptoms (e.g. gets paranoid when using)           First Episode of Psychosis History      DUP (duration untreated psychosis):  4 years  Route to initial care: outpatient  Medication adherence overall:  See above, Clinician Rating Form in COMPASS Item 13  General frequency of visits:  weekly IRT, monthly med management  Participation in groups:  No  Cognitive Remediation:  No  Other treatment history:     Reviewed for completion of First Episode work-up:  Yes  First episode workup:  Not Done (if completed, see LABS for results)  MATRICS Consensus Cognitive Battery:  Not Done (if completed, see LABS for results)       Medical/Surgical History     Patient has no known allergies.    Patient Active Problem List   Diagnosis    Alcohol use disorder, mild, abuse    MELY (generalized anxiety disorder)    Induration penis plastica    Moderate episode of recurrent major depressive disorder (H)    Open displaced fracture of neck of second metacarpal bone of right hand    Psychosis (H)    Social anxiety disorder    TMJ (temporomandibular joint syndrome)    Traumatic compression fracture of T3 vertebra (H)            Medications     Current Outpatient Medications   Medication Sig Dispense Refill    cholecalciferol, vitamin D3, 1,000 unit (25 mcg) tablet [CHOLECALCIFEROL, VITAMIN D3, 1,000 UNIT (25 MCG) TABLET] Take 2,000 Units by mouth daily.      dutasteride (AVODART) 0.5 MG capsule Take 0.5 mg by mouth daily      ketoconazole (NIZORAL) 2 % external shampoo Apply  topically daily as needed LATHER INTO SCALP AND RINSE AFTER 2-3 MINUTES. USE THREE TIMES A WEEK.      magnesium chloride (SLOW-MAG) 64 mg TbEC delayed-release tablet [MAGNESIUM CHLORIDE (SLOW-MAG) 64 MG TBEC DELAYED-RELEASE TABLET] Take 64 mg by mouth daily.      naltrexone (DEPADE/REVIA) 50 MG tablet Take 1 tablet (50 mg) by mouth daily 30 tablet 1    OLANZapine (ZYPREXA) 15 MG tablet Take 2 tablets (30 mg) by mouth at bedtime 60 tablet 2    QUEtiapine (SEROQUEL) 100 MG tablet Take 1 tablet (100 mg) by mouth at bedtime. May also take 0.5-1 tablets ( mg) 2 times daily as needed (anxiety). 60 tablet 3    tadalafil (CIALIS) 10 MG tablet Take 10 mg by mouth daily      zinc gluconate 50 mg tablet [ZINC GLUCONATE 50 MG TABLET] Take 100 mg by mouth daily.            Vitals     There were no vitals taken for this visit.      Weight prior to medication: unknown         Mental Status Exam     Alertness: alert  and oriented  Appearance: well groomed  Behavior/Demeanor: cooperative, pleasant, and calm, with good  eye contact   Speech: regular rate and rhythm, not pressured  Language: no obvious problem  Psychomotor: normal or unremarkable  Mood: depressed  Affect: blunted; was congruent to mood; was congruent to content  Thought Process/Associations:  overinclusive at times  Thought Content:  Reports suicidal ideation without plan; without intent [details in Interim History];  Denies violent ideation  Perception:  Reports auditory hallucinations;  Denies visual hallucinations  Insight: good  Judgment: fair and adequate for safety  Cognition: does  appear grossly intact; formal cognitive testing was not done         Labs and Data     RATING SCALES:  AIMS: next in person visit    PHQ9 TODAY =       4/1/2024     3:05 PM 4/16/2024     2:48 PM 4/23/2024     2:55 PM   PHQ-9 SCORE   PHQ-9 Total Score MyChart 23 (Severe depression) 24 (Severe depression) 23 (Severe depression)   PHQ-9 Total Score 23    23 24    24 23    23      ANTIPSYCHOTIC LABS ROUTINE    [glu, A1C, lipids (focus LDL), liver enzymes, WBC, ANEU, Hgb, plts]   q12 mo  Recent Labs   Lab Test 06/30/23  1048   GLC 84     No lab results found.  Recent Labs   Lab Test 06/30/23  1048   AST 39   ALT 40   ALKPHOS 88     Recent Labs   Lab Test 06/30/23  1048   WBC 7.7   HGB 14.9             Psychiatric Diagnoses     Psychosis, schizophrenia vs schizoaffective disorder, r/o substance-induced  AUD in early remission  Cannabis use disorder with active use         Assessment   Navi HO Andrew is a 28 year old male with first onset of psychiatric symptoms at age 7 (social anxiety), and psychotic symptoms at age 23. The duration of untreated psychosis was approximately 4 years.  Prodromal symptoms seem to have been present since at least college (notable substance use) though patient does note a substantially longer history of depression and physical health concerns. Presenting symptoms appear to include hallucinations, delusional thoughts. Navi attributes symptoms to physical health issues and history of trauma.  Substance use does seem to be a present concern. There are possible medical comorbidities which impact this treatment [suicide attempt, suicidal ideation, SIB, psychosis, aggression, and substance use: alcohol].     Today, Navi reports his symptoms are under fairly good control with reduced frequency of hallucinations since switching to olanzapine. He hasn't had significant cravings for alcohol and was forgetting to take naltrexone so he decided to stop taking it a couple weeks ago and has been doing well without it.               SUICIDE RISK ASSESSMENT:  Risk factors for self-harm: previous suicide attempt, substance use/pending treatment, recent symptom worsening, hallucinations, recent loss, and lives alone/ isolated.  Mitigating factors: describes a safety plan, h/o seeking help , future oriented, commitment to family, and stable housing.  The patient does not  appear to be at imminent risk for self-harm, hospitalization is not recommended which the pt does  agree with. No hospitalization will be arranged. Based on degree of symptoms close psych follow-up was/were recommended which the pt does  agree to. Additional steps to minimize risk: med changes.      [place] PRESCRIPTION MONITORING PROGRAM [] was not checked today: not using controlled substances.    PSYCHOTROPIC DRUG INTERACTIONS:   Quetiapine/olanzapine: additive CNS depression, QTc prolongation.    MANAGEMENT:  use lowest therapeutic doses of both and routine monitoring         Plan     1) PSYCHOTROPIC MEDICATIONS:  - Reduce scheduled quetiapine from 100 mg to 50mg PO at bedtime  - Quetiapine 50-100mg BID PRN for anxiety  - Olanzapine to 30 mg PO at bedtime   - Discontinue naltrexone 50 mg daily (patient discontinued in the interim period)    2) THERAPY:  Continue weekly IRT with LEENA Lay    3) NEXT DUE:    Labs: FEP workup due  Rating Scales: AIMS due    4) REFERRALS:    none    5) RTC: 4 weeks    6) CRISIS NUMBERS:   Provided routinely in AVS.    TREATMENT RISK STATEMENT:  The risks, benefits, alternatives and potential adverse effects have been discussed and are understood by the pt. The pt understands the risks of using street drugs or alcohol. There are no medical contraindications, the pt agrees to treatment with the ability to do so. The pt knows to call the clinic for any problems or to access emergency care if needed.  Medical and substance use concerns are documented above.  Psychotropic drug interaction check was done, including changes made today.    PROVIDERS: Paula Dutta MD (PGY-4 resident)  Attending: Heidi Vega MD    TELEHEALTH ATTENDING ATTESTATION  Following the ACGME guidelines on telemedicine and direct supervision, I was concurrently participating in and/or monitoring the patient care through appropriate telecommunication technology - including participation during a the  entire video session that involved clinical data collection and treatment planning discussion.  I discussed the key portions of the service with the resident, including history, presentation, the mental status examination and developing the plan of care. I agree with the findings and plan as documented in this note.  Heidi Vega MD       Psychiatry Individual Psychotherapy Note   Psychotherapy start time 2:45 PM  Psychotherapy end time 3:10 PM  Date treatment plan last reviewed with patient - 1/16/24, with LEENA Lay  Subjective: This supportive psychotherapy session addressed issues related to goals of therapy and current psychosocial stressors. Patient's reaction: Preparatory and Relapse in the context of mental status appropriate for ambulatory setting.    Interactive complexity indicated? No  Plan: RTC in timeframe noted above  Psychotherapy services during this visit included myself and the patient.   Treatment Plan      SYMPTOMS; PROBLEMS   MEASURABLE GOALS;    FUNCTIONAL IMPROVEMENT / GAINS INTERVENTIONS DISCHARGE CRITERIA   Depression: depressed mood, concentration problems, suicidal ideation, and feeling hopelesss  Psychosis: auditory hallucinations without commands [details in Interim History] and paranoia  Substance Use: alcohol  and cannabis      Pt s measurable objectives (list 3)  Reduce intensity of psychosis symptoms  Demonstrate understanding of symptoms regarding causes, treatment  Learn two skills to manage symptoms of psychosis  In Pt s own words    I want to have stability both mentally and psychically.   Interventions  IRT  Medication Management  SEE  Family support and education  Social work care coordination    Supportive / CBT marked symptom improvement and transition to stepdown therapy     The longitudinal plan of care for the diagnosis(es)/condition(s) as documented were addressed during this visit. Due to the added complexity in care, I will continue to support Navi in  the subsequent management and with ongoing continuity of care.

## 2024-04-30 ENCOUNTER — TELEPHONE (OUTPATIENT)
Dept: PSYCHIATRY | Facility: CLINIC | Age: 29
End: 2024-04-30

## 2024-05-01 ENCOUNTER — VIRTUAL VISIT (OUTPATIENT)
Dept: PSYCHIATRY | Facility: CLINIC | Age: 29
End: 2024-05-01
Payer: COMMERCIAL

## 2024-05-01 DIAGNOSIS — F29 PSYCHOSIS, UNSPECIFIED PSYCHOSIS TYPE (H): Primary | ICD-10-CM

## 2024-05-01 NOTE — Clinical Note
"Just forwarding you both my note: Heidi, mom was wondering about \"antianxiety bracelets\" that she saw advertised on FaceBook : \  Tesha, I got a little insight into what mom is observing at home. Happy to talk more in team on Monday. Triston, Laura"

## 2024-05-02 ENCOUNTER — TELEPHONE (OUTPATIENT)
Dept: PSYCHIATRY | Facility: CLINIC | Age: 29
End: 2024-05-02

## 2024-05-02 NOTE — PROGRESS NOTES
NAVIGATE Clinician Contact & Progress Note   For Family Education Program    NAVIGATE Enrollee: Navi Morales (1995)     MRN: [id number]  Date:  5/01/24  Diagnosis(es):   Psychosis unspecified  Clinician: EMILIANAATE Family Clinician, Laura Maldonado MA, LP     1. Type of contact: (majority of time spent)  Family Session via telehealth  Mode of communication: Attempted to use [employer] (HIPAA compliant, secure platform), but switched to phone due to technology issues on mom's computer. Family consented verbally to this mode of therapy today.  Reason for telehealth: COVID-19. This patient visit was converted to a telehealth visit to minimize exposure to COVID-19.    2. People present:   Writer  Client: No  Significant Other/Family/Friend:  Mother    3. Length of Actual Contact: Start Time: 11:10; End Time: Noon     4. Location of contact:  Originating Location (patient location):[place]   Distant Location (provider location): Psychiatry Clinic, [facility]    5. Did the client complete the home practice option(s) from the previous session: Partially Completed    6. Motivational Teaching Strategies:  Connect info and skills with personal goals  Promote hope and positive expectations    7. Educational Teaching Strategies:  Review of written material/education  Relate information to client's experience  Ask questions to check comprehension  Break down information into small chunks    8. CBT Teaching Strategies:  Reinforcement and shaping (positive feedback for steps towards goals and gains in knowledge & skills)    9. Psychoeducational Topic(s) Addressed:  Family Education Orientation & Tip Sheet    10. Techniques utilized:   [place] announced at beginning of session  Review of previous meeting  Present new material  Family Therapy  Motivational Interviewing (Connect info and skills with personal goals, Promote hope and positive expectations, Explore pros and cons of change, Re-frame experiences  "in a positive light)  Educational Teaching Strategies (Review written material/education on: Psychosis, Medications for psychosis, Coping with stress, Strategies to build resiliency, Relapse prevention planning, Developing a collaboration with mental health professionals, Effective communication, A relative s guide to supporting recovery from psychosis, Basic facts about alcohol and drugs)  CBT (Reinforcement and shaping, Social skills training, Relapse prevention planning, Coping skills training, Relaxation training, Cognitive restructuring, Behavioral tailoring)  11. Assessment/Progress Note:     Writer met with Navi's mom on this day. Attempted to meet with mom on "[employer] but mom was struggling with connecting to the online platform. Writer and mom talked on the phone for the session.  Set agenda to discuss mom's observations of Navi, review last session,  review Navi's engagement in Navigate. Writer was unable to start family interview.     Mom reports  \"two highlights\" over the last week. She states that Navi had helped her  cut down a large tree branch on their property. Mom reported this as a positive change. Secondly, mom reported that they had a birthday party and family get-together for her son's new baby. Mom reports that Navi ate with the family and also engaged in conversation with family members. Mom reports that Navi smiled during this experience and reported that Navi's participation with the family was a welcome change.     Mom reported no urgent concerns about Navi. Mom reports that Navi has been less irritable  in the last week and has also been exhibiting less times where he will yell, \"They don't know you. They don't understand.\" Write provides supportive listening. Writer provides education about positive and negative symptoms and validate that what mom describes is consistent with psychosis.     Mom asks with specific questions about anti anxiety bracelets \"use copper technology\" that " she saw on FaceBook. Writer agrees to mention this question at team meeting.  Writer also provides education that anxiety can be intense when experiencing symptoms of hallucinations and delusions. Discuss coping strategies for anxiety and mom notes that Navi has mentioned meditation. Writer also agrees to talk with Navi's IRT to get an update about coping strategies.     Reviewed upcoming NAVIGATE appointments for family and Navi.     Overall family seemed very engaged in conversation. They did express interest in continuing to meet for family therapy and psychoeducation. As of today's appt their insight into Navi's mental illness appears fair. They seem they would benefit from continued clinical intervention aimed at assisting them implement helpful strategies at home and increase their understanding of psychosis.    12. Plan/Referrals:     Will meet with family weekly as schedule allows for evidence based family psychoeducation and therapeutic support aimed at maximizing Navi's opportunity for recovery from psychosis.         Laura Maldonado MA, LP   NAVIGATE   The author of this note documented a reason for not sharing it with the patient.

## 2024-05-03 ENCOUNTER — VIRTUAL VISIT (OUTPATIENT)
Dept: PSYCHIATRY | Facility: CLINIC | Age: 29
End: 2024-05-03
Payer: COMMERCIAL

## 2024-05-03 DIAGNOSIS — F29 PSYCHOSIS, UNSPECIFIED PSYCHOSIS TYPE (H): Primary | ICD-10-CM

## 2024-05-03 ASSESSMENT — ANXIETY QUESTIONNAIRES
4. TROUBLE RELAXING: NEARLY EVERY DAY
7. FEELING AFRAID AS IF SOMETHING AWFUL MIGHT HAPPEN: NEARLY EVERY DAY
4. TROUBLE RELAXING: NEARLY EVERY DAY
1. FEELING NERVOUS, ANXIOUS, OR ON EDGE: NEARLY EVERY DAY
GAD7 TOTAL SCORE: 19
2. NOT BEING ABLE TO STOP OR CONTROL WORRYING: NEARLY EVERY DAY
2. NOT BEING ABLE TO STOP OR CONTROL WORRYING: NEARLY EVERY DAY
IF YOU CHECKED OFF ANY PROBLEMS ON THIS QUESTIONNAIRE, HOW DIFFICULT HAVE THESE PROBLEMS MADE IT FOR YOU TO DO YOUR WORK, TAKE CARE OF THINGS AT HOME, OR GET ALONG WITH OTHER PEOPLE: EXTREMELY DIFFICULT
5. BEING SO RESTLESS THAT IT IS HARD TO SIT STILL: MORE THAN HALF THE DAYS
GAD7 TOTAL SCORE: 19
GAD7 TOTAL SCORE: 19
7. FEELING AFRAID AS IF SOMETHING AWFUL MIGHT HAPPEN: NEARLY EVERY DAY
GAD7 TOTAL SCORE: 19
7. FEELING AFRAID AS IF SOMETHING AWFUL MIGHT HAPPEN: NEARLY EVERY DAY
5. BEING SO RESTLESS THAT IT IS HARD TO SIT STILL: MORE THAN HALF THE DAYS
GAD7 TOTAL SCORE: 19
6. BECOMING EASILY ANNOYED OR IRRITABLE: MORE THAN HALF THE DAYS
3. WORRYING TOO MUCH ABOUT DIFFERENT THINGS: NEARLY EVERY DAY
8. IF YOU CHECKED OFF ANY PROBLEMS, HOW DIFFICULT HAVE THESE MADE IT FOR YOU TO DO YOUR WORK, TAKE CARE OF THINGS AT HOME, OR GET ALONG WITH OTHER PEOPLE?: EXTREMELY DIFFICULT
7. FEELING AFRAID AS IF SOMETHING AWFUL MIGHT HAPPEN: NEARLY EVERY DAY
3. WORRYING TOO MUCH ABOUT DIFFERENT THINGS: NEARLY EVERY DAY
6. BECOMING EASILY ANNOYED OR IRRITABLE: MORE THAN HALF THE DAYS
8. IF YOU CHECKED OFF ANY PROBLEMS, HOW DIFFICULT HAVE THESE MADE IT FOR YOU TO DO YOUR WORK, TAKE CARE OF THINGS AT HOME, OR GET ALONG WITH OTHER PEOPLE?: EXTREMELY DIFFICULT
IF YOU CHECKED OFF ANY PROBLEMS ON THIS QUESTIONNAIRE, HOW DIFFICULT HAVE THESE PROBLEMS MADE IT FOR YOU TO DO YOUR WORK, TAKE CARE OF THINGS AT HOME, OR GET ALONG WITH OTHER PEOPLE: EXTREMELY DIFFICULT
1. FEELING NERVOUS, ANXIOUS, OR ON EDGE: NEARLY EVERY DAY

## 2024-05-03 ASSESSMENT — PATIENT HEALTH QUESTIONNAIRE - PHQ9
SUM OF ALL RESPONSES TO PHQ QUESTIONS 1-9: 24
SUM OF ALL RESPONSES TO PHQ QUESTIONS 1-9: 24
10. IF YOU CHECKED OFF ANY PROBLEMS, HOW DIFFICULT HAVE THESE PROBLEMS MADE IT FOR YOU TO DO YOUR WORK, TAKE CARE OF THINGS AT HOME, OR GET ALONG WITH OTHER PEOPLE: VERY DIFFICULT

## 2024-05-06 NOTE — PROGRESS NOTES
NAVIGGALINDO Clinician Contact & Progress Note  For Individual Resiliency Training (IRT)  A Part of the [facility] First Episode of Psychosis Program    NAVIGATE Enrollee: Navi Morales (1995)     MRN: [id number]  Date:  5/03/24  Diagnosis: Psychosis, unspecifed  Clinician: LILIANA Individual Resiliency Trainer, LEENA Lay     1. Type of contact: (majority of time spent)  IRT Session via telehealth  Mode of communication: [employer] (HIPAA compliant, secure platform). Patient consented verbally to this mode of therapy today.  Reason for telehealth: COVID-19. This patient visit was converted to a telehealth visit to minimize exposure to COVID-19.    2. People  present:   Writer  Client: Yes     3. Length of Actual Contact: Start Time: 11:02; End Time: 11:59     4. Location of contact:  Originating Location (patient location): family home, located in [place], [place]  Distant Location (provider location): Home office, located in [place], [place], using appropriate privacy considerations and procedures    5. Did the client complete the home practice option(s) from the previous session: Not Applicable    6. Motivational Teaching Strategies:  Connect info and skills with personal goals  Promote hope and positive expectations  Explore pros and cons of change  Re-frame experiences in positive light    7. Educational Teaching Strategies:  Review of written material/education  Relate information to client's experience    8. CBT Teaching Strategies:  Reinforcement and shaping (positive feedback for steps towards goals and gains in knowledge & skills)    9. IRT Module(s) Addressed:  Module 3 - What is Psychosis    10. Techniques utilized:   [place] announced at beginning of session  Present new material  Summarize progress made in current session  Other techniques (please specify):   -Built rapport with patient by inviting him to share his experiences and discuss his concerns  -Clarified patient's feelings and  perspectives  -Discussed crisis intervention plan and safety plan  -Elicited more details about current and recent circumstances  -Emotional support via active and reflective listening, responding to feelings etc.  -Normalized and validated feelings and experiences  -Provided verbal overview of First Episode Program NAVIGATE services  -Provided positive feedback and encouragement  -Provided psychoeducation related to psychosis and related concerns.   -Supportive counseling    11. Measures:    Mental Status Exam  Alertness: alert  and oriented  Behavior/Demeanor: cooperative and pleasant  Speech: regular rate and rhythm  Language: no obvious problem.   Mood: description consistent with euthymia  Thought Process/Associations: unremarkable  Thought Content:  Reports none;  Denies suicidal ideation and violent ideation  Perception:  Reports auditory hallucinations;  Denies none  Insight: adequate  Judgment: adequate for safety  Cognition: does  appear grossly intact; formal cognitive testing was not done    MELY-7  Not completed today.      PHQ-9  Not completed today.      [place] Protocol Risk Identification  Not completed today.      12. Assessment/Progress Note:     Writer and client met for IRT visit via [employer]. Set agenda to check in, discuss treatment plan and client's goals, and discuss IRT specific work. Client consented to this agenda. Reported current symptoms to include: AH, depression, anxiety all at an increased level. Explored symptoms and coping from a stress-vulnerability lens. Current stressors include: symptoms. In regards to medications, client reports: . Client noted he continues titrating from Seroquel to Zyprexa with the doctor's supervision, reports having had a helpful conversations with the doctor. Substance use: history of issues steming from substance use. Reports vaping tobacco and cannabis (delta 9) products as a coping strategy. Reports occasional alcohol use often in excess leading to  "\"blackouts\". Provided education about negative impact of substance use on symptoms of psychosis and depression. Will continue to discuss and assess. Assessed safety. Client reports SI, denies intent, and denies plan. HI was not present. SIB was not present. Safety plan was not reviewed today and includes calling 911, going to the emergency department (client specified [facility] as his preferred hospital if needed) and calling crisis lines if needed if SI/HI/SIB becomes overwhelming, worsens and/or becomes active.     Client did not identify urgent concerns to be addressed today.     Today's visit:  Continued IRT material from [facility] program, as well as assessment. Client appeared engaged in conversation and content. Writer and client reviewed symptoms, medications, and coping strategies. Client reports continued breathing practice nightly as he is falling asleep of diaphramatic breathing. Regarding symptoms, Navi reports continued AH, depression, and anxiety (both general and social).  Regarding medication  - client reports that he has stopped using the Serquel and is using Olanzapine, noted that he was given leave to use Seroquel throughout the day to try to address the anxiety if he wants to do so. Did not discuss medication concerns today, previously discussed concerns related to how he believes his sensitivity to medications fluctuates over time. Regarding safety - client reported no change from last week, endorsed SI and \"an idea of a plan\" without specifics, denied intent, denied action towards any plan. Navi reports mother attending visits with [facility] family clinician to work with his mother. Client and writer discussed him attending a family visit - writer encouraged this and Navi stated he would think about it.  Regarding substances, denied alcohol use in past week, reports continued cannabis and nicotine vaping in past week. Increased caffeine use due to sedation. Regarding sleep, Navi reports needing " about 12 hours a night and that he is now often going to lay down around 1:00 am. Notes that sleep is a bit variable and he is working on sleeping more during times other people sleep. Reports continued sedation and need for extra coffee. Continued discussing symptoms of psychosis. Writer provided definition, description of average age on onset and rate of occurrence in the population. Described hallucinations, delusions, and cognitive impacts. Spent time discussing currently used coping strategies and coping strategies he would consider adding. Client uses distraction (listening to music, watching movies) and grounding (breathing techniques). Writer and client discuss cold therapy and the utility it has with grounding and coping with symptoms. Navi was agreeable to include cold therary (cold pack on sternum, face in ice water, cold shower) and will monitor how impactful these strategies may be. Will continue at next visit.     Home practice identified as: practice diaphragmatic breathing and notice how it impacts symptoms. Today, Navi reports nightly practice of about 5 minutes before going to sleep.     Writer and client previously discussed coping strategies of distraction, soothing, and redirecting attention. Writer has sent client information about grounding techniques of using an ice pack on the sternum, the 5-4-3-2-1 sensory grounding technique and the physiological sigh, discussed it's impact on soothing the vagus nerve.     Previously discussed substance use (regular vaping of nicotine and delta 9 and occasional alcohol use, with a Hx of issues related to use). Writer inquired about Navi's openness to discussion of his substance use with this writer. Navi reports that he feels that substance use is an effective way to cope with symptoms and seems to also report having experienced negative consequences of use in the past. Writer provided education about the impact of substance use on efficacy of  antipsychotic medications (e.g. can reduce efficacy). Navi was open to further analysis and discussion with this writer in the future and also expressed some reservation about discontinuing use of substances.     Writer used relational and interpersonal approach to explore feelings, motivations, and behavior. Writer offered support, feedback, validation, and reinforced use of skills taught in IRT from modalities including cognitive behavioral therapy, psycho education, and skills training. Promoted understanding of their experiences of psychosis and how it impacts them in important areas of their life and in recovery goals. Reflected on client's strengths and resiliency factors and facilitated discussion on how these can assist in symptom management, recovery, and well-being.       13. Treatment Plan:     Treatment Plan________________________________________________________________________  Reviewed 1/16/2024    Pt s measurable objectives (list 3)  o Reduce intensity of psychosis symptoms  o Demonstrate understanding of symptoms regarding causes, treatment  o Learn two skills to manage symptoms of psychosis    In Pt s own words   o  I want to have stability both mentally and psychically.     Interventions  o IRT  o Medication Management  o SEE  o Family support and education  o Social work care coordination    Target date of discharge  o 12-36 months from enrollment    Discharge criteria  o Marked and sustained symptom improvement  o Demonstrated understanding of mental illness  o Successful implementation of strategies to cope with stressors/symptoms to mitigate risk for increase in symptoms severity or relapse    Gains made (listed out objectives accomplished)    Frequency of sessions and expected duration of treatment:   o 6-12 months of weekly IRT/Individual Psychotherapy followed by 12-24 months of biweekly or monthly IRT    Participants in therapy plan: Navi Morales and IRT Tesha Hartmann, [facility]    Support  "System: parents, particularly mother and providers    14. Plan/Referrals:     Next visit schedule for next week.     Billing for \"Interactive Complexity\"?    No      LEENA LayATE Individual Resiliency Trainer  Answers for HPI/ROS submitted by the patient on 5/3/2024  If you checked off any problems, how difficult have these problems made it for you to do your work, take care of things at home, or get along with other people?: Very difficult  PHQ9 TOTAL SCORE: 24  MELY 7 TOTAL SCORE: 19      "

## 2024-05-07 ENCOUNTER — VIRTUAL VISIT (OUTPATIENT)
Dept: PSYCHIATRY | Facility: CLINIC | Age: 29
End: 2024-05-07
Payer: COMMERCIAL

## 2024-05-07 DIAGNOSIS — F29 PSYCHOSIS, UNSPECIFIED PSYCHOSIS TYPE (H): Primary | ICD-10-CM

## 2024-05-07 NOTE — PROGRESS NOTES
NAVIGGALINDO Clinician Contact & Progress Note  For Individual Resiliency Training (IRT)  A Part of the [facility] First Episode of Psychosis Program    NAVIGATE Enrollee: Navi Morales (1995)     MRN: [id number]  Date:  5/07/24  Diagnosis: Psychosis, unspecifed  Clinician: LILIANA Individual Resiliency Trainer, LEENA Lay     1. Type of contact: (majority of time spent)  IRT Session via telehealth  Mode of communication: [employer] (HIPAA compliant, secure platform). Patient consented verbally to this mode of therapy today.  Reason for telehealth: COVID-19. This patient visit was converted to a telehealth visit to minimize exposure to COVID-19.    2. People  present:   Writer  Client: Yes     3. Length of Actual Contact: Start Time: 3:03; End Time: 4:02     4. Location of contact:  Originating Location (patient location): family home, located in [place], [place]  Distant Location (provider location): Home office, located in [place], [place], using appropriate privacy considerations and procedures    5. Did the client complete the home practice option(s) from the previous session: Not Applicable    6. Motivational Teaching Strategies:  Connect info and skills with personal goals  Promote hope and positive expectations  Explore pros and cons of change  Re-frame experiences in positive light    7. Educational Teaching Strategies:  Review of written material/education  Relate information to client's experience    8. CBT Teaching Strategies:  Reinforcement and shaping (positive feedback for steps towards goals and gains in knowledge & skills)    9. IRT Module(s) Addressed:  Module 3 - What is Psychosis    10. Techniques utilized:   [place] announced at beginning of session  Present new material  Summarize progress made in current session  Other techniques (please specify):   -Built rapport with patient by inviting him to share his experiences and discuss his concerns  -Clarified patient's feelings and  perspectives  -Discussed crisis intervention plan and safety plan  -Elicited more details about current and recent circumstances  -Emotional support via active and reflective listening, responding to feelings etc.  -Normalized and validated feelings and experiences  -Provided verbal overview of First Episode Program NAVIGATE services  -Provided positive feedback and encouragement  -Provided psychoeducation related to psychosis and related concerns.   -Supportive counseling    11. Measures:    Mental Status Exam  Alertness: alert  and oriented  Behavior/Demeanor: cooperative and pleasant  Speech: regular rate and rhythm  Language: no obvious problem.   Mood: description consistent with euthymia  Thought Process/Associations: unremarkable  Thought Content:  Reports none;  Denies suicidal ideation and violent ideation  Perception:  Reports auditory hallucinations;  Denies none  Insight: adequate  Judgment: adequate for safety  Cognition: does  appear grossly intact; formal cognitive testing was not done    Answers for HPI/ROS submitted by the patient on 5/7/2024  If you checked off any problems, how difficult have these problems made it for you to do your work, take care of things at home, or get along with other people?: Extremely difficult  PHQ9 TOTAL SCORE: 23  MELY 7 TOTAL SCORE: 19    [place] Protocol Risk Identification  Not completed today.      12. Assessment/Progress Note:     Writer and client met for IRT visit via "[employer]. Set agenda to check in, discuss treatment plan and client's goals, and discuss IRT specific work. Client consented to this agenda. Reported current symptoms to include: AH, depression, anxiety all at an increased level. Explored symptoms and coping from a stress-vulnerability lens. Current stressors include: symptoms. In regards to medications, client reports: . Client noted he continues titrating from Seroquel to Zyprexa with the doctor's supervision, reports having had a helpful  "conversations with the doctor. Substance use: history of issues steming from substance use. Reports vaping tobacco and cannabis (delta 9) products as a coping strategy. Reports occasional alcohol use often in excess leading to \"blackouts\". Provided education about negative impact of substance use on symptoms of psychosis and depression. Will continue to discuss and assess. Assessed safety. Client reports SI, denies intent, and denies plan. HI was not present. SIB was not present. Safety plan was not reviewed today and includes calling 911, going to the emergency department (client specified [facility] as his preferred hospital if needed) and calling crisis lines if needed if SI/HI/SIB becomes overwhelming, worsens and/or becomes active.     Client did not identify urgent concerns to be addressed today.     Today's visit:  Continued IRT material from [employer] program, as well as assessment. Client appeared engaged in conversation and content. Writer and client reviewed symptoms, medications, and coping strategies. Client reports continued breathing practice nightly as he is falling asleep of diaphramatic breathing. Regarding symptoms, Navi reports continued AH, depression, and anxiety (both general and social), specified that the AH is less frequent but still more sharp and that the anxiety is worse that previous week.  Regarding medication  - client reports that he has stopped using the Seroquel and is using Olanzapine, noted that he was given leave to use Seroquel throughout the day to try to address the anxiety if he wants to do so. Did not discuss medication concerns today outside on-going sedation, previously discussed concerns related to how he believes his sensitivity to medications fluctuates over time. Regarding safety - client reported no change from last week, endorsed SI and \"an idea of a plan\" without specifics, denied intent, denied action towards any plan. Navi reports mother attending visits with " [facility] family clinician to work with his mother.  Regarding substances, denied alcohol use in past week, reports continued cannabis and nicotine vaping in past week. Increased caffeine use due to sedation. Regarding sleep, Navi reports needing about 12 hours a night and that he is now often going to lay down around 1:00 am. Notes that sleep is a bit variable and he is working on sleeping more during times other people sleep. Reports continued sedation and need for extra coffee. Navi described an interaction with his father wherein he was asked to leave the house on his own and pick something up at a store - Navi described the difficulty of this request and we discussed some of the reactions he had to it. Continued discussing coping strategies. Spent time discussing currently used coping strategies and coping strategies he would consider adding. Client uses distraction (listening to music, watching movies) and grounding (breathing techniques). Writer and client discuss cold therapy and the utility it has with grounding and coping with symptoms. Navi was agreeable to include cold therapy (cold pack on sternum, face in ice water) as well as breathing technique of physiological sigh and will monitor how impactful these strategies may be on his symptoms and distress.     Writer previously provided definition, description of average age on onset and rate of occurrence in the population. Described hallucinations, delusions, and cognitive impacts. Will continue at next visit.     Home practice identified as: practice diaphragmatic breathing and notice how it impacts symptoms. Today, Navi reports nightly practice of about 5 minutes before going to sleep.     Writer and client previously discussed coping strategies of distraction, soothing, and redirecting attention. Writer has sent client information about grounding techniques of using an ice pack on the sternum, the 5-4-3-2-1 sensory grounding technique and the  physiological sigh, discussed it's impact on soothing the vagus nerve.     Previously discussed substance use (regular vaping of nicotine and delta 9 and occasional alcohol use, with a Hx of issues related to use). Writer inquired about Navi's openness to discussion of his substance use with this writer. Navi reports that he feels that substance use is an effective way to cope with symptoms and seems to also report having experienced negative consequences of use in the past. Writer provided education about the impact of substance use on efficacy of antipsychotic medications (e.g. can reduce efficacy). Navi was open to further analysis and discussion with this writer in the future and also expressed some reservation about discontinuing use of substances.     Writer used relational and interpersonal approach to explore feelings, motivations, and behavior. Writer offered support, feedback, validation, and reinforced use of skills taught in IRT from modalities including cognitive behavioral therapy, psycho education, and skills training. Promoted understanding of their experiences of psychosis and how it impacts them in important areas of their life and in recovery goals. Reflected on client's strengths and resiliency factors and facilitated discussion on how these can assist in symptom management, recovery, and well-being.     Email sent 5/7:  Hi Navi,   I attached the PDF of breathing and grounding techniques we have been discussing here. See you next Tuesday!  Warmest regards,   SANTOS Lay, LICSW         13. Treatment Plan:     Treatment Plan________________________________________________________________________  Reviewed 1/16/2024    Pt s measurable objectives (list 3)  o Reduce intensity of psychosis symptoms  o Demonstrate understanding of symptoms regarding causes, treatment  o Learn two skills to manage symptoms of psychosis    In Pt s own words   o  I want to have stability both mentally and  "psychically.     Interventions  o IRT  o Medication Management  o SEE  o Family support and education  o Social work care coordination    Target date of discharge  o 12-36 months from enrollment    Discharge criteria  o Marked and sustained symptom improvement  o Demonstrated understanding of mental illness  o Successful implementation of strategies to cope with stressors/symptoms to mitigate risk for increase in symptoms severity or relapse    Gains made (listed out objectives accomplished)    Frequency of sessions and expected duration of treatment:   o 6-12 months of weekly IRT/Individual Psychotherapy followed by 12-24 months of biweekly or monthly IRT    Participants in therapy plan: Navi Morales and IRT LEENA aLy    Support System: parents, particularly mother and providers    14. Plan/Referrals:     Next visit schedule for next week.     Billing for \"Interactive Complexity\"?    No      LEENA Lay    NAVIGATE Individual Resiliency Trainer        Email sent 5/7:  ANTHONY Hernandez attached the PDF of breathing and grounding techniques we have been discussing here. See you next Tuesday!    Warmest regards,     Tesha Hartmann, SANTOS, LEENA   Answers for HPI/ROS submitted by the patient on 5/7/2024  If you checked off any problems, how difficult have these problems made it for you to do your work, take care of things at home, or get along with other people?: Extremely difficult  PHQ9 TOTAL SCORE: 23  MELY 7 TOTAL SCORE: 19      "

## 2024-05-14 ENCOUNTER — VIRTUAL VISIT (OUTPATIENT)
Dept: PSYCHIATRY | Facility: CLINIC | Age: 29
End: 2024-05-14
Payer: COMMERCIAL

## 2024-05-14 DIAGNOSIS — F29 PSYCHOSIS, UNSPECIFIED PSYCHOSIS TYPE (H): Primary | ICD-10-CM

## 2024-05-15 ENCOUNTER — VIRTUAL VISIT (OUTPATIENT)
Dept: PSYCHIATRY | Facility: CLINIC | Age: 29
End: 2024-05-15
Payer: COMMERCIAL

## 2024-05-15 DIAGNOSIS — F29 PSYCHOSIS, UNSPECIFIED PSYCHOSIS TYPE (H): Primary | ICD-10-CM

## 2024-05-15 NOTE — PROGRESS NOTES
NAVIGGALINDO Clinician Contact & Progress Note  For Individual Resiliency Training (IRT)  A Part of the [facility] First Episode of Psychosis Program    NAVIGATE Enrollee: Navi Morales (1995)     MRN: [id number]  Date:  5/14/24  Diagnosis: Psychosis, unspecifed  Clinician: LILIANA Individual Resiliency Trainer, LEENA Lay     1. Type of contact: (majority of time spent)  IRT Session via telehealth  Mode of communication: [employer] (HIPAA compliant, secure platform). Patient consented verbally to this mode of therapy today.  Reason for telehealth: COVID-19. This patient visit was converted to a telehealth visit to minimize exposure to COVID-19.    2. People  present:   Writer  Client: Yes     3. Length of Actual Contact: Start Time: 3:03; End Time: 4:01     4. Location of contact:  Originating Location (patient location): family home, located in [place], [place]  Distant Location (provider location): Home office, located in [place], [place], using appropriate privacy considerations and procedures    5. Did the client complete the home practice option(s) from the previous session: Not Applicable    6. Motivational Teaching Strategies:  Connect info and skills with personal goals  Promote hope and positive expectations  Explore pros and cons of change  Re-frame experiences in positive light    7. Educational Teaching Strategies:  Review of written material/education  Relate information to client's experience    8. CBT Teaching Strategies:  Reinforcement and shaping (positive feedback for steps towards goals and gains in knowledge & skills)    9. IRT Module(s) Addressed:  Module 3 - What is Psychosis    10. Techniques utilized:   [place] announced at beginning of session  Present new material  Summarize progress made in current session  Other techniques (please specify):   -Built rapport with patient by inviting him to share his experiences and discuss his concerns  -Clarified patient's feelings and  perspectives  -Discussed crisis intervention plan and safety plan  -Elicited more details about current and recent circumstances  -Emotional support via active and reflective listening, responding to feelings etc.  -Normalized and validated feelings and experiences  -Provided verbal overview of First Episode Program [facility]IGATE services  -Provided positive feedback and encouragement  -Provided psychoeducation related to psychosis and related concerns.   -Supportive counseling    11. Measures:    Mental Status Exam  Alertness: alert  and oriented  Behavior/Demeanor: cooperative and pleasant  Speech: regular rate and rhythm  Language: no obvious problem.   Mood: description consistent with euthymia  Thought Process/Associations: unremarkable  Thought Content:  Reports none;  Denies suicidal ideation and violent ideation  Perception:  Reports auditory hallucinations;  Denies none  Insight: adequate  Judgment: adequate for safety  Cognition: does  appear grossly intact; formal cognitive testing was not done    Answers for HPI/ROS submitted by the patient on 5/7/2024  If you checked off any problems, how difficult have these problems made it for you to do your work, take care of things at home, or get along with other people?: Extremely difficult  PHQ9 TOTAL SCORE: 23  MELY 7 TOTAL SCORE: 19    [place] Protocol Risk Identification  Not completed today.      12. Assessment/Progress Note:     Writer and client met for IRT visit via [employer]. Set agenda to check in, discuss treatment plan and client's goals, and discuss IRT specific work. Client consented to this agenda. Reported current symptoms to include: AH, depression, anxiety all at an increased level. Explored symptoms and coping from a stress-vulnerability lens. Current stressors include: symptoms. In regards to medications, client reports: . Client noted he continues titrating from Seroquel to Zyprexa with the doctor's supervision, reports having had a helpful  "conversations with the doctor. Substance use: history of issues steming from substance use. Reports vaping tobacco and cannabis (delta 9) products as a coping strategy. Reports occasional alcohol use often in excess leading to \"blackouts\". Provided education about negative impact of substance use on symptoms of psychosis and depression. Will continue to discuss and assess. Assessed safety. Client reports SI, denies intent, and denies plan. HI was not present. SIB was not present. Safety plan was not reviewed today and includes calling 911, going to the emergency department (client specified [facility] as his preferred hospital if needed) and calling crisis lines if needed if SI/HI/SIB becomes overwhelming, worsens and/or becomes active.     Client did not identify urgent concerns to be addressed today.     Today's visit:  Continued IRT material from [employer] program, as well as assessment. Client appeared engaged in conversation and content. Writer and client reviewed symptoms, medications, and coping strategies. Regarding symptoms, Navi reports continued AH, depression, and anxiety (both general and social), specified that the AH is less frequent but still more sharp and that the anxiety is worse that previous week.  Regarding medication  - client reports that he has stopped using the Seroquel and is using Olanzapine, noted that he was given leave to use Seroquel throughout the day to try to address the anxiety if he wants to do so. Did not discuss medication concerns today outside on-going sedation, previously discussed concerns related to how he believes his sensitivity to medications fluctuates over time. Regarding safety - client reported no change from last week, endorsed SI and \"an idea of a plan\" without specifics, denied intent, denied action towards any plan. Navi reports mother attending visits with [facility] family clinician to work with his mother.  Regarding substances, denied alcohol use in past week, " reports continued cannabis and nicotine vaping in past week. Increased caffeine use due to sedation. Regarding sleep, Navi reports needing about 12 hours a night and that he is now often going to lay down around 1:00 am. Notes that sleep is a bit variable and he is working on sleeping more during times other people sleep. Reports continued sedation and need for extra coffee. Client reports continued breathing practice nightly as he is falling asleep of diaphramatic breathing.  Continued discussing coping strategies. Spent time discussing currently used coping strategies and coping strategies he would consider adding. Client uses distraction (listening to music, watching movies) and grounding (breathing techniques). Writer and client discuss cold therapy and the utility it has with grounding and coping with symptoms. Navi was agreeable to include cold therapy (cold pack on sternum, face in ice water) as well as breathing technique of physiological sigh and will monitor how impactful these strategies may be on his symptoms and distress.     Continued to discuss symptoms of psychosis. Writer previously provided definition, description of average age on onset and rate of occurrence in the population. Described hallucinations, delusions, and cognitive impacts. Discussed Fatigue, avolition, and all or nothing thinking. Will continue at next visit.     Home practice identified as: practice diaphragmatic breathing and notice how it impacts symptoms. Today, Navi reports nightly practice of about 5 minutes before going to sleep.     Writer and client previously discussed coping strategies of distraction, soothing, and redirecting attention. Writer has sent client information about grounding techniques of using an ice pack on the sternum, the 5-4-3-2-1 sensory grounding technique and the physiological sigh, discussed it's impact on soothing the vagus nerve.     Previously discussed substance use (regular vaping of nicotine and  delta 9 and occasional alcohol use, with a Hx of issues related to use). Writer inquired about Navi's openness to discussion of his substance use with this writer. Navi reports that he feels that substance use is an effective way to cope with symptoms and seems to also report having experienced negative consequences of use in the past. Writer provided education about the impact of substance use on efficacy of antipsychotic medications (e.g. can reduce efficacy). Navi was open to further analysis and discussion with this writer in the future and also expressed some reservation about discontinuing use of substances.     Writer used relational and interpersonal approach to explore feelings, motivations, and behavior. Writer offered support, feedback, validation, and reinforced use of skills taught in IRT from modalities including cognitive behavioral therapy, psycho education, and skills training. Promoted understanding of their experiences of psychosis and how it impacts them in important areas of their life and in recovery goals. Reflected on client's strengths and resiliency factors and facilitated discussion on how these can assist in symptom management, recovery, and well-being.     Email sent 5/7:  Hi Navi,   I attached the PDF of breathing and grounding techniques we have been discussing here. See you next Tuesday!  Warmest regards,   Tesha Hartmann, MSW, LICSW         13. Treatment Plan:     Treatment Plan________________________________________________________________________  Reviewed 1/16/2024    Pt s measurable objectives (list 3)  o Reduce intensity of psychosis symptoms  o Demonstrate understanding of symptoms regarding causes, treatment  o Learn two skills to manage symptoms of psychosis    In Pt s own words   o  I want to have stability both mentally and psychically.     Interventions  o IRT  o Medication Management  o SEE  o Family support and education  o Social work care coordination    Target date of  "discharge  o 12-36 months from enrollment    Discharge criteria  o Marked and sustained symptom improvement  o Demonstrated understanding of mental illness  o Successful implementation of strategies to cope with stressors/symptoms to mitigate risk for increase in symptoms severity or relapse    Gains made (listed out objectives accomplished)    Frequency of sessions and expected duration of treatment:   o 6-12 months of weekly IRT/Individual Psychotherapy followed by 12-24 months of biweekly or monthly IRT    Participants in therapy plan: Navi Morales and IRT LEENA Lay    Support System: parents, particularly mother and providers    14. Plan/Referrals:     Next visit schedule for next week.     Billing for \"Interactive Complexity\"?    No      LEENA Lay    NAVIGATE Individual Resiliency Trainer        Email sent 5/7:  Vern Mcelroy,     I attached the PDF of breathing and grounding techniques we have been discussing here. See you next Tuesday!    Warmest regards,     SANTOS Lay, LEENA     Answers for HPI/ROS submitted by the patient on 5/14/2024  If you checked off any problems, how difficult have these problems made it for you to do your work, take care of things at home, or get along with other people?: Extremely difficult  PHQ9 TOTAL SCORE: 24      "

## 2024-05-16 NOTE — PROGRESS NOTES
NAVIG[facility] Clinician Contact & Progress Note   For Family Education Program    NAVIGATE Enrollee: Navi Morales (1995)     MRN: [id number]  Date:  5/15/24  Diagnosis(es):   Psychosis unspecified  Clinician: [facility], Laura Maldonado MA, LP     1. Type of contact: (majority of time spent)  Family Session via telehealth  Mode of communication: Attempted to use [employer], but switched to  Zoom  due to mom having trouble with speakers/computer. Family consented verbally to this mode of therapy today.  Reason for telehealth: COVID-19. This patient visit was converted to a telehealth visit to minimize exposure to COVID-19.    2. People present:   Writer  Client: No  Significant Other/Family/Friend:  Mother    3. Length of Actual Contact: Start Time: 11:10; End Time: Noon     4. Location of contact:  Originating Location (patient location):[place]   [facility]    5. Did the client complete the home practice option(s) from the previous session: Partially Completed    6. Motivational Teaching Strategies:  Connect info and skills with personal goals  Promote hope and positive expectations    7. Educational Teaching Strategies:  Review of written material/education  Relate information to client's experience  Ask questions to check comprehension  Break down information into small chunks    8. CBT Teaching Strategies:  Reinforcement and shaping (positive feedback for steps towards goals and gains in knowledge & skills)    9. Psychoeducational Topic(s) Addressed:  Family Education Orientation & Tip Sheet and Family Interview    10. Techniques utilized:   State Line announced at beginning of session  Review of previous meeting  Present new material  Family Therapy  Motivational Interviewing (Connect info and skills with personal goals, Promote hope and positive expectations, Explore pros and cons of change, Re-frame experiences in a positive  "light)  Educational Teaching Strategies (Review written material/education on: Psychosis, Medications for psychosis, Coping with stress, Strategies to build resiliency, Relapse prevention planning, Developing a collaboration with mental health professionals, Effective communication, A relative s guide to supporting recovery from psychosis, Basic facts about alcohol and drugs)  CBT (Reinforcement and shaping, Social skills training, Relapse prevention planning, Coping skills training, Relaxation training, Cognitive restructuring, Behavioral tailoring)  11. Assessment/Progress Note:     Writer met with Navi's mom on this day. Attempted to meet with mom on Com[employer], but mom was struggling with connecting to the platform. Writer and mom switched to Zoom for the session.  Set agenda to discuss mom's observations of Navi, review last session,  review Navi's engagement in [facility]. Writer started the family interview.     Mom reports that overall there have been no changes in the last couple weeks. 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 Mom reports no other incidents of yelling or anger from Navi. She does report concerns about Navi \"coughing a lot\" which she believes is due to his vaping.     Started family interview; family consists of Mom, dad, Navi's older brother who is  with a baby and Navi. Mom reports that Navi's strengths include that he is a \"hard worker\", logical, and caring.     Mom reports her understanding of her illness is that Navi has \"psychosis\" which means that he has heard voices in the past and also that he has \"anxiety\" that is \"debilitating.\" Mom reports past signs of his ilness included him talking to himself and becomes very irritable. Mom reports no signs of Navi talking to himself recently. Mom observes mostly negative symptoms including low energy, low motivation, and social isolation. Mom reports that " "Navi's symptoms worse when experiencing stress such as his dad asking him to do something around the house \"out of the blue.\" She reports that they have learned to ask Navi a day or so in advance.     Mom reports that Navi has had ongoing conflict with dad starting in childhood when Navi played baseballs and dad was the . Mom reports that Navi and his dad are \"very alike\" and that they have \"butted heads.\"     Mom reported no urgent concerns about Navi at this time. Discuss ongoing engagement with [facility]. . Reviewed upcoming [facility] appointments for family and Navi. Schedule next family visit to complete the family interview on 5/22.    Overall family seemed very engaged in conversation. They did express interest in continuing to meet for family therapy and psychoeducation. As of today's appt their insight into Navi's mental illness appears fair. They seem they would benefit from continued clinical intervention aimed at assisting them implement helpful strategies at home and increase their understanding of psychosis.    12. Plan/Referrals:     Will meet with family weekly as schedule allows for evidence based family psychoeducation and therapeutic support aimed at maximizing Navi's opportunity for recovery from psychosis.         Laura Maldonado MA, LP   LILIANA   The author of this note documented a reason for not sharing it with the patient.    "

## 2024-05-19 NOTE — PROGRESS NOTES
"NAVIGATE Medication Management Progress Note  A Part of the [facility] First Episode of Psychosis Program    NAVIGATE Enrollee: Navi Morales (1995)     MRN: [id number]  Date:  5/20/24         Contributors to the Assessment     Chart Reviewed.   Interview completed with Navi Morales.  Collateral information obtained from none.         Interim History      Navi Morales is a 28 year old male who was last seen in MD clinic on 4/29/24 at which time cross-taper from quetiapine to olanzapine was continued and naltrexone was self-discontinued. The patient reports good treatment adherence. History was provided by Navi who was a good historian.  Since the last visit:  - Noticed increase in anxiety, it is happening in social situations and has physiologic manifestations (palpitations, clamy hands), social anxiety is a horse just running wild, generalized anxiety is more logical and has markers to double check with himself and use strategies to get out of it. Getting out of his house is a bit of a struggle and typically only does it if there is a commitment needed. Even going to the park is challenging.   - Voices have possibly increased a bit from the last meeting but are still overall less since the cross-titration from quetiapine to olanzapine. Feels like the olanzapine has bean wearing off, similar to how the quetiapine wore off last year. Voices are negative about who he is and where he is at in life. They discuss about him. Makes him feel self-conscious. These voices \"run their own software\" and then he is just checking up on him. Feels like even one voice could trigger a PTSD of psychosis.   - Suicidal thoughts have been less and thinks this correlated with the cross titration to olanzapine. Thinks this goes wit the lower incidence of AH. Feels calmer and less likely to act on a plan.   - Has quit alcohol completely.  - Is noticing that he has to smoke more to not get anxious. Feels like he is getting very little " effect which is a huge turnaround from before when he was over doing it with alcohol and weed.  - Vaping 150 puffs of nicotine daily.   - Caffeine tolerance has increased over the last 1-2 months. Feels like he has 4-5 cups.   - Takes naltrexone once every 3 days.   - Has only been taking quetiapine at night, 100mg has been causing him sedation. Wondering if he could cut it in half. Confirmed that he is scheduled for 50mg at night. Can also use this during times of anxiety along with TIPPS.   - Wakes up sedated in the morning. Then gets anxious in the early afternoon often after having 3 cups of coffee.       RECENT SOCIAL HISTORY:  Lives with parents. Finished some college at [employer] but didn't graduate. Not currently working    Medical ROS:  none    PSYCH ROS:  Clinician Rating Form in [facility]  1. Depressed Mood: Ratin  0 Not reported     1 Very mild occasionally feels sad or  down ; of questionable clinical significance   2 Mild occasionally feels moderately depressed or often feels sad or  down    3 Moderate occasionally feels very depressed or often feels moderately depressed   4 Moderately severe often feels very depressed   5 Severe feels very depressed most of the time   6 Very severe constant extremely painful feelings of depression   U Unable to assess        2. Anxiety/Worry: Ratin  0 Not reported     1 Very mild occasionally feels a little anxious; of questionable clinical significance   2 Mild occasionally feels moderately anxious or often feels a little anxious or worried   3 Moderate occasionally feels very anxious or often feels moderately anxious   4 Moderately severe often feels very anxious or often feels moderately anxious   5 Severe feels very anxious or worried most of the time   6 Very severe patient is continually preoccupied with severe anxiety   U Unable to assess        3. Suicidal Ideation/Behavior: Ratin          0 Not reported     1 Very mild occasional  thoughts of dying,  I d be better off dead  or  I wish I were dead    2 Mild frequents thoughts of dying or occasional thoughts of killing self, without plan or method   3 Moderate often thinks of suicide or has though of a specific method   4 Moderately severe has mentally rehearsed a specific method of suicide or has made a suicide attempt with questionable intent to die (e.r. takes aspirins and then tells family)   5 Severe has made preparations for a potentially lethal suicide attempt (e.g acquires a gun and bullets for an attempt)   6 Very severe has made a suicide attempt with an intent to die   U Unable to assess                      4. Elevated/Expansive Mood: Ratin  0 Not at all     1 Very mild questionable; more cheerful than most people in his/her circumstances but of only possible clinical significance   2 Mild brief elevated/expansive mood but only somewhat out of proportion to the circumstances   3 Moderate brief/occasional elevation of mood which is clearly out of proportion to the circumstances   4 Moderately severe sustained/frequent elevation of mood which is clearly out of proportion to the circumstances   5 Severe mood is euphoric most of the time   6 Very severe sustained elevation;  everything is wonderful  almost all of the time   U Unable to assess        5. Hostility/Anger/Irritability/Aggressiveness: Ratin  0 Not at all     1 Very mild occasional irritability of doubtful clinical significance   2 Mild occasionally feels angry or mild or indirect expressions of anger, e.g. sarcasm, disrespect or hostile gestures   3 Moderate frequently feels angry, frequent irritability or occasional direct expression of anger, e.g. yelling at others   4 Moderately severe often feels very angry, often yells at others or occasionally threatens to harm others   5 Severe has acted on his anger by becoming physically abusive on one or two occasions or makes frequent threats to harm others or is very  angry most of the time   6 Very severe has been physically aggressive and/or required intervention to prevent assaultiveness on several occasions; or any serious assaultive act   U Unable to assess        6. Impulsive Behavior: Ratin  0 Not at all     1 Very mild one instance of impulsive behavior which is of doubtful clinical significance   2 Mild occasional impulsive acts, e.g. making phone calls at odd hours   3 Moderate occasional impulsive acts with some potential negative consequence, e.g. leaving work abruptly; changing plans without thinking   4 Moderately severe impulsive acts with definite negative consequences, e.g. overspending on non-essentials; repeated reckless sexual behavior   5 Severe impulsive acts with direct negative consequences, e.g. spends entire income on nonessentials without regard for basic needs   6 Very severe impulsive behavior which is potentially life threatening, e.g. jumps from dangerous height (without suicidal intent) or criminal behavior, e.g. impulsive robbery   U Unable to assess        7. Suspiciousness: Ratin  0 Not present     1 Very mild Seems on guard. Reluctant to respond to some  personal  questions. Reports being overly self-conscious in public   2 Mild Describes incidents in which others have harmed or wanted to harm him/her that sound plausible. Patient feels as if others are watching, laughing, or criticizing him/her in public, but this occurs only occasionally or rarely. Little or no preoccupation   3 Moderate Says others are talking about him/her maliciously, have negative intentions, or may harm him/her. Beyond the likelihood of plausibility, but not delusional. Incidents of suspected persecution occur occasionally (less than once per week) with some preoccupation   4 Moderately severe Same as 3, but incidents occur frequently such as more than once a week. Patient is moderately preoccupied with ideas of persecution OR patient reports persecutory  delusions expressed with much doubt (e.g. partial delusion)   5 Severe Delusional -- speaks of Mafia plots, the FBI, or others poisoning his/her food, persecution by supernatural forces   6 Extremely severe Same as 5, but the beliefs are bizarre or more preoccupying. Patient tends to disclose or act on persecutory delusions.   U Unable to assess        8. Unusual Thought Content: Ratin  0 Not present     1 Very mild Ideas of reference (people may stare or may laugh at him), ideas of persecution (people may mistreat him). Unusual beliefs in psychic tyler, spirits, UFOs, or unrealistic beliefs in one's own abilities. Not strongly held. Some doubt   2 Mild Same as 1, but degree of reality distortion is more severe as indicated by highly unusual ideas or greater conviction. Content may be typical of delusions (even bizarre), but without full conviction. The delusion does not seem to have fully formed, but is considered as one possible explanation for an unusual experience   3 Moderate Delusion present but no preoccupation or functional impairment. May be an encapsulated delusion or a firmly endorsed absurd belief about past delusional circumstances   4 Moderately severe Full delusion(s) present with some preoccupation OR some areas of functioning disrupted by delusional thinking   5 Severe Full delusion(s) present with much preoccupation OR many areas of functioning are disrupted by delusional thinking   6 Extremely severe Full delusions present with almost   U Unable to assess        9. Hallucinations: Ratin  0 Not present     1 Very mild While resting or going to sleep, sees visions, smells odors, or hears voices, sounds or whispers in the absence of external stimulation, but no impairment in functioning   2 Mild While in a clear state of consciousness, hears a voice calling the subject s name, experiences non-verbal auditory hallucinations (e.g., sounds or whispers), formless visual hallucinations, or has  sensory experiences in the presence of a modality-relevant stimulus (e.g., visual illusions) infrequently (e.g., 1-2 times per week) and with no functional impairment   3 Moderate Occasional verbal, visual, gustatory, olfactory, or tactile hallucinations with no functional impairment OR non-verbal auditory hallucinations/visual illusions more than infrequently or with impairment   4 Moderately severe Experiences daily hallucinations OR some areas of functioning are disrupted by hallucinations   5 Severe Experiences verbal or visual hallucinations several times a day OR many areas of functioning are disrupted by these hallucinations   6 Extremely severe Persistent verbal or visual hallucinations throughout the day OR most areas of functioning are disrupted by these hallucinations   U Unable to assess        10. Conceptual Disorganization: Ratin  0 Not present     1 Very mild Peculiar use of words or rambling but speech is comprehensible   2 Mild Speech a bit hard to understand or make sense of due to tangentiality, circumstantiality, or sudden topic shifts   3 Moderate Speech difficult to understand due to tangentiality, circumstantiality, idiosyncratic speech, or topic shifts on many occasions OR 1-2 instances of incoherent phrases   4 Moderately severe Speech difficult to understand due to circumstantiality, tangentiality, neologisms, blocking, or topic shifts most of the time OR 3-5 instances of incoherent phrases   5 Severe Speech is incomprehensible due to severe impairments most of the time. Many PSRS items cannot be rated by self-report alone   6 Extremely severe Speech is incomprehensible throughout interview   U Unable to assess        11. Avolition/Apathy: Ratin  0 Not at all     1 Very mild questionable decrease in time spent in goal-directed activities   2 Mild spends less time in goal-directed activities than is appropriate for situation and age   3 Moderate initiates activities at times but does  not follow through   4 Moderately severe rarely initiates activity but will passively engage with encouragement   5 Severe almost never initiates activities; requires assistance to accomplish basic activities   6 Very severe does not initiate or persist in any goal-directed activity even with outside assistance   U Unable to assess        12. Asociality/Low Social Drive: Ratin  0 Not at all     1 Very mild questionable   2 Mild slow to initiate social interactions but usually responds to overtures by others   3 Moderate rarely initiates social interactions; sometimes responds to overtures by others   4 Moderately severe does not initiate but sometimes responds to overtures by others; little social interaction outside close family members   5 Severe never initiates and rarely encourages conversations or activities; avoids being with others unless prodded, may have contacts with family   6 Very severe avoids being with others (even family members) whenever possible, extreme social isolation   U Unable to assess        13. Adherence: Days: 4  The longest, continuous amount of time in days, since the last visit when the subject did not take medication      14. EPS Part I: Rating: U  Rate Elbow Rigidity for all subjects  0 Normal   1 Slight stiffness and resistance   2 Moderate stiffness and resistance   3 Marked rigidity with difficulty in passive movement   4 Extreme stiffness and rigidity with almost a frozen joint   U Unable to assess            EPS Part 2: Signs of EPS: 0  Are there are other signs of EPS (eg diminished arm swing, postural instability, cogwheeling, tremor, akinesia) present based upon patient report or exam?  0 No   1 Yes      15. Akathisia: Ratin  0 No restlessness reported or observed   1 Mild restlessness observed; e.g., occasional jiggling of the foot occurs when subject is seated   2 Moderate restlessness observed; e.g., on several occasions, jiggles foot, crosses and uncrosses legs or  twists a part of the body   3 Restlessness is frequently observed; e.g., the foot or legs moving most of the time   4 Restlessness persistently observed; e.g., subject cannot sit still, may get up and walk   U Unable to assess      16. Dyskinetic Movement Ratings: Ratin  0 None   1 Minimal, may be extreme normal   2 Mild   3 Moderate   4 Severe   U Unable to assess      SIDE EFFECT ASSESSMENT:  Clinician Rating Form in [facility] - Side Effect Assessment  Address side effects reported by the patient and rate using this scale  0 Not present   1 Minimal, may be extreme normal   2 Mild   3 Moderate   4 Severe   U Unable to assess   P Present, but not related      Feeling dizzy or faint: 2  Blurred vision: 0  Dry mouth: 2  Too much saliva/droolin  Nausea:  0  Constipation: 0  Increased appetite: 0  Weight gain: 0  Weight loss: 0  Feeling tired/fatigue: 0  Daytime sedation: 0  Hypersomnia: 2  Insomnia: 2  Low libido: 2  Other problems with sex: 2  Breast enlargement or discharge:  0  Irregular Menstruation or amenorrhea: 0  Other (please list and rate):      RECENT SUBSTANCE USE:  Clinician Rating Form in [facility] - Substance Use Assessment  Alcohol Use Severity: Ratin  0 none   1 use without impairment: drinks but no immediate social or medical impairment   2 use with impairment: e.g. becomes grossly intoxicated; alcohol use or withdrawal compromises school, work or social functioning; alcohol use or withdrawal exacerbates symptoms (e.g. gets depressed when drinking)      Marijuana Use Severity: Rating: 3  0 none   1 occasional use without impairment: e.g. uses marijuana a few days a month and has no immediate social or medical impairment   2 frequent use without impairment: e.g. uses marijuana several or more days a week but has no immediate social or medical impairment   3 use with impairment: e.g. becomes grossly intoxicated; marijuana use compromises school, work or social functioning; marijuana use  exacerbates symptoms (e.g. gets paranoid when using)      Other Drug Use Severity: Ratin  0 none   1 occasional use without impairment: e.g. uses drug(s) a few days a month and has no immediate social or medical impairment   2 frequent use without impairment: e.g. uses drug(s) several or more days a week but has no immediate social or medical impairment   3 use with impairment: e.g. becomes grossly intoxicated; drug use compromises school, work or social functioning; drug use exacerbates symptoms (e.g. gets paranoid when using)           First Episode of Psychosis History      DUP (duration untreated psychosis):  4 years  Route to initial care: outpatient  Medication adherence overall:  See above, Clinician Rating Form in [facility] Item 13  General frequency of visits:  weekly IRT, monthly med management  Participation in groups:  No  Cognitive Remediation:  No  Other treatment history:     Reviewed for completion of First Episode work-up:  Yes  First episode workup:  Not Done (if completed, see LABS for results)  MATRICS Consensus Cognitive Battery:  Not Done (if completed, see LABS for results)       Medical/Surgical History     Patient has no known allergies.    Patient Active Problem List   Diagnosis    Alcohol use disorder, mild, abuse    MELY (generalized anxiety disorder)    Induration penis plastica    Moderate episode of recurrent major depressive disorder (H)    Open displaced fracture of neck of second metacarpal bone of right hand    Psychosis (H)    Social anxiety disorder    TMJ (temporomandibular joint syndrome)    Traumatic compression fracture of T3 vertebra (H)            Medications     Current Outpatient Medications   Medication Sig Dispense Refill    cholecalciferol, vitamin D3, 1,000 unit (25 mcg) tablet [CHOLECALCIFEROL, VITAMIN D3, 1,000 UNIT (25 MCG) TABLET] Take 2,000 Units by mouth daily.      dutasteride (AVODART) 0.5 MG capsule Take 0.5 mg by mouth daily      ketoconazole (NIZORAL) 2 %  external shampoo Apply topically daily as needed LATHER INTO SCALP AND RINSE AFTER 2-3 MINUTES. USE THREE TIMES A WEEK.      magnesium chloride (SLOW-MAG) 64 mg TbEC delayed-release tablet [MAGNESIUM CHLORIDE (SLOW-MAG) 64 MG TBEC DELAYED-RELEASE TABLET] Take 64 mg by mouth daily.      naltrexone (DEPADE/REVIA) 50 MG tablet Take 1 tablet (50 mg) by mouth daily 30 tablet 1    OLANZapine (ZYPREXA) 15 MG tablet Take 2 tablets (30 mg) by mouth at bedtime 60 tablet 2    QUEtiapine (SEROQUEL) 100 MG tablet Take 1 tablet (100 mg) by mouth at bedtime. May also take 0.5-1 tablets ( mg) 2 times daily as needed (anxiety). 60 tablet 3    tadalafil (CIALIS) 10 MG tablet Take 10 mg by mouth daily      zinc gluconate 50 mg tablet [ZINC GLUCONATE 50 MG TABLET] Take 100 mg by mouth daily.            Vitals     There were no vitals taken for this visit.      Weight prior to medication: unknown         Mental Status Exam     Alertness: alert  and oriented  Appearance: well groomed  Behavior/Demeanor: cooperative, pleasant, and calm, with good  eye contact   Speech: regular rate and rhythm, not pressured  Language: no obvious problem  Psychomotor: normal or unremarkable  Mood: depressed  Affect: blunted; was congruent to mood; was congruent to content  Thought Process/Associations:  overinclusive at times  Thought Content:  Reports suicidal ideation without plan; without intent [details in Interim History];  Denies violent ideation  Perception:  Reports auditory hallucinations;  Denies visual hallucinations  Insight: good  Judgment: fair and adequate for safety  Cognition: does  appear grossly intact; formal cognitive testing was not done         Labs and Data     RATING SCALES:  AIMS: next in person visit    PHQ9 TODAY =       5/3/2024    10:35 AM 5/7/2024     2:34 PM 5/14/2024     2:58 PM   PHQ-9 SCORE   PHQ-9 Total Score MyChart 24 (Severe depression) 23 (Severe depression) 24 (Severe depression)   PHQ-9 Total Score 24 23 24     24     ANTIPSYCHOTIC LABS ROUTINE    [glu, A1C, lipids (focus LDL), liver enzymes, WBC, ANEU, Hgb, plts]   q12 mo  Recent Labs   Lab Test 06/30/23  1048   GLC 84     No lab results found.  Recent Labs   Lab Test 06/30/23  1048   AST 39   ALT 40   ALKPHOS 88     Recent Labs   Lab Test 06/30/23  1048   WBC 7.7   HGB 14.9             Psychiatric Diagnoses     Psychosis, schizophrenia vs schizoaffective disorder, r/o substance-induced  AUD in early remission  Cannabis use disorder with active use         Assessment   Navi HO Sobieski is a 28 year old male with first onset of psychiatric symptoms at age 7 (social anxiety), and psychotic symptoms at age 23. The duration of untreated psychosis was approximately 4 years.  Prodromal symptoms seem to have been present since at least college (notable substance use) though patient does note a substantially longer history of depression and physical health concerns. Presenting symptoms appear to include hallucinations, delusional thoughts. Navi attributes symptoms to physical health issues and history of trauma.  Substance use does seem to be a present concern. There are possible medical comorbidities which impact this treatment [suicide attempt, suicidal ideation, SIB, psychosis, aggression, and substance use: alcohol].     Today, Navi endorses experiencing an increase in anxiety since the last visit; however, it is hard to parse out if the symptoms are indicative of social anxiety, panic with agoraphobia, or transient change with quitting alcohol. Other substance use including THC, nicotine, and caffeine are also blurring the picture. In the meantime, quetiapine can be used 50-100mg PRN for these spikes in anxiety and will be less likely to cause sedation at those times. He also notes an interim increase in VH, but these are less distressing, has found coping strategies to deal with them, and is consistently reality testing.                 SUICIDE RISK ASSESSMENT:   Risk factors for self-harm: previous suicide attempt, substance use/pending treatment, recent symptom worsening, hallucinations, recent loss, and lives alone/ isolated.  Mitigating factors: describes a safety plan, h/o seeking help , future oriented, commitment to family, and stable housing.  The patient does not appear to be at imminent risk for self-harm, hospitalization is not recommended which the pt does  agree with. No hospitalization will be arranged. Based on degree of symptoms close psych follow-up was/were recommended which the pt does  agree to. Additional steps to minimize risk: med changes.      [place] PRESCRIPTION MONITORING PROGRAM [] was not checked today: not using controlled substances.    PSYCHOTROPIC DRUG INTERACTIONS:   Quetiapine/olanzapine: additive CNS depression, QTc prolongation.    MANAGEMENT:  use lowest therapeutic doses of both and routine monitoring         Plan     1) PSYCHOTROPIC MEDICATIONS:  - Reduce scheduled quetiapine from 100 mg to 50mg PO at bedtime  - Quetiapine 50-100mg BID PRN for anxiety  - Olanzapine to 30 mg PO at bedtime   - Discontinue naltrexone 50 mg daily (patient discontinued in the interim period)    2) THERAPY:  Continue weekly IRT with LEENA Lay    3) NEXT DUE:    Labs: FEP workup due  Rating Scales: AIMS due    4) REFERRALS:    none    5) RTC: 4 weeks    6) CRISIS NUMBERS:   Provided routinely in AVS.    TREATMENT RISK STATEMENT:  The risks, benefits, alternatives and potential adverse effects have been discussed and are understood by the pt. The pt understands the risks of using street drugs or alcohol. There are no medical contraindications, the pt agrees to treatment with the ability to do so. The pt knows to call the clinic for any problems or to access emergency care if needed.  Medical and substance use concerns are documented above.  Psychotropic drug interaction check was done, including changes made today.    PROVIDERS: Paula Dutta MD  (PGY-4 resident)  Attending: Hedii Vega MD    TELEHEALTH ATTENDING ATTESTATION  Following the ACGME guidelines on telemedicine and direct supervision, I was concurrently participating in and/or monitoring the patient care through appropriate telecommunication technology - including participation during a the entire video session that involved clinical data collection and treatment planning discussion.  I discussed the key portions of the service with the resident, including history, presentation, the mental status examination and developing the plan of care. I agree with the findings and plan as documented in this note.  Heidi Vega MD       Psychiatry Individual Psychotherapy Note   Psychotherapy start time 2:45 PM  Psychotherapy end time 3:10 PM  Date treatment plan last reviewed with patient - 1/16/24, with LEENA Lay  Subjective: This supportive psychotherapy session addressed issues related to goals of therapy and current psychosocial stressors. Patient's reaction: Preparatory and Relapse in the context of mental status appropriate for ambulatory setting.    Interactive complexity indicated? No  Plan: RTC in timeframe noted above  Psychotherapy services during this visit included myself and the patient.   Treatment Plan      SYMPTOMS; PROBLEMS   MEASURABLE GOALS;    FUNCTIONAL IMPROVEMENT / GAINS INTERVENTIONS DISCHARGE CRITERIA   Depression: depressed mood, concentration problems, suicidal ideation, and feeling hopelesss  Psychosis: auditory hallucinations without commands [details in Interim History] and paranoia  Substance Use: alcohol  and cannabis      Pt s measurable objectives (list 3)  Reduce intensity of psychosis symptoms  Demonstrate understanding of symptoms regarding causes, treatment  Learn two skills to manage symptoms of psychosis  In Pt s own words    I want to have stability both mentally and psychically.   Interventions  IRT  Medication Management  SEE  Family  support and education  Social work care coordination    Supportive / CBT marked symptom improvement and transition to stepdown therapy     The longitudinal plan of care for the diagnosis(es)/condition(s) as documented were addressed during this visit. Due to the added complexity in care, I will continue to support Navi in the subsequent management and with ongoing continuity of care.

## 2024-05-20 ENCOUNTER — VIRTUAL VISIT (OUTPATIENT)
Dept: PSYCHIATRY | Facility: CLINIC | Age: 29
End: 2024-05-20
Payer: COMMERCIAL

## 2024-05-20 VITALS — WEIGHT: 165 LBS | HEIGHT: 70 IN | BODY MASS INDEX: 23.62 KG/M2

## 2024-05-20 DIAGNOSIS — F10.90 ALCOHOL USE DISORDER: ICD-10-CM

## 2024-05-20 DIAGNOSIS — F25.1 SCHIZOAFFECTIVE DISORDER, DEPRESSIVE TYPE (H): Primary | ICD-10-CM

## 2024-05-20 ASSESSMENT — ANXIETY QUESTIONNAIRES
7. FEELING AFRAID AS IF SOMETHING AWFUL MIGHT HAPPEN: NEARLY EVERY DAY
GAD7 TOTAL SCORE: 19
3. WORRYING TOO MUCH ABOUT DIFFERENT THINGS: NEARLY EVERY DAY
2. NOT BEING ABLE TO STOP OR CONTROL WORRYING: NEARLY EVERY DAY
7. FEELING AFRAID AS IF SOMETHING AWFUL MIGHT HAPPEN: NEARLY EVERY DAY
6. BECOMING EASILY ANNOYED OR IRRITABLE: MORE THAN HALF THE DAYS
GAD7 TOTAL SCORE: 19
3. WORRYING TOO MUCH ABOUT DIFFERENT THINGS: NEARLY EVERY DAY
1. FEELING NERVOUS, ANXIOUS, OR ON EDGE: NEARLY EVERY DAY
GAD7 TOTAL SCORE: 19
8. IF YOU CHECKED OFF ANY PROBLEMS, HOW DIFFICULT HAVE THESE MADE IT FOR YOU TO DO YOUR WORK, TAKE CARE OF THINGS AT HOME, OR GET ALONG WITH OTHER PEOPLE?: EXTREMELY DIFFICULT
5. BEING SO RESTLESS THAT IT IS HARD TO SIT STILL: MORE THAN HALF THE DAYS
8. IF YOU CHECKED OFF ANY PROBLEMS, HOW DIFFICULT HAVE THESE MADE IT FOR YOU TO DO YOUR WORK, TAKE CARE OF THINGS AT HOME, OR GET ALONG WITH OTHER PEOPLE?: EXTREMELY DIFFICULT
5. BEING SO RESTLESS THAT IT IS HARD TO SIT STILL: MORE THAN HALF THE DAYS
GAD7 TOTAL SCORE: 19
7. FEELING AFRAID AS IF SOMETHING AWFUL MIGHT HAPPEN: NEARLY EVERY DAY
2. NOT BEING ABLE TO STOP OR CONTROL WORRYING: NEARLY EVERY DAY
IF YOU CHECKED OFF ANY PROBLEMS ON THIS QUESTIONNAIRE, HOW DIFFICULT HAVE THESE PROBLEMS MADE IT FOR YOU TO DO YOUR WORK, TAKE CARE OF THINGS AT HOME, OR GET ALONG WITH OTHER PEOPLE: EXTREMELY DIFFICULT
4. TROUBLE RELAXING: NEARLY EVERY DAY
6. BECOMING EASILY ANNOYED OR IRRITABLE: MORE THAN HALF THE DAYS
4. TROUBLE RELAXING: NEARLY EVERY DAY
6. BECOMING EASILY ANNOYED OR IRRITABLE: MORE THAN HALF THE DAYS
2. NOT BEING ABLE TO STOP OR CONTROL WORRYING: NEARLY EVERY DAY
7. FEELING AFRAID AS IF SOMETHING AWFUL MIGHT HAPPEN: NEARLY EVERY DAY
7. FEELING AFRAID AS IF SOMETHING AWFUL MIGHT HAPPEN: NEARLY EVERY DAY
5. BEING SO RESTLESS THAT IT IS HARD TO SIT STILL: MORE THAN HALF THE DAYS
GAD7 TOTAL SCORE: 19
IF YOU CHECKED OFF ANY PROBLEMS ON THIS QUESTIONNAIRE, HOW DIFFICULT HAVE THESE PROBLEMS MADE IT FOR YOU TO DO YOUR WORK, TAKE CARE OF THINGS AT HOME, OR GET ALONG WITH OTHER PEOPLE: EXTREMELY DIFFICULT
GAD7 TOTAL SCORE: 19
GAD7 TOTAL SCORE: 19
8. IF YOU CHECKED OFF ANY PROBLEMS, HOW DIFFICULT HAVE THESE MADE IT FOR YOU TO DO YOUR WORK, TAKE CARE OF THINGS AT HOME, OR GET ALONG WITH OTHER PEOPLE?: EXTREMELY DIFFICULT
3. WORRYING TOO MUCH ABOUT DIFFERENT THINGS: NEARLY EVERY DAY
GAD7 TOTAL SCORE: 19
7. FEELING AFRAID AS IF SOMETHING AWFUL MIGHT HAPPEN: NEARLY EVERY DAY
1. FEELING NERVOUS, ANXIOUS, OR ON EDGE: NEARLY EVERY DAY
4. TROUBLE RELAXING: NEARLY EVERY DAY
1. FEELING NERVOUS, ANXIOUS, OR ON EDGE: NEARLY EVERY DAY
IF YOU CHECKED OFF ANY PROBLEMS ON THIS QUESTIONNAIRE, HOW DIFFICULT HAVE THESE PROBLEMS MADE IT FOR YOU TO DO YOUR WORK, TAKE CARE OF THINGS AT HOME, OR GET ALONG WITH OTHER PEOPLE: EXTREMELY DIFFICULT

## 2024-05-20 ASSESSMENT — PATIENT HEALTH QUESTIONNAIRE - PHQ9
SUM OF ALL RESPONSES TO PHQ QUESTIONS 1-9: 23
SUM OF ALL RESPONSES TO PHQ QUESTIONS 1-9: 23
10. IF YOU CHECKED OFF ANY PROBLEMS, HOW DIFFICULT HAVE THESE PROBLEMS MADE IT FOR YOU TO DO YOUR WORK, TAKE CARE OF THINGS AT HOME, OR GET ALONG WITH OTHER PEOPLE: EXTREMELY DIFFICULT
SUM OF ALL RESPONSES TO PHQ QUESTIONS 1-9: 23
10. IF YOU CHECKED OFF ANY PROBLEMS, HOW DIFFICULT HAVE THESE PROBLEMS MADE IT FOR YOU TO DO YOUR WORK, TAKE CARE OF THINGS AT HOME, OR GET ALONG WITH OTHER PEOPLE: EXTREMELY DIFFICULT
SUM OF ALL RESPONSES TO PHQ QUESTIONS 1-9: 23

## 2024-05-20 ASSESSMENT — PAIN SCALES - GENERAL: PAINLEVEL: NO PAIN (0)

## 2024-05-20 NOTE — NURSING NOTE
Patient reviewed medications and allergies in [employer] during e-check in and said everything looked correct.      Patient scored 23 on PHQ-9, #2=more than half the days.      NAVIGATE Patient Self-Rating Form    Since your last medication management visit--    Have you been feeling depressed, sad, or down? Yes  Have you been feeling anxious, worried or nervous? Yes  Have you been thinking about death or have you had any feelings that you would be better off dead? No   Have you been feeling particularly good? Yes  Have you been feeling annoyed, angry, or resentful (whether you showed it or not)? No  Did you do anything that could have gotten you in trouble? No  Have you felt dizzy or faint? No  Have you had blurred vision? No  Have you had dry mouth? Yes  Have you had too much saliva in your mouth or had drooling? No  Have you felt nauseous? No  Have you been constipated? No  Has you appetite for food been increased? Yes  Have you gained weight? Yes  Have you lost weight? No  Have you felt restless or like you cannot sit still? No  Any shaking of your hands, legs, or other muscles? No  Any problems walking or moving or any problems feeling stiff or rigid? No  Have you felt tired or fatigued? Yes  Have you felt drowsy during the day? Yes  Have you been sleeping too much at night? Yes  Have you been sleeping too little or had problems sleeping at night? No  Any decrease in your interest in sex? Yes  Any other problems with sex? No  Any problems with your breasts such as swelling or discharge? No  For women, any problems with your period? N/A  Are there other medical or side effect problems you wish to discuss with your prescriber? No  Since your last visit, how many days have you not taken your medication? 0  Have you had trouble remembering to take your medication? No  Do you find the number of medicines or the times when you are supposed to take then confusing or burdensome? No  Are you afraid of the medication?  "No  Do you think that you have an illness that requires taking medication? Yes  Do you think that other people would think poorly of you if they knew that you take medication? Yes  On average, how many cigarettes do you smoke per day? 0  Since you last visit, did you drink alcohol? No  Since your last visit, have you used any marijuana? Yes  Since your last visit, have you used any stress drugs other than marijuana? No  Between now and your next visit, do you think we should keep your medication the same or consider changing the medications? Patient said, \" ummm, for the most part keep it the same, but my anxiety is a little worse so maybe change and keep one and drop the other or something like that. \"       Depression Response    Patient completed the PHQ-9 assessment for depression and scored >9? Yes  Question 9 on the PHQ-9 was positive for suicidality? Yes  Does patient have current mental health provider? Yes    Is this a virtual visit? Yes   Does patient have suicidal ideation (positive question 9)? Yes    I personally notified the following: visit provider Put PHQ-9 score in MSG Column for Provider awareness.             Is the patient currently in the state of [place]? YES    Visit mode:VIDEO    If the visit is dropped, the patient can be reconnected by: VIDEO VISIT: Text to cell phone:   Telephone Information:   Mobile [phone]    and VIDEO VISIT: Send to e-mail at: [email or web address]    Will anyone else be joining the visit? NO  (If patient encounters technical issues they should call [phone])    How would you like to obtain your AVS? MyChart    Are changes needed to the allergy or medication list? Pt stated no med changes    Are refills needed on medications prescribed by this physician? NO    Reason for visit: OSWALD Pierce VVF     "

## 2024-05-21 ENCOUNTER — VIRTUAL VISIT (OUTPATIENT)
Dept: PSYCHIATRY | Facility: CLINIC | Age: 29
End: 2024-05-21
Payer: COMMERCIAL

## 2024-05-21 DIAGNOSIS — F29 PSYCHOSIS, UNSPECIFIED PSYCHOSIS TYPE (H): Primary | ICD-10-CM

## 2024-05-22 ENCOUNTER — VIRTUAL VISIT (OUTPATIENT)
Dept: PSYCHIATRY | Facility: CLINIC | Age: 29
End: 2024-05-22
Payer: COMMERCIAL

## 2024-05-22 DIAGNOSIS — F29 PSYCHOSIS, UNSPECIFIED PSYCHOSIS TYPE (H): Primary | ICD-10-CM

## 2024-05-22 NOTE — PROGRESS NOTES
NAVIGGALINDO Clinician Contact & Progress Note  For Individual Resiliency Training (IRT)  A Part of the [facility] First Episode of Psychosis Program    NAVIGATE Enrollee: Navi Morales (1995)     MRN: [id number]  Date:  5/21/24  Diagnosis: Psychosis, unspecifed  Clinician: LILIANA Individual Resiliency Trainer, LEENA Lay     1. Type of contact: (majority of time spent)  IRT Session via telehealth  Mode of communication: [employer] (HIPAA compliant, secure platform). Patient consented verbally to this mode of therapy today.  Reason for telehealth: COVID-19. This patient visit was converted to a telehealth visit to minimize exposure to COVID-19.    2. People  present:   Writer  Client: Yes     3. Length of Actual Contact: Start Time: 3:00; End Time: 3:59     4. Location of contact:  Originating Location (patient location): family home, located in [place], [place]  Distant Location (provider location): Home office, located in [place], [place], using appropriate privacy considerations and procedures    5. Did the client complete the home practice option(s) from the previous session: Not Applicable    6. Motivational Teaching Strategies:  Connect info and skills with personal goals  Promote hope and positive expectations  Explore pros and cons of change  Re-frame experiences in positive light    7. Educational Teaching Strategies:  Review of written material/education  Relate information to client's experience    8. CBT Teaching Strategies:  Reinforcement and shaping (positive feedback for steps towards goals and gains in knowledge & skills)    9. IRT Module(s) Addressed:  Module 3 - What is Psychosis    10. Techniques utilized:   [place] announced at beginning of session  Present new material  Summarize progress made in current session  Other techniques (please specify):   -Built rapport with patient by inviting him to share his experiences and discuss his concerns  -Clarified patient's feelings and  "perspectives  -Discussed crisis intervention plan and safety plan  -Elicited more details about current and recent circumstances  -Emotional support via active and reflective listening, responding to feelings etc.  -Normalized and validated feelings and experiences  -Provided verbal overview of First Episode Program NAVIGATE services  -Provided positive feedback and encouragement  -Provided psychoeducation related to psychosis and related concerns.   -Supportive counseling    11. Measures:    Mental Status Exam  Alertness: alert  and oriented  Behavior/Demeanor: cooperative and pleasant  Speech: regular rate and rhythm  Language: no obvious problem.   Mood: description consistent with euthymia  Thought Process/Associations: unremarkable  Thought Content:  Reports none;  Denies suicidal ideation and violent ideation  Perception:  Reports auditory hallucinations;  Denies none  Insight: adequate  Judgment: adequate for safety  Cognition: does  appear grossly intact; formal cognitive testing was not done    [place] Protocol Risk Identification  Not completed today.      12. Assessment/Progress Note:     Writer and client met for IRT visit via [employer]. Set agenda to check in, discuss treatment plan and client's goals, and discuss IRT specific work. Client consented to this agenda. Reported current symptoms to include: AH, depression, anxiety all at an increased level. Explored symptoms and coping from a stress-vulnerability lens. Current stressors include: symptoms. In regards to medications, client reports: . Client noted he continues titrating from Seroquel to Zyprexa with the doctor's supervision, reports having had a helpful conversations with the doctor. Substance use: history of issues steming from substance use. Reports vaping tobacco and cannabis (delta 9) products as a coping strategy. Reports occasional alcohol use often in excess leading to \"blackouts\". Provided education about negative impact of substance use " "on symptoms of psychosis and depression. Will continue to discuss and assess. Assessed safety. Client reports SI, denies intent, and denies plan. HI was not present. SIB was not present. Safety plan was not reviewed today and includes calling 911, going to the emergency department (client specified [facility] as his preferred hospital if needed) and calling crisis lines if needed if SI/HI/SIB becomes overwhelming, worsens and/or becomes active.     Client did not identify urgent concerns to be addressed today.     Today's visit:   Continued IRT material from [facility] program, as well as assessment. Client appeared engaged in conversation and content. Writer and client reviewed symptoms, medications, and coping strategies. Regarding symptoms, Navi reports continued AH, depression, and anxiety (both general and social), specified that \"anxiety is higher overall, but I haven't been getting excessively sad or frustrated. I've been pretty calm since this newest medication [antipsychotic]\".    Regarding medication  - client denies any medication concerns today outside on-going sedation, previously discussed concerns related to how he believes his sensitivity to medications fluctuates over time.   Regarding safety - client endorsed SI and denied any plan, denied intent.   Regarding substances, denied alcohol use in past week, reports continued cannabis and nicotine vaping in past week. Increased caffeine use due to sedation.   Regarding sleep, Navi reports good sleep over all, noting that he is only waking 1-2 times per night, still sleeping 10-12 hours a night. He also notes that he needs a couple cups of coffee to be alert after he wakes up. Client reports continued breathing practice nightly as he is falling asleep of diaphramatic breathing.      Continued discussing coping strategies. Client uses distraction (listening to music, watching movies) and grounding (breathing techniques). Client enjoys going for hour long " drives at sunset, listening ot music. Client has added breathing techniques and cold therapy to his coping strategies to work on managing symptoms and distress.     Continued to discuss symptoms of psychosis. Writer previously provided definition, description of average age on onset and rate of occurrence in the population. Described hallucinations, delusions, and cognitive impacts. Discussed Fatigue, avolition, and all or nothing thinking. Discussed asolicality, anhedonia, and diminished emotional expression. Will continue at next visit.     Home practice identified as: practice diaphragmatic breathing and notice how it impacts symptoms. Today, Navi reports nightly practice of about 5 minutes before going to sleep.     Writer and client previously discussed coping strategies of distraction, soothing, and redirecting attention. Writer has sent client information about grounding techniques of using an ice pack on the sternum, the 5-4-3-2-1 sensory grounding technique and the physiological sigh, discussed it's impact on soothing the vagus nerve.     Previously discussed substance use (regular vaping of nicotine and delta 9 and occasional alcohol use, with a Hx of issues related to use). Writer inquired about Navi's openness to discussion of his substance use with this writer. Navi reports that he feels that substance use is an effective way to cope with symptoms and seems to also report having experienced negative consequences of use in the past. Writer provided education about the impact of substance use on efficacy of antipsychotic medications (e.g. can reduce efficacy). Navi was open to further analysis and discussion with this writer in the future and also expressed some reservation about discontinuing use of substances.     Writer used relational and interpersonal approach to explore feelings, motivations, and behavior. Writer offered support, feedback, validation, and reinforced use of skills taught in IRT from  "modalities including cognitive behavioral therapy, psycho education, and skills training. Promoted understanding of their experiences of psychosis and how it impacts them in important areas of their life and in recovery goals. Reflected on client's strengths and resiliency factors and facilitated discussion on how these can assist in symptom management, recovery, and well-being.     Email sent 5/7:  Vern Mcelroy,   I attached the PDF of breathing and grounding techniques we have been discussing here. See you next Tuesday!  Warmest regards,   SANTOS Lay, [facility]         13. Treatment Plan:     Treatment Plan________________________________________________________________________  Reviewed 1/16/2024  Pt s measurable objectives (list 3)  Reduce intensity of psychosis symptoms  Demonstrate understanding of symptoms regarding causes, treatment  Learn two skills to manage symptoms of psychosis  In Pt s own words    I want to have stability both mentally and psychically.   Interventions  IRT  Medication Management  SEE  Family support and education  Social work care coordination  Target date of discharge  12-36 months from enrollment  Discharge criteria  Marked and sustained symptom improvement  Demonstrated understanding of mental illness  Successful implementation of strategies to cope with stressors/symptoms to mitigate risk for increase in symptoms severity or relapse  Gains made (listed out objectives accomplished)  Frequency of sessions and expected duration of treatment:   6-12 months of weekly IRT/Individual Psychotherapy followed by 12-24 months of biweekly or monthly IRT  Participants in therapy plan: Navi Morales and IRT LEENA Lay  Support System: parents, particularly mother and providers    14. Plan/Referrals:     Next visit schedule for next week.     Billing for \"Interactive Complexity\"?    No      Answers submitted by the patient for this visit:  Patient Health Questionnaire (Submitted on " 5/20/2024)  If you checked off any problems, how difficult have these problems made it for you to do your work, take care of things at home, or get along with other people?: Extremely difficult  PHQ9 TOTAL SCORE: 23  MELY-7 (Submitted on 5/20/2024)  MELY 7 TOTAL SCORE: 19

## 2024-05-22 NOTE — PROGRESS NOTES
NAVIGATE Clinician Contact & Progress Note   For Family Education Program    NAVIGATE Enrollee: Navi Morales (1995)     MRN: [id number]  Date:  5/22/24  Diagnosis(es):   Psychosis unspecified  Clinician: EMILIANAATE Family Clinician, Laura Maldonado MA, SELWYN     1. Type of contact: (majority of time spent)  Family Session via telehealth  Mode of communication:   Zoom  due to mom having trouble with speakers/computer. Family consented verbally to this mode of therapy today.  Reason for telehealth: COVID-19. This patient visit was converted to a telehealth visit to minimize exposure to COVID-19.    2. People present:   Writer  Client: No  Significant Other/Family/Friend:  Mother    3. Length of Actual Contact: Start Time: 11:00; End Time: 11:55am     4. Location of contact:  Originating Location (patient location):[place]   Distant Location (provider location): Psychiatry Clinic, [facility]    5. Did the client complete the home practice option(s) from the previous session: Partially Completed    6. Motivational Teaching Strategies:  Connect info and skills with personal goals  Promote hope and positive expectations    7. Educational Teaching Strategies:  Review of written material/education  Relate information to client's experience  Ask questions to check comprehension  Break down information into small chunks    8. CBT Teaching Strategies:  Reinforcement and shaping (positive feedback for steps towards goals and gains in knowledge & skills)    9. Psychoeducational Topic(s) Addressed:  Family Education Orientation & Tip Sheet and Family Interview    10. Techniques utilized:   [place] announced at beginning of session  Review of previous meeting  Present new material  Family Therapy  Motivational Interviewing (Connect info and skills with personal goals, Promote hope and positive expectations, Explore pros and cons of change, Re-frame experiences in a positive light)  Educational Teaching  "Strategies (Review written material/education on: Psychosis, Medications for psychosis, Coping with stress, Strategies to build resiliency, Relapse prevention planning, Developing a collaboration with mental health professionals, Effective communication, A relative s guide to supporting recovery from psychosis, Basic facts about alcohol and drugs)  CBT (Reinforcement and shaping, Social skills training, Relapse prevention planning, Coping skills training, Relaxation training, Cognitive restructuring, Behavioral tailoring)  11. Assessment/Progress Note:     Writer met with Navi's mom on this day via Zoom for the session.  Set agenda to discuss mom's observations of Navi, review last session,  review Navi's engagement in Navigate, and complete the family interview.     Mom reports there have been few changes in the last week. She reports Navi has been going out on more drives with her car. She believes that driving is one of his coping strategies for stress. Mom reported having a family celebration that Navi attended for \"short spurts.\" She reports that Navi's intermittent participation in family gatherings can be frustrating for family members. Validated and normalized that taking breaks is often something that individuals experiencing psychosis will do to be able to cope with stressful situations. Mom has not discussed coping strategies with Navi.     Mom has observed no obvious signs of psychosis including yelling or responding to internal stimuli. Mom does believe Navi is taking his medications (he takes them on his own), and mom picks up new rx bottles regularly. Mom reports continued concerns about what she describes as Navi's anxiety. A positive report was that mom received a homemade card from Navi and she reports feeling grateful for this.     Mom reports that Dad will have shoulder surgery soon and will be at home more. Mom observes that Navi will often avoid dad. Dad is not willing to participate in family " education at this point.     Continued family interview; mom identified that conflict or demands from dad appears to increase Navi's symptoms. Mom reports that medications have appeared to help improve his symptoms. In addition mom describes that being outside and going to therapy appointments seems to help Navi have reduced symptoms.     Regarding hopes for Navi's recovery mom anticipates that Navi can receive relief from his anxiety and develop more meaning in life. Mom hopes to increase her knowledge about psychosis and learn how to better support Navi.     Mom reported no urgent concerns about Navi at this time. Review how to respond if Mom has concerns about safety including calling the crisis team, calling 911 or going to the emergency room. Writer offers to send the crisis handbook from [facility] and mom would appreciate this information.     Discuss ongoing engagement with NAVIGATE. Reviewed upcoming NAVIGATE appointments for family and schedule next family visit for 5/30. Mom reports that Navi may attend this visit.    Overall family seemed very engaged in conversation. They did express interest in continuing to meet for family therapy and psychoeducation. As of today's appt their insight into Navi's mental illness appears fair. They seem they would benefit from continued clinical intervention aimed at assisting them implement helpful strategies at home and increase their understanding of psychosis.    12. Plan/Referrals:     Will meet with family weekly as schedule allows for evidence based family psychoeducation and therapeutic support aimed at maximizing Navi's opportunity for recovery from psychosis.         Laura Maldonado MA, LP   LILIANA   The author of this note documented a reason for not sharing it with the patient.

## 2024-05-30 ENCOUNTER — VIRTUAL VISIT (OUTPATIENT)
Dept: PSYCHIATRY | Facility: CLINIC | Age: 29
End: 2024-05-30
Payer: COMMERCIAL

## 2024-05-30 DIAGNOSIS — F29 PSYCHOSIS, UNSPECIFIED PSYCHOSIS TYPE (H): Primary | ICD-10-CM

## 2024-05-30 NOTE — Clinical Note
ROSALBA Mcelroy attended and was amazing! I think it went well? Will be curious to hear what he says to you. He really appreciates you Auralie- good work!!!!!!!!!!!

## 2024-05-30 NOTE — PROGRESS NOTES
NAVIGATE/STRENGTHS Virtual Visit Progress Note  For Supported Employment & Education    NAVIGATE Enrollee: Navi Morales (1995)     MRN: [id number]  Date of Visit: 5/30/24  Contacted: ALICE  Appointment Length: 30    Discussed:   Writer met with Navi Morales for virtual visit check-in. Meeting agenda included Is currently in [employer] has 2 classes left, environmental science and biology. 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 , is an option but really we would like the least amount of customer interaction.     Follow-up: transferology    Delmi HOPSONATE/STRENGTHS  Supported Employment & Education

## 2024-05-31 NOTE — PROGRESS NOTES
NAVIGATE Clinician Contact & Progress Note   For Family Education Program    NAVIGATE Enrollee: Navi Morales (1995)     MRN: [id number]  Date:  5/30/24  Diagnosis(es):   Psychosis unspecified  Clinician: LILIANA Family Clinician, Laura Maldonado MA, SELWYN     1. Type of contact: (majority of time spent)  Family Session via telehealth  Mode of communication:   Zoom  due to mom having trouble with speakers/computer. Family consented verbally to this mode of therapy today.  Reason for telehealth: COVID-19. This patient visit was converted to a telehealth visit to minimize exposure to COVID-19.    2. People present:   Writer  Client: No  Significant Other/Family/Friend:  Mother    3. Length of Actual Contact: Start Time: 2:00; End Time: 2:55am     4. Location of contact:  Originating Location (patient location):[place]   Distant Location (provider location): Home office, located in [place], [place], using appropriate privacy considerations and procedures    5. Did the client complete the home practice option(s) from the previous session: Partially Completed    6. Motivational Teaching Strategies:  Connect info and skills with personal goals  Promote hope and positive expectations    7. Educational Teaching Strategies:  Review of written material/education  Relate information to client's experience  Ask questions to check comprehension  Break down information into small chunks    8. CBT Teaching Strategies:  Reinforcement and shaping (positive feedback for steps towards goals and gains in knowledge & skills)    9. Psychoeducational Topic(s) Addressed:  Just the Facts - Psychosis    10. Techniques utilized:   Brandon announced at beginning of session  Review of previous meeting  Present new material  Family Therapy  Motivational Interviewing (Connect info and skills with personal goals, Promote hope and positive expectations, Explore pros and cons of change, Re-frame experiences in a positive  light)  Educational Teaching Strategies (Review written material/education on: Psychosis, Medications for psychosis, Coping with stress, Strategies to build resiliency, Relapse prevention planning, Developing a collaboration with mental health professionals, Effective communication, A relative s guide to supporting recovery from psychosis, Basic facts about alcohol and drugs)  CBT (Reinforcement and shaping, Social skills training, Relapse prevention planning, Coping skills training, Relaxation training, Cognitive restructuring, Behavioral tailoring)  11. Assessment/Progress Note:     Writer met with Navi and Navi's mom on this day via Zoom for the session.  Writer introduced self to Navi and thanked him for attending the appointment. Set agenda to discuss how Navi and mom have been doing in the last week or so and to start education on psychosis.     Navi reports he has been experiencing overall improvement over the last year, but continues to have some rough days where he has more AH.  Mom reports upcoming changes including dad getting a surgery, nephew being baptized on 6/15, and the family getting a dog on 6/16. No urgent concerns identified on this day.     Began Just the Facts - Psychosis. Asked for family's definition of psychosis and Navi reported a solid understanding of psychosis including sensory experiences not based in reality and thoughts that don't fit reality.  Identified psychosis as different for each individual but consisting of common types of symptoms (hallucinations, delusions, confused thinking). Navi endorsed symptoms of AH, paranoid delusions, and negative symptoms.    Explained how a diagnosis of psychosis and affiliated illnesses are made. Reviewed criteria for schizophreniform, schizophrenia, and schizoaffective disorders. 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 Family's reaction to this seemed accepting.     Writer provided encouragement to Navi regarding  his knowledge and insight into his mental health diagnosis. Validated the challenges of explaining his experiences to others in the family.     Overall family seemed very engaged in conversation. They did express interest in continuing to meet for family therapy and psychoeducation. As of today's appt their insight into Navi's mental illness appears fair. They seem they would benefit from continued clinical intervention aimed at assisting them implement helpful strategies at home and increase their understanding of psychosis.    12. Plan/Referrals:     Will meet with family weekly as schedule allows for evidence based family psychoeducation and therapeutic support aimed at maximizing Navi's opportunity for recovery from psychosis.     Laura Maldonado MA, LP   NAVIGATE   The author of this note documented a reason for not sharing it with the patient.

## 2024-06-04 ENCOUNTER — VIRTUAL VISIT (OUTPATIENT)
Dept: PSYCHIATRY | Facility: CLINIC | Age: 29
End: 2024-06-04
Payer: COMMERCIAL

## 2024-06-04 DIAGNOSIS — F29 PSYCHOSIS, UNSPECIFIED PSYCHOSIS TYPE (H): Primary | ICD-10-CM

## 2024-06-04 ASSESSMENT — ANXIETY QUESTIONNAIRES
7. FEELING AFRAID AS IF SOMETHING AWFUL MIGHT HAPPEN: MORE THAN HALF THE DAYS
6. BECOMING EASILY ANNOYED OR IRRITABLE: NEARLY EVERY DAY
5. BEING SO RESTLESS THAT IT IS HARD TO SIT STILL: MORE THAN HALF THE DAYS
1. FEELING NERVOUS, ANXIOUS, OR ON EDGE: NEARLY EVERY DAY
7. FEELING AFRAID AS IF SOMETHING AWFUL MIGHT HAPPEN: MORE THAN HALF THE DAYS
IF YOU CHECKED OFF ANY PROBLEMS ON THIS QUESTIONNAIRE, HOW DIFFICULT HAVE THESE PROBLEMS MADE IT FOR YOU TO DO YOUR WORK, TAKE CARE OF THINGS AT HOME, OR GET ALONG WITH OTHER PEOPLE: EXTREMELY DIFFICULT
4. TROUBLE RELAXING: NEARLY EVERY DAY
GAD7 TOTAL SCORE: 19
8. IF YOU CHECKED OFF ANY PROBLEMS, HOW DIFFICULT HAVE THESE MADE IT FOR YOU TO DO YOUR WORK, TAKE CARE OF THINGS AT HOME, OR GET ALONG WITH OTHER PEOPLE?: EXTREMELY DIFFICULT
GAD7 TOTAL SCORE: 19
GAD7 TOTAL SCORE: 19
2. NOT BEING ABLE TO STOP OR CONTROL WORRYING: NEARLY EVERY DAY
3. WORRYING TOO MUCH ABOUT DIFFERENT THINGS: NEARLY EVERY DAY

## 2024-06-05 ENCOUNTER — VIRTUAL VISIT (OUTPATIENT)
Dept: PSYCHIATRY | Facility: CLINIC | Age: 29
End: 2024-06-05
Payer: COMMERCIAL

## 2024-06-05 DIAGNOSIS — F29 PSYCHOSIS, UNSPECIFIED PSYCHOSIS TYPE (H): Primary | ICD-10-CM

## 2024-06-05 NOTE — PROGRESS NOTES
NAVIGGALINDO Clinician Contact & Progress Note  For Individual Resiliency Training (IRT)  A Part of the [facility] First Episode of Psychosis Program    NAVIGATE Enrollee: Navi Morales (1995)     MRN: [id number]  Date:  6/04/24  Diagnosis: Psychosis, unspecifed  Clinician: LILIANA Individual Resiliency Trainer, LEENA Lay     1. Type of contact: (majority of time spent)  IRT Session via telehealth  Mode of communication: [employer] (HIPAA compliant, secure platform). Patient consented verbally to this mode of therapy today.  Reason for telehealth: COVID-19. This patient visit was converted to a telehealth visit to minimize exposure to COVID-19.    2. People  present:   Writer  Client: Yes     3. Length of Actual Contact: Start Time: 3:03; End Time: 4:01     4. Location of contact:  Originating Location (patient location): family home, located in [place], [place]  Distant Location (provider location): Home office, located in [place], [place], using appropriate privacy considerations and procedures    5. Did the client complete the home practice option(s) from the previous session: Not Applicable    6. Motivational Teaching Strategies:  Connect info and skills with personal goals  Promote hope and positive expectations  Explore pros and cons of change  Re-frame experiences in positive light    7. Educational Teaching Strategies:  Review of written material/education  Relate information to client's experience    8. CBT Teaching Strategies:  Reinforcement and shaping (positive feedback for steps towards goals and gains in knowledge & skills)    9. IRT Module(s) Addressed:  Module 3 - What is Psychosis    10. Techniques utilized:   [place] announced at beginning of session  Present new material  Summarize progress made in current session  Other techniques (please specify):   -Built rapport with patient by inviting him to share his experiences and discuss his concerns  -Clarified patient's feelings and  "perspectives  -Discussed crisis intervention plan and safety plan  -Elicited more details about current and recent circumstances  -Emotional support via active and reflective listening, responding to feelings etc.  -Normalized and validated feelings and experiences  -Provided verbal overview of First Episode Program NAVIGATE services  -Provided positive feedback and encouragement  -Provided psychoeducation related to psychosis and related concerns.   -Supportive counseling    11. Measures:    Mental Status Exam  Alertness: alert  and oriented  Behavior/Demeanor: cooperative and pleasant  Speech: regular rate and rhythm  Language: no obvious problem.   Mood: description consistent with euthymia  Thought Process/Associations: unremarkable  Thought Content:  Reports none;  Denies suicidal ideation and violent ideation  Perception:  Reports auditory hallucinations;  Denies none  Insight: adequate  Judgment: adequate for safety  Cognition: does  appear grossly intact; formal cognitive testing was not done    [place] Protocol Risk Identification  Not completed today.      12. Assessment/Progress Note:     Writer and client met for IRT visit via Checkout10. Set agenda to check in, discuss treatment plan and client's goals, and discuss IRT specific work. Client consented to this agenda. Reported current symptoms to include: AH, depression, anxiety all at an increased level. Explored symptoms and coping from a stress-vulnerability lens. Current stressors include: symptoms. In regards to medications, client reports medication adherence to the prescribed antipsychotic. Substance use: history of issues steming from substance use. Reports vaping tobacco and cannabis (delta 9) products as a coping strategy. Reports occasional alcohol use often in excess leading to \"blackouts\". Previously provided education about negative impact of substance use on symptoms of psychosis and depression. Will continue to discuss and assess.     Client " "did not identify urgent concerns to be addressed today.     Today's visit:   Continued IRT material from NAVIGATE program, as well as assessment. Client appeared engaged in conversation and content. Writer and client reviewed symptoms, medications, and coping strategies. Regarding symptoms, Navi reports continued AH, depression, and anxiety (both general and social), specified that \"generally the same, with anxiety a bit higher and hallucinations a bit lower. Mood is a bit better too\".      Regarding medication  - client denies any medication concerns today outside on-going sedation, previously discussed concerns related to how he believes his sensitivity to medications fluctuates over time. He discussed wishing for an antianxiety medication.     Safety: There are notable risk factors for self-harm, including single status, anxiety, psychosis, suicidal ideation, and command AH. However, risk is mitigated by commitment to family, sobriety, ability to volunteer a safety plan, history of seeking help when needed, future oriented, identifies reasons to live including not wanting to die, no family history of suicide. Therefore, based on all available evidence including the factors cited above, Navi does not appear to be at imminent risk for self-harm, does not meet criteria for a 72-hr hold, and therefore remains appropriate for ongoing outpatient level of care. Recent reports via PHQ were discussed - Pt reports that he is experiencing passive SI, denies plan, denies intent. The patient convincingly denies suicidality on several occasions. There was no deceit detected, and the patient presented in a manner that was believable.    Will continue to monitor.     Regarding substances, reports alcohol use in past two weeks. When asked, client described a day wherein his parents where both out of the house and he went to the store and got vodka dn had 10 shots over the course of 6 hours. He reports skipping his medication on " this day - writer reiterated previous direction to continue medications even when using alcohol, which client states he remembers but notes that he wants to jaleesa caution. He denied any adverse impacts of the one day of drinking. Writer reiterated recommendation to abstain from heavy alcohol use. Client and wrier explored reasons for use on this day. Client reports continued cannabis and nicotine vaping in past week. Increased caffeine use due to sedation.     Regarding sleep, Navi reports good sleep over all, noting that he is only waking 1-2 times per night, still sleeping 10-12 hours a night. He also notes that he needs a couple cups of coffee to be alert after he wakes up. Client reports continued breathing practice nightly as he is falling asleep of diaphramatic breathing.      Regarding coping, client uses distraction, soothing, and redirecting attention. Client additionally reports using watching movies, listening to music, diaphragmatic breathing, physiological sigh, ice pack on sternum or cold water on face, driving at sunset as coping strategies. Client identified that he hopes to be able to walk the new dog at a particular park that he feels more comfortable visiting, noting that parents would prefer he not do this. He then discussed a possible compromise of asking his mother to take the walk with him at the farther away park.      Continued to discuss symptoms of psychosis. Writer previously provided definition, description of average age on onset and rate of occurrence in the population. Described hallucinations, delusions, and cognitive impacts. Discussed Fatigue, avolition, and all or nothing thinking. Discussed asolicality, anhedonia, and diminished emotional expression. Will continue at next visit.     Home practice identified as: Notice what sort of sensations and relief each coping strategy you use brings.     Previously discussed substance use (regular vaping of nicotine and delta 9 and occasional  alcohol use, with a Hx of issues related to use). Writer inquired about Navi's openness to discussion of his substance use with this writer. Navi reports that he feels that substance use is an effective way to cope with symptoms and seems to also report having experienced negative consequences of use in the past. Writer provided education about the impact of substance use on efficacy of antipsychotic medications (e.g. can reduce efficacy). Navi was open to further analysis and discussion with this writer in the future and also expressed some reservation about discontinuing use of substances.     Writer used relational and interpersonal approach to explore feelings, motivations, and behavior. Writer offered support, feedback, validation, and reinforced use of skills taught in IRT from modalities including cognitive behavioral therapy, psycho education, and skills training. Promoted understanding of their experiences of psychosis and how it impacts them in important areas of their life and in recovery goals. Reflected on client's strengths and resiliency factors and facilitated discussion on how these can assist in symptom management, recovery, and well-being.     Email sent 5/7:  Hi Navi,   I attached the PDF of breathing and grounding techniques we have been discussing here. See you next Tuesday!  Warmest regards,   SANTOS Lay, LICSW         13. Treatment Plan:     Treatment Plan________________________________________________________________________  Reviewed 1/16/2024  Pt s measurable objectives (list 3)  Reduce intensity of psychosis symptoms  Demonstrate understanding of symptoms regarding causes, treatment  Learn two skills to manage symptoms of psychosis  In Pt s own words    I want to have stability both mentally and psychically.   Interventions  IRT  Medication Management  SEE  Family support and education  Social work care coordination  Target date of discharge  12-36 months from  "enrollment  Discharge criteria  Marked and sustained symptom improvement  Demonstrated understanding of mental illness  Successful implementation of strategies to cope with stressors/symptoms to mitigate risk for increase in symptoms severity or relapse  Gains made (listed out objectives accomplished)  Frequency of sessions and expected duration of treatment:   6-12 months of weekly IRT/Individual Psychotherapy followed by 12-24 months of biweekly or monthly IRT  Participants in therapy plan: Navi Morales and IRT LEENA Lay  Support System: parents, particularly mother and providers    14. Plan/Referrals:     Next visit schedule for next week.     Billing for \"Interactive Complexity\"?    No          Answers submitted by the patient for this visit:  Patient Health Questionnaire (Submitted on 6/4/2024)  If you checked off any problems, how difficult have these problems made it for you to do your work, take care of things at home, or get along with other people?: Very difficult  PHQ9 TOTAL SCORE: 24  MELY-7 (Submitted on 6/4/2024)  MELY 7 TOTAL SCORE: 19    "

## 2024-06-06 NOTE — PROGRESS NOTES
NAVIGATE Clinician Contact & Progress Note   For Family Education Program    NAVIGATE Enrollee: Navi Morales (1995)     MRN: [id number]  Date:  6/05/24  Diagnosis(es):   Psychosis unspecified  Clinician: EMILIANAATE Family Clinician, Laura Maldonado MA, LP     1. Type of contact: (majority of time spent)  Family Session via telehealth  Mode of communication:   Jeanette then [place]  Family consented verbally to this mode of therapy today.  Reason for telehealth: COVID-19. This patient visit was converted to a telehealth visit to minimize exposure to COVID-19.    2. People present:   Writer  Client: No  Significant Other/Family/Friend:  Mother    3. Length of Actual Contact: Start Time: 5:05pm to 6pm     4. Location of contact:  Originating Location (patient location):[place]   Distant Location (provider location): Psychiatry [facility]    5. Did the client complete the home practice option(s) from the previous session: Partially Completed    6. Motivational Teaching Strategies:  Connect info and skills with personal goals  Promote hope and positive expectations    7. Educational Teaching Strategies:  Review of written material/education  Relate information to client's experience  Ask questions to check comprehension  Break down information into small chunks    8. CBT Teaching Strategies:  Reinforcement and shaping (positive feedback for steps towards goals and gains in knowledge & skills)    9. Psychoeducational Topic(s) Addressed:  Just the Facts - Psychosis    10. Techniques utilized:   [place] announced at beginning of session  Review of previous meeting  Present new material  Family Therapy  Motivational Interviewing (Connect info and skills with personal goals, Promote hope and positive expectations, Explore pros and cons of change, Re-frame experiences in a positive light)  Educational Teaching Strategies (Review written material/education on: Psychosis, Medications for psychosis,  Coping with stress, Strategies to build resiliency, Relapse prevention planning, Developing a collaboration with mental health professionals, Effective communication, A relative s guide to supporting recovery from psychosis, Basic facts about alcohol and drugs)  CBT (Reinforcement and shaping, Social skills training, Relapse prevention planning, Coping skills training, Relaxation training, Cognitive restructuring, Behavioral tailoring)  11. Assessment/Progress Note:     Writer met with Navi and Navi's mom on this day initially via Zoom, but switched to Navi's computer via Quat-E (due to audio difficulties) for the session.  Writer set agenda to discuss how Navi and mom have been doing in the last week or so and to start education on psychosis.     Navi reported having increased anxiety over the last week. Navi also reported increased tiredness due to work around the house and mowing the lawn.     Mom and Navi discussed upcoming changes including dad's surgery and the arrival of new puppy in a week and a half. Writer provided supportive listening and normalized Navi's struggle with sleeping and energy.  Explore strategies for flexibility in taking on responsibilities related to the new dog and lawn care.    Navi made a request of mom to drive to a park to walk the dog due to Navi's social anxiety. Mom was agreeable to this. Writer validated and encouraged Navi in asking for this additional support and reinforced mom's flexibility.      Navi shared with mom about his experiences of anxiety and AH when being around other people. Mom provided supportive listening.     Writer provided encouragement to Navi regarding his knowledge and insight into his mental health symptoms. Validated the challenges of explaining his experiences to others in the family.     Overall family seemed very engaged in conversation. They did express interest in continuing to meet for family therapy and psychoeducation. As of today's appt their  insight into Navi's mental illness appears fair. They seem they would benefit from continued clinical intervention aimed at assisting them implement helpful strategies at home and increase their understanding of psychosis.    12. Plan/Referrals:     Will meet with family weekly as schedule allows for evidence based family psychoeducation and therapeutic support aimed at maximizing Navi's opportunity for recovery from psychosis.     Laura Maldonado MA, LP   NAVIGATE   The author of this note documented a reason for not sharing it with the patient.

## 2024-06-10 ENCOUNTER — TELEPHONE (OUTPATIENT)
Dept: PSYCHIATRY | Facility: CLINIC | Age: 29
End: 2024-06-10

## 2024-06-11 ENCOUNTER — VIRTUAL VISIT (OUTPATIENT)
Dept: PSYCHIATRY | Facility: CLINIC | Age: 29
End: 2024-06-11
Payer: COMMERCIAL

## 2024-06-11 DIAGNOSIS — F29 PSYCHOSIS, UNSPECIFIED PSYCHOSIS TYPE (H): Primary | ICD-10-CM

## 2024-06-11 ASSESSMENT — PATIENT HEALTH QUESTIONNAIRE - PHQ9
SUM OF ALL RESPONSES TO PHQ QUESTIONS 1-9: 23
10. IF YOU CHECKED OFF ANY PROBLEMS, HOW DIFFICULT HAVE THESE PROBLEMS MADE IT FOR YOU TO DO YOUR WORK, TAKE CARE OF THINGS AT HOME, OR GET ALONG WITH OTHER PEOPLE: EXTREMELY DIFFICULT
10. IF YOU CHECKED OFF ANY PROBLEMS, HOW DIFFICULT HAVE THESE PROBLEMS MADE IT FOR YOU TO DO YOUR WORK, TAKE CARE OF THINGS AT HOME, OR GET ALONG WITH OTHER PEOPLE: EXTREMELY DIFFICULT
SUM OF ALL RESPONSES TO PHQ QUESTIONS 1-9: 23

## 2024-06-12 NOTE — PROGRESS NOTES
NAVIGATE Clinician Contact & Progress Note   For Family Education Program    NAVIGATE Enrollee: Navi Morales (1995)     MRN: [id number]  Date:  6/11/24  Diagnosis(es):   Psychosis unspecified  Clinician: EMILIANAATE Family Clinician, Laura Maldonado MA, SELWYN     1. Type of contact: (majority of time spent)  Family Session via telehealth  Mode of communication:    Linwood Verduzco consented verbally to this mode of therapy today.  Reason for telehealth: COVID-19. This patient visit was converted to a telehealth visit to minimize exposure to COVID-19.    2. People present:   Writer  Client: No  Significant Other/Family/Friend:  Mother    3. Length of Actual Contact: Start Time: 5:05pm to 6pm     4. Location of contact:  Originating Location (patient location):[place]   [facility]    5. Did the client complete the home practice option(s) from the previous session: Partially Completed    6. Motivational Teaching Strategies:  Connect info and skills with personal goals  Promote hope and positive expectations    7. Educational Teaching Strategies:  Review of written material/education  Relate information to client's experience  Ask questions to check comprehension  Break down information into small chunks    8. CBT Teaching Strategies:  Reinforcement and shaping (positive feedback for steps towards goals and gains in knowledge & skills)    9. Psychoeducational Topic(s) Addressed:  Just the Facts - Psychosis    10. Techniques utilized:   [place] announced at beginning of session  Review of previous meeting  Present new material  Family Therapy  Motivational Interviewing (Connect info and skills with personal goals, Promote hope and positive expectations, Explore pros and cons of change, Re-frame experiences in a positive light)  Educational Teaching Strategies (Review written material/education on: Psychosis, Medications for psychosis, Coping with  "stress, Strategies to build resiliency, Relapse prevention planning, Developing a collaboration with mental health professionals, Effective communication, A relative s guide to supporting recovery from psychosis, Basic facts about alcohol and drugs)  CBT (Reinforcement and shaping, Social skills training, Relapse prevention planning, Coping skills training, Relaxation training, Cognitive restructuring, Behavioral tailoring)  11. Assessment/Progress Note:     Writer met with Navi and Navi's mom on this day initially via [employer].  Writer set agenda to discuss how Navi and mom have been doing in the last week or so and to start education on psychosis.     Checked in about the last week. Neither mom nor Navi report urgent concerns or changes.     Discussed dad having surgery and Navi taking on more lawn work. Mom reinforces that Navi has been doing a good job on this.     Discuss upcoming change related to new puppy arriving on Sunday. Explored strategies for managing these changes. Navi proposes having a schedule for who is responsible for puppy. Mom is open to this and discuss having a schedule in a central location like the fridge so everyone in the house can communicate about puppy care. Mom also provides supportive statements in saying, \"It is going to be ok, and it will get better.\" Writer reflects on both Navi's and mom's good communication with each other.     Continue with education on psychosis. Navi discloses to mom that he in the past when he has yelled, he is experiencing AH symptoms.  Navi describes that he gets frustrated because he just wants \"to have a conversation with my mom.\" Mom reports her experience of thinking Navi is mad at her. Reflect on both Navi and Mom's desire to understand each other during these stressful times and reinforce their care for each other. Validate Navi's courage in talking about these symptoms with mom.     Explore strategies for managing these symptoms including taking a " time out, walking away, etc. Ask Navi to think about what would be most helpful from mom in these times. Navi agrees to think about this.     Overall family seemed very engaged in conversation. They did express interest in continuing to meet for family therapy and psychoeducation. As of today's appt their insight into Navi's mental illness appears fair. They seem they would benefit from continued clinical intervention aimed at assisting them implement helpful strategies at home and increase their understanding of psychosis.    12. Plan/Referrals:     Will meet with family weekly as schedule allows for evidence based family psychoeducation and therapeutic support aimed at maximizing Navi's opportunity for recovery from psychosis.     Laura Maldonado MA, LP   NAVIGATE   The author of this note documented a reason for not sharing it with the patient.

## 2024-06-12 NOTE — PROGRESS NOTES
NAVIGGALINDO Clinician Contact & Progress Note  For Individual Resiliency Training (IRT)  A Part of the [facility] First Episode of Psychosis Program    NAVIGATE Enrollee: Navi Morales (1995)     MRN: [id number]  Date:  6/11/24  Diagnosis: Psychosis, unspecifed  Clinician: LILIANA Individual Resiliency Trainer, LEENA Lay     1. Type of contact: (majority of time spent)  IRT Session via telehealth  Mode of communication: [employer] (HIPAA compliant, secure platform). Patient consented verbally to this mode of therapy today.  Reason for telehealth: COVID-19. This patient visit was converted to a telehealth visit to minimize exposure to COVID-19.    2. People  present:   Writer  Client: Yes     3. Length of Actual Contact: Start Time: 3:02; End Time: 3:59     4. Location of contact:  Originating Location (patient location): family home, located in [employer] [place] [employer], [place]  Distant Location (provider location): Home office, located in [place], [place], using appropriate privacy considerations and procedures    5. Did the client complete the home practice option(s) from the previous session: Not Applicable    6. Motivational Teaching Strategies:  Connect info and skills with personal goals  Promote hope and positive expectations  Explore pros and cons of change  Re-frame experiences in positive light    7. Educational Teaching Strategies:  Review of written material/education  Relate information to client's experience    8. CBT Teaching Strategies:  Reinforcement and shaping (positive feedback for steps towards goals and gains in knowledge & skills)    9. IRT Module(s) Addressed:  Module 3 - What is Psychosis    10. Techniques utilized:   Center announced at beginning of session  Present new material  Summarize progress made in current session  Other techniques (please specify):   -Built rapport with patient by inviting him to share his experiences and discuss his concerns  -Clarified patient's feelings and  "perspectives  -Discussed crisis intervention plan and safety plan  -Elicited more details about current and recent circumstances  -Emotional support via active and reflective listening, responding to feelings etc.  -Normalized and validated feelings and experiences  -Provided verbal overview of First Episode Program NAVIGATE services  -Provided positive feedback and encouragement  -Provided psychoeducation related to psychosis and related concerns.   -Supportive counseling    11. Measures:    Mental Status Exam  Alertness: alert  and oriented  Behavior/Demeanor: cooperative and pleasant  Speech: regular rate and rhythm  Language: no obvious problem.   Mood: description consistent with euthymia  Thought Process/Associations: unremarkable  Thought Content:  Reports none;  Denies suicidal ideation and violent ideation  Perception:  Reports auditory hallucinations;  Denies none  Insight: adequate  Judgment: adequate for safety  Cognition: does  appear grossly intact; formal cognitive testing was not done    Tucker Protocol Risk Identification  Not completed today.      12. Assessment/Progress Note:     Writer and client met for IRT visit via [place]. Set agenda to check in, discuss treatment plan and client's goals, and discuss IRT specific work. Client consented to this agenda. Reported current symptoms to include: AH, depression, anxiety all at an increased level. Explored symptoms and coping from a stress-vulnerability lens. Current stressors include: symptoms. In regards to medications, client reports medication adherence to the prescribed antipsychotic. Substance use: history of issues steming from substance use. Reports vaping tobacco and cannabis (delta 9) products as a coping strategy. Reports occasional alcohol use often in excess leading to \"blackouts\". Previously provided education about negative impact of substance use on symptoms of psychosis and depression. Will continue to discuss and assess.     Client " "did not identify urgent concerns to be addressed today.     Today's visit:   Continued IRT material from NAVIGATE program, as well as assessment. Client appeared engaged in conversation and content. Writer and client reviewed symptoms, medications, and coping strategies. Regarding symptoms, Navi reports continued AH, depression, and anxiety (both general and social), specified that \"generally the same, but the hallucinations are a bit worse and more frequent\".  Writer and client explored possible triggers and identified that there are two events in the coming week that Navi is feeling quite stressed about - his nephew's Sabianism and the picking up of the new puppy in [place]. Navi discussed how they are both happy events, but he experiences significant social anxiety and as a result, is ruminating over how he will be able to show up at these two events. Per his preference, majority of visit was spent discussing strategies for making small talk, with specific phrases discussed. He also identified that he wants to be able to express his love and excitement to his Brother and his wife on the Sabianism of his nephew - to this end we discussed what message he may want to put in a card. With permission, writer emailed the discussed responses/message to client via email before the visit ended - client reports he received the email.     Regarding medication  - client denies any medication concerns today outside on-going sedation, previously discussed concerns related to how he believes his sensitivity to medications fluctuates over time. He discussed wishing for an antianxiety medication.     Safety: There are notable risk factors for self-harm, including single status, anxiety, psychosis, suicidal ideation, and command AH. However, risk is mitigated by commitment to family, sobriety, ability to volunteer a safety plan, history of seeking help when needed, future oriented, identifies reasons to live including not wanting to die, no " family history of suicide. Therefore, based on all available evidence including the factors cited above, Navi does not appear to be at imminent risk for self-harm, does not meet criteria for a 72-hr hold, and therefore remains appropriate for ongoing outpatient level of care. Recent reports via PHQ were discussed - Pt reports that he is experiencing passive SI, denies plan, denies intent. The patient convincingly denies suicidality on several occasions. There was no deceit detected, and the patient presented in a manner that was believable.    Will continue to monitor.     Regarding sleep, Navi reports good sleep over all, noting that he is only waking 1-2 times per night, still sleeping 10-12 hours a night. He also notes that he has been delaying sleep due to delaying brushing his teeth, which he has been finding takes too long and is tedious. Client and writer brainstormed ways to address this. Client reports continued breathing practice nightly as he is falling asleep of diaphramatic breathing.      Regarding coping, client uses distraction, soothing, and redirecting attention. Client additionally reports using watching movies, listening to music, diaphragmatic breathing, physiological sigh, ice pack on sternum or cold water on face, driving at sunset as coping strategies. Client identified that he hopes to be able to walk the new dog at a particular park that he feels more comfortable visiting, noting that parents would prefer he not do this. He then discussed a possible compromise of asking his mother to take the walk with him at the farther away park. Navi reports that his mother is open to this option and shares that they are discussing how he can be involved in the dog's care.     Home practice identified as: Notice what sort of sensations and relief each coping strategy you use brings.     Previously discussed substance use (regular vaping of nicotine and delta 9 and occasional alcohol use, with a Hx of  issues related to use). Writer inquired about Navi's openness to discussion of his substance use with this writer. Navi reports that he feels that substance use is an effective way to cope with symptoms and seems to also report having experienced negative consequences of use in the past. Writer provided education about the impact of substance use on efficacy of antipsychotic medications (e.g. can reduce efficacy). Navi was open to further analysis and discussion with this writer in the future and also expressed some reservation about discontinuing use of substances.     Writer used relational and interpersonal approach to explore feelings, motivations, and behavior. Writer offered support, feedback, validation, and reinforced use of skills taught in IRT from modalities including cognitive behavioral therapy, psycho education, and skills training. Promoted understanding of their experiences of psychosis and how it impacts them in important areas of their life and in recovery goals. Reflected on client's strengths and resiliency factors and facilitated discussion on how these can assist in symptom management, recovery, and well-being.     Email sent 5/7:  Hi Navi,   I attached the PDF of breathing and grounding techniques we have been discussing here. See you next Tuesday!  Warmest regards,   Tesha Hartmann, SANTOS, LICSW         13. Treatment Plan:     Treatment Plan________________________________________________________________________  Reviewed 1/16/2024  Pt s measurable objectives (list 3)  Reduce intensity of psychosis symptoms  Demonstrate understanding of symptoms regarding causes, treatment  Learn two skills to manage symptoms of psychosis  In Pt s own words    I want to have stability both mentally and psychically.   Interventions  IRT  Medication Management  SEE  Family support and education  Social work care coordination  Target date of discharge  12-36 months from enrollment  Discharge criteria  Marked and  "sustained symptom improvement  Demonstrated understanding of mental illness  Successful implementation of strategies to cope with stressors/symptoms to mitigate risk for increase in symptoms severity or relapse  Gains made (listed out objectives accomplished)  Frequency of sessions and expected duration of treatment:   6-12 months of weekly IRT/Individual Psychotherapy followed by 12-24 months of biweekly or monthly IRT  Participants in therapy plan: aNvi Morales and [facility]  Support System: parents, particularly mother and providers    14. Plan/Referrals:     Next visit schedule for next week.     Billing for \"Interactive Complexity\"?    No      Answers submitted by the patient for this visit:  Patient Health Questionnaire (Submitted on 6/11/2024)  If you checked off any problems, how difficult have these problems made it for you to do your work, take care of things at home, or get along with other people?: Extremely difficult  PHQ9 TOTAL SCORE: 23    "

## 2024-06-13 ENCOUNTER — TELEPHONE (OUTPATIENT)
Dept: PSYCHIATRY | Facility: CLINIC | Age: 29
End: 2024-06-13

## 2024-06-13 ENCOUNTER — VIRTUAL VISIT (OUTPATIENT)
Dept: PSYCHIATRY | Facility: CLINIC | Age: 29
End: 2024-06-13
Payer: COMMERCIAL

## 2024-06-13 DIAGNOSIS — F29 PSYCHOSIS, UNSPECIFIED PSYCHOSIS TYPE (H): Primary | ICD-10-CM

## 2024-06-13 NOTE — TELEPHONE ENCOUNTER
Outgoing Telephone Call/Email/Message  For Care Coordination  First Episode Psychosis Programs    Patient Name: Navi Morales (1995)     MRN: [id number]  Type of Contact: phone  Date of Contact: 6/13/2024  Contacted/by/patient relation: self  Contact Length: na    Discussed:   Writer called Navi and left message to schedule social work care coordination appointment per request of NAVIGATE team member Laura Moncada Wengered for social security and other benefits Navi may qualify for.  Provided contact information.       Follow up needed:  MERCEDES Land

## 2024-06-13 NOTE — PROGRESS NOTES
NAVIGATE/STRENGTHS Virtual Visit Progress Note  For Supported Employment & Education    NAVIGATE Enrollee: Navi Morales (1995)     MRN: [id number]  Date of Visit: 6/13/24  Contacted: ALICE  Appointment Length: 30    Discussed:   Writer met with Navi Morales for virtual visit check-in. Meeting agenda included Tranferology report, talk about loans and what he wants to do when he is ready. We talked about ways to be eligible for the free college [employer] in the fall    Follow-up: Did he look into how much he still owes on student loans?     Delmi Davis  NAVIGATE/STRENGTHS  Supported Employment & Education

## 2024-06-17 DIAGNOSIS — F29 PSYCHOSIS, UNSPECIFIED PSYCHOSIS TYPE (H): ICD-10-CM

## 2024-06-17 DIAGNOSIS — F10.90 ALCOHOL USE DISORDER: ICD-10-CM

## 2024-06-17 RX ORDER — OLANZAPINE 15 MG/1
30 TABLET ORAL AT BEDTIME
Qty: 180 TABLET | Refills: 0 | Status: SHIPPED | OUTPATIENT
Start: 2024-06-17 | End: 2024-08-14

## 2024-06-18 ENCOUNTER — VIRTUAL VISIT (OUTPATIENT)
Dept: PSYCHIATRY | Facility: CLINIC | Age: 29
End: 2024-06-18
Payer: COMMERCIAL

## 2024-06-18 DIAGNOSIS — F29 PSYCHOSIS, UNSPECIFIED PSYCHOSIS TYPE (H): Primary | ICD-10-CM

## 2024-06-18 ASSESSMENT — PATIENT HEALTH QUESTIONNAIRE - PHQ9
SUM OF ALL RESPONSES TO PHQ QUESTIONS 1-9: 22
10. IF YOU CHECKED OFF ANY PROBLEMS, HOW DIFFICULT HAVE THESE PROBLEMS MADE IT FOR YOU TO DO YOUR WORK, TAKE CARE OF THINGS AT HOME, OR GET ALONG WITH OTHER PEOPLE: EXTREMELY DIFFICULT
SUM OF ALL RESPONSES TO PHQ QUESTIONS 1-9: 22
10. IF YOU CHECKED OFF ANY PROBLEMS, HOW DIFFICULT HAVE THESE PROBLEMS MADE IT FOR YOU TO DO YOUR WORK, TAKE CARE OF THINGS AT HOME, OR GET ALONG WITH OTHER PEOPLE: EXTREMELY DIFFICULT

## 2024-06-18 ASSESSMENT — ANXIETY QUESTIONNAIRES
8. IF YOU CHECKED OFF ANY PROBLEMS, HOW DIFFICULT HAVE THESE MADE IT FOR YOU TO DO YOUR WORK, TAKE CARE OF THINGS AT HOME, OR GET ALONG WITH OTHER PEOPLE?: EXTREMELY DIFFICULT
7. FEELING AFRAID AS IF SOMETHING AWFUL MIGHT HAPPEN: NEARLY EVERY DAY
8. IF YOU CHECKED OFF ANY PROBLEMS, HOW DIFFICULT HAVE THESE MADE IT FOR YOU TO DO YOUR WORK, TAKE CARE OF THINGS AT HOME, OR GET ALONG WITH OTHER PEOPLE?: EXTREMELY DIFFICULT
IF YOU CHECKED OFF ANY PROBLEMS ON THIS QUESTIONNAIRE, HOW DIFFICULT HAVE THESE PROBLEMS MADE IT FOR YOU TO DO YOUR WORK, TAKE CARE OF THINGS AT HOME, OR GET ALONG WITH OTHER PEOPLE: EXTREMELY DIFFICULT
GAD7 TOTAL SCORE: 20
6. BECOMING EASILY ANNOYED OR IRRITABLE: NEARLY EVERY DAY
GAD7 TOTAL SCORE: 20
GAD7 TOTAL SCORE: 20
7. FEELING AFRAID AS IF SOMETHING AWFUL MIGHT HAPPEN: NEARLY EVERY DAY
4. TROUBLE RELAXING: NEARLY EVERY DAY
7. FEELING AFRAID AS IF SOMETHING AWFUL MIGHT HAPPEN: NEARLY EVERY DAY
2. NOT BEING ABLE TO STOP OR CONTROL WORRYING: NEARLY EVERY DAY
7. FEELING AFRAID AS IF SOMETHING AWFUL MIGHT HAPPEN: NEARLY EVERY DAY
7. FEELING AFRAID AS IF SOMETHING AWFUL MIGHT HAPPEN: NEARLY EVERY DAY
1. FEELING NERVOUS, ANXIOUS, OR ON EDGE: NEARLY EVERY DAY
1. FEELING NERVOUS, ANXIOUS, OR ON EDGE: NEARLY EVERY DAY
2. NOT BEING ABLE TO STOP OR CONTROL WORRYING: NEARLY EVERY DAY
GAD7 TOTAL SCORE: 20
3. WORRYING TOO MUCH ABOUT DIFFERENT THINGS: NEARLY EVERY DAY
3. WORRYING TOO MUCH ABOUT DIFFERENT THINGS: NEARLY EVERY DAY
IF YOU CHECKED OFF ANY PROBLEMS ON THIS QUESTIONNAIRE, HOW DIFFICULT HAVE THESE PROBLEMS MADE IT FOR YOU TO DO YOUR WORK, TAKE CARE OF THINGS AT HOME, OR GET ALONG WITH OTHER PEOPLE: EXTREMELY DIFFICULT
5. BEING SO RESTLESS THAT IT IS HARD TO SIT STILL: MORE THAN HALF THE DAYS
4. TROUBLE RELAXING: NEARLY EVERY DAY
3. WORRYING TOO MUCH ABOUT DIFFERENT THINGS: NEARLY EVERY DAY
8. IF YOU CHECKED OFF ANY PROBLEMS, HOW DIFFICULT HAVE THESE MADE IT FOR YOU TO DO YOUR WORK, TAKE CARE OF THINGS AT HOME, OR GET ALONG WITH OTHER PEOPLE?: EXTREMELY DIFFICULT
5. BEING SO RESTLESS THAT IT IS HARD TO SIT STILL: MORE THAN HALF THE DAYS
IF YOU CHECKED OFF ANY PROBLEMS ON THIS QUESTIONNAIRE, HOW DIFFICULT HAVE THESE PROBLEMS MADE IT FOR YOU TO DO YOUR WORK, TAKE CARE OF THINGS AT HOME, OR GET ALONG WITH OTHER PEOPLE: EXTREMELY DIFFICULT
GAD7 TOTAL SCORE: 20
GAD7 TOTAL SCORE: 20
5. BEING SO RESTLESS THAT IT IS HARD TO SIT STILL: MORE THAN HALF THE DAYS
IF YOU CHECKED OFF ANY PROBLEMS ON THIS QUESTIONNAIRE, HOW DIFFICULT HAVE THESE PROBLEMS MADE IT FOR YOU TO DO YOUR WORK, TAKE CARE OF THINGS AT HOME, OR GET ALONG WITH OTHER PEOPLE: EXTREMELY DIFFICULT
6. BECOMING EASILY ANNOYED OR IRRITABLE: NEARLY EVERY DAY
7. FEELING AFRAID AS IF SOMETHING AWFUL MIGHT HAPPEN: NEARLY EVERY DAY
8. IF YOU CHECKED OFF ANY PROBLEMS, HOW DIFFICULT HAVE THESE MADE IT FOR YOU TO DO YOUR WORK, TAKE CARE OF THINGS AT HOME, OR GET ALONG WITH OTHER PEOPLE?: EXTREMELY DIFFICULT
IF YOU CHECKED OFF ANY PROBLEMS ON THIS QUESTIONNAIRE, HOW DIFFICULT HAVE THESE PROBLEMS MADE IT FOR YOU TO DO YOUR WORK, TAKE CARE OF THINGS AT HOME, OR GET ALONG WITH OTHER PEOPLE: EXTREMELY DIFFICULT
3. WORRYING TOO MUCH ABOUT DIFFERENT THINGS: NEARLY EVERY DAY
5. BEING SO RESTLESS THAT IT IS HARD TO SIT STILL: MORE THAN HALF THE DAYS
1. FEELING NERVOUS, ANXIOUS, OR ON EDGE: NEARLY EVERY DAY
8. IF YOU CHECKED OFF ANY PROBLEMS, HOW DIFFICULT HAVE THESE MADE IT FOR YOU TO DO YOUR WORK, TAKE CARE OF THINGS AT HOME, OR GET ALONG WITH OTHER PEOPLE?: EXTREMELY DIFFICULT
5. BEING SO RESTLESS THAT IT IS HARD TO SIT STILL: MORE THAN HALF THE DAYS
7. FEELING AFRAID AS IF SOMETHING AWFUL MIGHT HAPPEN: NEARLY EVERY DAY
GAD7 TOTAL SCORE: 20
7. FEELING AFRAID AS IF SOMETHING AWFUL MIGHT HAPPEN: NEARLY EVERY DAY
6. BECOMING EASILY ANNOYED OR IRRITABLE: NEARLY EVERY DAY
GAD7 TOTAL SCORE: 20
6. BECOMING EASILY ANNOYED OR IRRITABLE: NEARLY EVERY DAY
7. FEELING AFRAID AS IF SOMETHING AWFUL MIGHT HAPPEN: NEARLY EVERY DAY
2. NOT BEING ABLE TO STOP OR CONTROL WORRYING: NEARLY EVERY DAY
1. FEELING NERVOUS, ANXIOUS, OR ON EDGE: NEARLY EVERY DAY
6. BECOMING EASILY ANNOYED OR IRRITABLE: NEARLY EVERY DAY
3. WORRYING TOO MUCH ABOUT DIFFERENT THINGS: NEARLY EVERY DAY
1. FEELING NERVOUS, ANXIOUS, OR ON EDGE: NEARLY EVERY DAY
4. TROUBLE RELAXING: NEARLY EVERY DAY
2. NOT BEING ABLE TO STOP OR CONTROL WORRYING: NEARLY EVERY DAY
GAD7 TOTAL SCORE: 20
4. TROUBLE RELAXING: NEARLY EVERY DAY
GAD7 TOTAL SCORE: 20
GAD7 TOTAL SCORE: 20
2. NOT BEING ABLE TO STOP OR CONTROL WORRYING: NEARLY EVERY DAY
GAD7 TOTAL SCORE: 20
4. TROUBLE RELAXING: NEARLY EVERY DAY
7. FEELING AFRAID AS IF SOMETHING AWFUL MIGHT HAPPEN: NEARLY EVERY DAY

## 2024-06-19 ENCOUNTER — VIRTUAL VISIT (OUTPATIENT)
Dept: PSYCHIATRY | Facility: CLINIC | Age: 29
End: 2024-06-19
Payer: COMMERCIAL

## 2024-06-19 VITALS — WEIGHT: 165 LBS | BODY MASS INDEX: 23.62 KG/M2 | HEIGHT: 70 IN

## 2024-06-19 DIAGNOSIS — F10.90 ALCOHOL USE DISORDER: ICD-10-CM

## 2024-06-19 DIAGNOSIS — F25.1 SCHIZOAFFECTIVE DISORDER, DEPRESSIVE TYPE (H): Primary | ICD-10-CM

## 2024-06-19 DIAGNOSIS — F29 PSYCHOSIS, UNSPECIFIED PSYCHOSIS TYPE (H): ICD-10-CM

## 2024-06-19 RX ORDER — NALTREXONE HYDROCHLORIDE 50 MG/1
50 TABLET, FILM COATED ORAL DAILY
Qty: 30 TABLET | Refills: 2 | Status: SHIPPED | OUTPATIENT
Start: 2024-06-19 | End: 2024-08-14

## 2024-06-19 RX ORDER — QUETIAPINE FUMARATE 100 MG/1
TABLET, FILM COATED ORAL
Qty: 60 TABLET | Refills: 3 | Status: SHIPPED | OUTPATIENT
Start: 2024-06-19

## 2024-06-19 ASSESSMENT — PAIN SCALES - GENERAL: PAINLEVEL: NO PAIN (0)

## 2024-06-19 NOTE — PROGRESS NOTES
NAVIGGALINDO Clinician Contact & Progress Note  For Individual Resiliency Training (IRT)  A Part of the [facility] First Episode of Psychosis Program    NAVIGATE Enrollee: Navi Morales (1995)     MRN: [id number]  Date:  6/18/24  Diagnosis: Psychosis, unspecifed  Clinician: LILIANA Individual Resiliency Trainer, LEENA Lay     1. Type of contact: (majority of time spent)  IRT Session via telehealth  Mode of communication: [employer] (HIPAA compliant, secure platform). Patient consented verbally to this mode of therapy today.  Reason for telehealth: To reduce barriers in accessing services, due to but not limited to transportation, location, or scheduling reasons.    2. People  present:   Writer  Client: Yes     3. Length of Actual Contact: Start Time: 3:10; End Time: 4:00     4. Location of contact:  Originating Location (patient location): family home, located in [place], [place]  Distant Location (provider location): Home office, located in [place], [place], using appropriate privacy considerations and procedures    5. Did the client complete the home practice option(s) from the previous session: Not Applicable    6. Motivational Teaching Strategies:  Connect info and skills with personal goals  Promote hope and positive expectations  Explore pros and cons of change  Re-frame experiences in positive light    7. Educational Teaching Strategies:  Review of written material/education  Relate information to client's experience    8. CBT Teaching Strategies:  Reinforcement and shaping (positive feedback for steps towards goals and gains in knowledge & skills)    9. IRT Module(s) Addressed:  Module 3 - What is Psychosis    10. Techniques utilized:   [place] announced at beginning of session  Present new material  Summarize progress made in current session  Other techniques (please specify):   -Built rapport with patient by inviting him to share his experiences and discuss his concerns  -Clarified  "patient's feelings and perspectives  -Discussed crisis intervention plan and safety plan  -Elicited more details about current and recent circumstances  -Emotional support via active and reflective listening, responding to feelings etc.  -Normalized and validated feelings and experiences  -Provided verbal overview of First Episode Program NAVIGATE services  -Provided positive feedback and encouragement  -Provided psychoeducation related to psychosis and related concerns.   -Supportive counseling    11. Measures:    Mental Status Exam  Alertness: alert  and oriented  Behavior/Demeanor: cooperative and pleasant  Speech: regular rate and rhythm  Language: no obvious problem.   Mood: description consistent with euthymia  Thought Process/Associations: unremarkable  Thought Content:  Reports none;  Denies suicidal ideation and violent ideation  Perception:  Reports auditory hallucinations;  Denies none  Insight: adequate  Judgment: adequate for safety  Cognition: does  appear grossly intact; formal cognitive testing was not done    [place] Protocol Risk Identification  Not completed today.      12. Assessment/Progress Note:     Writer and client met for IRT visit via [employer]. Set agenda to check in, discuss treatment plan and client's goals, and discuss IRT specific work. Client consented to this agenda. Reported current symptoms to include: AH, depression, anxiety all at an increased level. Explored symptoms and coping from a stress-vulnerability lens. Current stressors include: symptoms. In regards to medications, client reports medication adherence to the prescribed antipsychotic. Substance use: history of issues steming from substance use. Reports vaping tobacco and cannabis (delta 9) products as a coping strategy. Reports occasional alcohol use often in excess leading to \"blackouts\". Previously provided education about negative impact of substance use on symptoms of psychosis and depression. Will continue to discuss " "and assess.     Client did not identify urgent concerns to be addressed today.     Today's visit:   Continued IRT material from NAVIGATE program, as well as assessment. Client appeared engaged in conversation and content. Writer and client reviewed symptoms, medications, and coping strategies.     Regarding symptoms, Navi reports continued AH, depression, and anxiety (both general and social), specified that \"hallucinations were less but mood has been more down and anxiety was high\".  Writer and client explored possible triggers and identified that the two events in the past week- his nephew's Restorationism and the picking up of the new puppy in [place], caused stress to be higher and may also have lead Navi to compare himself to others in a manner that lead to a lower mood. Navi discussed how they are both happy events, and that he feels that they both went better than he thought they would    Regarding medication  - client denies any medication concerns today outside on-going sedation, previously discussed concerns related to how he believes his sensitivity to medications fluctuates over time. He discussed wishing for an antianxiety medication.     Safety: There are notable risk factors for self-harm, including single status, anxiety, psychosis, suicidal ideation, and command AH. However, risk is mitigated by commitment to family, sobriety, ability to volunteer a safety plan, history of seeking help when needed, future oriented, identifies reasons to live including not wanting to die, no family history of suicide. Therefore, based on all available evidence including the factors cited above, Navi does not appear to be at imminent risk for self-harm, does not meet criteria for a 72-hr hold, and therefore remains appropriate for ongoing outpatient level of care. Recent reports via PHQ were discussed - Pt reports that he continues to have passive SI, denies plan, denies intent. The patient convincingly denies suicidality on " "several occasions. There was no deceit detected, and the patient presented in a manner that was believable. Client and writer discussed his history of SI and client expressed that his SI has been less intense since starting on the currently antipsychotic medication. Writer and client also discussed how client has felt as if SI is used as a sort of coping mechanism, as in the thought of being able to \"escape\" emotionally painful experiences and symptoms brings a sort of relief to the client.    Will continue to monitor.     Regarding sleep, Navi reports good sleep over all, noting that he is only waking 1-2 times per night, still sleeping 10-12 hours a night. He also notes that he has been delaying sleep due to delaying brushing his teeth, which he has been finding takes too long and is tedious. Client and writer brainstormed ways to address this. Client reports continued breathing practice nightly as he is falling asleep of diaphramatic breathing.      Regarding coping, client uses distraction, soothing, and redirecting attention. Client additionally reports using watching movies, listening to music, diaphragmatic breathing, physiological sigh, ice pack on sternum or cold water on face, driving at sunset as coping strategies. Client identified that he hopes to be able to walk the new dog at a particular park that he feels more comfortable visiting, noting that parents would prefer he not do this. He then discussed a possible compromise of asking his mother to take the walk with him at the farther away park. Navi reports that his mother is open to this option and shares that they are discussing how he can be involved in the dog's care.     Home practice identified as: Notice what sort of sensations and relief each coping strategy you use brings.     Previously discussed substance use (regular vaping of nicotine and delta 9 and occasional alcohol use, with a Hx of issues related to use). Writer inquired about Navi's " openness to discussion of his substance use with this writer. José Miguel reports that he feels that substance use is an effective way to cope with symptoms and seems to also report having experienced negative consequences of use in the past. Writer provided education about the impact of substance use on efficacy of antipsychotic medications (e.g. can reduce efficacy). Today, José Miguel noted using alcohol on previous Wednesday to manage anxiety about events of the weekend in his future. José Miguel reported skipping his medication that night and writer encouraged josé miguel to not skip medication if he uses alcohol. Writer also expressed concern about use of alcohol, due to past negative consequences. José Miguel was open to further analysis and discussion with this writer in the future and also expressed some reservation about discontinuing use of substances.     Writer used relational and interpersonal approach to explore feelings, motivations, and behavior. Writer offered support, feedback, validation, and reinforced use of skills taught in IRT from modalities including cognitive behavioral therapy, psycho education, and skills training. Promoted understanding of their experiences of psychosis and how it impacts them in important areas of their life and in recovery goals. Reflected on client's strengths and resiliency factors and facilitated discussion on how these can assist in symptom management, recovery, and well-being.         13. Treatment Plan:     Treatment Plan________________________________________________________________________  Reviewed 1/16/2024  Pt s measurable objectives (list 3)  Reduce intensity of psychosis symptoms  Demonstrate understanding of symptoms regarding causes, treatment  Learn two skills to manage symptoms of psychosis  In Pt s own words    I want to have stability both mentally and psychically.   Interventions  IRT  Medication Management  SEE  Family support and education  Social work care coordination  Target  "date of discharge  12-36 months from enrollment  Discharge criteria  Marked and sustained symptom improvement  Demonstrated understanding of mental illness  Successful implementation of strategies to cope with stressors/symptoms to mitigate risk for increase in symptoms severity or relapse  Gains made (listed out objectives accomplished)  Frequency of sessions and expected duration of treatment:   6-12 months of weekly IRT/Individual Psychotherapy followed by 12-24 months of biweekly or monthly IRT  Participants in therapy plan: Navi Morales and LEENA Puckett  Support System: parents, particularly mother and providers    14. Plan/Referrals:     Next visit schedule for next week.     Billing for \"Interactive Complexity\"?    No        Answers submitted by the patient for this visit:  Patient Health Questionnaire (Submitted on 6/18/2024)  If you checked off any problems, how difficult have these problems made it for you to do your work, take care of things at home, or get along with other people?: Extremely difficult  PHQ9 TOTAL SCORE: 22  MELY-7 (Submitted on 6/18/2024)  MELY 7 TOTAL SCORE: 20    "

## 2024-06-19 NOTE — PROGRESS NOTES
"Virtual Visit Details    Type of service:  Video Visit   Video Start Time: {video visit start/end time for provider to select:458651}  Video End Time:{video visit start/end time for provider to select:939834}    Originating Location (pt. Location): {video visit patient location:263448::\"Home\"}  {PROVIDER LOCATION On-site should be selected for visits conducted from your clinic location or adjoining [facility] hospital, academic office, or other nearby [facility] building. Off-site should be selected for all other provider locations, including home:101262}  Distant Location (provider location):  {virtual location provider:923186}  Platform used for Video Visit: {Virtual Visit Platforms:864207::\"[employer]d\"}    "

## 2024-06-19 NOTE — NURSING NOTE
Patient scored 22 on PHQ-9, #9 2=more than half the days.       NAVIGATE Patient Self-Rating Form    Since your last medication management visit--    Have you been feeling depressed, sad, or down? Yes  Have you been feeling anxious, worried or nervous? Yes  Have you been thinking about death or have you had any feelings that you would be better off dead? Yes   Have you been feeling particularly good? Yes  Have you been feeling annoyed, angry, or resentful (whether you showed it or not)? Yes  Did you do anything that could have gotten you in trouble? No  Have you felt dizzy or faint? No  Have you had blurred vision?  No, but flashes  Have you had dry mouth? Yes  Have you had too much saliva in your mouth or had drooling? No  Have you felt nauseous? No  Have you been constipated? No  Has you appetite for food been increased? Yes  Have you gained weight? Yes  Have you lost weight? No  Have you felt restless or like you cannot sit still? No  Any shaking of your hands, legs, or other muscles? No  Any problems walking or moving or any problems feeling stiff or rigid? No  Have you felt tired or fatigued? Yes  Have you felt drowsy during the day? Yes  Have you been sleeping too much at night? Yes  Have you been sleeping too little or had problems sleeping at night? No  Any decrease in your interest in sex? Yes  Any other problems with sex? No  Any problems with your breasts such as swelling or discharge? No  For women, any problems with your period? N/A  Are there other medical or side effect problems you wish to discuss with your prescriber? Yes  Since your last visit, how many days have you not taken your medication? 2  Have you had trouble remembering to take your medication? No  Do you find the number of medicines or the times when you are supposed to take then confusing or burdensome? No  Are you afraid of the medication? No  Do you think that you have an illness that requires taking medication? Yes  Do you think that  "other people would think poorly of you if they knew that you take medication?  Depends on the person.  On average, how many cigarettes do you smoke per day? 0  Since you last visit, did you drink alcohol? Yes  Since your last visit, have you used any marijuana? Yes  Since your last visit, have you used any stress drugs other than marijuana? No  Between now and your next visit, do you think we should keep your medication the same or consider changing the medications? Patient stated, \" I would say, keep it the same but anxiety is worse.\"           Depression Response    Patient completed the PHQ-9 assessment for depression and scored >9? Yes  Question 9 on the PHQ-9 was positive for suicidality? Yes  Does patient have current mental health provider? Yes    Is this a virtual visit? Yes   Does patient have suicidal ideation (positive question 9)? Yes    I personally notified the following: visit provider  Put PHQ-9 score in MSG Column for provider awareness.           Is the patient currently in the state of [place]? YES    Visit mode:VIDEO    If the visit is dropped, the patient can be reconnected by: VIDEO VISIT: Text to cell phone:   Telephone Information:   Mobile [phone]    and VIDEO VISIT: Send to e-mail at: [email or web address]    Will anyone else be joining the visit? NO  (If patient encounters technical issues they should call [phone] :[phone])    How would you like to obtain your AVS? MyChart    Are changes needed to the allergy or medication list? No    Are refills needed on medications prescribed by this physician? YES    Reason for visit: OSWALD Pierce VVF      "

## 2024-06-19 NOTE — PROGRESS NOTES
"     Progress Notes  Christopher Alva (Medical Student)  Unsigned  Expand All Collapse All  NAVIGATE Medication Management Progress Note  A Part of the [facility] First Episode of Psychosis Program     NAVIGATE Enrollee: Navi Morales (1995)     MRN: [id number]  Date:  5/20/24          Contributors to the Assessment      Chart Reviewed.   Interview completed with Navi Morales.  Collateral information obtained from none.          Interim History       Navi Morales is a 28 year old male who was last seen in MD clinic on 4/29/24 at which time cross-taper from quetiapine to olanzapine was continued and naltrexone was self-discontinued. The patient reports good treatment adherence. History was provided by Navi who was a good historian.  Since the last visit:  - Noticed increase in anxiety, it is happening in social situations and has physiologic manifestations (palpitations, clamy hands), social anxiety is a horse just running wild, generalized anxiety is more logical and has markers to double check with himself and use strategies to get out of it. Getting out of his house is a bit of a struggle and typically only does it if there is a commitment needed. Even going to the park is challenging.   - Voices have possibly increased a bit from the last meeting but are still overall less since the cross-titration from quetiapine to olanzapine. Feels like the olanzapine has bean wearing off, similar to how the quetiapine wore off last year. Voices are negative about who he is and where he is at in life. They discuss about him. Makes him feel self-conscious. These voices \"run their own software\" and then he is just checking up on him. Feels like even one voice could trigger a PTSD of psychosis.   - Suicidal thoughts have been less and thinks this correlated with the cross titration to olanzapine. Thinks this goes wit the lower incidence of AH. Feels calmer and less likely to act on a plan.   - Has quit alcohol completely.  - " "Is noticing that he has to smoke more to not get anxious. Feels like he is getting very little effect which is a huge turnaround from before when he was over doing it with alcohol and weed.  - Vaping 150 puffs of nicotine daily.   - Caffeine tolerance has increased over the last 1-2 months. Feels like he has 4-5 cups.   - Takes naltrexone once every 3 days.   - Has only been taking quetiapine at night, 100mg has been causing him sedation. Wondering if he could cut it in half. Confirmed that he is scheduled for 50mg at night. Can also use this during times of anxiety along with TIPPS.   - Wakes up sedated in the morning. Then gets anxious in the early afternoon often after having 3 cups of coffee.    - \"My mood has stabilized\"     - Anxiety is worse        - Continues to have anxiety about tasks like taking medication or shopping for groceries      - depression is well managed        - Some passive SI    - Has had a decrease auditory hallucination frequency, but \"sharper\" quality / more discernable content    - Recently got a puppy, which has helped his mood and has reduced psychosis symptoms     - Navi is hoping to become more involved in taking care of the new puppy -- such as walking and training    - Overall substance use is decreasing     - Drank last week to combat social anxiety prior to ***      - Drank approximately 1/5 of a bottle of alcohol over the course of a few hours      - Describes drinking as \"cycle\" where he wishes to drink again to relieve anxiety     - Drinking about 3 cups of coffee a day, down from 4-5    - Has been working with [employer] on ***    - Finds the Quetiapine PRN 50mg is too sedating and does not take during the day    - Has not been taking the naltrexone      RECENT SOCIAL HISTORY:  Lives with parents. Finished some college at [employer] but didn't graduate. Not currently working     Medical ROS:  none     PSYCH ROS:  Clinician Rating Form in [facility]  1. Depressed Mood: " Ratin  0 Not reported     1 Very mild occasionally feels sad or  down ; of questionable clinical significance   2 Mild occasionally feels moderately depressed or often feels sad or  down    3 Moderate occasionally feels very depressed or often feels moderately depressed   4 Moderately severe often feels very depressed   5 Severe feels very depressed most of the time   6 Very severe constant extremely painful feelings of depression   U Unable to assess        2. Anxiety/Worry: Ratin  0 Not reported     1 Very mild occasionally feels a little anxious; of questionable clinical significance   2 Mild occasionally feels moderately anxious or often feels a little anxious or worried   3 Moderate occasionally feels very anxious or often feels moderately anxious   4 Moderately severe often feels very anxious or often feels moderately anxious   5 Severe feels very anxious or worried most of the time   6 Very severe patient is continually preoccupied with severe anxiety   U Unable to assess        3. Suicidal Ideation/Behavior: Ratin          0 Not reported     1 Very mild occasional thoughts of dying,  I d be better off dead  or  I wish I were dead    2 Mild frequents thoughts of dying or occasional thoughts of killing self, without plan or method   3 Moderate often thinks of suicide or has though of a specific method   4 Moderately severe has mentally rehearsed a specific method of suicide or has made a suicide attempt with questionable intent to die (e.r. takes aspirins and then tells family)   5 Severe has made preparations for a potentially lethal suicide attempt (e.g acquires a gun and bullets for an attempt)   6 Very severe has made a suicide attempt with an intent to die   U Unable to assess                      4. Elevated/Expansive Mood: Ratin  0 Not at all     1 Very mild questionable; more cheerful than most people in his/her circumstances but of only possible clinical significance   2 Mild brief  elevated/expansive mood but only somewhat out of proportion to the circumstances   3 Moderate brief/occasional elevation of mood which is clearly out of proportion to the circumstances   4 Moderately severe sustained/frequent elevation of mood which is clearly out of proportion to the circumstances   5 Severe mood is euphoric most of the time   6 Very severe sustained elevation;  everything is wonderful  almost all of the time   U Unable to assess        5. Hostility/Anger/Irritability/Aggressiveness: Ratin  0 Not at all     1 Very mild occasional irritability of doubtful clinical significance   2 Mild occasionally feels angry or mild or indirect expressions of anger, e.g. sarcasm, disrespect or hostile gestures   3 Moderate frequently feels angry, frequent irritability or occasional direct expression of anger, e.g. yelling at others   4 Moderately severe often feels very angry, often yells at others or occasionally threatens to harm others   5 Severe has acted on his anger by becoming physically abusive on one or two occasions or makes frequent threats to harm others or is very angry most of the time   6 Very severe has been physically aggressive and/or required intervention to prevent assaultiveness on several occasions; or any serious assaultive act   U Unable to assess        6. Impulsive Behavior: Ratin  0 Not at all     1 Very mild one instance of impulsive behavior which is of doubtful clinical significance   2 Mild occasional impulsive acts, e.g. making phone calls at odd hours   3 Moderate occasional impulsive acts with some potential negative consequence, e.g. leaving work abruptly; changing plans without thinking   4 Moderately severe impulsive acts with definite negative consequences, e.g. overspending on non-essentials; repeated reckless sexual behavior   5 Severe impulsive acts with direct negative consequences, e.g. spends entire income on nonessentials without regard for basic needs   6 Very  severe impulsive behavior which is potentially life threatening, e.g. jumps from dangerous height (without suicidal intent) or criminal behavior, e.g. impulsive robbery   U Unable to assess        7. Suspiciousness: Ratin  0 Not present     1 Very mild Seems on guard. Reluctant to respond to some  personal  questions. Reports being overly self-conscious in public   2 Mild Describes incidents in which others have harmed or wanted to harm him/her that sound plausible. Patient feels as if others are watching, laughing, or criticizing him/her in public, but this occurs only occasionally or rarely. Little or no preoccupation   3 Moderate Says others are talking about him/her maliciously, have negative intentions, or may harm him/her. Beyond the likelihood of plausibility, but not delusional. Incidents of suspected persecution occur occasionally (less than once per week) with some preoccupation   4 Moderately severe Same as 3, but incidents occur frequently such as more than once a week. Patient is moderately preoccupied with ideas of persecution OR patient reports persecutory delusions expressed with much doubt (e.g. partial delusion)   5 Severe Delusional -- speaks of [employer] plots, the FBI, or others poisoning his/her food, persecution by supernatural forces   6 Extremely severe Same as 5, but the beliefs are bizarre or more preoccupying. Patient tends to disclose or act on persecutory delusions.   U Unable to assess        8. Unusual Thought Content: Ratin  0 Not present     1 Very mild Ideas of reference (people may stare or may laugh at him), ideas of persecution (people may mistreat him). Unusual beliefs in psychic tyler, spirits, UFOs, or unrealistic beliefs in one's own abilities. Not strongly held. Some doubt   2 Mild Same as 1, but degree of reality distortion is more severe as indicated by highly unusual ideas or greater conviction. Content may be typical of delusions (even bizarre), but without full  conviction. The delusion does not seem to have fully formed, but is considered as one possible explanation for an unusual experience   3 Moderate Delusion present but no preoccupation or functional impairment. May be an encapsulated delusion or a firmly endorsed absurd belief about past delusional circumstances   4 Moderately severe Full delusion(s) present with some preoccupation OR some areas of functioning disrupted by delusional thinking   5 Severe Full delusion(s) present with much preoccupation OR many areas of functioning are disrupted by delusional thinking   6 Extremely severe Full delusions present with almost   U Unable to assess        9. Hallucinations: Ratin  0 Not present     1 Very mild While resting or going to sleep, sees visions, smells odors, or hears voices, sounds or whispers in the absence of external stimulation, but no impairment in functioning   2 Mild While in a clear state of consciousness, hears a voice calling the subject s name, experiences non-verbal auditory hallucinations (e.g., sounds or whispers), formless visual hallucinations, or has sensory experiences in the presence of a modality-relevant stimulus (e.g., visual illusions) infrequently (e.g., 1-2 times per week) and with no functional impairment   3 Moderate Occasional verbal, visual, gustatory, olfactory, or tactile hallucinations with no functional impairment OR non-verbal auditory hallucinations/visual illusions more than infrequently or with impairment   4 Moderately severe Experiences daily hallucinations OR some areas of functioning are disrupted by hallucinations   5 Severe Experiences verbal or visual hallucinations several times a day OR many areas of functioning are disrupted by these hallucinations   6 Extremely severe Persistent verbal or visual hallucinations throughout the day OR most areas of functioning are disrupted by these hallucinations   U Unable to assess        10. Conceptual Disorganization: Rating:  1  0 Not present     1 Very mild Peculiar use of words or rambling but speech is comprehensible   2 Mild Speech a bit hard to understand or make sense of due to tangentiality, circumstantiality, or sudden topic shifts   3 Moderate Speech difficult to understand due to tangentiality, circumstantiality, idiosyncratic speech, or topic shifts on many occasions OR 1-2 instances of incoherent phrases   4 Moderately severe Speech difficult to understand due to circumstantiality, tangentiality, neologisms, blocking, or topic shifts most of the time OR 3-5 instances of incoherent phrases   5 Severe Speech is incomprehensible due to severe impairments most of the time. Many PSRS items cannot be rated by self-report alone   6 Extremely severe Speech is incomprehensible throughout interview   U Unable to assess        11. Avolition/Apathy: Ratin  0 Not at all     1 Very mild questionable decrease in time spent in goal-directed activities   2 Mild spends less time in goal-directed activities than is appropriate for situation and age   3 Moderate initiates activities at times but does not follow through   4 Moderately severe rarely initiates activity but will passively engage with encouragement   5 Severe almost never initiates activities; requires assistance to accomplish basic activities   6 Very severe does not initiate or persist in any goal-directed activity even with outside assistance   U Unable to assess        12. Asociality/Low Social Drive: Ratin  0 Not at all     1 Very mild questionable   2 Mild slow to initiate social interactions but usually responds to overtures by others   3 Moderate rarely initiates social interactions; sometimes responds to overtures by others   4 Moderately severe does not initiate but sometimes responds to overtures by others; little social interaction outside close family members   5 Severe never initiates and rarely encourages conversations or activities; avoids being with others  unless prodded, may have contacts with family   6 Very severe avoids being with others (even family members) whenever possible, extreme social isolation   U Unable to assess        13. Adherence: Days: 4  The longest, continuous amount of time in days, since the last visit when the subject did not take medication      14. EPS Part I: Rating: U  Rate Elbow Rigidity for all subjects  0 Normal   1 Slight stiffness and resistance   2 Moderate stiffness and resistance   3 Marked rigidity with difficulty in passive movement   4 Extreme stiffness and rigidity with almost a frozen joint   U Unable to assess            EPS Part 2: Signs of EPS: 0  Are there are other signs of EPS (eg diminished arm swing, postural instability, cogwheeling, tremor, akinesia) present based upon patient report or exam?  0 No   1 Yes      15. Akathisia: Ratin  0 No restlessness reported or observed   1 Mild restlessness observed; e.g., occasional jiggling of the foot occurs when subject is seated   2 Moderate restlessness observed; e.g., on several occasions, jiggles foot, crosses and uncrosses legs or twists a part of the body   3 Restlessness is frequently observed; e.g., the foot or legs moving most of the time   4 Restlessness persistently observed; e.g., subject cannot sit still, may get up and walk   U Unable to assess      16. Dyskinetic Movement Ratings: Ratin  0 None   1 Minimal, may be extreme normal   2 Mild   3 Moderate   4 Severe   U Unable to assess      SIDE EFFECT ASSESSMENT:  Clinician Rating Form in [facility] - Side Effect Assessment  Address side effects reported by the patient and rate using this scale  0 Not present   1 Minimal, may be extreme normal   2 Mild   3 Moderate   4 Severe   U Unable to assess   P Present, but not related      Feeling dizzy or faint: 2  Blurred vision: 0  Dry mouth: 2  Too much saliva/droolin  Nausea:  0  Constipation: 0  Increased appetite: 0  Weight gain: 0  Weight loss: 0  Feeling  tired/fatigue: 0  Daytime sedation: 0  Hypersomnia: 2  Insomnia: 2  Low libido: 2  Other problems with sex: 2  Breast enlargement or discharge:  0  Irregular Menstruation or amenorrhea: 0  Other (please list and rate):      RECENT SUBSTANCE USE:  Clinician Rating Form in [facility] - Substance Use Assessment  Alcohol Use Severity: Ratin  0 none   1 use without impairment: drinks but no immediate social or medical impairment   2 use with impairment: e.g. becomes grossly intoxicated; alcohol use or withdrawal compromises school, work or social functioning; alcohol use or withdrawal exacerbates symptoms (e.g. gets depressed when drinking)      Marijuana Use Severity: Rating: 3  0 none   1 occasional use without impairment: e.g. uses marijuana a few days a month and has no immediate social or medical impairment   2 frequent use without impairment: e.g. uses marijuana several or more days a week but has no immediate social or medical impairment   3 use with impairment: e.g. becomes grossly intoxicated; marijuana use compromises school, work or social functioning; marijuana use exacerbates symptoms (e.g. gets paranoid when using)      Other Drug Use Severity: Ratin  0 none   1 occasional use without impairment: e.g. uses drug(s) a few days a month and has no immediate social or medical impairment   2 frequent use without impairment: e.g. uses drug(s) several or more days a week but has no immediate social or medical impairment   3 use with impairment: e.g. becomes grossly intoxicated; drug use compromises school, work or social functioning; drug use exacerbates symptoms (e.g. gets paranoid when using)           First Episode of Psychosis History       DUP (duration untreated psychosis):  4 years  Route to initial care: outpatient  Medication adherence overall:  See above, Clinician Rating Form in [facility] Item 13  General frequency of visits:  weekly IRT, monthly med management  Participation in groups:  No  Cognitive  Remediation:  No  Other treatment history:      Reviewed for completion of First Episode work-up:  Yes  First episode workup:  Not Done (if completed, see LABS for results)  MATRICS Consensus Cognitive Battery:  Not Done (if completed, see LABS for results)       Medical/Surgical History      Patient has no known allergies.         Patient Active Problem List   Diagnosis    Alcohol use disorder, mild, abuse    MELY (generalized anxiety disorder)    Induration penis plastica    Moderate episode of recurrent major depressive disorder (H)    Open displaced fracture of neck of second metacarpal bone of right hand    Psychosis (H)    Social anxiety disorder    TMJ (temporomandibular joint syndrome)    Traumatic compression fracture of T3 vertebra (H)              Medications      Current Outpatient Prescriptions          Current Outpatient Medications   Medication Sig Dispense Refill    cholecalciferol, vitamin D3, 1,000 unit (25 mcg) tablet [CHOLECALCIFEROL, VITAMIN D3, 1,000 UNIT (25 MCG) TABLET] Take 2,000 Units by mouth daily.        dutasteride (AVODART) 0.5 MG capsule Take 0.5 mg by mouth daily        ketoconazole (NIZORAL) 2 % external shampoo Apply topically daily as needed LATHER INTO SCALP AND RINSE AFTER 2-3 MINUTES. USE THREE TIMES A WEEK.        magnesium chloride (SLOW-MAG) 64 mg TbEC delayed-release tablet [MAGNESIUM CHLORIDE (SLOW-MAG) 64 MG TBEC DELAYED-RELEASE TABLET] Take 64 mg by mouth daily.        naltrexone (DEPADE/REVIA) 50 MG tablet Take 1 tablet (50 mg) by mouth daily 30 tablet 1    OLANZapine (ZYPREXA) 15 MG tablet Take 2 tablets (30 mg) by mouth at bedtime 60 tablet 2    QUEtiapine (SEROQUEL) 100 MG tablet Take 1 tablet (100 mg) by mouth at bedtime. May also take 0.5-1 tablets ( mg) 2 times daily as needed (anxiety). 60 tablet 3    tadalafil (CIALIS) 10 MG tablet Take 10 mg by mouth daily        zinc gluconate 50 mg tablet [ZINC GLUCONATE 50 MG TABLET] Take 100 mg by mouth daily.         "          Vitals      There were no vitals taken for this visit.      Weight prior to medication: unknown          Mental Status Exam      Alertness: alert  and oriented  Appearance: well groomed  Behavior/Demeanor: cooperative, pleasant, and calm, with good  eye contact   Speech: regular rate and rhythm, not pressured  Language: no obvious problem  Psychomotor: normal or unremarkable  Mood: \"good\"  Affect: blunted; was congruent to mood; was congruent to content  Thought Process/Associations:  overinclusive at times  Thought Content:  Reports suicidal ideation without plan; without intent [details in Interim History];  Denies violent ideation  Perception:  Reports auditory hallucinations;  Denies visual hallucinations  Insight: good  Judgment: fair and adequate for safety  Cognition: does  appear grossly intact; formal cognitive testing was not done          Labs and Data      RATING SCALES:  AIMS: next in person visit     PHQ9 TODAY =        5/3/2024    10:35 AM 5/7/2024     2:34 PM 5/14/2024     2:58 PM   PHQ-9 SCORE   PHQ-9 Total Score MyChart 24 (Severe depression) 23 (Severe depression) 24 (Severe depression)   PHQ-9 Total Score 24 23 24    24      ANTIPSYCHOTIC LABS ROUTINE    [glu, A1C, lipids (focus LDL), liver enzymes, WBC, ANEU, Hgb, plts]   q12 mo      Recent Labs   Lab Test 06/30/23  1048   GLC 84      No lab results found.      Recent Labs   Lab Test 06/30/23  1048   AST 39   ALT 40   ALKPHOS 88          Recent Labs   Lab Test 06/30/23  1048   WBC 7.7   HGB 14.9              Psychiatric Diagnoses      Psychosis, schizophrenia vs schizoaffective disorder, r/o substance-induced  AUD in early remission  Cannabis use disorder with active use          Assessment   Navi A Townsend is a 28 year old male with first onset of psychiatric symptoms at age 7 (social anxiety), and psychotic symptoms at age 23. The duration of untreated psychosis was approximately 4 years.  Prodromal symptoms seem to have been " present since at least college (notable substance use) though patient does note a substantially longer history of depression and physical health concerns. Presenting symptoms appear to include hallucinations, delusional thoughts. Navi attributes symptoms to physical health issues and history of trauma.  Substance use does seem to be a present concern. There are possible medical comorbidities which impact this treatment [suicide attempt, suicidal ideation, SIB, psychosis, aggression, and substance use: alcohol].      Today, Navi endorses overall stabilization of mood with an increase in anxiety since the last visit. Navi has had social events recently that have spurred his social anxiety and have caused him to use alcohol to relieve anxiety. Reintroduction of naltrexone was discussed and agreed upon. Depression has been well controlled, which Navi partially attributes to new puppy in his home. He endorses that his overall THC, nicotine, and caffeine use have decreased since last visit. Has not used quetiapine 50-100mg PRN due to sedative effects. Navi notes a decrease in VH frequency, but increased VH clarity. Will continue current antipsychotic regimen.                 SUICIDE RISK ASSESSMENT:  Risk factors for self-harm: previous suicide attempt, substance use/pending treatment, recent symptom worsening, hallucinations, recent loss, and lives alone/ isolated.  Mitigating factors: describes a safety plan, h/o seeking help , future oriented, commitment to family, and stable housing.  The patient does not appear to be at imminent risk for self-harm, hospitalization is not recommended which the pt does  agree with. No hospitalization will be arranged. Based on degree of symptoms close psych follow-up was/were recommended which the pt does  agree to. Additional steps to minimize risk: med changes.       [place] PRESCRIPTION MONITORING PROGRAM [] was not checked today: not using controlled substances.     PSYCHOTROPIC DRUG  INTERACTIONS:   Quetiapine/olanzapine: additive CNS depression, QTc prolongation.     MANAGEMENT:  use lowest therapeutic doses of both and routine monitoring          Plan      1) PSYCHOTROPIC MEDICATIONS:  - Continue scheduled quetiapine 50mg PO at bedtime  - Quetiapine 50-100mg BID PRN for anxiety  - Olanzapine to 30 mg PO at bedtime   - Restart naltrexone 50 mg daily     2) THERAPY:  Continue weekly IRT with LEENA Lay     3) NEXT DUE:    Labs: FEP workup due  Rating Scales: AIMS due     4) REFERRALS:    none     5) RTC: 4 weeks     6) CRISIS NUMBERS:   Provided routinely in AVS.     TREATMENT RISK STATEMENT:  The risks, benefits, alternatives and potential adverse effects have been discussed and are understood by the pt. The pt understands the risks of using street drugs or alcohol. There are no medical contraindications, the pt agrees to treatment with the ability to do so. The pt knows to call the clinic for any problems or to access emergency care if needed.  Medical and substance use concerns are documented above.  Psychotropic drug interaction check was done, including changes made today.     PROVIDERS: Paula Dutta MD (PGY-4 resident)  Attending: Heidi Vega MD     TELEHEALTH ATTENDING ATTESTATION  Following the ACGME guidelines on telemedicine and direct supervision, I was concurrently participating in and/or monitoring the patient care through appropriate telecommunication technology - including participation during a the entire video session that involved clinical data collection and treatment planning discussion.  I discussed the key portions of the service with the resident, including history, presentation, the mental status examination and developing the plan of care. I agree with the findings and plan as documented in this note.  Heidi Vega MD         Psychiatry Individual Psychotherapy Note   Psychotherapy start time 2:45 PM  Psychotherapy end time 3:10 PM  Date  "treatment plan last reviewed with patient - 1/16/24, with LEENA Lay  Subjective: This supportive psychotherapy session addressed issues related to goals of therapy and current psychosocial stressors. Patient's reaction: Preparatory and Relapse in the context of mental status appropriate for ambulatory setting.    Interactive complexity indicated? No  Plan: RTC in timeframe noted above  Psychotherapy services during this visit included myself and the patient.   Treatment Plan         SYMPTOMS; PROBLEMS    MEASURABLE GOALS;    FUNCTIONAL IMPROVEMENT / GAINS INTERVENTIONS DISCHARGE CRITERIA   Depression: depressed mood, concentration problems, suicidal ideation, and feeling hopelesss  Psychosis: auditory hallucinations without commands [details in Interim History] and paranoia  Substance Use: alcohol  and cannabis        Pt s measurable objectives (list 3)  Reduce intensity of psychosis symptoms  Demonstrate understanding of symptoms regarding causes, treatment  Learn two skills to manage symptoms of psychosis  In Pt s own words    I want to have stability both mentally and psychically.   Interventions  IRT  Medication Management  SEE  Family support and education  Social work care coordination    Supportive / CBT marked symptom improvement and transition to stepdown therapy      The longitudinal plan of care for the diagnosis(es)/condition(s) as documented were addressed during this visit. Due to the added complexity in care, I will continue to support Navi in the subsequent management and with ongoing continuity of care.          -      Additional Documentation    Vitals: Ht 1.778 m (5' 10\")     Wt 74.8 kg (165 lb)     BMI 23.68 kg/m      BSA 1.92 m      Pain Sc No Pain (0)   Flowsheets: Patient-Reported Data,     Anthropometrics   Encounter Info: Billing Info,     History,     Allergies,     Detailed Report     Communications    View All Conversations on this Encounter  Phq-9 (Phq-9)-Developed By Osmel" Bubba Goldsmith,Ciera Moncada,Vic Reid And Colleagues,With An Educational Jim From [employer] 2002.    Question 5/20/2024  1:52 PM CDT - Filed by Patient 5/14/2024  2:58 PM CDT - Filed by Patient 5/7/2024  2:34 PM CDT - Filed by Patient   Over the last 2 weeks, how often have you been bothered by any of the following problems?      1. Little interest or pleasure in doing things Nearly every day Nearly every day Nearly every day   2.  Feeling down, depressed, or hopeless Nearly every day Nearly every day Nearly every day   3.  Trouble falling or staying asleep, or sleeping too much Nearly every day Nearly every day Nearly every day   4.  Feeling tired or having little energy Nearly every day Nearly every day Nearly every day   5.  Poor appetite or overeating More than half the days More than half the days More than half the days   6.  Feeling bad about yourself - or that you are a failure or have let yourself or your family down Nearly every day Nearly every day Nearly every day   7.  Trouble concentrating on things, such as reading the newspaper or watching television More than half the days More than half the days More than half the days   8.  Moving or speaking so slowly that other people could have noticed. Or the opposite - being so fidgety or restless that you have been moving around a lot more than usual More than half the days More than half the days More than half the days   9.  Thoughts that you would be better off dead, or of hurting yourself in some way More than half the days Nearly every day More than half the days   If you checked off any problems, how difficult have these problems made it for you to do your work, take care of things at home, or get along with other people? Extremely difficult Extremely difficult Extremely difficult   PHQ9 TOTAL SCORE (range: 0 - 27) 23 (Severe depression) 24 (Severe depression) 23 (Severe depression)   In the past two weeks have you had thoughts of  suicide or self harm? Yes Yes Yes   Do you have concerns about your personal safety or the safety of others? No No No   In the past 2 weeks have you thought about a plan or had intention to harm yourself? Yes Yes Yes   In the past 2 weeks have you acted on these thoughts in any way? No No No     Mert-7 ([employer],2002; Used With Permission)    Question 5/20/2024  1:52 PM CDT - Filed by Patient 5/3/2024 10:36 AM CDT - Filed by Patient 4/16/2024  2:48 PM CDT - Filed by Patient   Over the last two weeks, how often have you been bothered by the following problems?      1. Feeling nervous, anxious, or on edge Nearly every day Nearly every day Nearly every day   2. Not being able to stop or control worrying Nearly every day Nearly every day Nearly every day   3. Worrying too much about different things Nearly every day Nearly every day Nearly every day   4. Trouble relaxing Nearly every day Nearly every day Nearly every day   5. Being so restless that it is hard to sit still More than half the days More than half the days More than half the days   6. Becoming easily annoyed or irritable More than half the days More than half the days More than half the days   7. Feeling afraid, as if something awful might happen Nearly every day Nearly every day Nearly every day   If you checked off any problems on this questionnaire, how difficult have these problems made it for you to do your work, take care of things at home, or get along with other people? Extremely difficult Extremely difficult Extremely difficult   MERT 7 TOTAL SCORE (range: 0 - 21) 19 (severe anxiety) 19 (severe anxiety) 19 (severe anxiety)     Encounter Information    Encounter Information   Provider Department Encounter # Center   5/20/2024 2:15 PM Heidi Vega MD [facility] [id number] [place]     Reviewed this Encounter     Medications Problems Allergies History   Gayle Pierce   Reviewed Tobacco     Communicable/Travel screen     6/30/2023            Communicable/Travel Screening   Do you have any of the following symptoms?      Details    Open Review Flowsheets      Orders Placed    None  Medication Changes      None  Medication List  Visit Diagnoses      None  Problem List  Answers submitted by the patient for this visit:  Patient Health Questionnaire (Submitted on 6/18/2024)  If you checked off any problems, how difficult have these problems made it for you to do your work, take care of things at home, or get along with other people?: Extremely difficult  PHQ9 TOTAL SCORE: 22  MELY-7 (Submitted on 6/18/2024)  MELY 7 TOTAL SCORE: 20

## 2024-06-20 ENCOUNTER — VIRTUAL VISIT (OUTPATIENT)
Dept: PSYCHIATRY | Facility: CLINIC | Age: 29
End: 2024-06-20
Payer: COMMERCIAL

## 2024-06-20 DIAGNOSIS — F29 PSYCHOSIS, UNSPECIFIED PSYCHOSIS TYPE (H): Primary | ICD-10-CM

## 2024-06-25 ENCOUNTER — VIRTUAL VISIT (OUTPATIENT)
Dept: PSYCHIATRY | Facility: CLINIC | Age: 29
End: 2024-06-25
Payer: COMMERCIAL

## 2024-06-25 DIAGNOSIS — F29 PSYCHOSIS, UNSPECIFIED PSYCHOSIS TYPE (H): Primary | ICD-10-CM

## 2024-06-25 NOTE — PROGRESS NOTES
NAVIGATE Clinician Contact & Progress Note   For Family Education Program    NAVIGATE Enrollee: Navi Morales (1995)     MRN: [id number]  Date:  6/20/24  Diagnosis(es):   Psychosis unspecified  Clinician: EMILIANAATE Family Clinician, Laura Maldonado MA, SELWYN     1. Type of contact: (majority of time spent)  Family Session via telehealth  Mode of communication:    Linwood Verduzco consented verbally to this mode of therapy today.  Reason for telehealth: COVID-19. This patient visit was converted to a telehealth visit to minimize exposure to COVID-19.    2. People present:   Writer  Client: yes  Significant Other/Family/Friend:  Mother    3. Length of Actual Contact: Start Time: 5:05pm to 6pm     4. Location of contact:  Originating Location (patient location):[place]   [facility]    5. Did the client complete the home practice option(s) from the previous session: Partially Completed    6. Motivational Teaching Strategies:  Connect info and skills with personal goals  Promote hope and positive expectations    7. Educational Teaching Strategies:  Review of written material/education  Relate information to client's experience  Ask questions to check comprehension  Break down information into small chunks    8. CBT Teaching Strategies:  Reinforcement and shaping (positive feedback for steps towards goals and gains in knowledge & skills)    9. Psychoeducational Topic(s) Addressed:  Just the Facts - Psychosis    10. Techniques utilized:   [place] announced at beginning of session  Review of previous meeting  Present new material  Family Therapy  Motivational Interviewing (Connect info and skills with personal goals, Promote hope and positive expectations, Explore pros and cons of change, Re-frame experiences in a positive light)  Educational Teaching Strategies (Review written material/education on: Psychosis, Medications for psychosis, Coping with  stress, Strategies to build resiliency, Relapse prevention planning, Developing a collaboration with mental health professionals, Effective communication, A relative s guide to supporting recovery from psychosis, Basic facts about alcohol and drugs)  CBT (Reinforcement and shaping, Social skills training, Relapse prevention planning, Coping skills training, Relaxation training, Cognitive restructuring, Behavioral tailoring)  11. Assessment/Progress Note:     Writer met with Navi and Navi's mom on this day initially via [employer].  Writer set agenda to discuss how Navi and mom have been doing in the last week or so and to continue education on psychosis.     Checked in about the last week. Neither mom nor Navi report urgent concerns or changes. Navi reported that overall his symptoms have felt more manageable in the last few weeks. Mom provided encouragement to Navi in his recovery. Discussed other things that mom can do to support Navi when experiencing symptoms. Navi confirmed a plan to step away when having intense symptoms so as to not yell around mom.     Mom and Navi talked about the transition of the new puppy, Brennan. Navi appeared interested in the new puppy and reported that they had been working together to manage this transition. Also discussed the family [employer] gathering. Discussed that Navi had been very insightful and decided to not join the reception after the [employer]. Both mom and writer framed this decision as a positive and thoughtful decision on Navi's part.     Overall family seemed very engaged in conversation. They did express interest in continuing to meet for family therapy and psychoeducation. As of today's appt their insight into [place] mental illness appears fair. They seem they would benefit from continued clinical intervention aimed at assisting them implement helpful strategies at home and increase their understanding of psychosis.    12. Plan/Referrals:     Will meet with family weekly  as schedule allows for evidence based family psychoeducation and therapeutic support aimed at maximizing Navi's opportunity for recovery from psychosis.     Laura Maldonado MA, LP   NAVIGATE   The author of this note documented a reason for not sharing it with the patient.

## 2024-06-26 NOTE — PROGRESS NOTES
NAVIGATE Clinician Contact & Progress Note   For Family Education Program    NAVIGATE Enrollee: Navi Morales (1995)     MRN: [id number]  Date:  6/25/24  Diagnosis(es):   Psychosis unspecified  Clinician: EMILIANAATE Family Clinician, Laura Maldonado MA, SELWYN     1. Type of contact: (majority of time spent)  Family Session via telehealth  Mode of communication:    Linwood Verduzco consented verbally to this mode of therapy today.  Reason for telehealth: COVID-19. This patient visit was converted to a telehealth visit to minimize exposure to COVID-19.    2. People present:   Writer  Client: Yes  Significant Other/Family/Friend:  Mother    3. Length of Actual Contact: Start Time: 4:05pm to 5pm     4. Location of contact:  Originating Location (patient location):[place]   Distant Location (provider location): Home office, located in [place], [place], using appropriate privacy considerations and procedures    5. Did the client complete the home practice option(s) from the previous session: Partially Completed    6. Motivational Teaching Strategies:  Connect info and skills with personal goals  Promote hope and positive expectations    7. Educational Teaching Strategies:  Review of written material/education  Relate information to client's experience  Ask questions to check comprehension  Break down information into small chunks    8. CBT Teaching Strategies:  Reinforcement and shaping (positive feedback for steps towards goals and gains in knowledge & skills)    9. Psychoeducational Topic(s) Addressed:  Just the Facts - Psychosis    10. Techniques utilized:   [place] announced at beginning of session  Review of previous meeting  Present new material  Family Therapy  Motivational Interviewing (Connect info and skills with personal goals, Promote hope and positive expectations, Explore pros and cons of change, Re-frame experiences in a positive light)  Educational Teaching Strategies (Review written  "material/education on: Psychosis, Medications for psychosis, Coping with stress, Strategies to build resiliency, Relapse prevention planning, Developing a collaboration with mental health professionals, Effective communication, A relative s guide to supporting recovery from psychosis, Basic facts about alcohol and drugs)  CBT (Reinforcement and shaping, Social skills training, Relapse prevention planning, Coping skills training, Relaxation training, Cognitive restructuring, Behavioral tailoring)  11. Assessment/Progress Note:     Writer met with Navi and Navi's mom on this day  via [email or web address].  Writer set agenda to discuss how Navi and mom have been doing in the last week or so and to continue education on psychosis.     Checked in about the last week. Neither mom nor Navi report urgent concerns or changes in behavior or symptoms. Navi reported that overall his symptoms have felt more manageable in the last few weeks and this has remained unchanged. Mom provided encouragement to Navi in his recovery. Discussed stress associated with the arrival of the puppy and challenges associated with training the puppy. Provided supportive listening and reflected how well Navi is coping with the stress.     Navi noted challenges to getting up early to help puppy. Mom had a request to watch the puppy for Friday morning which Navi reported that he could meet this request.     Continued education about symptoms of psychosis including delusions. Navi described his symptoms like \"having been on a boat for 40 days and then you still feel like you are on the water.\" He reported having \"PTSD\" related to the experience of psychosis. Mom expressed positive feedback to Navi regarding his analogy and his communication. Navi struggled to accept this feedback. Navi frequently reflected on worries for the future.     Provided education about mindfulness and lead mom and Navi in a 1 minute mindfulness practice. Writer placed emphasis on the " non-judgmental stance of mindfulness. Navi was open to trying to practice this in the coming week.     Overall family seemed very engaged in conversation. They did express interest in continuing to meet for family therapy and psychoeducation. As of today's appt their insight into Navi's mental illness appears fair. They seem they would benefit from continued clinical intervention aimed at assisting them implement helpful strategies at home and increase their understanding of psychosis.    12. Plan/Referrals:     Will meet with family weekly as schedule allows for evidence based family psychoeducation and therapeutic support aimed at maximizing Navi's opportunity for recovery from psychosis.     Laura Maldonado MA, LP   NAVIGATE   The author of this note documented a reason for not sharing it with the patient.

## 2024-06-26 NOTE — PROGRESS NOTES
NAVIGGALINDO Clinician Contact & Progress Note  For Individual Resiliency Training (IRT)  A Part of the [employer] First Episode of Psychosis Program    NAVIGATE Enrollee: Navi Morales (1995)     MRN: [id number]  Date:  6/25/24  Diagnosis: Psychosis, unspecifed  Clinician: LILIANA Individual Resiliency Trainer, LEENA Lay     1. Type of contact: (majority of time spent)  IRT Session via telehealth  Mode of communication: [employer] (HIPAA compliant, secure platform). Patient consented verbally to this mode of therapy today.  Reason for telehealth: To reduce barriers in accessing services, due to but not limited to transportation, location, or scheduling reasons.    2. People  present:   Writer  Client: Yes     3. Length of Actual Contact: Start Time: 3:02; End Time: 4:00     4. Location of contact:  Originating Location (patient location): family home, located in [place], [place]  Distant Location (provider location): Home office, located in [place], [place], using appropriate privacy considerations and procedures    5. Did the client complete the home practice option(s) from the previous session: Not Applicable    6. Motivational Teaching Strategies:  Connect info and skills with personal goals  Promote hope and positive expectations  Explore pros and cons of change  Re-frame experiences in positive light    7. Educational Teaching Strategies:  Review of written material/education  Relate information to client's experience    8. CBT Teaching Strategies:  Reinforcement and shaping (positive feedback for steps towards goals and gains in knowledge & skills)    9. IRT Module(s) Addressed:  Module 3 - What is Psychosis    10. Techniques utilized:   Nemo announced at beginning of session  Present new material  Summarize progress made in current session  Other techniques (please specify):   -Built rapport with patient by inviting him to share his experiences and discuss his concerns  -Clarified  "patient's feelings and perspectives  -Discussed crisis intervention plan and safety plan  -Elicited more details about current and recent circumstances  -Emotional support via active and reflective listening, responding to feelings etc.  -Normalized and validated feelings and experiences  -Provided verbal overview of First Episode Program NAVIGATE services  -Provided positive feedback and encouragement  -Provided psychoeducation related to psychosis and related concerns.   -Supportive counseling    11. Measures:    Mental Status Exam  Alertness: alert  and oriented  Behavior/Demeanor: cooperative and pleasant  Speech: regular rate and rhythm  Language: no obvious problem.   Mood: description consistent with euthymia  Thought Process/Associations: unremarkable  Thought Content:  Reports none;  Denies suicidal ideation and violent ideation  Perception:  Reports auditory hallucinations;  Denies none  Insight: adequate  Judgment: adequate for safety  Cognition: does  appear grossly intact; formal cognitive testing was not done    [place] Protocol Risk Identification  Not completed today.      12. Assessment/Progress Note:     Writer and client met for IRT visit via "[employer]. Set agenda to check in, discuss treatment plan and client's goals, and discuss IRT specific work. Client consented to this agenda. Reported current symptoms to include: AH, depression, anxiety all at an increased level. Explored symptoms and coping from a stress-vulnerability lens. Current stressors include: symptoms. In regards to medications, client reports medication adherence to the prescribed antipsychotic. Substance use: history of issues steming from substance use. Reports vaping tobacco and cannabis (delta 9) products as a coping strategy. Reports occasional alcohol use often in excess leading to \"blackouts\". Previously provided education about negative impact of substance use on symptoms of psychosis and depression. Will continue to discuss " "and assess.     Client did not identify urgent concerns to be addressed today.     Today's visit:   Continued IRT material from NAVIGATE program, as well as assessment. Client appeared engaged in conversation and content. Writer and client reviewed symptoms, medications, and coping strategies.     Regarding symptoms, Navi reports continued AH, depression, and anxiety (both general and social), specified that \"hallucinations were less but mood has been more down and anxiety was high\".      Regarding medication  - client denies any medication concerns today outside on-going sedation, previously discussed concerns related to how he believes his sensitivity to medications fluctuates over time. He discussed wishing for an antianxiety medication and trying to make sure he is taking naltrexone nightly.     Safety: There are notable risk factors for self-harm, including single status, anxiety, psychosis, suicidal ideation, and command AH. However, risk is mitigated by commitment to family, sobriety, ability to volunteer a safety plan, history of seeking help when needed, future oriented, identifies reasons to live including not wanting to die, no family history of suicide. Therefore, based on all available evidence including the factors cited above, Navi does not appear to be at imminent risk for self-harm, does not meet criteria for a 72-hr hold, and therefore remains appropriate for ongoing outpatient level of care. Recent reports via PHQ were discussed - Pt reports that he continues to have passive SI, denies plan, denies intent. The patient convincingly denies suicidality on several occasions. There was no deceit detected, and the patient presented in a manner that was believable. Client and writer discussed his history of SI and client expressed that his SI has been less intense since starting on the currently antipsychotic medication. Writer and client also discussed how client has felt as if SI is used as a sort of " "coping mechanism, as in the thought of being able to \"escape\" emotionally painful experiences and symptoms brings a sort of relief to the client.    Will continue to monitor.     Regarding sleep, Navi reports good sleep over all, noting that he is only waking 1-2 times per night, still sleeping 10-12 hours a night. He also notes that he has been delaying sleep due to delaying brushing his teeth, which he has been finding takes too long and is tedious. Client and writer brainstormed ways to address this. Client reports continued breathing practice nightly as he is falling asleep of diaphramatic breathing.      Regarding coping, client uses distraction, soothing, and redirecting attention. Client additionally reports using watching movies, listening to music, diaphragmatic breathing, physiological sigh, ice pack on sternum or cold water on face, driving at sunset as coping strategies. Client identified that he hopes to be able to walk the new dog at a particular park that he feels more comfortable visiting, noting that parents would prefer he not do this. He then discussed a possible compromise of asking his mother to take the walk with him at the farther away park. Navi reports that his mother is open to this option and shares that they are discussing how he can be involved in the dog's care.     Completed first half of the brief strengths inventory, will continue at next visit.     Home practice identified as: Notice what sort of sensations and relief each coping strategy you use brings.     Previously discussed substance use (regular vaping of nicotine and delta 9 and occasional alcohol use, with a Hx of issues related to use). Writer inquired about Navi's openness to discussion of his substance use with this writer. Navi reports that he feels that substance use is an effective way to cope with symptoms and seems to also report having experienced negative consequences of use in the past. Writer provided education " about the impact of substance use on efficacy of antipsychotic medications (e.g. can reduce efficacy). Today, José Miguel noted using alcohol on previous Wednesday to manage anxiety about events of the weekend in his future. José Miguel reported skipping his medication that night and writer encouraged josé miguel to not skip medication if he uses alcohol. Writer also expressed concern about use of alcohol, due to past negative consequences. José Miguel was open to further analysis and discussion with this writer in the future and also expressed some reservation about discontinuing use of substances.     Writer used relational and interpersonal approach to explore feelings, motivations, and behavior. Writer offered support, feedback, validation, and reinforced use of skills taught in IRT from modalities including cognitive behavioral therapy, psycho education, and skills training. Promoted understanding of their experiences of psychosis and how it impacts them in important areas of their life and in recovery goals. Reflected on client's strengths and resiliency factors and facilitated discussion on how these can assist in symptom management, recovery, and well-being.         13. Treatment Plan:     Treatment Plan________________________________________________________________________  Reviewed 1/16/2024  Pt s measurable objectives (list 3)  Reduce intensity of psychosis symptoms  Demonstrate understanding of symptoms regarding causes, treatment  Learn two skills to manage symptoms of psychosis  In Pt s own words    I want to have stability both mentally and psychically.   Interventions  IRT  Medication Management  SEE  Family support and education  Social work care coordination  Target date of discharge  12-36 months from enrollment  Discharge criteria  Marked and sustained symptom improvement  Demonstrated understanding of mental illness  Successful implementation of strategies to cope with stressors/symptoms to mitigate risk for increase in  "symptoms severity or relapse  Gains made (listed out objectives accomplished)  Frequency of sessions and expected duration of treatment:   6-12 months of weekly IRT/Individual Psychotherapy followed by 12-24 months of biweekly or monthly IRT  Participants in therapy plan: Navi Morales and LEENA Puckett  Support System: parents, particularly mother and providers    14. Plan/Referrals:     Next visit schedule for next week.     Billing for \"Interactive Complexity\"?    No        Answers submitted by the patient for this visit:  Patient Health Questionnaire (Submitted on 6/25/2024)  If you checked off any problems, how difficult have these problems made it for you to do your work, take care of things at home, or get along with other people?: Extremely difficult  PHQ9 TOTAL SCORE: 22  MELY-7 (Submitted on 6/18/2024)  MELY 7 TOTAL SCORE: 20    "

## 2024-06-27 ENCOUNTER — VIRTUAL VISIT (OUTPATIENT)
Dept: PSYCHIATRY | Facility: CLINIC | Age: 29
End: 2024-06-27
Payer: COMMERCIAL

## 2024-06-27 ENCOUNTER — TELEPHONE (OUTPATIENT)
Dept: PSYCHIATRY | Facility: CLINIC | Age: 29
End: 2024-06-27

## 2024-06-27 DIAGNOSIS — F29 PSYCHOSIS, UNSPECIFIED PSYCHOSIS TYPE (H): Primary | ICD-10-CM

## 2024-06-27 ASSESSMENT — PATIENT HEALTH QUESTIONNAIRE - PHQ9
SUM OF ALL RESPONSES TO PHQ QUESTIONS 1-9: 24
SUM OF ALL RESPONSES TO PHQ QUESTIONS 1-9: 24
10. IF YOU CHECKED OFF ANY PROBLEMS, HOW DIFFICULT HAVE THESE PROBLEMS MADE IT FOR YOU TO DO YOUR WORK, TAKE CARE OF THINGS AT HOME, OR GET ALONG WITH OTHER PEOPLE: EXTREMELY DIFFICULT

## 2024-06-28 NOTE — PROGRESS NOTES
--  ATTESTATION:    I agree with this note and plan as documented. This patient is discussed with me in supervision; I did not see the patient directly.     Delta Pérez, PhD,   --    NAVIGATE Social Work Care Coordination Contact & Progress Note  A Part of the [facility] First Episode of Psychosis Program    NAVIGATE Enrollee: Navi Morales (1995)     MRN: [id number]  Date:  6/27/24  Diagnosis: Unspecified schizophrenia spectrum and other psychotic disorder, 298.9 (F29)  Clinician: [facility] Work Care Coordinator, MERCEDES Mchugh     1. Type of contact:     Mode of communication: [employer] (HIPAA compliant, secure platform).   Patient consented verbally to this mode of therapy today.  Reason for telehealth: To reduce barriers in accessing services, due to but not limited to transportation, location, or scheduling reasons.  Reviewed limits to confidentiality: Yes    2. People present:   Writer  Individual supported: Yes - Navi  Significant Other/Family/Friend:  Mother     3. Length of Actual Contact: Start Time: 1; End Time: 140pm     4. Location of contact:  Originating Location (patient location): home, located in [place], [place]  Distant Location (provider location): Home office, located in [place], [place], using appropriate privacy considerations and procedures    5. Did the individual complete the home practice option(s) from the previous session: Not Applicable    6. Motivational Teaching Strategies:  Connect info and skills with personal goals  Promote hope and positive expectations  Explore pros and cons of change  Re-frame experiences in positive light    7. Educational Teaching Strategies:  Review of written material/education  Relate information to client's experience  Ask questions to check comprehension  Break down information into small chunks  Adopt client's language       8. Assessment/Progress Note:     Writer met with Navi Perezd on this date for Social work care  "coordination appointment per request of NAVIGATE team member.  Writer set agenda to review role of social work care coordinator, discuss individual's needs related to social work care coordination (e.g. internal/external referrals, state/county assistance, forms/paperwork, resource identification, skills/education, etc.). Writer coached individual on means of accessing resources including applying for county and federal benefits. Broke down information into small, achievable steps, explored pros and cons of change and set reasonable home assignment. More specifically, Navi and his mom had applied for some county support and had a few questions about the application. Coached Navi and mom on how to apply for EBT without health insurance. Offered [email or web address] as option to discuss. Provided education on difference between certified disabled and not, dependent vs not, writer offered to meet to further discuss pros and cons which Navi was open to.          11. Measures:    Mental Status Exam  Alertness: alert  and slow to respond  Behavior/Demeanor: cooperative, pleasant, and calm  Speech: slowed  Language: intact.   Mood: depressed  Thought Process/Associations: unremarkable  Thought Content:  Reports obsessions , over-valued ideas, and paranoid ideation;  Denies suicidal and violent ideation  Perception:  Reports none;  Denies none  Insight: good  Judgment: adequate for safety  Cognition: does  appear grossly intact; formal cognitive testing was not done      12. Plan/Referrals:     Writer and individual scheduled follow up session for tbd      Billing for \"Interactive Complexity\"?    No      MERCEDES Mchugh Social Work Care Coordinator and Clinician  Answers submitted by the patient for this visit:  Patient Health Questionnaire (Submitted on 6/27/2024)  If you checked off any problems, how difficult have these problems made it for you to do your work, take care of things at home, or get along with other " people?: Extremely difficult  PHQ9 TOTAL SCORE: 24    Attestation:    I did not see this patient directly. This patient is discussed with me in individual and group supervision, and I agree with the plan as documented.     Brandie Catalan [facility] HospitalSANCHO, MSW, [facility] HospitalSW, July 22, 2024

## 2024-07-01 NOTE — PROGRESS NOTES
[facility] DA Consultation Outcome    Patient Name: Navi Morales    Diagnostic Assessment Date: 6/27/24     Discussed information gathered in the diagnostic assessment process in multidisciplinary consultation on 7/1/2024 for the purpose of preparing the DA clinician for a future feedback session.     Diagnostically: A provisional diagnosis of Schizophrenia spectrum disorder seems appropriate.   Rule outs to consider include Schizoaffective disorder.     Treatment Recommendations:  - [facility]IGATE (Schedule enrollment with Brandie Catalan during feedback visit)  - [facility] Resources   - Family Education and Support Group (Send email address to Abdirashid Grier to be added to the listserv)  - [facility] FEP Groups    As a reminder, the smarphrase FEPRESOURCES identifies psychosis-specific resources and referrals in the community outside of [facility].     LEENA Perkins

## 2024-07-02 ENCOUNTER — VIRTUAL VISIT (OUTPATIENT)
Dept: PSYCHIATRY | Facility: CLINIC | Age: 29
End: 2024-07-02
Payer: COMMERCIAL

## 2024-07-02 DIAGNOSIS — F29 PSYCHOSIS, UNSPECIFIED PSYCHOSIS TYPE (H): Primary | ICD-10-CM

## 2024-07-02 ASSESSMENT — ANXIETY QUESTIONNAIRES
4. TROUBLE RELAXING: NEARLY EVERY DAY
4. TROUBLE RELAXING: NEARLY EVERY DAY
GAD7 TOTAL SCORE: 19
IF YOU CHECKED OFF ANY PROBLEMS ON THIS QUESTIONNAIRE, HOW DIFFICULT HAVE THESE PROBLEMS MADE IT FOR YOU TO DO YOUR WORK, TAKE CARE OF THINGS AT HOME, OR GET ALONG WITH OTHER PEOPLE: EXTREMELY DIFFICULT
8. IF YOU CHECKED OFF ANY PROBLEMS, HOW DIFFICULT HAVE THESE MADE IT FOR YOU TO DO YOUR WORK, TAKE CARE OF THINGS AT HOME, OR GET ALONG WITH OTHER PEOPLE?: EXTREMELY DIFFICULT
1. FEELING NERVOUS, ANXIOUS, OR ON EDGE: NEARLY EVERY DAY
4. TROUBLE RELAXING: NEARLY EVERY DAY
6. BECOMING EASILY ANNOYED OR IRRITABLE: NEARLY EVERY DAY
3. WORRYING TOO MUCH ABOUT DIFFERENT THINGS: NEARLY EVERY DAY
6. BECOMING EASILY ANNOYED OR IRRITABLE: NEARLY EVERY DAY
7. FEELING AFRAID AS IF SOMETHING AWFUL MIGHT HAPPEN: NEARLY EVERY DAY
8. IF YOU CHECKED OFF ANY PROBLEMS, HOW DIFFICULT HAVE THESE MADE IT FOR YOU TO DO YOUR WORK, TAKE CARE OF THINGS AT HOME, OR GET ALONG WITH OTHER PEOPLE?: EXTREMELY DIFFICULT
7. FEELING AFRAID AS IF SOMETHING AWFUL MIGHT HAPPEN: NEARLY EVERY DAY
7. FEELING AFRAID AS IF SOMETHING AWFUL MIGHT HAPPEN: NEARLY EVERY DAY
3. WORRYING TOO MUCH ABOUT DIFFERENT THINGS: NEARLY EVERY DAY
5. BEING SO RESTLESS THAT IT IS HARD TO SIT STILL: MORE THAN HALF THE DAYS
7. FEELING AFRAID AS IF SOMETHING AWFUL MIGHT HAPPEN: NEARLY EVERY DAY
8. IF YOU CHECKED OFF ANY PROBLEMS, HOW DIFFICULT HAVE THESE MADE IT FOR YOU TO DO YOUR WORK, TAKE CARE OF THINGS AT HOME, OR GET ALONG WITH OTHER PEOPLE?: EXTREMELY DIFFICULT
IF YOU CHECKED OFF ANY PROBLEMS ON THIS QUESTIONNAIRE, HOW DIFFICULT HAVE THESE PROBLEMS MADE IT FOR YOU TO DO YOUR WORK, TAKE CARE OF THINGS AT HOME, OR GET ALONG WITH OTHER PEOPLE: EXTREMELY DIFFICULT
7. FEELING AFRAID AS IF SOMETHING AWFUL MIGHT HAPPEN: NEARLY EVERY DAY
GAD7 TOTAL SCORE: 19
3. WORRYING TOO MUCH ABOUT DIFFERENT THINGS: NEARLY EVERY DAY
GAD7 TOTAL SCORE: 19
5. BEING SO RESTLESS THAT IT IS HARD TO SIT STILL: MORE THAN HALF THE DAYS
5. BEING SO RESTLESS THAT IT IS HARD TO SIT STILL: MORE THAN HALF THE DAYS
GAD7 TOTAL SCORE: 19
2. NOT BEING ABLE TO STOP OR CONTROL WORRYING: MORE THAN HALF THE DAYS
1. FEELING NERVOUS, ANXIOUS, OR ON EDGE: NEARLY EVERY DAY
IF YOU CHECKED OFF ANY PROBLEMS ON THIS QUESTIONNAIRE, HOW DIFFICULT HAVE THESE PROBLEMS MADE IT FOR YOU TO DO YOUR WORK, TAKE CARE OF THINGS AT HOME, OR GET ALONG WITH OTHER PEOPLE: EXTREMELY DIFFICULT
2. NOT BEING ABLE TO STOP OR CONTROL WORRYING: MORE THAN HALF THE DAYS
7. FEELING AFRAID AS IF SOMETHING AWFUL MIGHT HAPPEN: NEARLY EVERY DAY
1. FEELING NERVOUS, ANXIOUS, OR ON EDGE: NEARLY EVERY DAY
GAD7 TOTAL SCORE: 19
GAD7 TOTAL SCORE: 19
6. BECOMING EASILY ANNOYED OR IRRITABLE: NEARLY EVERY DAY
2. NOT BEING ABLE TO STOP OR CONTROL WORRYING: MORE THAN HALF THE DAYS

## 2024-07-02 ASSESSMENT — PATIENT HEALTH QUESTIONNAIRE - PHQ9
10. IF YOU CHECKED OFF ANY PROBLEMS, HOW DIFFICULT HAVE THESE PROBLEMS MADE IT FOR YOU TO DO YOUR WORK, TAKE CARE OF THINGS AT HOME, OR GET ALONG WITH OTHER PEOPLE: EXTREMELY DIFFICULT
10. IF YOU CHECKED OFF ANY PROBLEMS, HOW DIFFICULT HAVE THESE PROBLEMS MADE IT FOR YOU TO DO YOUR WORK, TAKE CARE OF THINGS AT HOME, OR GET ALONG WITH OTHER PEOPLE: EXTREMELY DIFFICULT
SUM OF ALL RESPONSES TO PHQ QUESTIONS 1-9: 23

## 2024-07-03 ENCOUNTER — VIRTUAL VISIT (OUTPATIENT)
Dept: PSYCHIATRY | Facility: CLINIC | Age: 29
End: 2024-07-03
Payer: COMMERCIAL

## 2024-07-03 DIAGNOSIS — F29 PSYCHOSIS, UNSPECIFIED PSYCHOSIS TYPE (H): Primary | ICD-10-CM

## 2024-07-03 NOTE — PROGRESS NOTES
[facility] Clinician Contact & Progress Note  For Individual Resiliency Training (IRT)  A Part of the [facility] First Episode of Psychosis Program    [facility] Enrollee: Navi Morales (1995)     MRN: [id number]  Date:  7/02/24  Diagnosis: Psychosis, unspecifed  Clinician: LILIANA Individual Resiliency Trainer, LEENA Lay     1. Type of contact: (majority of time spent)  IRT Session via telehealth  Mode of communication: [employer] (HIPAA compliant, secure platform). Patient consented verbally to this mode of therapy today.  Reason for telehealth: To reduce barriers in accessing services, due to but not limited to transportation, location, or scheduling reasons.    2. People  present:   Writer  Client: Yes     3. Length of Actual Contact: Start Time: 3:03; End Time: 4:00     4. Location of contact:  Originating Location (patient location): family home, located in [place], [place]  Distant Location (provider location): Home office, located in [place], [place], using appropriate privacy considerations and procedures    5. Did the client complete the home practice option(s) from the previous session: Not Applicable    6. Motivational Teaching Strategies:  Connect info and skills with personal goals  Promote hope and positive expectations  Explore pros and cons of change  Re-frame experiences in positive light    7. Educational Teaching Strategies:  Review of written material/education  Relate information to client's experience    8. CBT Teaching Strategies:  Reinforcement and shaping (positive feedback for steps towards goals and gains in knowledge & skills)    9. IRT Module(s) Addressed:  Module 3 - What is Psychosis    10. Techniques utilized:   [place] announced at beginning of session  Present new material  Summarize progress made in current session  Other techniques (please specify):   -Built rapport with patient by inviting him to share his experiences and discuss his concerns  -Clarified  "patient's feelings and perspectives  -Discussed crisis intervention plan and safety plan  -Elicited more details about current and recent circumstances  -Emotional support via active and reflective listening, responding to feelings etc.  -Normalized and validated feelings and experiences  -Provided verbal overview of First Episode Program [facility] services  -Provided positive feedback and encouragement  -Provided psychoeducation related to psychosis and related concerns.   -Supportive counseling    11. Measures:    Mental Status Exam  Alertness: alert  and oriented  Behavior/Demeanor: cooperative and pleasant  Speech: regular rate and rhythm  Language: no obvious problem.   Mood: description consistent with euthymia  Thought Process/Associations: unremarkable  Thought Content:  Reports none;  Denies suicidal ideation and violent ideation  Perception:  Reports auditory hallucinations;  Denies none  Insight: adequate  Judgment: adequate for safety  Cognition: does  appear grossly intact; formal cognitive testing was not done    [place] Protocol Risk Identification  Not completed today.      12. Assessment/Progress Note:     Writer and client met for IRT visit via [employer]. Set agenda to check in, discuss treatment plan and client's goals, and discuss IRT specific work. Client consented to this agenda. Reported current symptoms to include: AH, depression, anxiety all at an increased level. Explored symptoms and coping from a stress-vulnerability lens. Current stressors include: symptoms. In regards to medications, client reports medication adherence to the prescribed antipsychotic. Substance use: history of issues steming from substance use. Reports vaping tobacco and cannabis (delta 9) products as a coping strategy. Reports occasional alcohol use often in excess leading to \"blackouts\". Previously provided education about negative impact of substance use on symptoms of psychosis and depression. Will continue to discuss " "and assess.     Client did not identify urgent concerns to be addressed today.     Today's visit:   Continued IRT material from [facility] program, as well as assessment. Client appeared engaged in conversation and content. Writer shared update about my practice - discussed that this writer will be leaving [employer], discussed plan for the time remaining for this writer to work with client.  Client expressed understanding.     Writer and client reviewed symptoms, medications, and coping strategies.     Regarding symptoms, Navi reports continued AH, depression, and anxiety (both general and social), specified that \"hallucinations were less but mood has been more down and anxiety was high\".      Regarding medication  - client denies any medication concerns today outside on-going sedation, previously discussed concerns related to how he believes his sensitivity to medications fluctuates over time. He discussed wishing for an antianxiety medication and trying to make sure he is taking naltrexone nightly.     Safety: There are notable risk factors for self-harm, including single status, anxiety, psychosis, suicidal ideation, and command AH. However, risk is mitigated by commitment to family, sobriety, ability to volunteer a safety plan, history of seeking help when needed, future oriented, identifies reasons to live including not wanting to die, no family history of suicide. Therefore, based on all available evidence including the factors cited above, Navi does not appear to be at imminent risk for self-harm, does not meet criteria for a 72-hr hold, and therefore remains appropriate for ongoing outpatient level of care. Recent reports via PHQ were discussed - Pt reports that he continues to have passive SI, denies plan, denies intent. The patient convincingly denies suicidality on several occasions. There was no deceit detected, and the patient presented in a manner that was believable. Client and writer discussed his " "history of SI and client expressed that his SI has been less intense since starting on the currently antipsychotic medication. Writer and client also discussed how client has felt as if SI is used as a sort of coping mechanism, as in the thought of being able to \"escape\" emotionally painful experiences and symptoms brings a sort of relief to the client.  Again today, client denied active SI, denied plan or intent.  Will continue to monitor.     Regarding sleep, Navi reports good sleep over all, noting that he is only waking 1-2 times per night, still sleeping 10-12 hours a night. Client reports continued breathing practice nightly as he is falling asleep of diaphramatic breathing.      Regarding coping, client uses distraction, soothing, and redirecting attention. Client additionally reports using watching movies, listening to music, diaphragmatic breathing, physiological sigh, ice pack on sternum or cold water on face, driving at sunset as coping strategies.    Completed the brief strengths inventory, will review at next visit. Will also discuss common thinking styles at next visit.     Home practice identified as: Notice what sort of sensations and relief each coping strategy you use brings.     Previously discussed substance use (regular vaping of nicotine and delta 9 and occasional alcohol use, with a Hx of issues related to use). Writer inquired about Navi's openness to discussion of his substance use with this writer. Navi reports that he feels that substance use is an effective way to cope with symptoms and seems to also report having experienced negative consequences of use in the past. Writer provided education about the impact of substance use on efficacy of antipsychotic medications (e.g. can reduce efficacy). Today, Navi reports sobriety from alcohol in past week. Navi was open to further analysis and discussion with this writer in the future and also expressed some reservation about discontinuing use of " substances.     Writer used relational and interpersonal approach to explore feelings, motivations, and behavior. Writer offered support, feedback, validation, and reinforced use of skills taught in IRT from modalities including cognitive behavioral therapy, psycho education, and skills training. Promoted understanding of their experiences of psychosis and how it impacts them in important areas of their life and in recovery goals. Reflected on client's strengths and resiliency factors and facilitated discussion on how these can assist in symptom management, recovery, and well-being.         13. Treatment Plan:     Treatment Plan________________________________________________________________________  Reviewed 1/16/2024  Pt s measurable objectives (list 3)  Reduce intensity of psychosis symptoms  Demonstrate understanding of symptoms regarding causes, treatment  Learn two skills to manage symptoms of psychosis  In Pt s own words    I want to have stability both mentally and psychically.   Interventions  IRT  Medication Management  SEE  Family support and education  Social work care coordination  Target date of discharge  12-36 months from enrollment  Discharge criteria  Marked and sustained symptom improvement  Demonstrated understanding of mental illness  Successful implementation of strategies to cope with stressors/symptoms to mitigate risk for increase in symptoms severity or relapse  Gains made (listed out objectives accomplished)  Frequency of sessions and expected duration of treatment:   6-12 months of weekly IRT/Individual Psychotherapy followed by 12-24 months of biweekly or monthly IRT  Participants in therapy plan: Navi Morales and IRLEENA Ramey  Support System: parents, particularly mother and providers    14. Plan/Referrals:     Next visit scheduled for one week from today. Writer informed client's family that my last day with be 7/26/24, discussed what we will spend our last visits going  "over. Client expressed understanding.    Billing for \"Interactive Complexity\"?    No        Answers submitted by the patient for this visit:  Patient Health Questionnaire (Submitted on 7/2/2024)  If you checked off any problems, how difficult have these problems made it for you to do your work, take care of things at home, or get along with other people?: Extremely difficult  PHQ9 TOTAL SCORE: 23  MELY-7 (Submitted on 7/2/2024)  MELY 7 TOTAL SCORE: 19    "

## 2024-07-05 NOTE — PROGRESS NOTES
[facility] Clinician Contact & Progress Note   For Family Education Program    [facility] Enrollee: Navi Morales (1995)     MRN: [id number]  Date:  7/03/24  Diagnosis(es):   Psychosis unspecified  Clinician: LILIANA Family Clinician, Laura Maldonado MA, LP     1. Type of contact: (majority of time spent)  Family Session via telehealth  Mode of communication:    Linwood Verduzco consented verbally to this mode of therapy today.  Reason for telehealth: To reduce barriers in accessing services, due to but not limited to transportation, location, or scheduling reasons.     2. People present:   Writer  Client: Yes  Significant Other/Family/Friend:  Mother    3. Length of Actual Contact: Start Time: 4:05pm to 5pm     4. Location of contact:  Originating Location (patient location):[place]   Distant Location (provider location): Home office, located in [place], [place], using appropriate privacy considerations and procedures    5. Did the client complete the home practice option(s) from the previous session: Partially Completed    6. Motivational Teaching Strategies:  Connect info and skills with personal goals  Promote hope and positive expectations    7. Educational Teaching Strategies:  Review of written material/education  Relate information to client's experience  Ask questions to check comprehension  Break down information into small chunks    8. CBT Teaching Strategies:  Reinforcement and shaping (positive feedback for steps towards goals and gains in knowledge & skills)    9. Psychoeducational Topic(s) Addressed:  Just the Facts - Psychosis    10. Techniques utilized:   [place] announced at beginning of session  Review of previous meeting  Present new material  Family Therapy  Motivational Interviewing (Connect info and skills with personal goals, Promote hope and positive expectations, Explore pros and cons of change, Re-frame experiences in a positive light)  Educational Teaching Strategies  "(Review written material/education on: Psychosis, Medications for psychosis, Coping with stress, Strategies to build resiliency, Relapse prevention planning, Developing a collaboration with mental health professionals, Effective communication, A relative s guide to supporting recovery from psychosis, Basic facts about alcohol and drugs)  CBT (Reinforcement and shaping, Social skills training, Relapse prevention planning, Coping skills training, Relaxation training, Cognitive restructuring, Behavioral tailoring)  11. Assessment/Progress Note:     Writer met with Navi and Navi's mom on this day  via [employer].  Writer set agenda to discuss how Navi and mom have been doing in the last week or so and to continue education on psychosis.     Navi reports disappointing news that his IRT therapist, Tesha Hartmann, is leaving [facility]. Mom and writer provided empathy for the distress this change can trigger for Navi. Discussed plan to attempt to have a \"warm handoff\" so that Navi can transition smoothly to a different clinician.     Checked in about the last week. Neither mom nor Navi report urgent concerns or changes in behavior or symptoms. Navi reported no changes in his symptoms. No concerns regarding safety. No concerns regarding sleep, appetite, or medications. Provided supportive listening and reinforced Navi's ongoing participation in family sessions.    Regarding the last week, mom and Navi reported some confusion regarding an application for [place] and not knowing if they are supposed to report family income or not. Writer will connect with  navigator, Brandie Moran, to inquire about her recommendation regarding this question.     Continue education about psychosis by discussing the stress vulnerability model. Discussed the biological vulnerability including genetics, neurochemicals, brain anatomy changes, and substance use. Mom reports history of alcohol use problems on both sides of the family.  Mom " "reports her mother had some mental health issues as well dad's grandpa. Navi reports substance of THC and alcohol. Navi reports that he does not believe his substance use impacts his symptoms. Provide education about potential impacts of substances including increased vulnerability to symptoms. Inquired about current alcohol use and Navi reports that he will use what he has \"on hand.\" Navi notes that he has learned andrew strategies to decrease use including drinking more water, eating with use, and having limited alcohol on hand. Reflect on the positive value of these strategies.     Overall family seemed very engaged in conversation. They did express interest in continuing to meet for family therapy and psychoeducation. As of today's appt their insight into Navi's mental illness appears fair. They seem they would benefit from continued clinical intervention aimed at assisting them implement helpful strategies at home and increase their understanding of psychosis.    12. Plan/Referrals:     Will meet with family weekly as schedule allows for evidence based family psychoeducation and therapeutic support aimed at maximizing Navi's opportunity for recovery from psychosis.     Laura Maldonado MA, LP   [facility]   The author of this note documented a reason for not sharing it with the patient.    "

## 2024-07-09 ENCOUNTER — VIRTUAL VISIT (OUTPATIENT)
Dept: PSYCHIATRY | Facility: CLINIC | Age: 29
End: 2024-07-09
Payer: COMMERCIAL

## 2024-07-09 DIAGNOSIS — F29 PSYCHOSIS, UNSPECIFIED PSYCHOSIS TYPE (H): Primary | ICD-10-CM

## 2024-07-09 ASSESSMENT — PATIENT HEALTH QUESTIONNAIRE - PHQ9
SUM OF ALL RESPONSES TO PHQ QUESTIONS 1-9: 23
SUM OF ALL RESPONSES TO PHQ QUESTIONS 1-9: 23
10. IF YOU CHECKED OFF ANY PROBLEMS, HOW DIFFICULT HAVE THESE PROBLEMS MADE IT FOR YOU TO DO YOUR WORK, TAKE CARE OF THINGS AT HOME, OR GET ALONG WITH OTHER PEOPLE: EXTREMELY DIFFICULT
10. IF YOU CHECKED OFF ANY PROBLEMS, HOW DIFFICULT HAVE THESE PROBLEMS MADE IT FOR YOU TO DO YOUR WORK, TAKE CARE OF THINGS AT HOME, OR GET ALONG WITH OTHER PEOPLE: EXTREMELY DIFFICULT
SUM OF ALL RESPONSES TO PHQ QUESTIONS 1-9: 23
SUM OF ALL RESPONSES TO PHQ QUESTIONS 1-9: 23

## 2024-07-10 ENCOUNTER — VIRTUAL VISIT (OUTPATIENT)
Dept: PSYCHIATRY | Facility: CLINIC | Age: 29
End: 2024-07-10
Payer: COMMERCIAL

## 2024-07-10 DIAGNOSIS — F29 PSYCHOSIS, UNSPECIFIED PSYCHOSIS TYPE (H): Primary | ICD-10-CM

## 2024-07-10 NOTE — PROGRESS NOTES
NAVIGGALINDO Clinician Contact & Progress Note  For Individual Resiliency Training (IRT)  A Part of the [facility] First Episode of Psychosis Program    NAVIGATE Enrollee: Navi Morales (1995)     MRN: [id number]  Date:  7/09/24  Diagnosis: Psychosis, unspecifed  Clinician: LILIANA Individual Resiliency Trainer, LEENA Lay     1. Type of contact: (majority of time spent)  IRT Session via telehealth  Mode of communication: [employer] (HIPAA compliant, secure platform). Patient consented verbally to this mode of therapy today.  Reason for telehealth: To reduce barriers in accessing services, due to but not limited to transportation, location, or scheduling reasons.    2. People  present:   Writer  Client: Yes     3. Length of Actual Contact: Start Time: 3:03; End Time: 4:00     4. Location of contact:  Originating Location (patient location): family home, located in [place], [place]  Distant Location (provider location): Home office, located in [place], [place], using appropriate privacy considerations and procedures    5. Did the client complete the home practice option(s) from the previous session: Not Applicable    6. Motivational Teaching Strategies:  Connect info and skills with personal goals  Promote hope and positive expectations  Explore pros and cons of change  Re-frame experiences in positive light    7. Educational Teaching Strategies:  Review of written material/education  Relate information to client's experience    8. CBT Teaching Strategies:  Reinforcement and shaping (positive feedback for steps towards goals and gains in knowledge & skills)    9. IRT Module(s) Addressed:  Module 3 - What is Psychosis    10. Techniques utilized:   [place] announced at beginning of session  Present new material  Summarize progress made in current session  Other techniques (please specify):   -Built rapport with patient by inviting him to share his experiences and discuss his concerns  -Clarified  "patient's feelings and perspectives  -Discussed crisis intervention plan and safety plan  -Elicited more details about current and recent circumstances  -Emotional support via active and reflective listening, responding to feelings etc.  -Normalized and validated feelings and experiences  -Provided verbal overview of First Episode Program NAVIGATE services  -Provided positive feedback and encouragement  -Provided psychoeducation related to psychosis and related concerns.   -Supportive counseling    11. Measures:    Mental Status Exam  Alertness: alert  and oriented  Behavior/Demeanor: cooperative and pleasant  Speech: regular rate and rhythm  Language: no obvious problem.   Mood: description consistent with euthymia  Thought Process/Associations: unremarkable  Thought Content:  Reports none;  Denies suicidal ideation and violent ideation  Perception:  Reports auditory hallucinations;  Denies none  Insight: adequate  Judgment: adequate for safety  Cognition: does  appear grossly intact; formal cognitive testing was not done    [place] Protocol Risk Identification  Not completed today.      12. Assessment/Progress Note:     Writer and client met for IRT visit via [employer] Set agenda to check in, discuss treatment plan and client's goals, and discuss IRT specific work. Client consented to this agenda. Reported current symptoms to include: AH, depression, anxiety all at an increased level. Explored symptoms and coping from a stress-vulnerability lens. Current stressors include: symptoms. In regards to medications, client reports medication adherence to the prescribed antipsychotic. Substance use: history of issues steming from substance use. Reports vaping tobacco and cannabis (delta 9) products as a coping strategy. Reports occasional alcohol use often in excess leading to \"blackouts\". Previously provided education about negative impact of substance use on symptoms of psychosis and depression. Will continue to discuss " "and assess.     Client did not identify urgent concerns to be addressed today.     Today's visit:   Continued IRT material from NAVIGATE program, as well as assessment. Client appeared engaged in conversation and content. Writer previously shared update about my practice - discussed that this writer will be leaving [facility], discussed plan for the time remaining for this writer to work with client.  Client expressed understanding. Client discussed the previous week and the activities that he engaged in. Client and writer reviewed goals and treatment plan.     Writer and client reviewed symptoms, medications, and coping strategies.     Regarding symptoms, Navi reports continued AH, depression, and anxiety (both general and social), specified that \"symptoms are still there but overall a bit less intense\".      Regarding medication  - client denies any medication concerns today outside on-going sedation, previously discussed concerns related to how he believes his sensitivity to medications fluctuates over time. He previously discussed wishing for an antianxiety medication and trying to make sure he is taking naltrexone nightly.     Safety: There are notable risk factors for self-harm, including single status, anxiety, psychosis, suicidal ideation, and command AH. However, risk is mitigated by commitment to family, sobriety, ability to volunteer a safety plan, history of seeking help when needed, future oriented, identifies reasons to live including not wanting to die, no family history of suicide. Therefore, based on all available evidence including the factors cited above, Navi does not appear to be at imminent risk for self-harm, does not meet criteria for a 72-hr hold, and therefore remains appropriate for ongoing outpatient level of care. Recent reports via PHQ were discussed - Pt reports that he continues to have passive SI, denies plan, denies intent. The patient convincingly denies suicidality on several " occasions. There was no deceit detected, and the patient presented in a manner that was believable.  Again today, client denied active SI, denied plan or intent.  Will continue to monitor.     Regarding sleep, Navi reports good sleep over all, noting that he is only waking 1-2 times per night, still sleeping 10-12 hours a night. Client reports continued breathing practice nightly as he is falling asleep of diaphramatic breathing.  Noted that he can sleep for 16+ hours if he lets himself.    Regarding coping, client uses distraction, soothing, and redirecting attention. Client additionally reports using watching movies, listening to music, diaphragmatic breathing, physiological sigh, ice pack on sternum or cold water on face, driving at sunset as coping strategies.Client wants to keep using these skills and develop more efficacy with them.    At today's visit, began discussing 'Dealing with Negative Feelings' module, particularly the common styles of thought that can lead to to inaccurate thoughts and tus to negative feelings. Will continue at next visit.     Home practice identified as: Notice what sort of sensations and relief each coping strategy you use brings.     Previously discussed substance use (regular vaping of nicotine and delta 9 and occasional alcohol use, with a Hx of issues related to use). Writer inquired about Navi's openness to discussion of his substance use with this writer. Navi reports that he feels that substance use is an effective way to cope with symptoms and seems to also report having experienced negative consequences of use in the past. Writer provided education about the impact of substance use on efficacy of antipsychotic medications (e.g. can reduce efficacy). Today, Navi reports sobriety from alcohol in past week. Navi was open to further analysis and discussion with this writer in the future and also expressed some reservation about discontinuing use of substances.     Writer used  relational and interpersonal approach to explore feelings, motivations, and behavior. Writer offered support, feedback, validation, and reinforced use of skills taught in IRT from modalities including cognitive behavioral therapy, psycho education, and skills training. Promoted understanding of their experiences of psychosis and how it impacts them in important areas of their life and in recovery goals. Reflected on client's strengths and resiliency factors and facilitated discussion on how these can assist in symptom management, recovery, and well-being.         13. Treatment Plan:     [facility] NAVIGATE Program IRT Treatment Plan Summary  A Part of the [facility] First Episode of Psychosis Program       Date of Initial IRT session: 11/9/23  Date of INTIAL Treatment Plan: 1/16/24  Last Review/Update Date: 7/9/24  Today's Date: 7/9/24  Next 90-Day Review Due:  10/8/24    The following represents REVIEWED treatment plan.    Individual Resiliency Training Treatment Goals     Measurable Objectives Interventions Target Dates & Discharge Criteria   Individual s Objectives    Patient's measurable goals  Reduce intensity of psychosis symptoms  Demonstrate understanding of symptoms regarding causes, treatment  Learn two skills to manage symptoms of psychosis      In Navi's own words:   I want to have stability, both physically and mentally.  IRT/Psychotherapy  -Psychoeducation  -Motivation interviewing  -CBT  -Behavioral activation    Coordination with other NAVIGATE Team Members:  - Supported Education and Employment  - Medication Management  - Family Education and Support  - Social Work Care Coordination Target date:   12-36 months from enrollment    Discharge criteria:  Marked and sustained symptom improvement     Navi demonstrates understanding of mental illness     Navi successfully implements strategies to cope with stressors and/or symptoms to mitigate risk for increase in symptom severity or relapse    Gains Made:  -Pt has  "been incorporating two skills (breath work and using cold exposure) to manage symptoms. He wants to keep practicing and gain additional competency in these skills.  -Pt has also achieved reduced intensity of symptoms, hopes to continue to reduce intensity.        Frequency of sessions and expected duration of treatment:   6-12 months of weekly IRT/Individual Psychotherapy followed by 12-24 months of biweekly or monthly IRT  1-4x per month Medication Management with Prescriber ongoing   Other services as client wishes to engage in them.     Participants in therapy plan:   Navi Morales    Support System: Parents (particularly his mother) and [facility]IGATE providers    See signed Acknowledgement of Current Treatment Plan attached to this visit in patient chart (via \"consents\" tab).    Regulatory Guidelines for Updating Treatment Plan  [place] Medical Assistance: Reviewed & signed at least every 90 days  Medicare:  Update per policy      14. Plan/Referrals:     Next visit scheduled for one week from today. Writer informed client's family that my last day with be 7/26/24, discussed what we will spend our last visits going over. Client expressed understanding.    Billing for \"Interactive Complexity\"?    No        Answers submitted by the patient for this visit:  Patient Health Questionnaire (Submitted on 7/9/2024)  If you checked off any problems, how difficult have these problems made it for you to do your work, take care of things at home, or get along with other people?: Extremely difficult  PHQ9 TOTAL SCORE: 23  MELY-7 (Submitted on 7/2/2024)  MELY 7 TOTAL SCORE: 19    "

## 2024-07-10 NOTE — Clinical Note
Hello team, Just an FYI regarding my visit with Navi. Delmi, I'm not sure if mom showed for your SEE visit? Mom seems like a valuable support and might be able to help with communication with Navi. I wonder if sometimes Navi hears information and is biased towards the most negative info? Awaise, is that your take? Just thought it might be helpful to include mom at times on some of the SEE info! Thanks for meeting with him and please let me know if there is anything I can do in family visits to support Navi from either of your perspectives! Laura

## 2024-07-11 ENCOUNTER — VIRTUAL VISIT (OUTPATIENT)
Dept: PSYCHIATRY | Facility: CLINIC | Age: 29
End: 2024-07-11
Payer: COMMERCIAL

## 2024-07-11 DIAGNOSIS — F29 PSYCHOSIS, UNSPECIFIED PSYCHOSIS TYPE (H): Primary | ICD-10-CM

## 2024-07-11 NOTE — PROGRESS NOTES
NAVIGATE/STRENGTHS Virtual Visit Progress Note  For Supported Employment & Education    NAVIGATE Enrollee: José Miguel Morales (1995)     MRN: [id number]  Date of Visit: 7/11/24  Contacted: josé miguel and mom  Appointment Length: 60    Discussed:   Writer met with José Miguel Morales for virtual visit check-in. Reason for telehealth: To reduce barriers in accessing services, due to but not limited to transportation, location, or scheduling reasons. Meeting agenda included talking about his different options with school. He is unsure what route he wants to go in regards to going back to [place] and finishing his degree or staying in [place]. Also thought about staying in [place] and going to [facility] [employer] to get his associates with his credits from [place] and transfer to a 4 year but start out at [employer]. Aslo talked about different phases of life.     Follow-up:     Delmi HOPSONATE/STRENGTHS  Supported Employment & Education  Supported Employment & EducationThis is a non-billable encounter as it was solely for the purposes of outreach and/or care coordination. SEE services are non billable services

## 2024-07-12 NOTE — PROGRESS NOTES
NAVIGATE Clinician Contact & Progress Note   For Family Education Program    NAVIGATE Enrollee: Navi Morales (1995)     MRN: [id number]  Date:  7/10/24  Diagnosis(es):   Psychosis unspecified  Clinician: EMILIANAATE Family Clinician, Laura Maldonado MA, SELWYN     1. Type of contact: (majority of time spent)  Family Session via telehealth  Mode of communication:    Linwood Verduzco consented verbally to this mode of therapy today.  Reason for telehealth: To reduce barriers in accessing services, due to but not limited to transportation, location, or scheduling reasons.     2. People present:   Writer  Client: Yes  Significant Other/Family/Friend:  Mother    3. Length of Actual Contact: Start Time: 4:05pm to 5pm     4. Location of contact:  Originating Location (patient location):[place]   [facility]    5. Did the client complete the home practice option(s) from the previous session: Partially Completed    6. Motivational Teaching Strategies:  Connect info and skills with personal goals  Promote hope and positive expectations    7. Educational Teaching Strategies:  Review of written material/education  Relate information to client's experience  Ask questions to check comprehension  Break down information into small chunks    8. CBT Teaching Strategies:  Reinforcement and shaping (positive feedback for steps towards goals and gains in knowledge & skills)    9. Psychoeducational Topic(s) Addressed:  Just the Facts - Psychosis    10. Techniques utilized:   [place] announced at beginning of session  Review of previous meeting  Present new material  Family Therapy  Motivational Interviewing (Connect info and skills with personal goals, Promote hope and positive expectations, Explore pros and cons of change, Re-frame experiences in a positive light)  Educational Teaching Strategies (Review written material/education on: Psychosis, Medications for  "psychosis, Coping with stress, Strategies to build resiliency, Relapse prevention planning, Developing a collaboration with mental health professionals, Effective communication, A relative s guide to supporting recovery from psychosis, Basic facts about alcohol and drugs)  CBT (Reinforcement and shaping, Social skills training, Relapse prevention planning, Coping skills training, Relaxation training, Cognitive restructuring, Behavioral tailoring)  11. Assessment/Progress Note:     Writer met with Navi and Navi's mom on this day via [employer].  Writer set agenda to discuss how Navi and mom have been doing in the last week or so and to continue education on psychosis.     Navi reports overall things \"have been about the same.\" Navi reports ongoing processing of his therapist leaving [facility]. Writer provides empathy and resilience shown in his continued work with this therapist. Provide encouragement and support of Navi's ongoing recovery.     Regarding ongoing involvement in [facility]IG[facility], Navi reported plans to meet with SEE specialist and mom inquired about her ability to also attend this appt. Writer informed that mom is able to join if this is ok with Navi. Mom noted some questions about the transfer process discussed with Navi. Navi reported feeling \"defeated\" about this process.    Explored strategies Navi uses to cope with feeling \"defeated.\" Navi reports using distraction by watching Y[employer] and playing games. aNvi also reports relief from feeling defeated right after he accomplishes a task like \"mowing the lawn.\" Navi also acknowledges some relief in talking with Auralibernadette or the writer.     Neither mom nor Navi report urgent concerns or changes in behavior or symptoms. No concerns regarding safety. No concerns regarding sleep, appetite, or medications. No changes regarding substance use reported.     Provided supportive listening and reinforced Navi's ongoing participation in family sessions. Writer modeled " communicating positive feelings towards Navi. Review education on psychosis regarding the stress vulnerability model.     Overall family seemed very engaged in conversation. They did express interest in continuing to meet for family therapy and psychoeducation. As of today's appt their insight into [place] mental illness appears fair. They seem they would benefit from continued clinical intervention aimed at assisting them implement helpful strategies at home and increase their understanding of psychosis.    12. Plan/Referrals:     Will meet with family weekly as schedule allows for evidence based family psychoeducation and therapeutic support aimed at maximizing Navi's opportunity for recovery from psychosis.     Laura Maldonado MA, LP   NAVIGATE   The author of this note documented a reason for not sharing it with the patient.

## 2024-07-16 ENCOUNTER — VIRTUAL VISIT (OUTPATIENT)
Dept: PSYCHIATRY | Facility: CLINIC | Age: 29
End: 2024-07-16
Payer: COMMERCIAL

## 2024-07-16 DIAGNOSIS — F29 PSYCHOSIS, UNSPECIFIED PSYCHOSIS TYPE (H): Primary | ICD-10-CM

## 2024-07-17 NOTE — PROGRESS NOTES
NAVIGGALINDO Clinician Contact & Progress Note  For Individual Resiliency Training (IRT)  A Part of the [facility] First Episode of Psychosis Program    NAVIGATE Enrollee: Navi Morales (1995)     MRN: [id number]  Date:  7/16/24  Diagnosis: Psychosis, unspecifed  Clinician: LILIANA Individual Resiliency Trainer, LEENA Lay     1. Type of contact: (majority of time spent)  IRT Session via telehealth  Mode of communication: [employer] (HIPAA compliant, secure platform). Patient consented verbally to this mode of therapy today.  Reason for telehealth: To reduce barriers in accessing services, due to but not limited to transportation, location, or scheduling reasons.    2. People  present:   Writer  Client: Yes     3. Length of Actual Contact: Start Time: 3:03; End Time: 4:00     4. Location of contact:  Originating Location (patient location): family home, located in [place], [place]  Distant Location (provider location): Home office, located in [place], [place], using appropriate privacy considerations and procedures    5. Did the client complete the home practice option(s) from the previous session: Not Applicable    6. Motivational Teaching Strategies:  Connect info and skills with personal goals  Promote hope and positive expectations  Explore pros and cons of change  Re-frame experiences in positive light    7. Educational Teaching Strategies:  Review of written material/education  Relate information to client's experience    8. CBT Teaching Strategies:  Reinforcement and shaping (positive feedback for steps towards goals and gains in knowledge & skills)    9. IRT Module(s) Addressed:  Module 3 - What is Psychosis    10. Techniques utilized:   [place] announced at beginning of session  Present new material  Summarize progress made in current session  Other techniques (please specify):   -Built rapport with patient by inviting him to share his experiences and discuss his concerns  -Clarified  "patient's feelings and perspectives  -Discussed crisis intervention plan and safety plan  -Elicited more details about current and recent circumstances  -Emotional support via active and reflective listening, responding to feelings etc.  -Normalized and validated feelings and experiences  -Provided verbal overview of First Episode Program NAVIGATE services  -Provided positive feedback and encouragement  -Provided psychoeducation related to psychosis and related concerns.   -Supportive counseling    11. Measures:    Mental Status Exam  Alertness: alert  and oriented  Behavior/Demeanor: cooperative and pleasant  Speech: regular rate and rhythm  Language: no obvious problem.   Mood: description consistent with euthymia  Thought Process/Associations: unremarkable  Thought Content:  Reports none;  Denies suicidal ideation and violent ideation  Perception:  Reports auditory hallucinations;  Denies none  Insight: adequate  Judgment: adequate for safety  Cognition: does  appear grossly intact; formal cognitive testing was not done    [place] Protocol Risk Identification  Not completed today.      12. Assessment/Progress Note:     Writer and client met for IRT visit via [place]. Set agenda to check in, discuss treatment plan and client's goals, and discuss IRT specific work. Client consented to this agenda. Reported current symptoms to include: AH, depression, anxiety all at an increased level. Explored symptoms and coping from a stress-vulnerability lens. Current stressors include: symptoms. In regards to medications, client reports medication adherence to the prescribed antipsychotic. Substance use: history of issues steming from substance use. Reports vaping tobacco and cannabis (delta 9) products as a coping strategy. Reports occasional alcohol use often in excess leading to \"blackouts\". Previously provided education about negative impact of substance use on symptoms of psychosis and depression. Will continue to discuss " "and assess.     Client did not identify urgent concerns to be addressed today.     Today's visit:   Continued IRT material from NAVIGATE program, as well as assessment. Client appeared engaged in conversation and content. Writer previously shared update about my practice (shared on 7/3) - discussed that this writer will be leaving [employer], discussed plan for the time remaining for this writer to work with client.  Client expressed understanding. Client discussed the previous week and the activities that he engaged in. Client and writer reviewed goals and treatment plan.     Writer and client reviewed symptoms, medications, and coping strategies.     Regarding symptoms, Navi reports continued AH, depression, and anxiety (both general and social), specified that \"symptoms are still there - about the same as last week\".      Regarding medication  - client denies any medication concerns today outside on-going sedation, previously discussed concerns related to how he believes his sensitivity to medications fluctuates over time. He previously discussed wishing for an antianxiety medication and trying to make sure he is taking naltrexone nightly.     Safety: There are notable risk factors for self-harm, including single status, anxiety, psychosis, suicidal ideation, and command AH. However, risk is mitigated by commitment to family, sobriety, ability to volunteer a safety plan, history of seeking help when needed, future oriented, identifies reasons to live including not wanting to die, no family history of suicide. Therefore, based on all available evidence including the factors cited above, Navi does not appear to be at imminent risk for self-harm, does not meet criteria for a 72-hr hold, and therefore remains appropriate for ongoing outpatient level of care. Recent reports via PHQ were discussed - Pt reports that he continues to have passive SI, denies plan, denies intent. The patient convincingly denies suicidality on " "several occasions. There was no deceit detected, and the patient presented in a manner that was believable.  Again today, client denied active SI, denied plan or intent.  Will continue to monitor.     Regarding sleep, Navi reports good sleep over all, noting that he is only waking 1-2 times per night, still sleeping 10-12 hours a night. Client reports continued breathing practice nightly as he is falling asleep of diaphramatic breathing.  Noted that he can sleep for 16+ hours if he lets himself.    Regarding coping, client uses distraction, soothing, and redirecting attention. Client additionally reports using watching movies, listening to music, diaphragmatic breathing, physiological sigh, ice pack on sternum or cold water on face, driving at sunset as coping strategies.Client wants to keep using these skills and develop more efficacy with them.    Regarding substance use (regular vaping of nicotine and delta 9 and occasional alcohol use, with a Hx of issues related to use), client reports having used alcohol over both two and a half days (the weekend) for a total of 1.75 liters or about 32 shots over the span of 2.5 days. Writer expressed concern about amount of alcohol consumed over this span of time. Client reports that he skipped his medications over the week as well. Writer again shared that our advice is to continue taking medications even when using alcohol. While Navi has historically used substances to manage symptoms, today Navi mentioned that he was using alcohol \"for fun and to be less anxious\" but noted that it was not fun and did not do much for his anxiety. He reports continued use of naltrexone.     Writer previous inquired about Navi's openness to discussion of his substance use with this writer. Navi reports that he feels that substance use is an effective way to cope with symptoms and seems to also report having experienced negative consequences of use in the past. Writer provided education about " "the impact of substance use on efficacy of antipsychotic medications (e.g. can reduce efficacy). Navi reports that he has \"an agreement\" with his mother and that his father would possibly kick him out if he knew how much Navi drinks. Writer and Navi discussed the benefits of use compared to the risk of losing a place to live. Navi was open to further analysis and discussion with this writer in the future and also expressed some reservation about discontinuing use of substances.     At today's visit, began discussing 'Dealing with Negative Feelings' module, particularly the common styles of thought that can lead to to inaccurate thoughts and tus to negative feelings. Will continue at next visit.     Home practice identified as: Print out and hang up visual cue to use more deep breathing.     Writer used relational and interpersonal approach to explore feelings, motivations, and behavior. Writer offered support, feedback, validation, and reinforced use of skills taught in IRT from modalities including cognitive behavioral therapy, psycho education, and skills training. Promoted understanding of their experiences of psychosis and how it impacts them in important areas of their life and in recovery goals. Reflected on client's strengths and resiliency factors and facilitated discussion on how these can assist in symptom management, recovery, and well-being.         13. Treatment Plan:     [facility] NAVIGATE Program IRT Treatment Plan Summary  A Part of the [facility] First Episode of Psychosis Program       Date of Initial IRT session: 11/9/23  Date of INTIAL Treatment Plan: 1/16/24  Last Review/Update Date: 7/9/24  Today's Date: 7/9/24  Next 90-Day Review Due:  10/8/24    The following represents REVIEWED treatment plan.    Individual Resiliency Training Treatment Goals     Measurable Objectives Interventions Target Dates & Discharge Criteria   Individual s Objectives    Patient's measurable goals  Reduce intensity of " "psychosis symptoms  Demonstrate understanding of symptoms regarding causes, treatment  Learn two skills to manage symptoms of psychosis      In Navi's own words:   I want to have stability, both physically and mentally.  IRT/Psychotherapy  -Psychoeducation  -Motivation interviewing  -CBT  -Behavioral activation    Coordination with other NAVIGATE Team Members:  - Supported Education and Employment  - Medication Management  - Family Education and Support  - Social Work Care Coordination Target date:   12-36 months from enrollment    Discharge criteria:  Marked and sustained symptom improvement     Navi demonstrates understanding of mental illness     Navi successfully implements strategies to cope with stressors and/or symptoms to mitigate risk for increase in symptom severity or relapse    Gains Made:  -Pt has been incorporating two skills (breath work and using cold exposure) to manage symptoms. He wants to keep practicing and gain additional competency in these skills.  -Pt has also achieved reduced intensity of symptoms, hopes to continue to reduce intensity.        Frequency of sessions and expected duration of treatment:   6-12 months of weekly IRT/Individual Psychotherapy followed by 12-24 months of biweekly or monthly IRT  1-4x per month Medication Management with Prescriber ongoing   Other services as client wishes to engage in them.     Participants in therapy plan:   Navi Moreiralord    Support System: Parents (particularly his mother) and NAVIGATE providers    See signed Acknowledgement of Current Treatment Plan attached to this visit in patient chart (via \"consents\" tab).    Regulatory Guidelines for Updating Treatment Plan  [place] Medical Assistance: Reviewed & signed at least every 90 days  Medicare:  Update per policy      14. Plan/Referrals:     Next visit scheduled for one week from today. Writer informed client's family that my last day with be 7/26/24, discussed what we will spend our last " "visits going over. Client expressed understanding.    Billing for \"Interactive Complexity\"?    No      "

## 2024-07-18 ENCOUNTER — VIRTUAL VISIT (OUTPATIENT)
Dept: PSYCHIATRY | Facility: CLINIC | Age: 29
End: 2024-07-18
Payer: COMMERCIAL

## 2024-07-18 DIAGNOSIS — F29 PSYCHOSIS, UNSPECIFIED PSYCHOSIS TYPE (H): Primary | ICD-10-CM

## 2024-07-19 ENCOUNTER — PATIENT OUTREACH (OUTPATIENT)
Dept: PSYCHIATRY | Facility: CLINIC | Age: 29
End: 2024-07-19

## 2024-07-19 NOTE — PROGRESS NOTES
NAVIGATE Clinician Contact & Progress Note   For Family Education Program    NAVIGATE Enrollee: Navi Morales (1995)     MRN: [id number]  Date:  7/18/24  Diagnosis(es):   Psychosis unspecified  Clinician: EMILIANAATE Family Clinician, Laura Maldonado MA, SELWYN     1. Type of contact: (majority of time spent)  Family Session via telehealth  Mode of communication:    Lin[place] Verduzco consented verbally to this mode of therapy today.  Reason for telehealth: To reduce barriers in accessing services, due to but not limited to transportation, location, or scheduling reasons.     2. People present:   Writer  Client: Yes  Significant Other/Family/Friend:  Mother    3. Length of Actual Contact: Start Time: 2:05pm to 2:55pm     4. Location of contact:  Originating Location (patient location):[place]   [facility]    5. Did the client complete the home practice option(s) from the previous session: Partially Completed    6. Motivational Teaching Strategies:  Connect info and skills with personal goals  Promote hope and positive expectations    7. Educational Teaching Strategies:  Review of written material/education  Relate information to client's experience  Ask questions to check comprehension  Break down information into small chunks    8. CBT Teaching Strategies:  Reinforcement and shaping (positive feedback for steps towards goals and gains in knowledge & skills)    9. Psychoeducational Topic(s) Addressed:  Just the Facts - Psychosis    10. Techniques utilized:   [place] announced at beginning of session  Review of previous meeting  Present new material  Family Therapy  Motivational Interviewing (Connect info and skills with personal goals, Promote hope and positive expectations, Explore pros and cons of change, Re-frame experiences in a positive light)  Educational Teaching Strategies (Review written material/education on: Psychosis, Medications for  "psychosis, Coping with stress, Strategies to build resiliency, Relapse prevention planning, Developing a collaboration with mental health professionals, Effective communication, A relative s guide to supporting recovery from psychosis, Basic facts about alcohol and drugs)  CBT (Reinforcement and shaping, Social skills training, Relapse prevention planning, Coping skills training, Relaxation training, Cognitive restructuring, Behavioral tailoring)  11. Assessment/Progress Note:     Writer met with Navi and Navi's mom on this day via Between.  Writer set agenda to discuss how Navi and mom have been doing in the last week or so, review engagement in [facility]IGATE, and to continue education on psychosis.     Navi reports that his symptoms have been better, but that he has been \"more tired.\" Explore that this could be a side effect and something that would be good to communicate to Dr. Vega. Navi reports he missed his Dr. Vega appt due to sleeping in and his phone going dead. Navi reports frustration with his mom due to her making a \"big deal\" out of this. Attempt to reflect both Navi's feelings of frustration and mom's concern being communicated as anger.    Explore what would be helpful moving forward and mom acknowledges that she was \"very stressed\" and was \"fearful\" when she noticed Navi missed his appointment. Discussed making positive requests and also highlighted that mom was feeling afraid and stressed and that these feelings impacted Navi. Writer offers to communicate Navi's concerns about sedation to Dr. Vega and Navi agreed that was ok.     Provided education on window of tolerance. Navi reports that he notices when he goes into hypo-arousal and will go into his room. Both mom and Navi notice that dad moves more into hyper-arousal.     Neither mom nor Navi report urgent concerns on this day. Provided supportive listening and reinforced Navi's ongoing participation in family sessions as a continuing sign of " his recovery.     Overall family seemed very engaged in conversation. They did express interest in continuing to meet for family therapy and psychoeducation. As of today's appt their insight into Navi's mental illness appears fair. They seem they would benefit from continued clinical intervention aimed at assisting them implement helpful strategies at home and increase their understanding of psychosis.    12. Plan/Referrals:     Will meet with family weekly as schedule allows for evidence based family psychoeducation and therapeutic support aimed at maximizing Navi's opportunity for recovery from psychosis.     Laura Maldonado MA, LP   NAVIGATE   The author of this note documented a reason for not sharing it with the patient.

## 2024-07-19 NOTE — PROGRESS NOTES
LILIANA Telephone Call or E-mail Correspondence    NAVIGATE Enrollee: Navi Morales (1995)     MRN: [id number]    Writer called Pt at suggestion of family clinican after family expressed concern of elevated symptoms. No answer, left voice message indicating why I was calling (e.g. to offer phone check in, if needed) and indicated that I would be able to check in via phone at about 4:00 today. Invited Pt to call or email me about that. Reminded pt of next scheduled visit on Tuesday 7/23 at 3:00.    [facility] LILIANA

## 2024-07-23 ENCOUNTER — VIRTUAL VISIT (OUTPATIENT)
Dept: PSYCHIATRY | Facility: CLINIC | Age: 29
End: 2024-07-23
Payer: COMMERCIAL

## 2024-07-23 DIAGNOSIS — F29 PSYCHOSIS, UNSPECIFIED PSYCHOSIS TYPE (H): Primary | ICD-10-CM

## 2024-07-23 ASSESSMENT — ANXIETY QUESTIONNAIRES
7. FEELING AFRAID AS IF SOMETHING AWFUL MIGHT HAPPEN: NEARLY EVERY DAY
GAD7 TOTAL SCORE: 19
3. WORRYING TOO MUCH ABOUT DIFFERENT THINGS: NEARLY EVERY DAY
1. FEELING NERVOUS, ANXIOUS, OR ON EDGE: NEARLY EVERY DAY
8. IF YOU CHECKED OFF ANY PROBLEMS, HOW DIFFICULT HAVE THESE MADE IT FOR YOU TO DO YOUR WORK, TAKE CARE OF THINGS AT HOME, OR GET ALONG WITH OTHER PEOPLE?: EXTREMELY DIFFICULT
5. BEING SO RESTLESS THAT IT IS HARD TO SIT STILL: MORE THAN HALF THE DAYS
2. NOT BEING ABLE TO STOP OR CONTROL WORRYING: NEARLY EVERY DAY
GAD7 TOTAL SCORE: 19
6. BECOMING EASILY ANNOYED OR IRRITABLE: MORE THAN HALF THE DAYS
7. FEELING AFRAID AS IF SOMETHING AWFUL MIGHT HAPPEN: NEARLY EVERY DAY
GAD7 TOTAL SCORE: 19
IF YOU CHECKED OFF ANY PROBLEMS ON THIS QUESTIONNAIRE, HOW DIFFICULT HAVE THESE PROBLEMS MADE IT FOR YOU TO DO YOUR WORK, TAKE CARE OF THINGS AT HOME, OR GET ALONG WITH OTHER PEOPLE: EXTREMELY DIFFICULT
4. TROUBLE RELAXING: NEARLY EVERY DAY

## 2024-07-23 ASSESSMENT — PATIENT HEALTH QUESTIONNAIRE - PHQ9
SUM OF ALL RESPONSES TO PHQ QUESTIONS 1-9: 22
SUM OF ALL RESPONSES TO PHQ QUESTIONS 1-9: 22
10. IF YOU CHECKED OFF ANY PROBLEMS, HOW DIFFICULT HAVE THESE PROBLEMS MADE IT FOR YOU TO DO YOUR WORK, TAKE CARE OF THINGS AT HOME, OR GET ALONG WITH OTHER PEOPLE: EXTREMELY DIFFICULT

## 2024-07-23 NOTE — PROGRESS NOTES
NAVIGGALINDO Clinician Contact & Progress Note  For Individual Resiliency Training (IRT)  A Part of the [facility] First Episode of Psychosis Program    NAVIGATE Enrollee: Navi Morales (1995)     MRN: [id number]  Date:  7/23/24  Diagnosis: Psychosis, unspecifed  Clinician: LILIANA Individual Resiliency Trainer, LEENA Lay     1. Type of contact: (majority of time spent)  IRT Session via telehealth  Mode of communication: [employer] (HIPAA compliant, secure platform). Patient consented verbally to this mode of therapy today.  Reason for telehealth: To reduce barriers in accessing services, due to but not limited to transportation, location, or scheduling reasons.    2. People  present:   Writer  Client: Yes     3. Length of Actual Contact: Start Time: 3:03; End Time: 4:01     4. Location of contact:  Originating Location (patient location): family home, located in [place], [place]  Distant Location (provider location): Home office, located in [place], [place], using appropriate privacy considerations and procedures    5. Did the client complete the home practice option(s) from the previous session: Not Applicable    6. Motivational Teaching Strategies:  Connect info and skills with personal goals  Promote hope and positive expectations  Explore pros and cons of change  Re-frame experiences in positive light    7. Educational Teaching Strategies:  Review of written material/education  Relate information to client's experience    8. CBT Teaching Strategies:  Reinforcement and shaping (positive feedback for steps towards goals and gains in knowledge & skills)    9. IRT Module(s) Addressed:  Module 3 - What is Psychosis    10. Techniques utilized:   Rudolph announced at beginning of session  Present new material  Summarize progress made in current session  Other techniques (please specify):   -Built rapport with patient by inviting him to share his experiences and discuss his concerns  -Clarified  "patient's feelings and perspectives  -Discussed crisis intervention plan and safety plan  -Elicited more details about current and recent circumstances  -Emotional support via active and reflective listening, responding to feelings etc.  -Normalized and validated feelings and experiences  -Provided verbal overview of First Episode Program NAVIGATE services  -Provided positive feedback and encouragement  -Provided psychoeducation related to psychosis and related concerns.   -Supportive counseling    11. Measures:    Mental Status Exam  Alertness: alert  and oriented  Behavior/Demeanor: cooperative and pleasant  Speech: regular rate and rhythm  Language: no obvious problem.   Mood: description consistent with euthymia  Thought Process/Associations: unremarkable  Thought Content:  Reports none;  Denies suicidal ideation and violent ideation  Perception:  Reports auditory hallucinations;  Denies none  Insight: adequate  Judgment: adequate for safety  Cognition: does  appear grossly intact; formal cognitive testing was not done    [place] Protocol Risk Identification  Not completed today.      12. Assessment/Progress Note:     Writer and client met for IRT visit via [employer]. Set agenda to check in, discuss transition plan and review things to share with new in-coming IRT. Client consented to this agenda. Reported current symptoms to include: AH, depression, anxiety but noted that they are \"alright, with the medication\". Explored symptoms and coping from a stress-vulnerability lens. Current stressors include: symptoms. In regards to medications, client reports medication adherence to the prescribed antipsychotic. Substance use: history of issues steming from substance use. Reports vaping tobacco and cannabis (delta 9) products as a coping strategy. Reports occasional alcohol use often in excess leading to \"blackouts\" and other concerns. Previously provided education about negative impact of substance use on symptoms of " psychosis and depression.       Client did identify urgent concerns to be addressed today - client was unable to open email and retrieve attachments previously sent. Writer and client spent time working on this and client was able to create login and password, record the password and open an email writer sent with an attachment that has breathing and coping strategies for Navi to print out and use.     Writer previously shared update about my practice (shared on 7/3) - discussed that this writer will be leaving [facility]. Today, Navi and writer briefly reviewed timeline of this writer ending work with [facility], acknowledged that this visit is the last visit with this writer, and discussed expectations over the coming few weeks. [facility] is in process of interviewing and hiring.  The on-boarding of new staff will then take a few weeks, which will lead to a pause on individual therapy until the new clinical is fully on-boarded. Noted that Navi will be scheduled to check in with LILIANA colleague Brandie Moran once in the month of August to bridge the pause on IRT - Navi was agreeable. Once the new staff starts, they will reach out to introduce themselves and discuss scheduling. During the pause, client continue to attend family visits, attend groups (Navi is not inclined to do so), and engage with SEE.  Family or clients can call [facility] at [phone], ask to speak to the LILIANA nurse(s), and share information or request a callback or check in with appropriate staff. At the end of the visit, writer expressed gratitude for being allowed to be a part of the care team, and wished client all the best in the future.    Reviewed topics of interest for this writer to share with incoming clinician. Discussed postive self talk and introduced idea of neutral self talk to challenge the inner critic.     Home practice identified as: Print out and hang up visual cue to use more deep breathing.     Writer used relational  and interpersonal approach to explore feelings, motivations, and behavior. Writer offered support, feedback, validation, and reinforced use of skills taught in IRT from modalities including cognitive behavioral therapy, psycho education, and skills training. Promoted understanding of their experiences of psychosis and how it impacts them in important areas of their life and in recovery goals. Reflected on client's strengths and resiliency factors and facilitated discussion on how these can assist in symptom management, recovery, and well-being.         13. Treatment Plan:     [facility] NAVIGATE Program IRT Treatment Plan Summary  A Part of the [facility] First Episode of Psychosis Program       Date of Initial IRT session: 11/9/23  Date of INTIAL Treatment Plan: 1/16/24  Last Review/Update Date: 7/9/24  Today's Date: 7/9/24  Next 90-Day Review Due:  10/8/24    The following represents REVIEWED treatment plan.    Individual Resiliency Training Treatment Goals     Measurable Objectives Interventions Target Dates & Discharge Criteria   Individual s Objectives    Patient's measurable goals  Reduce intensity of psychosis symptoms  Demonstrate understanding of symptoms regarding causes, treatment  Learn two skills to manage symptoms of psychosis      In Navi's own words:   I want to have stability, both physically and mentally.  IRT/Psychotherapy  -Psychoeducation  -Motivation interviewing  -CBT  -Behavioral activation    Coordination with other NAVIGATE Team Members:  - Supported Education and Employment  - Medication Management  - Family Education and Support  - Social Work Care Coordination Target date:   12-36 months from enrollment    Discharge criteria:  Marked and sustained symptom improvement     Navi demonstrates understanding of mental illness     Navi successfully implements strategies to cope with stressors and/or symptoms to mitigate risk for increase in symptom severity or relapse    Gains Made:  -Pt has been  "incorporating two skills (breath work and using cold exposure) to manage symptoms. He wants to keep practicing and gain additional competency in these skills.  -Pt has also achieved reduced intensity of symptoms, hopes to continue to reduce intensity.        Frequency of sessions and expected duration of treatment:   6-12 months of weekly IRT/Individual Psychotherapy followed by 12-24 months of biweekly or monthly IRT  1-4x per month Medication Management with Prescriber ongoing   Other services as client wishes to engage in them.     Participants in therapy plan:   Navi Morales    Support System: Parents (particularly his mother) and NAVIGATE providers    See signed Acknowledgement of Current Treatment Plan attached to this visit in patient chart (via \"consents\" tab).    Regulatory Guidelines for Updating Treatment Plan  [place] Medical Assistance: Reviewed & signed at least every 90 days  Medicare:  Update per policy      14. Plan/Referrals:     Today's visit was the last visit for this writer with this client. Writer previously informed client that my last day with be 7/26/24, discussed what we will spend our last visits going over. Client expressed understanding. Incoming new clinician will reach out to client/family to introduce self and discuss scheduling.     Billing for \"Interactive Complexity\"?    No      Answers submitted by the patient for this visit:  Patient Health Questionnaire (Submitted on 7/23/2024)  If you checked off any problems, how difficult have these problems made it for you to do your work, take care of things at home, or get along with other people?: Extremely difficult  PHQ9 TOTAL SCORE: 22  MELY-7 (Submitted on 7/23/2024)  MELY 7 TOTAL SCORE: 19    "

## 2024-08-01 ENCOUNTER — VIRTUAL VISIT (OUTPATIENT)
Dept: PSYCHIATRY | Facility: CLINIC | Age: 29
End: 2024-08-01
Payer: COMMERCIAL

## 2024-08-01 DIAGNOSIS — F29 PSYCHOSIS, UNSPECIFIED PSYCHOSIS TYPE (H): Primary | ICD-10-CM

## 2024-08-01 NOTE — PROGRESS NOTES
NAVIGATE/STRENGTHS Virtual Visit Progress Note  For Supported Employment & Education    NAVIGATE Enrollee: Navi Morales (1995)     MRN: [id number]  Date of Visit: 8/01/24  Contacted: ALICE and mom (ben)  Appointment Length: 60    Discussed:   Writer met with Navi Morales for virtual visit check-in. Reason for telehealth: To reduce barriers in accessing services, due to but not limited to transportation, location, or scheduling reasons. Meeting agenda included navi would like to learn more about organization skills for studying, note taking, interview skills.    Mom and navi are going to email [facility] and one other [facility] of [facility] choice to have them analyses Navis transcripts to see what transfers and then we will go over the results    We talked about accomodation for when he's ready to start school. We also reviewed the school success powerpoint    Follow-up: School results, what else would he like to go over?    Delmi Davis  NAVIGATE/STRENGTHS  Supported Employment & Education  Supported Employment & EducationThis is a non-billable encounter as it was solely for the purposes of outreach and/or care coordination. SEE services are non billable services

## 2024-08-02 ENCOUNTER — TELEPHONE (OUTPATIENT)
Dept: PSYCHIATRY | Facility: CLINIC | Age: 29
End: 2024-08-02

## 2024-08-02 NOTE — PROGRESS NOTES
NAVIGATE Clinician Contact & Progress Note   For Family Education Program    NAVIGATE Enrollee: Navi Morales (1995)     MRN: [id number]  Date:  8/01/24  Diagnosis(es):   Psychosis unspecified  Clinician: LILIANA Family Clinician, Laura Maldonado MA, LP     1. Type of contact: (majority of time spent)  Family Session via telehealth  Mode of communication:    Linwood Verduzco consented verbally to this mode of therapy today.  Reason for telehealth: To reduce barriers in accessing services, due to but not limited to transportation, location, or scheduling reasons.     2. People present:   Writer  Client: Yes  Significant Other/Family/Friend:  Mother    3. Length of Actual Contact: Start Time: 2:05pm to 2:55pm     4. Location of contact:  Originating Location (patient location):[place]   Distant Location (provider location): Home office, located in [place], [place], using appropriate privacy considerations and procedures    5. Did the client complete the home practice option(s) from the previous session: Partially Completed    6. Motivational Teaching Strategies:  Connect info and skills with personal goals  Promote hope and positive expectations    7. Educational Teaching Strategies:  Review of written material/education  Relate information to client's experience  Ask questions to check comprehension  Break down information into small chunks    8. CBT Teaching Strategies:  Reinforcement and shaping (positive feedback for steps towards goals and gains in knowledge & skills)    9. Psychoeducational Topic(s) Addressed:  Just the Facts - Psychosis    10. Techniques utilized:   Mobile announced at beginning of session  Review of previous meeting  Present new material  Family Therapy  Motivational Interviewing (Connect info and skills with personal goals, Promote hope and positive expectations, Explore pros and cons of change, Re-frame experiences in a positive light)  Educational Teaching  "Strategies (Review written material/education on: Psychosis, Medications for psychosis, Coping with stress, Strategies to build resiliency, Relapse prevention planning, Developing a collaboration with mental health professionals, Effective communication, A relative s guide to supporting recovery from psychosis, Basic facts about alcohol and drugs)  CBT (Reinforcement and shaping, Social skills training, Relapse prevention planning, Coping skills training, Relaxation training, Cognitive restructuring, Behavioral tailoring)  11. Assessment/Progress Note:     Writer met with Navi and Navi's mom on this day via [employer].  Writer set agenda to discuss how Navi and mom have been doing in the last week or so, review engagement in NAVIGATE, and to continue education on psychosis. Writer met briefly with Navi while mom attended to their puppy.    Navi reported that he has been feeling \"pretty good.\" He states that his symptoms have continued to improve. Navi smiled appropriately in this appointment  and his mood appeared hopeful and positive for the future. Navi noted some stress related to improvement in symptoms and saying to himself, \"Now what?\"     Provided empathy and validation that it can be overwhelming to think of all of the options for his day and the future. Explored Navi's ideas for activities he would like to engage in and the things that he thinks he \"should\" engage in. Navi reported that he knows he should look for work and/or register for classes. Navi also reports pressure to help around the house and help with their dog.     Explored idea of taking one step at a time while having neutral self talk. Navi was open to this plan. Explored ideas for first steps and Navi noted that going to the store with mom could be a good experiment to see how he feels in a public setting. Also explored taking a walk in an area where feels comfortable if only for a couple minutes. Also mentioned idea of attending DBT and Navi " agreed to think about this as an option.    Neither mom nor Navi report urgent concerns on this day. Provided supportive listening and reinforced Navi's ongoing participation in family sessions as a continuing sign of his recovery.     Discussed some stressors related to puppy having accidents. Mom voiced support and gratitude for Navi's help.     Discussed how Navi had been doing since Auralie leaving and he described meeting with Delmi for SEE support and also plan to meet with Brandie Moran in August. Navi appeared agreeable to this plan. Reviewed to call general clinic number if symptoms increased before next appointment.    Overall family seemed very engaged in conversation. They did express interest in continuing to meet for family therapy and psychoeducation. As of today's appt their insight into Navi's mental illness appears fair. They seem they would benefit from continued clinical intervention aimed at assisting them implement helpful strategies at home and increase their understanding of psychosis.    12. Plan/Referrals:     Will meet with family weekly as schedule allows for evidence based family psychoeducation and therapeutic support aimed at maximizing Navi's opportunity for recovery from psychosis.     Laura Maldonado MA, LP   NAVIGATE   The author of this note documented a reason for not sharing it with the patient.

## 2024-08-02 NOTE — TELEPHONE ENCOUNTER
Outgoing Telephone Call/Email/Message  For Care Coordination  First Episode Psychosis Programs    Patient Name: Navi Morales (1995)     MRN: [id number]  Type of Contact: phone  Date of Contact: 8/2/2024  Contacted/by/patient relation: Navi  Contact Length: na    Discussed:   Writer called Navi and left message to schedule  psychotherapy appointment as writer is interim coverage for LEENA Lay who recently left organization. Provided contact information, cell phone details and availability      Follow up needed:  MERCEDES Land

## 2024-08-08 ENCOUNTER — VIRTUAL VISIT (OUTPATIENT)
Dept: PSYCHIATRY | Facility: CLINIC | Age: 29
End: 2024-08-08
Payer: COMMERCIAL

## 2024-08-08 ENCOUNTER — TELEPHONE (OUTPATIENT)
Dept: PSYCHIATRY | Facility: CLINIC | Age: 29
End: 2024-08-08

## 2024-08-08 DIAGNOSIS — F29 PSYCHOSIS, UNSPECIFIED PSYCHOSIS TYPE (H): Primary | ICD-10-CM

## 2024-08-08 NOTE — TELEPHONE ENCOUNTER
Below is the email exchange with mom. The most recent email is my response and mom's initial email is further below.     ____________    Sent 8/8/24    Vern Dunn,    Thank you so much for this information. I will be sure to pass on your concerns regarding Navi's sleep to the team.     This sounds like a very challenging position for you. I hear you trying to so hard to be supportive, but also feeling like you need additional help due to your increased work stress. I think you are right that it is not clear what Navi is able to do at this time. I also hear that when you are under stress with work it is especially hard when Navi doesn't complete household tasks.     Perhaps, we could talk more openly today about specific requests you have of Navi (maybe to start keep it minimal, like 2 requests). He may or may not be able to complete them, but you can always make the request.     Today I could provide some education about making a positive request for both of you and I could ask both of you if you have any requests of the other? Perhaps we could work in your concerns in this way?     I do think managing expectations of loved ones experiencing psychosis if one of the most difficult things for family, and while there isn't an easy answer for this I hope it is helpful to hear that you are not alone.    Laura Goldstein        From: [email or web address] <[email or web address]>   Sent: Thursday, August 8, 2024 11:31 AM  To: Laura Maldonado <[email or web address]>  Subject: Appointment today    This message was sent securely using [employer]  Vern Sanchez, I wanted to send you a message ahead of our appointment today with Navi.?  Since last time I had concerns about Navi sleeping through his appt. and he got so worked up about it, I wanted  [email or web address]     ATTENTION: This email was sent to your  Physicians account from an external source. Please use extra caution before clicking links, opening  attachments, or replying to or forwarding this email.          [id number]  This message was sent securely using [employer] Laura, I wanted to send you a message ahead of our appointment today with Navi.  Since last time I had concerns about Navi sleeping through his appt. and he got so worked up about it, I wanted to fill you in on some concerns I have now.  First of all, we had a meeting with Delmi Davis last week and determined that Navi should send his transcript to [facility] to see what credits of his will transfer.  He did that immediately (great!), but then they replied and told him that he would have to apply to [employer] and as far as I know, he hasn't yet.  I have asked him to do so twice. no response.  He is still on an a backwards sleep schedule, and I have to wake him early to watch the dog if I have something earlier than noon scheduled.  He says to wake him, but then he is crabby when I do.  He just could do more around the house.  Empty garbage when it is full, put dishes away, clean the coffee pot after using it.  Also, he doesn't communicate much with me anymore via text about how the dog is doing etc. only if he wants something, like fast food.  I am just frustrated.  I am going to have a VERY busy Medicare open enrollment in Oct. - Dec. busier than usual, because there are lot of changes coming. Plus I have been busy with many new referrals and I just need more help at home and don't want to push him too much and that's where I struggle.  How much is too much? That's just what I wanted you to know before our call today.      Thanks,  Mona

## 2024-08-08 NOTE — Clinical Note
LYDIA regarding my family visit yesterday which was predominantly with Donnie Diego, it sounds like he would really benefit from meeting with you. Perhaps, I could communicate with mom when you plan to call him so he will be more likely to respond? Also I think he is sleeping most of the day, so he might be hard to reach via phone?  Let's talk when you have time! Thanks! Laura

## 2024-08-09 NOTE — PROGRESS NOTES
NAVIGATE Clinician Contact & Progress Note   For Family Education Program    NAVIGATE Enrollee: Navi Morales (1995)     MRN: [id number]  Date:  8/08/24  Diagnosis(es):   Psychosis unspecified  Clinician: [facility] Clinician, Laura Maldonado MA, SELWYN     1. Type of contact: (majority of time spent)  Family Session via telehealth  Mode of communication:    Linwood Verduzco consented verbally to this mode of therapy today.  Reason for telehealth: To reduce barriers in accessing services, due to but not limited to transportation, location, or scheduling reasons.     2. People present:   Writer  Client: Yes  Significant Other/Family/Friend:  Mother    3. Length of Actual Contact: Start Time: 2:00pm to 3:05pm     4. Location of contact:  Originating Location (patient location):[place]   Distant Location (provider location): Home office, located in [place], [place], using appropriate privacy considerations and procedures    5. Did the client complete the home practice option(s) from the previous session: Partially Completed    6. Motivational Teaching Strategies:  Connect info and skills with personal goals  Promote hope and positive expectations    7. Educational Teaching Strategies:  Review of written material/education  Relate information to client's experience  Ask questions to check comprehension  Break down information into small chunks    8. CBT Teaching Strategies:  Reinforcement and shaping (positive feedback for steps towards goals and gains in knowledge & skills)    9. Psychoeducational Topic(s) Addressed:  Just the Facts - Psychosis    10. Techniques utilized:   [place] announced at beginning of session  Review of previous meeting  Present new material  Family Therapy  Motivational Interviewing (Connect info and skills with personal goals, Promote hope and positive expectations, Explore pros and cons of change, Re-frame experiences in a positive light)  Educational Teaching  "Strategies (Review written material/education on: Psychosis, Medications for psychosis, Coping with stress, Strategies to build resiliency, Relapse prevention planning, Developing a collaboration with mental health professionals, Effective communication, A relative s guide to supporting recovery from psychosis, Basic facts about alcohol and drugs)  CBT (Reinforcement and shaping, Social skills training, Relapse prevention planning, Coping skills training, Relaxation training, Cognitive restructuring, Behavioral tailoring)  [place] Protocol Risk Identification  1) Have you wished you were dead or wished you could go to sleep and not wake up? yes  2) Have you actually had any thoughts about killing yourself?yes  If YES to 2, answer questions 3, 4, 5, 6  If NO to 2, go directly to question 6  3) Have you thought about how you might do this? yes  4) Have you had any intension of acting on these thoughts of killing yourself, as opposed to you have the thoughts but you definitely would not act on them? Yes, in the future (not today)  5) Have you started to work out or worked out the details of how to kill yourself? Do you intend to carry out this plan? No   Always Ask Question 6  6) Have you done anything, started to do anything, or prepared to do anything to end your life? No  Examples: collected pills, obtained a gun, gave away valuables, wrote a will or suicide note, held a gun but changed your mind, cut yourself, tried to hang yourself, etc.    11. Assessment/Progress Note:     Writer met with Navi and Navi's mom on this day via [facility].  Writer set agenda to check-in with Navi as he has not had individual therapy since his IRT left the clinic and to provide family education. Writer met with Navi for 45 minutes and then with mom for 20 minutes.    Navi appears tired and reports that he 'just got up.\" He notes that his symptoms have not been \"bad\" overall, but have been worse the previous couple days. Navi reports " "drinking a liter of whiskey over the course of two days (this was approximately five days ago). Navi is unsure if the alcohol impacts his mood. Navi also reports using THC daily. Inquire about mood changes and complete [place] Risk Protocol. See above. Navi endorses SI and as well as some intention and desire to complete suicide. Navi has not taken action towards a plan of hanging or poisoning; however, Navi reports that he could \"find something\" at his home if he wanted to end his life.     Writer reflects the change in Navi's mood and hopelessness from last week's visit. Navi reports very matter of factly that he plans to pursue gathering a rope and poison \"once I'm feeling better and have energy\" so he is prepared to end his life when his symptoms get bad. Navi denies any current plan to end his life at this time due to his symptoms being manageable and is agreeable to developing a plan to stay safe on this day.     Navi reports that when his symptoms are bad that they are intolerable and he wants to be prepared in the future. Writer empathizes with Navi's hopelessness and explore factors that do give him some hope for the future. Navi has difficulty identifying factors for which he is hopeful. Discuss support of mom as a possible benefit and Navi is mildly agreeable.    Writer voices concern over Navi's worsening mood and SI. Navi reports that he attributes these changes to not having therapy recently due to provider leaving the clinic. Writer recommends that Navi seek additional support on this day via [facility]. Navi is initially reluctant to including mom in safety planning, but does agree with writer's support.     Meet with mom and Navi and discuss concerns regarding Navi's safety. Navi reports that he is feeling more down and hopeless. Navi is open to seeking more support. Writer provides information about [facility] and recommends that Mom take Navi to [facility] for support and further assessment on this day. Mom " is agreeable to talking more with Navi about going to [facility] as Navi is not committed to seeking this support. Writer also encourages mom to consider means restriction by removing sharps and medications. Mom reports that she has done this in the past. Writer also discusses resources to access if Navi's symptoms worsen and/or if is needing immediate support (calling 911 or going to the ER).     Writer asked mom to notify clinic of any updates and that writer will talk to  about Navi accessing more support in the interim between clinicians.     Overall family and Navi seemed engaged in conversation. They did express interest in continuing to meet for family therapy and psychoeducation. As of today's appt their insight into Navi's mental illness appears fair. They seem they would benefit from continued clinical intervention aimed at assisting them implement helpful strategies at home and increase their understanding of psychosis.    12. Plan/Referrals:     Will meet with family weekly as schedule allows for evidence based family psychoeducation and therapeutic support aimed at maximizing Navi's opportunity for recovery from psychosis.     Laura Maldonado MA, LP   NAVIGATE   The author of this note documented a reason for not sharing it with the patient.

## 2024-08-12 ENCOUNTER — TELEPHONE (OUTPATIENT)
Dept: PSYCHIATRY | Facility: CLINIC | Age: 29
End: 2024-08-12

## 2024-08-12 DIAGNOSIS — F29 PSYCHOSIS, UNSPECIFIED PSYCHOSIS TYPE (H): Primary | ICD-10-CM

## 2024-08-12 NOTE — TELEPHONE ENCOUNTER
Outgoing Telephone Call/Email/Message  For Care Coordination  First Episode Psychosis Programs    Patient Name: Navi Morales (1995)     MRN: [id number]  Type of Contact: text  Date of Contact: 8/12/2024  Contacted/by/patient relation: Navi  Contact Length: na    Discussed:   Texted Navi again for outreach to schedule IRT appt.     Follow up needed:  [place]

## 2024-08-14 ENCOUNTER — VIRTUAL VISIT (OUTPATIENT)
Dept: PSYCHIATRY | Facility: CLINIC | Age: 29
End: 2024-08-14
Payer: COMMERCIAL

## 2024-08-14 DIAGNOSIS — F25.1 SCHIZOAFFECTIVE DISORDER, DEPRESSIVE TYPE (H): Primary | ICD-10-CM

## 2024-08-14 DIAGNOSIS — F29 PSYCHOSIS, UNSPECIFIED PSYCHOSIS TYPE (H): ICD-10-CM

## 2024-08-14 DIAGNOSIS — F10.90 ALCOHOL USE DISORDER: ICD-10-CM

## 2024-08-14 RX ORDER — NALTREXONE HYDROCHLORIDE 50 MG/1
50 TABLET, FILM COATED ORAL DAILY
Qty: 30 TABLET | Refills: 2 | Status: SHIPPED | OUTPATIENT
Start: 2024-08-14

## 2024-08-14 RX ORDER — OLANZAPINE 15 MG/1
30 TABLET ORAL AT BEDTIME
Qty: 180 TABLET | Refills: 2 | Status: SHIPPED | OUTPATIENT
Start: 2024-08-14

## 2024-08-14 ASSESSMENT — ANXIETY QUESTIONNAIRES
2. NOT BEING ABLE TO STOP OR CONTROL WORRYING: NEARLY EVERY DAY
GAD7 TOTAL SCORE: 17
6. BECOMING EASILY ANNOYED OR IRRITABLE: MORE THAN HALF THE DAYS
IF YOU CHECKED OFF ANY PROBLEMS ON THIS QUESTIONNAIRE, HOW DIFFICULT HAVE THESE PROBLEMS MADE IT FOR YOU TO DO YOUR WORK, TAKE CARE OF THINGS AT HOME, OR GET ALONG WITH OTHER PEOPLE: VERY DIFFICULT
GAD7 TOTAL SCORE: 17
GAD7 TOTAL SCORE: 17
6. BECOMING EASILY ANNOYED OR IRRITABLE: MORE THAN HALF THE DAYS
3. WORRYING TOO MUCH ABOUT DIFFERENT THINGS: MORE THAN HALF THE DAYS
8. IF YOU CHECKED OFF ANY PROBLEMS, HOW DIFFICULT HAVE THESE MADE IT FOR YOU TO DO YOUR WORK, TAKE CARE OF THINGS AT HOME, OR GET ALONG WITH OTHER PEOPLE?: VERY DIFFICULT
1. FEELING NERVOUS, ANXIOUS, OR ON EDGE: NEARLY EVERY DAY
IF YOU CHECKED OFF ANY PROBLEMS ON THIS QUESTIONNAIRE, HOW DIFFICULT HAVE THESE PROBLEMS MADE IT FOR YOU TO DO YOUR WORK, TAKE CARE OF THINGS AT HOME, OR GET ALONG WITH OTHER PEOPLE: VERY DIFFICULT
7. FEELING AFRAID AS IF SOMETHING AWFUL MIGHT HAPPEN: MORE THAN HALF THE DAYS
8. IF YOU CHECKED OFF ANY PROBLEMS, HOW DIFFICULT HAVE THESE MADE IT FOR YOU TO DO YOUR WORK, TAKE CARE OF THINGS AT HOME, OR GET ALONG WITH OTHER PEOPLE?: VERY DIFFICULT
5. BEING SO RESTLESS THAT IT IS HARD TO SIT STILL: MORE THAN HALF THE DAYS
7. FEELING AFRAID AS IF SOMETHING AWFUL MIGHT HAPPEN: MORE THAN HALF THE DAYS
GAD7 TOTAL SCORE: 17
GAD7 TOTAL SCORE: 17
7. FEELING AFRAID AS IF SOMETHING AWFUL MIGHT HAPPEN: MORE THAN HALF THE DAYS
GAD7 TOTAL SCORE: 17
GAD7 TOTAL SCORE: 17
4. TROUBLE RELAXING: NEARLY EVERY DAY
5. BEING SO RESTLESS THAT IT IS HARD TO SIT STILL: MORE THAN HALF THE DAYS
3. WORRYING TOO MUCH ABOUT DIFFERENT THINGS: MORE THAN HALF THE DAYS
GAD7 TOTAL SCORE: 17
GAD7 TOTAL SCORE: 17
7. FEELING AFRAID AS IF SOMETHING AWFUL MIGHT HAPPEN: MORE THAN HALF THE DAYS
4. TROUBLE RELAXING: NEARLY EVERY DAY
8. IF YOU CHECKED OFF ANY PROBLEMS, HOW DIFFICULT HAVE THESE MADE IT FOR YOU TO DO YOUR WORK, TAKE CARE OF THINGS AT HOME, OR GET ALONG WITH OTHER PEOPLE?: VERY DIFFICULT
1. FEELING NERVOUS, ANXIOUS, OR ON EDGE: NEARLY EVERY DAY
IF YOU CHECKED OFF ANY PROBLEMS ON THIS QUESTIONNAIRE, HOW DIFFICULT HAVE THESE PROBLEMS MADE IT FOR YOU TO DO YOUR WORK, TAKE CARE OF THINGS AT HOME, OR GET ALONG WITH OTHER PEOPLE: VERY DIFFICULT
2. NOT BEING ABLE TO STOP OR CONTROL WORRYING: NEARLY EVERY DAY
5. BEING SO RESTLESS THAT IT IS HARD TO SIT STILL: MORE THAN HALF THE DAYS
1. FEELING NERVOUS, ANXIOUS, OR ON EDGE: NEARLY EVERY DAY
3. WORRYING TOO MUCH ABOUT DIFFERENT THINGS: MORE THAN HALF THE DAYS
4. TROUBLE RELAXING: NEARLY EVERY DAY
7. FEELING AFRAID AS IF SOMETHING AWFUL MIGHT HAPPEN: MORE THAN HALF THE DAYS
7. FEELING AFRAID AS IF SOMETHING AWFUL MIGHT HAPPEN: MORE THAN HALF THE DAYS
6. BECOMING EASILY ANNOYED OR IRRITABLE: MORE THAN HALF THE DAYS
2. NOT BEING ABLE TO STOP OR CONTROL WORRYING: NEARLY EVERY DAY

## 2024-08-14 ASSESSMENT — PATIENT HEALTH QUESTIONNAIRE - PHQ9
SUM OF ALL RESPONSES TO PHQ QUESTIONS 1-9: 21
SUM OF ALL RESPONSES TO PHQ QUESTIONS 1-9: 21
10. IF YOU CHECKED OFF ANY PROBLEMS, HOW DIFFICULT HAVE THESE PROBLEMS MADE IT FOR YOU TO DO YOUR WORK, TAKE CARE OF THINGS AT HOME, OR GET ALONG WITH OTHER PEOPLE: VERY DIFFICULT
10. IF YOU CHECKED OFF ANY PROBLEMS, HOW DIFFICULT HAVE THESE PROBLEMS MADE IT FOR YOU TO DO YOUR WORK, TAKE CARE OF THINGS AT HOME, OR GET ALONG WITH OTHER PEOPLE: VERY DIFFICULT
SUM OF ALL RESPONSES TO PHQ QUESTIONS 1-9: 21
SUM OF ALL RESPONSES TO PHQ QUESTIONS 1-9: 21
10. IF YOU CHECKED OFF ANY PROBLEMS, HOW DIFFICULT HAVE THESE PROBLEMS MADE IT FOR YOU TO DO YOUR WORK, TAKE CARE OF THINGS AT HOME, OR GET ALONG WITH OTHER PEOPLE: VERY DIFFICULT
SUM OF ALL RESPONSES TO PHQ QUESTIONS 1-9: 21
SUM OF ALL RESPONSES TO PHQ QUESTIONS 1-9: 21

## 2024-08-14 NOTE — PROGRESS NOTES
Is the patient currently in the state of [place]?yes     If the video visit is dropped, the invitation should be resent by: Send to e-mail at: [email or web address]    Will anyone else be joining the visit? No  {If patient encounters technical issues they should call [phone]}    How would you like to obtain your AVS? Jeanniehart    The following updates are needed to allergies and medications:  Medications patient is taking and are not on the medication list: no  Allergies that need to be added to patient's chart: no    Additional patient comments or concerns: just everything is recurring.      NAVIGATE Patient Self-Rating Form    Since your last medication management visit--    Have you been feeling depressed, sad, or down? Yes  Have you been feeling anxious, worried or nervous? Yes  Have you been thinking about death or have you had any feelings that you would be better off dead? Yes   Have you been feeling particularly good? No  Have you been feeling annoyed, angry, or resentful (whether you showed it or not)? Yes  Did you do anything that could have gotten you in trouble? No  Have you felt dizzy or faint? No  Have you had blurred vision? No  Have you had dry mouth? Yes  Have you had too much saliva in your mouth or had drooling? No  Have you felt nauseous? No  Have you been constipated? Yes  Has you appetite for food been increased? Yes  Have you gained weight? Yes  Have you lost weight? No  Have you felt restless or like you cannot sit still? No  Any shaking of your hands, legs, or other muscles? No  Any problems walking or moving or any problems feeling stiff or rigid? No  Have you felt tired or fatigued? Yes  Have you felt drowsy during the day? Yes  Have you been sleeping too much at night? Yes  Have you been sleeping too little or had problems sleeping at night? Yes  Any decrease in your interest in sex? Yes  Any other problems with sex? Yes  Any problems with your breasts such as swelling or discharge?  N/A  For women, any problems with your period? N/A  Are there other medical or side effect problems you wish to discuss with your prescriber? No  Since your last visit, how many days have you not taken your medication? 4  Have you had trouble remembering to take your medication? No  Do you find the number of medicines or the times when you are supposed to take then confusing or burdensome? No  Are you afraid of the medication? No  Do you think that you have an illness that requires taking medication? Yes  Do you think that other people would think poorly of you if they knew that you take medication? Yes  On average, how many cigarettes do you smoke per day? Vape daily   Since you last visit, did you drink alcohol? Yes  Since your last visit, have you used any marijuana? Yes  Since your last visit, have you used any stress drugs other than marijuana? No  Between now and your next visit, do you think we should keep your medication the same or consider changing the medications? same        Answers submitted by the patient for this visit:  Patient Health Questionnaire (Submitted on 8/14/2024)  If you checked off any problems, how difficult have these problems made it for you to do your work, take care of things at home, or get along with other people?: Very difficult  PHQ9 TOTAL SCORE: 21  MELY-7 (Submitted on 8/14/2024)  MELY 7 TOTAL SCORE: 17

## 2024-08-14 NOTE — PROGRESS NOTES
"Virtual Visit Details  Type of service:  Video Visit   Video Start Time: 4:15pm  Video End Time: 4:45pm  Originating Location (patient location): Home  Distant Location (provider location):  On-site  Platform used for Video Visit: [employer]    [employer] Medication Management Progress Note  A Part of the [facility] First Episode of Psychosis Program    NAVIGATE Enrollee: Navi Morales (1995)     MRN: [id number]  Date:  24         Contributors to the Assessment     Chart Reviewed.   Interview completed with Navi Morales.  Collateral information obtained from none.         Interim History      Navi Morales is a 29 year old male who was last seen in [facility] clinic on 3/18/24 at which time cross-taper from quetiapine to olanzapine was continued. The patient reports good treatment adherence. History was provided by Navi who was a good historian.  Since the last visit:  - Wouldn't have many triggers to drink, but just would drink on the weekends--a liter or two over the course of a weekend, self-medicating anxiety but also looking for euphoria, \"goes from mild buzz to blackout.\"  - Can't do specific exercises because of his fibromyalgia pain  - Naltrexone right before he is supposed to go to sleep. Might be working he thinks. Could drink more, but   - THC and nicotine - anxiety is what makes him reach for the THC vape as opposed to nicotine. Feels like he \"doesn't need to be that keyed up.\"  - Perspective that trying   - Ways to build a schedule/structure   - wants to do something other than exercise--could probably   - Got [employer] and mom got a cookie leches cake  - suicide thoughts--feels like they have been less than usual, because the voices have been less  - slips in here and there, couple times per day, ***  - ***    PSYCH ROS:  Clinician Rating Form in COMPASS  1. Depressed Mood: Ratin  0 Not reported     1 Very mild occasionally feels sad or  down ; of questionable clinical significance   2 Mild occasionally feels " moderately depressed or often feels sad or  down    3 Moderate occasionally feels very depressed or often feels moderately depressed   4 Moderately severe often feels very depressed   5 Severe feels very depressed most of the time   6 Very severe constant extremely painful feelings of depression   U Unable to assess        2. Anxiety/Worry: Ratin  0 Not reported     1 Very mild occasionally feels a little anxious; of questionable clinical significance   2 Mild occasionally feels moderately anxious or often feels a little anxious or worried   3 Moderate occasionally feels very anxious or often feels moderately anxious   4 Moderately severe often feels very anxious or often feels moderately anxious   5 Severe feels very anxious or worried most of the time   6 Very severe patient is continually preoccupied with severe anxiety   U Unable to assess        3. Suicidal Ideation/Behavior: Ratin          0 Not reported     1 Very mild occasional thoughts of dying,  I d be better off dead  or  I wish I were dead    2 Mild frequents thoughts of dying or occasional thoughts of killing self, without plan or method   3 Moderate often thinks of suicide or has though of a specific method   4 Moderately severe has mentally rehearsed a specific method of suicide or has made a suicide attempt with questionable intent to die (e.r. takes aspirins and then tells family)   5 Severe has made preparations for a potentially lethal suicide attempt (e.g acquires a gun and bullets for an attempt)   6 Very severe has made a suicide attempt with an intent to die   U Unable to assess                      4. Elevated/Expansive Mood: Ratin  0 Not at all     1 Very mild questionable; more cheerful than most people in his/her circumstances but of only possible clinical significance   2 Mild brief elevated/expansive mood but only somewhat out of proportion to the circumstances   3 Moderate brief/occasional elevation of mood which is clearly  out of proportion to the circumstances   4 Moderately severe sustained/frequent elevation of mood which is clearly out of proportion to the circumstances   5 Severe mood is euphoric most of the time   6 Very severe sustained elevation;  everything is wonderful  almost all of the time   U Unable to assess        5. Hostility/Anger/Irritability/Aggressiveness: Ratin  0 Not at all     1 Very mild occasional irritability of doubtful clinical significance   2 Mild occasionally feels angry or mild or indirect expressions of anger, e.g. sarcasm, disrespect or hostile gestures   3 Moderate frequently feels angry, frequent irritability or occasional direct expression of anger, e.g. yelling at others   4 Moderately severe often feels very angry, often yells at others or occasionally threatens to harm others   5 Severe has acted on his anger by becoming physically abusive on one or two occasions or makes frequent threats to harm others or is very angry most of the time   6 Very severe has been physically aggressive and/or required intervention to prevent assaultiveness on several occasions; or any serious assaultive act   U Unable to assess        6. Impulsive Behavior: Ratin  0 Not at all     1 Very mild one instance of impulsive behavior which is of doubtful clinical significance   2 Mild occasional impulsive acts, e.g. making phone calls at odd hours   3 Moderate occasional impulsive acts with some potential negative consequence, e.g. leaving work abruptly; changing plans without thinking   4 Moderately severe impulsive acts with definite negative consequences, e.g. overspending on non-essentials; repeated reckless sexual behavior   5 Severe impulsive acts with direct negative consequences, e.g. spends entire income on nonessentials without regard for basic needs   6 Very severe impulsive behavior which is potentially life threatening, e.g. jumps from dangerous height (without suicidal intent) or criminal behavior,  e.g. impulsive robbery   U Unable to assess        7. Suspiciousness: Ratin  0 Not present     1 Very mild Seems on guard. Reluctant to respond to some  personal  questions. Reports being overly self-conscious in public   2 Mild Describes incidents in which others have harmed or wanted to harm him/her that sound plausible. Patient feels as if others are watching, laughing, or criticizing him/her in public, but this occurs only occasionally or rarely. Little or no preoccupation   3 Moderate Says others are talking about him/her maliciously, have negative intentions, or may harm him/her. Beyond the likelihood of plausibility, but not delusional. Incidents of suspected persecution occur occasionally (less than once per week) with some preoccupation   4 Moderately severe Same as 3, but incidents occur frequently such as more than once a week. Patient is moderately preoccupied with ideas of persecution OR patient reports persecutory delusions expressed with much doubt (e.g. partial delusion)   5 Severe Delusional -- speaks of Socialcam[employer] plots, the FBI, or others poisoning his/her food, persecution by supernatural forces   6 Extremely severe Same as 5, but the beliefs are bizarre or more preoccupying. Patient tends to disclose or act on persecutory delusions.   U Unable to assess        8. Unusual Thought Content: Ratin  0 Not present     1 Very mild Ideas of reference (people may stare or may laugh at him), ideas of persecution (people may mistreat him). Unusual beliefs in psychic tyler, spirits, UFOs, or unrealistic beliefs in one's own abilities. Not strongly held. Some doubt   2 Mild Same as 1, but degree of reality distortion is more severe as indicated by highly unusual ideas or greater conviction. Content may be typical of delusions (even bizarre), but without full conviction. The delusion does not seem to have fully formed, but is considered as one possible explanation for an unusual experience   3 Moderate  Delusion present but no preoccupation or functional impairment. May be an encapsulated delusion or a firmly endorsed absurd belief about past delusional circumstances   4 Moderately severe Full delusion(s) present with some preoccupation OR some areas of functioning disrupted by delusional thinking   5 Severe Full delusion(s) present with much preoccupation OR many areas of functioning are disrupted by delusional thinking   6 Extremely severe Full delusions present with almost   U Unable to assess        9. Hallucinations: Ratin  0 Not present     1 Very mild While resting or going to sleep, sees visions, smells odors, or hears voices, sounds or whispers in the absence of external stimulation, but no impairment in functioning   2 Mild While in a clear state of consciousness, hears a voice calling the subject s name, experiences non-verbal auditory hallucinations (e.g., sounds or whispers), formless visual hallucinations, or has sensory experiences in the presence of a modality-relevant stimulus (e.g., visual illusions) infrequently (e.g., 1-2 times per week) and with no functional impairment   3 Moderate Occasional verbal, visual, gustatory, olfactory, or tactile hallucinations with no functional impairment OR non-verbal auditory hallucinations/visual illusions more than infrequently or with impairment   4 Moderately severe Experiences daily hallucinations OR some areas of functioning are disrupted by hallucinations   5 Severe Experiences verbal or visual hallucinations several times a day OR many areas of functioning are disrupted by these hallucinations   6 Extremely severe Persistent verbal or visual hallucinations throughout the day OR most areas of functioning are disrupted by these hallucinations   U Unable to assess        10. Conceptual Disorganization: Ratin  0 Not present     1 Very mild Peculiar use of words or rambling but speech is comprehensible   2 Mild Speech a bit hard to understand or make  sense of due to tangentiality, circumstantiality, or sudden topic shifts   3 Moderate Speech difficult to understand due to tangentiality, circumstantiality, idiosyncratic speech, or topic shifts on many occasions OR 1-2 instances of incoherent phrases   4 Moderately severe Speech difficult to understand due to circumstantiality, tangentiality, neologisms, blocking, or topic shifts most of the time OR 3-5 instances of incoherent phrases   5 Severe Speech is incomprehensible due to severe impairments most of the time. Many PSRS items cannot be rated by self-report alone   6 Extremely severe Speech is incomprehensible throughout interview   U Unable to assess        11. Avolition/Apathy: Ratin  0 Not at all     1 Very mild questionable decrease in time spent in goal-directed activities   2 Mild spends less time in goal-directed activities than is appropriate for situation and age   3 Moderate initiates activities at times but does not follow through   4 Moderately severe rarely initiates activity but will passively engage with encouragement   5 Severe almost never initiates activities; requires assistance to accomplish basic activities   6 Very severe does not initiate or persist in any goal-directed activity even with outside assistance   U Unable to assess        12. Asociality/Low Social Drive: Ratin  0 Not at all     1 Very mild questionable   2 Mild slow to initiate social interactions but usually responds to overtures by others   3 Moderate rarely initiates social interactions; sometimes responds to overtures by others   4 Moderately severe does not initiate but sometimes responds to overtures by others; little social interaction outside close family members   5 Severe never initiates and rarely encourages conversations or activities; avoids being with others unless prodded, may have contacts with family   6 Very severe avoids being with others (even family members) whenever possible, extreme social  isolation   U Unable to assess        13. Adherence: Days: 4  The longest, continuous amount of time in days, since the last visit when the subject did not take medication      14. EPS Part I: Rating: U  Rate Elbow Rigidity for all subjects  0 Normal   1 Slight stiffness and resistance   2 Moderate stiffness and resistance   3 Marked rigidity with difficulty in passive movement   4 Extreme stiffness and rigidity with almost a frozen joint   U Unable to assess            EPS Part 2: Signs of EPS: 0  Are there are other signs of EPS (eg diminished arm swing, postural instability, cogwheeling, tremor, akinesia) present based upon patient report or exam?  0 No   1 Yes      15. Akathisia: Ratin  0 No restlessness reported or observed   1 Mild restlessness observed; e.g., occasional jiggling of the foot occurs when subject is seated   2 Moderate restlessness observed; e.g., on several occasions, jiggles foot, crosses and uncrosses legs or twists a part of the body   3 Restlessness is frequently observed; e.g., the foot or legs moving most of the time   4 Restlessness persistently observed; e.g., subject cannot sit still, may get up and walk   U Unable to assess      16. Dyskinetic Movement Ratings: Ratin  0 None   1 Minimal, may be extreme normal   2 Mild   3 Moderate   4 Severe   U Unable to assess      SIDE EFFECT ASSESSMENT:  Clinician Rating Form in COMPASS - Side Effect Assessment  Address side effects reported by the patient and rate using this scale  0 Not present   1 Minimal, may be extreme normal   2 Mild   3 Moderate   4 Severe   U Unable to assess   P Present, but not related      Feeling dizzy or faint: 2  Blurred vision: 0  Dry mouth: 2  Too much saliva/droolin  Nausea:  0  Constipation: 0  Increased appetite: 0  Weight gain: 0  Weight loss: 0  Feeling tired/fatigue: 0  Daytime sedation: 0  Hypersomnia: 2  Insomnia: 2  Low libido: 2  Other problems with sex: 2  Breast enlargement or discharge:   0  Irregular Menstruation or amenorrhea: 0  Other (please list and rate):      RECENT SUBSTANCE USE:  Clinician Rating Form in COMPASS - Substance Use Assessment  Alcohol Use Severity: Ratin  0 none   1 use without impairment: drinks but no immediate social or medical impairment   2 use with impairment: e.g. becomes grossly intoxicated; alcohol use or withdrawal compromises school, work or social functioning; alcohol use or withdrawal exacerbates symptoms (e.g. gets depressed when drinking)      Marijuana Use Severity: Rating: 3  0 none   1 occasional use without impairment: e.g. uses marijuana a few days a month and has no immediate social or medical impairment   2 frequent use without impairment: e.g. uses marijuana several or more days a week but has no immediate social or medical impairment   3 use with impairment: e.g. becomes grossly intoxicated; marijuana use compromises school, work or social functioning; marijuana use exacerbates symptoms (e.g. gets paranoid when using)      Other Drug Use Severity: Ratin  0 none   1 occasional use without impairment: e.g. uses drug(s) a few days a month and has no immediate social or medical impairment   2 frequent use without impairment: e.g. uses drug(s) several or more days a week but has no immediate social or medical impairment   3 use with impairment: e.g. becomes grossly intoxicated; drug use compromises school, work or social functioning; drug use exacerbates symptoms (e.g. gets paranoid when using)      RECENT SOCIAL HISTORY:  SEE HPI    Medical ROS:  none         First Episode of Psychosis History      DUP (duration untreated psychosis):  4 years  Route to initial care: outpatient  Medication adherence overall:  See above, Clinician Rating Form in COMPASS Item 13  General frequency of visits:  weekly IRT, monthly med management  Participation in groups:  No  Cognitive Remediation:  No  Other treatment history:     Reviewed for completion of First Episode  work-up:  Yes  First episode workup:  Not Done (if completed, see LABS for results)  MATRICS Consensus Cognitive Battery:  Not Done (if completed, see LABS for results)       Medical/Surgical History     Patient has no known allergies.    Patient Active Problem List   Diagnosis    Alcohol use disorder, mild, abuse    MELY (generalized anxiety disorder)    Induration penis plastica    Moderate episode of recurrent major depressive disorder (H)    Open displaced fracture of neck of second metacarpal bone of right hand    Psychosis (H)    Social anxiety disorder    TMJ (temporomandibular joint syndrome)    Traumatic compression fracture of T3 vertebra (H)            Medications     Current Outpatient Medications   Medication Sig Dispense Refill    cholecalciferol, vitamin D3, 1,000 unit (25 mcg) tablet [CHOLECALCIFEROL, VITAMIN D3, 1,000 UNIT (25 MCG) TABLET] Take 2,000 Units by mouth daily.      dutasteride (AVODART) 0.5 MG capsule Take 0.5 mg by mouth daily      ketoconazole (NIZORAL) 2 % external shampoo Apply topically daily as needed LATHER INTO SCALP AND RINSE AFTER 2-3 MINUTES. USE THREE TIMES A WEEK.      magnesium chloride (SLOW-MAG) 64 mg TbEC delayed-release tablet [MAGNESIUM CHLORIDE (SLOW-MAG) 64 MG TBEC DELAYED-RELEASE TABLET] Take 64 mg by mouth daily.      naltrexone (DEPADE/REVIA) 50 MG tablet Take 1 tablet (50 mg) by mouth daily 30 tablet 2    OLANZapine (ZYPREXA) 15 MG tablet Take 2 tablets (30 mg) by mouth at bedtime 180 tablet 0    QUEtiapine (SEROQUEL) 100 MG tablet Take 1 tablet (100 mg) by mouth at bedtime. May also take 0.5-1 tablets ( mg) 2 times daily as needed (anxiety). 60 tablet 3    tadalafil (CIALIS) 10 MG tablet Take 10 mg by mouth daily      zinc gluconate 50 mg tablet [ZINC GLUCONATE 50 MG TABLET] Take 100 mg by mouth daily.            Vitals     There were no vitals taken for this visit.      Weight prior to medication: unknown         Mental Status Exam     Alertness: alert   "and oriented  Appearance: well groomed  Behavior/Demeanor: cooperative, pleasant, and calm, with good  eye contact   Speech: regular rate and rhythm, not pressured  Language: no obvious problem  Psychomotor: normal or unremarkable  Mood: depressed  Affect: blunted; was congruent to mood; was congruent to content  Thought Process/Associations:  overinclusive at times  Thought Content:  Reports suicidal ideation without plan; without intent [details in Interim History];  Denies violent ideation  Perception:  Reports auditory hallucinations;  Denies visual hallucinations  Insight: good  Judgment: fair and adequate for safety  Cognition: does  appear grossly intact; formal cognitive testing was not done         Labs and Data     RATING SCALES:  AIMS: next in person visit    PHQ9 TODAY =       7/9/2024     2:46 PM 7/23/2024     2:59 PM 8/14/2024     2:46 PM   PHQ-9 SCORE   PHQ-9 Total Score MyChart 23 (Severe depression) 22 (Severe depression) 21 (Severe depression)   PHQ-9 Total Score 23    23 22 21    21    21     ANTIPSYCHOTIC LABS ROUTINE    [glu, A1C, lipids (focus LDL), liver enzymes, WBC, ANEU, Hgb, plts]   q12 mo  Recent Labs   Lab Test 06/30/23  1048   GLC 84     No lab results found.  Recent Labs   Lab Test 06/30/23  1048   AST 39   ALT 40   ALKPHOS 88     Recent Labs   Lab Test 06/30/23  1048   WBC 7.7   HGB 14.9             Psychiatric Diagnoses     Psychosis, schizophrenia vs schizoaffective disorder, r/o substance-induced  AUD in early remission  Cannabis use disorder with active use         Assessment   Navi A [place] is a 28 year old male with first onset of psychiatric symptoms at age 7 (\"social anxiety\"), and psychotic symptoms at age 23. The above duration of untreated psychosis was approximately 4 years.  Prodromal symptoms seem to have been present since at least college (notable substance use) though patient does note a substantially longer history of depression and physical health concerns. " Presenting symptoms appear to include hallucinations, delusional thoughts. Navi attributes symptoms to physical health issues and history of trauma.  Substance use does seem to be a present concern. There are possible medical comorbidities which impact this treatment [suicide attempt, suicidal ideation, SIB, psychosis, aggression, and substance use: alcohol].     Today, Navi reports he is doing well with the recent changes in medications. His hallucinations have been reducing in frequency and he feels less sedated on olanzapine compared to quetiapine. He has not drank alcohol in 2-3 weeks and is continuing to take naltrexone. He feels like the quetiapine is still helpful for anxiety and would like to continue taking it for now, with additional lower doses throughout the day to use PRN for anxiety.              SUICIDE RISK ASSESSMENT:  Risk factors for self-harm: previous suicide attempt, substance use/pending treatment, recent symptom worsening, hallucinations, recent loss, and lives alone/ isolated.  Mitigating factors: describes a safety plan, h/o seeking help , future oriented, commitment to family, and stable housing.  The patient does not appear to be at imminent risk for self-harm, hospitalization is not recommended which the pt does  agree with. No hospitalization will be arranged. Based on degree of symptoms close psych follow-up was/were recommended which the pt does  agree to. Additional steps to minimize risk: med changes.      [place] PRESCRIPTION MONITORING PROGRAM [] was not checked today: not using controlled substances.    PSYCHOTROPIC DRUG INTERACTIONS:   Quetiapine/olanzapine: additive CNS depression, QTc prolongation.    MANAGEMENT:  use lowest therapeutic doses of both and routine monitoring         Plan     1) PSYCHOTROPIC MEDICATIONS:  - Quetiapine 100 mg PO at bedtime + 50-100mg BID PRN for anxiety  - Olanzapine to 30 mg PO at bedtime   - Naltrexone 50 mg daily    2) THERAPY:  Continue weekly  IRT with LEENA Lay    3) NEXT DUE:    Labs: FEP workup due  Rating Scales: AIMS due    4) REFERRALS:    none    5) RTC: 4 weeks    6) CRISIS NUMBERS:   Provided routinely in AVS.    TREATMENT RISK STATEMENT:  The risks, benefits, alternatives and potential adverse effects have been discussed and are understood by the pt. The pt understands the risks of using street drugs or alcohol. There are no medical contraindications, the pt agrees to treatment with the ability to do so. The pt knows to call the clinic for any problems or to access emergency care if needed.  Medical and substance use concerns are documented above.  Psychotropic drug interaction check was done, including changes made today.    PROVIDER:   Skylar Dutta MD, PGY-4  Patient staffed with attending psychiatrist Dr. Vega, who will sign the note.    TELEHEALTH ATTENDING ATTESTATION  Following the ACGME guidelines on telemedicine and direct supervision, I was concurrently participating in and/or monitoring the patient care through appropriate telecommunication technology - including participation during a the entire video session that involved clinical data collection and treatment planning discussion.  I discussed the key portions of the service with the resident, including history, presentation, the mental status examination and developing the plan of care. I agree with the findings and plan as documented in this note.  Heidi Vega MD       Psychiatry Individual Psychotherapy Note   Psychotherapy start time 4:20 PM  Psychotherapy end time 4:50 PM  Date treatment plan last reviewed with patient - 1/16/24, with LEENA Lay  Subjective: This supportive psychotherapy session addressed issues related to goals of therapy and current psychosocial stressors. Patient's reaction: Preparatory and Relapse in the context of mental status appropriate for ambulatory setting.    Interactive complexity indicated? No  Plan: RTC in timeframe  noted above  Psychotherapy services during this visit included myself and the patient.   Treatment Plan      SYMPTOMS; PROBLEMS   MEASURABLE GOALS;    FUNCTIONAL IMPROVEMENT / GAINS INTERVENTIONS DISCHARGE CRITERIA   Depression: depressed mood, concentration problems, suicidal ideation, and feeling hopelesss  Psychosis: auditory hallucinations without commands [details in Interim History] and paranoia  Substance Use: alcohol  and cannabis      Pt s measurable objectives (list 3)  Reduce intensity of psychosis symptoms  Demonstrate understanding of symptoms regarding causes, treatment  Learn two skills to manage symptoms of psychosis  In Pt s own words    I want to have stability both mentally and psychically.   Interventions  IRT  Medication Management  SEE  Family support and education  Social work care coordination    Supportive / CBT marked symptom improvement and transition to stepdown therapy     The longitudinal plan of care for the diagnosis(es)/condition(s) as documented were addressed during this visit. Due to the added complexity in care, I will continue to support Navi in the subsequent management and with ongoing continuity of care.    Answers submitted by the patient for this visit:  Patient Health Questionnaire (Submitted on 8/14/2024)  If you checked off any problems, how difficult have these problems made it for you to do your work, take care of things at home, or get along with other people?: Very difficult  PHQ9 TOTAL SCORE: 21  MELY-7 (Submitted on 8/14/2024)  MELY 7 TOTAL SCORE: 17

## 2024-08-15 ENCOUNTER — VIRTUAL VISIT (OUTPATIENT)
Dept: PSYCHIATRY | Facility: CLINIC | Age: 29
End: 2024-08-15
Payer: COMMERCIAL

## 2024-08-15 ENCOUNTER — BEH TREATMENT PLAN (OUTPATIENT)
Dept: PSYCHIATRY | Facility: CLINIC | Age: 29
End: 2024-08-15

## 2024-08-15 DIAGNOSIS — F29 PSYCHOSIS, UNSPECIFIED PSYCHOSIS TYPE (H): Primary | ICD-10-CM

## 2024-08-15 DIAGNOSIS — F25.1 SCHIZOAFFECTIVE DISORDER, DEPRESSIVE TYPE (H): Primary | ICD-10-CM

## 2024-08-15 NOTE — PROGRESS NOTES
NAVIGATE Clinician Contact & Progress Note   For Family Education Program    NAVIGATE Enrollee: Navi Morales (1995)     MRN: [id number]  Date:  8/15/24  Diagnosis(es):   Psychosis unspecified  Clinician: LILIANA Family Clinician, Laura Maldonado MA, LP     1. Type of contact: (majority of time spent)  Family Session via telehealth  Mode of communication:    Linwood Verduzco consented verbally to this mode of therapy today.  Reason for telehealth: To reduce barriers in accessing services, due to but not limited to transportation, location, or scheduling reasons.     2. People present:   Writer  Client: Yes  Significant Other/Family/Friend:  Mother    3. Length of Actual Contact: Start Time: 3:00pm to 3:55pm     4. Location of contact:  Originating Location (patient location):[place]   Distant Location (provider location): Home office, located in [place], [place], using appropriate privacy considerations and procedures    5. Did the client complete the home practice option(s) from the previous session: Partially Completed    6. Motivational Teaching Strategies:  Connect info and skills with personal goals  Promote hope and positive expectations    7. Educational Teaching Strategies:  Review of written material/education  Relate information to client's experience  Ask questions to check comprehension  Break down information into small chunks    8. CBT Teaching Strategies:  Reinforcement and shaping (positive feedback for steps towards goals and gains in knowledge & skills)    9. Psychoeducational Topic(s) Addressed:  Just the Facts - Psychosis    10. Techniques utilized:   [place] announced at beginning of session  Review of previous meeting  Present new material  Family Therapy  Motivational Interviewing (Connect info and skills with personal goals, Promote hope and positive expectations, Explore pros and cons of change, Re-frame experiences in a positive light)  Educational Teaching  Strategies (Review written material/education on: Psychosis, Medications for psychosis, Coping with stress, Strategies to build resiliency, Relapse prevention planning, Developing a collaboration with mental health professionals, Effective communication, A relative s guide to supporting recovery from psychosis, Basic facts about alcohol and drugs)  CBT (Reinforcement and shaping, Social skills training, Relapse prevention planning, Coping skills training, Relaxation training, Cognitive restructuring, Behavioral tailoring)    11. Assessment/Progress Note:     Writer met with Navi and Navi's mom on this day via [employer].  Writer set agenda toreview the last week, discuss Navi's engagement in NAVIGATE, and start education module on communication.     Navi reports having met with therapist, Brandie Moran on this day and he reports that this was helpful. He endorses relief that he will have more therapeutic support.    Navi reports stress following session last week where Mom was upset which in turn got dad upset. Dad then proceeded to confront Navi which was upsetting to Navi as he felt trapped. Writer reflected how stressful this was for Navi and how it sounds like everyone in the family was experiencing high levels of arousal. Provide education about window of tolerance and both mom and Navi affirm that they experienced being outside of their window of tolerance with Navi reporting hypoarousal and mom reporting hyperarousal. Explore strategies for noticing when you are struggling to maintain moderate arousal or staying within the window of tolerance such as engaging in relaxation breathing and or the TIPP skills. Writer also discussed the communication strategy of asking for a timeout or a break. Mom agreed to talk with dad about trying to use breaks or timeouts to reduce times of hyper/hypoarousal.      Provided education about making a positive request including making sure you are in a moderate state of arousal,  looking at the person, making the request, and telling them how them granting this request would impact you.     Both mom and Navi were responsive to this skill and indicated plan to use both making a request and requesting a timeout over the coming week.     Overall family and Navi seemed engaged in conversation. They did express interest in continuing to meet for family therapy and psychoeducation. As of today's appt their insight into Navi's mental illness appears fair. They seem they would benefit from continued clinical intervention aimed at assisting them implement helpful strategies at home and increase their understanding of psychosis.    12. Plan/Referrals:     Will meet with family weekly as schedule allows for evidence based family psychoeducation and therapeutic support aimed at maximizing Navi's opportunity for recovery from psychosis.     Laura Maldonado MA, LP   NAVIGATE   The author of this note documented a reason for not sharing it with the patient.

## 2024-08-15 NOTE — PROGRESS NOTES
NAVIGATE/STRENGTHS Virtual Visit Progress Note  For Supported Employment & Education    NAVIGATE Enrollee: Navi Morales (1995)     MRN: [id number]  Date of Visit: 8/15/24  Contacted: ALICE  Appointment Length: 30 min    Discussed:   Writer met with Navi Morales for virtual visit check-in. Reason for telehealth: To reduce barriers in accessing services, due to but not limited to transportation, location, or scheduling reasons. Meeting agenda included Navi applied to [employer] we just needed to sent in the application fee of $20. When Navi gets accepted I will start the conversation with the school and include Navi to see what credits will transfer and we can then gauge what he would like to do with that information. Navi seemed to be in good spirits    Follow-up:     Delmi Davis  NAVIGATE/STRENGTHS  Supported Employment & Education  Supported Employment & EducationThis is a non-billable encounter as it was solely for the purposes of outreach and/or care coordination. SEE services are non billable services

## 2024-08-15 NOTE — PROGRESS NOTES
NAVIGATE Clinician Contact & Progress Note  For Individual Resiliency Training (IRT)  A Part of the [facility] First Episode of Psychosis Program    NAVIGATE Enrollee: Navi Morales (1995)     MRN: [id number]  Date:  8/15/24  Diagnosis: psychosis, unspecified type   Clinician: LILIANA Individual Resiliency Trainer, MERCEDES Mchugh     1. Type of contact: (majority of time spent)  IRT Session via telehealth  Mode of communication: [employer] (HIPAA compliant, secure platform). Patient consented verbally to this mode of therapy today.  Reason for telehealth: To reduce barriers in accessing services, due to but not limited to transportation, location, or scheduling reasons.    2. People present:   Writer  Client: Yes - Navi    3. Length of Actual Contact: Start Time: 1; End Time: 2     4. Location of contact:  Originating Location (patient location): [facility], located in [place], [place]  Distant Location (provider location): [facility], [place]    5. Did the client complete the home practice option(s) from the previous session: Not Applicable    6. Motivational Teaching Strategies:  Connect info and skills with personal goals  Promote hope and positive expectations  Explore pros and cons of change  Re-frame experiences in positive light    7. Educational Teaching Strategies:  Review of written material/education  Relate information to client's experience  Ask questions to check comprehension  Break down information into small chunks  Adopt client's language     8. CBT Teaching Strategies:  Reinforcement and shaping (positive feedback for steps towards goals, gains in knowledge & skills, and follow-through on home assignments)  Cognitive restructuring (identify thoughts related to negative feelings, examine the evidence, and change though or form action plan)  Behavioral tailoring (hobbies, workouts, PT and meds)    9. IRT Module(s) Addressed:  Module 1 - Orientation  Module 2 - Assessment/Initial  Goal Setting    10. Techniques utilized:   [place] announced at beginning of session  Review of goal  Review of previous meeting  Present new material  Role-play  Problem-solving practice  Summarize progress made in current session    11. Measures:  Answers submitted by the patient for this visit:  Patient Health Questionnaire (Submitted on 8/14/2024)  If you checked off any problems, how difficult have these problems made it for you to do your work, take care of things at home, or get along with other people?: Very difficult  PHQ9 TOTAL SCORE: 21  MELY-7 (Submitted on 8/14/2024)  MELY 7 TOTAL SCORE: 17    Mental Status Exam  Alertness: alert   Behavior/Demeanor: cooperative, pleasant, and calm  Speech: regular rate and rhythm  Language: intact.   Mood: depressed and anxious  Thought Process/Associations: unremarkable  Thought Content:  Reports suicidal ideation, preoccupations, phobia , and paranoid ideation;  Denies suicidal ideation with plan; with intent [details in Interim History] and violent ideation  Perception:  Reports auditory hallucinations;  Denies visual hallucinations  Insight: adequate  Judgment: adequate for safety  Cognition: does  appear grossly intact; formal cognitive testing was not done      Depression Screening Follow-up        8/14/2024     2:46 PM   PHQ   PHQ-9 Total Score 21    21    21   Q9: Thoughts of better off dead/self-harm past 2 weeks More than half the days   F/U: Thoughts of suicide or self-harm Yes   F/U: Self harm-plan Yes   F/U: Self-harm action No   F/U: Safety concerns No             Alexander Protocol Risk Identification  1) Have you wished you were dead or wished you could go to sleep and not wake up? Yes  2) Have you actually had any thoughts about killing yourself? Yes  If YES to 2, answer questions 3, 4, 5, 6  If NO to 2, go directly to question 6  3) Have you thought about how you might do this? Yes  4) Have you had any intension of acting on these thoughts of killing  yourself, as opposed to you have the thoughts but you definitely would not act on them? No  5) Have you started to work out or worked out the details of how to kill yourself? Do you intend to carry out this plan? No  Always Ask Question 6  6) Have you done anything, started to do anything, or prepared to do anything to end your life? No  Examples: collected pills, obtained a gun, gave away valuables, wrote a will or suicide note, held a gun but changed your mind, cut yourself, tried to hang yourself, etc.    Follow Up     Follow Up Actions Taken  Crisis resource information provided in the After Visit Summary    Discussed the following ways the patient can remain in a safe environment:  remove alcohol, remove things I could use to hurt myself: ropes, cords, poison, and be around others    I have reviewed the results of the [place] Screening and proposed plan of care. The patient agrees with the follow up and safety plan.    MERCEDES Mchugh        12. Assessment/Progress Note:       Writer met with Navi HO Andrew on this date for IRT via [employer] Writer is providing IRT coverage as Navigate seeks replacement therapist for previous IRT, LEENA Lay. Writer set agenda to check-in, discuss and assess symptoms, discuss ways in which the individual can expand coping strategies and stress-management techniques for ongoing symptom management, and check-in regarding goals for ongoing recovery. Reviewed crisis and/or safety plan as needed including distraction (video games, TV/movies), talking with mom, calling crisis or 911, visiting local ED or hospital.     Today, Jack VIC Morales reports he has noticed a decrease in auditory hallucinations but continues to struggle with anxiety and suicidal thoughts. Today we discussed where he had left things with his previous IRT, his goals (starting PT again, walking, learning coping skills, increase energy). Writer reviewed Navigate material including orientation  "material and approaches. Navi is seeking more hobbies and during this conversation, he mentioned his perfectionistic tendencies and wanting to be \"good\" at something right when he starts. Discussed hobbies for the sake of doing and how we can incorporate hobbies into our work. Agreed to weekly meetings.    Symptom assessment, safety assessment, discussion and identification of coping strategies, and exploration of material in ACT and Step Down materials was all in support of Navi HO [place]'s self-identified goal(s) as identified in most recent BEH Treatment Plan. Progress toward goal completion seems adequate.         13. Plan/Referrals:     Meet next week tue at 2    Billing for \"Interactive Complexity\"?    No      MERCEDES MchughATE Individual Resiliency Trainer   "

## 2024-08-21 ENCOUNTER — TELEPHONE (OUTPATIENT)
Dept: PSYCHIATRY | Facility: CLINIC | Age: 29
End: 2024-08-21

## 2024-08-21 NOTE — TELEPHONE ENCOUNTER
NAVIGATE Family Peer Support  A Part of the [facility] First Episode of Psychosis Program     Patient Name: Navi Morales  /Age:  1995 (29 year old)  Date of Encounter: 24  Length of Contact: 1 minute    This writer attempted to reach out to offer resources and support to patient's mother, Mona.     A voicemail message was left inviting a return call.    ALYSIA AbdallaATE Family Peer    [Billing Code 68502 for No Billable Service as family peer support is a nonbillable service]

## 2024-08-22 ENCOUNTER — TELEPHONE (OUTPATIENT)
Dept: PSYCHIATRY | Facility: CLINIC | Age: 29
End: 2024-08-22

## 2024-08-22 ENCOUNTER — VIRTUAL VISIT (OUTPATIENT)
Dept: PSYCHIATRY | Facility: CLINIC | Age: 29
End: 2024-08-22
Payer: COMMERCIAL

## 2024-08-22 DIAGNOSIS — F29 PSYCHOSIS, UNSPECIFIED PSYCHOSIS TYPE (H): Primary | ICD-10-CM

## 2024-08-22 NOTE — PROGRESS NOTES
NAVIGATE Clinician Contact & Progress Note   For Family Education Program    NAVIGATE Enrollee: Navi Morales (1995)     MRN: [id number]  Date:  8/22/24  Diagnosis(es):   Psychosis unspecified  Clinician: LILIANA Family Clinician, Laura Maldonado MA, LP     1. Type of contact: (majority of time spent)  Family Session via telehealth  Mode of communication:    Linwood Verduzco consented verbally to this mode of therapy today.  Reason for telehealth: To reduce barriers in accessing services, due to but not limited to transportation, location, or scheduling reasons.     2. People present:   Writer  Client: Yes  Significant Other/Family/Friend:  Mother    3. Length of Actual Contact: Start Time: 2:10pm to 3pm     4. Location of contact:  Originating Location (patient location):[place]   Distant Location (provider location): Home office, located in [place], [place], using appropriate privacy considerations and procedures    5. Did the client complete the home practice option(s) from the previous session: Partially Completed    6. Motivational Teaching Strategies:  Connect info and skills with personal goals  Promote hope and positive expectations    7. Educational Teaching Strategies:  Review of written material/education  Relate information to client's experience  Ask questions to check comprehension  Break down information into small chunks    8. CBT Teaching Strategies:  Reinforcement and shaping (positive feedback for steps towards goals and gains in knowledge & skills)    9. Psychoeducational Topic(s) Addressed:  Just the Facts - Effective Communication    10. Techniques utilized:   [place] announced at beginning of session  Review of previous meeting  Present new material  Family Therapy  Motivational Interviewing (Connect info and skills with personal goals, Promote hope and positive expectations, Explore pros and cons of change, Re-frame experiences in a positive light)  Educational  Teaching Strategies (Review written material/education on: Psychosis, Medications for psychosis, Coping with stress, Strategies to build resiliency, Relapse prevention planning, Developing a collaboration with mental health professionals, Effective communication, A relative s guide to supporting recovery from psychosis, Basic facts about alcohol and drugs)  CBT (Reinforcement and shaping, Social skills training, Relapse prevention planning, Coping skills training, Relaxation training, Cognitive restructuring, Behavioral tailoring)    11. Assessment/Progress Note:     Writer met with Navi and Navi's mom on this day via [employer].  Writer set agenda to review the last week, discuss Navi's engagement in NAVIGATE, and to continue education module on communication. There was a late start to the appointment as Navi was asleep at the start.     Neither Navi nor mom report urgent concerns on this day. Navi reports missing appointment with therapist, Brandie Moran this week. He endorses relief knowing that he has another appointment scheduled for next week.     Navi reports that he has been getting up early to help his mom with the dog. He reports that when he does this he will take a nap later due to going to bed late. Encourage the use of the skill of making a positive request.    Review the last week and Navi and mom deny any further verbal escalations between each other or with dad. Review skills discussed last week of making a positive request and taking a time out. Navi voices concern about being able to ask for a time out. Review window of tolerance and try to identify potential barriers to asking for a timeout. Navi reports that he is concerned his dad won't accept this limit. Mom agrees to talk with dad about the value of accepting this request from Navi. Navi's concern is that he may not have the words to ask for a timeout; brainstorm about possible gestures or physical signs they could develop as a family to ask  for a timeout. Mom agrees to talk more with dad and Navi also agrees to think about his.    Navi notes a desire to talk with mom. Writer validates this request and brainstorm about ways Navi and mom could spend time together not talking about illness or tasks. Mom and Navi decide to take dog for a walk (away from family home) at a nearby park. Mom and Navi identify two times over the next week where this will work with both of their schedules.    Writer provides supportive listening. Provide encouragement to both mom and Navi in their commitment to supporting each other. Writer offers to send education material on communication and review plan to continue this conversation at the next session next week.     Overall family and Navi seemed engaged in conversation. They did express interest in continuing to meet for family therapy and psychoeducation. As of today's appt their insight into Navi's mental illness appears fair. They seem they would benefit from continued clinical intervention aimed at assisting them implement helpful strategies at home and increase their understanding of psychosis.    12. Plan/Referrals:     Will meet with family weekly as schedule allows for evidence based family psychoeducation and therapeutic support aimed at maximizing Navi's opportunity for recovery from psychosis.     Laura Maldonado MA, LP   NAVIGATE   The author of this note documented a reason for not sharing it with the patient.

## 2024-08-26 ENCOUNTER — TELEPHONE (OUTPATIENT)
Dept: PSYCHIATRY | Facility: CLINIC | Age: 29
End: 2024-08-26

## 2024-08-27 ENCOUNTER — VIRTUAL VISIT (OUTPATIENT)
Dept: PSYCHIATRY | Facility: CLINIC | Age: 29
End: 2024-08-27
Payer: COMMERCIAL

## 2024-08-27 DIAGNOSIS — F29 PSYCHOSIS, UNSPECIFIED PSYCHOSIS TYPE (H): Primary | ICD-10-CM

## 2024-08-27 NOTE — PROGRESS NOTES
NAVIGATE/STRENGTHS Virtual Visit Progress Note  For Supported Employment & Education    NAVIGATE Enrollee: Navi Morales (1995)     MRN: [id number]  Date of Visit: 8/27/24  Contacted: ALICE  Appointment Length: 60    Discussed:   Writer met with Navi Morales for virtual visit check-in. Reason for telehealth: To reduce barriers in accessing services, due to but not limited to transportation, location, or scheduling reasons. Meeting agenda included Checked to see if his application went through from [facility] obopay but it didn't. We spent the rest of the time building rapport talking jobs and school and IRT    Follow-up:     Delmi HOPSONATE/STRENGTHS  Supported Employment & Education  Supported Employment & EducationThis is a non-billable encounter as it was solely for the purposes of outreach and/or care coordination. SEE services are non billable services

## 2024-08-28 ENCOUNTER — TELEPHONE (OUTPATIENT)
Dept: PSYCHIATRY | Facility: CLINIC | Age: 29
End: 2024-08-28

## 2024-08-28 ENCOUNTER — VIRTUAL VISIT (OUTPATIENT)
Dept: PSYCHIATRY | Facility: CLINIC | Age: 29
End: 2024-08-28
Payer: COMMERCIAL

## 2024-08-28 DIAGNOSIS — F29 PSYCHOSIS, UNSPECIFIED PSYCHOSIS TYPE (H): Primary | ICD-10-CM

## 2024-08-28 ASSESSMENT — PATIENT HEALTH QUESTIONNAIRE - PHQ9
10. IF YOU CHECKED OFF ANY PROBLEMS, HOW DIFFICULT HAVE THESE PROBLEMS MADE IT FOR YOU TO DO YOUR WORK, TAKE CARE OF THINGS AT HOME, OR GET ALONG WITH OTHER PEOPLE: EXTREMELY DIFFICULT
SUM OF ALL RESPONSES TO PHQ QUESTIONS 1-9: 23
10. IF YOU CHECKED OFF ANY PROBLEMS, HOW DIFFICULT HAVE THESE PROBLEMS MADE IT FOR YOU TO DO YOUR WORK, TAKE CARE OF THINGS AT HOME, OR GET ALONG WITH OTHER PEOPLE: EXTREMELY DIFFICULT
SUM OF ALL RESPONSES TO PHQ QUESTIONS 1-9: 23

## 2024-08-28 ASSESSMENT — ANXIETY QUESTIONNAIRES
GAD7 TOTAL SCORE: 20
7. FEELING AFRAID AS IF SOMETHING AWFUL MIGHT HAPPEN: NEARLY EVERY DAY
GAD7 TOTAL SCORE: 20
8. IF YOU CHECKED OFF ANY PROBLEMS, HOW DIFFICULT HAVE THESE MADE IT FOR YOU TO DO YOUR WORK, TAKE CARE OF THINGS AT HOME, OR GET ALONG WITH OTHER PEOPLE?: EXTREMELY DIFFICULT
GAD7 TOTAL SCORE: 20
7. FEELING AFRAID AS IF SOMETHING AWFUL MIGHT HAPPEN: NEARLY EVERY DAY
GAD7 TOTAL SCORE: 20
GAD7 TOTAL SCORE: 20
8. IF YOU CHECKED OFF ANY PROBLEMS, HOW DIFFICULT HAVE THESE MADE IT FOR YOU TO DO YOUR WORK, TAKE CARE OF THINGS AT HOME, OR GET ALONG WITH OTHER PEOPLE?: EXTREMELY DIFFICULT
GAD7 TOTAL SCORE: 20

## 2024-08-28 NOTE — TELEPHONE ENCOUNTER
NAVIGATE Family Peer Support  A Part of the [facility] First Episode of Psychosis Program     Patient Name: Navi Morales  /Age:  1995 (29 year old)  Date of Encounter: 24  Length of Contact: 1 minute    This writer received a voicemail message from patient's mother, Mona, in response to family peer outreach.     This writer is in the process of scheduling a phone call.    ALYSIA AbdallaATE Family Peer    [Billing Code 86399 for No Billable Service as family peer support is a nonbillable service]

## 2024-08-28 NOTE — PROGRESS NOTES
NAVIGATE Clinician Contact & Progress Note  For Individual Resiliency Training (IRT)  A Part of the [facility] First Episode of Psychosis Program    NAVIGATE Enrollee: Navi Morales (1995)     MRN: [id number]  Date:  8/28/24  Diagnosis: Psychosis, unspecified psychosis type (H) [F29]   Clinician: LILIANA Individual Resiliency Trainer, MERCDEES Mchugh     1. Type of contact: (majority of time spent)  IRT Session via telehealth  Mode of communication: [employer] (HIPAA compliant, secure platform). Patient consented verbally to this mode of therapy today.  Reason for telehealth: To reduce barriers in accessing services, due to but not limited to transportation, location, or scheduling reasons.    2. People present:   Writer  Client: Yes - Navi    3. Length of Actual Contact: Start Time: 12:05; End Time: 1:05     4. Location of contact:  Originating Location (patient location): [facility], located in [place], [place]  Distant Location (provider location): Remote location    5. Did the client complete the home practice option(s) from the previous session: Not Applicable    6. Motivational Teaching Strategies:  Connect info and skills with personal goals  Promote hope and positive expectations  Explore pros and cons of change  Re-frame experiences in positive light    7. Educational Teaching Strategies:  Review of written material/education  Relate information to client's experience  Ask questions to check comprehension  Break down information into small chunks  Adopt client's language     8. CBT Teaching Strategies:  Reinforcement and shaping (positive feedback for steps towards goals and gains in knowledge & skills)  Cognitive restructuring (identify thoughts related to negative feelings)    9. IRT Module(s) Addressed:  Module 3 - Education about Psychosis    10. Techniques utilized:   [place] announced at beginning of session  Review of goal  Review of previous meeting  Present new material  Help client  "choose a home practice option  Summarize progress made in current session    11. Measures:  Answers submitted by the patient for this visit:      Mental Status Exam  Alertness: alert   Behavior/Demeanor: cooperative, pleasant, and calm  Speech: regular rate and rhythm  Language: intact.   Mood: depressed and anxious  Thought Process/Associations: unremarkable  Thought Content:  Reports suicidal ideation;  Denies suicidal ideation with plan; with intent [details in Interim History]  Perception:  Reports auditory hallucinations;  Denies visual hallucinations  Insight: good  Judgment: adequate for safety  Cognition: does  appear grossly intact; formal cognitive testing was not done      12. Assessment/Progress Note:     Writer and client met for IRT visit via [employer]socializer. Set agenda to check in, reported current symptoms to include: AH, depression, anxiety but noted that they are \"alright, with the medication\". Writer used relational and interpersonal approach to explore feelings, motivations, and behavior. Writer offered support, feedback, validation, and reinforced use of skills taught in IRT from modalities including cognitive behavioral therapy, psycho education, and skills training. Promoted understanding of their experiences of psychosis and how it impacts them in important areas of their life and in recovery goals. Reflected on client's strengths and resiliency factors and facilitated discussion on how these can assist in symptom management, recovery, and well-being. Explored symptoms and coping from a stress-vulnerability lens. Current stressors include: symptoms. In regards to medications, client reports medication adherence to the prescribed antipsychotic.     Navi and writer reviewed module 3, education on psychosis. Reviewed common symptoms and Navi was able to describe his Auditory hallucinations in detail. They are negative in nature and often comment on his body, behavior. Navi says his meds, they have " "decreased but has noticed anticipatory anxiety about them returning. Reminded Navi that he's been able to get through them before and reviewed skills that help him cope. These include mostly distraction. He wants to start increasing exercise and getting out of the house. Reports things with mom are going well. Reported SI but no plan or intent. Reviewed safety plan. Set home practice for gratitude journal.    13. Plan/Referrals:     Continue Navigate services, weekly IRT  Next appointment scheduled for 9/5 at 230pm    Billing for \"Interactive Complexity\"?    No      Brandie Moran [facility]    NAVIGATE Individual Resiliency Trainer   Answers submitted by the patient for this visit:  Patient Health Questionnaire (Submitted on 8/28/2024)  If you checked off any problems, how difficult have these problems made it for you to do your work, take care of things at home, or get along with other people?: Extremely difficult  PHQ9 TOTAL SCORE: 23  Patient Health Questionnaire (G7) (Submitted on 8/28/2024)  MELY 7 TOTAL SCORE: 20    "

## 2024-08-28 NOTE — TELEPHONE ENCOUNTER
NAVIGATE Family Peer Support  A Part of the [facility] First Episode of Psychosis Program     Patient Name: Navi Morales  /Age:  1995 (29 year old)  Date of Encounter: 24  Length of Contact: 63 minutes    This writer reached out to offer resources and support to patient's mother, Mona.     Family peer support services were described and offered.  Mona said that working with Laura Maldonado, PhD, has been very helpful.     This writer suggested ways to use the Calm porfirio to help with Navi's anxiety.  Other topics included information on social security and [facility] website navigation.  This writer will follow-up with Mona at a later date.    ALYSIA AbdallaATE Family Peer    [Billing Code 78983 for Non Billable Service as family peer support is a nonbillable service]

## 2024-08-28 NOTE — TELEPHONE ENCOUNTER
NAVIGATE Family Peer Support  A Part of the [facility] First Episode of Psychosis Program     Patient Name: Navi Morales  /Age:  1995 (29 year old)  Date of Encounter: 24  Length of Contact: 1 minute    This writer reached out to patient's mother, Mona, to discuss family peer support services.      A voicemail message was left inviting a return call.    ALYSIA AbdallaATE Family Peer    [Billing Code 63362 for No Billable Service as family peer support is a nonbillable service]

## 2024-08-29 ENCOUNTER — VIRTUAL VISIT (OUTPATIENT)
Dept: PSYCHIATRY | Facility: CLINIC | Age: 29
End: 2024-08-29
Payer: COMMERCIAL

## 2024-08-29 DIAGNOSIS — F29 PSYCHOSIS, UNSPECIFIED PSYCHOSIS TYPE (H): Primary | ICD-10-CM

## 2024-09-03 ENCOUNTER — VIRTUAL VISIT (OUTPATIENT)
Dept: PSYCHIATRY | Facility: CLINIC | Age: 29
End: 2024-09-03
Payer: COMMERCIAL

## 2024-09-03 DIAGNOSIS — F29 PSYCHOSIS, UNSPECIFIED PSYCHOSIS TYPE (H): Primary | ICD-10-CM

## 2024-09-03 ASSESSMENT — PATIENT HEALTH QUESTIONNAIRE - PHQ9
SUM OF ALL RESPONSES TO PHQ QUESTIONS 1-9: 23
SUM OF ALL RESPONSES TO PHQ QUESTIONS 1-9: 23
10. IF YOU CHECKED OFF ANY PROBLEMS, HOW DIFFICULT HAVE THESE PROBLEMS MADE IT FOR YOU TO DO YOUR WORK, TAKE CARE OF THINGS AT HOME, OR GET ALONG WITH OTHER PEOPLE: VERY DIFFICULT

## 2024-09-03 NOTE — PROGRESS NOTES
NAVIGATE Clinician Contact & Progress Note   For Family Education Program    NAVIGATE Enrollee: Navi Morales (1995)     MRN: [id number]  Date:  8/29/24  Diagnosis(es):   Psychosis unspecified  Clinician: LILIANA Family Clinician, Laura Maldonado MA, SELWYN     1. Type of contact: (majority of time spent)  Family Session via telehealth  Mode of communication:    Linwood Verduzco consented verbally to this mode of therapy today.  Reason for telehealth: To reduce barriers in accessing services, due to but not limited to transportation, location, or scheduling reasons.     2. People present:   Writer  Client: Yes  Significant Other/Family/Friend:  Mother    3. Length of Actual Contact: Start Time: 2:05pm to 3pm     4. Location of contact:  Originating Location (patient location):[place]   Distant Location (provider location): Home office, located in [place], [place], using appropriate privacy considerations and procedures    5. Did the client complete the home practice option(s) from the previous session: Partially Completed    6. Motivational Teaching Strategies:  Connect info and skills with personal goals  Promote hope and positive expectations    7. Educational Teaching Strategies:  Review of written material/education  Relate information to client's experience  Ask questions to check comprehension  Break down information into small chunks    8. CBT Teaching Strategies:  Reinforcement and shaping (positive feedback for steps towards goals and gains in knowledge & skills)    9. Psychoeducational Topic(s) Addressed:  Just the Facts - Effective Communication    10. Techniques utilized:   [place] announced at beginning of session  Review of previous meeting  Present new material  Family Therapy  Motivational Interviewing (Connect info and skills with personal goals, Promote hope and positive expectations, Explore pros and cons of change, Re-frame experiences in a positive light)  Educational  "Teaching Strategies (Review written material/education on: Psychosis, Medications for psychosis, Coping with stress, Strategies to build resiliency, Relapse prevention planning, Developing a collaboration with mental health professionals, Effective communication, A relative s guide to supporting recovery from psychosis, Basic facts about alcohol and drugs)  CBT (Reinforcement and shaping, Social skills training, Relapse prevention planning, Coping skills training, Relaxation training, Cognitive restructuring, Behavioral tailoring)    11. Assessment/Progress Note:     Writer met with Navi and Navi's mom on this day via [employer].  Writer set agenda to review the last week, discuss Navi's engagement in NAVIGATE, and to continue education module on communication.     Neither Navi nor mom report urgent concerns on this day. Mom reports that she and Navi went for a walk two times as planned in the last week with the dog. Navi described the stress he experiences walking around people and how his symptoms increase when he sees people. Writer validated and reinforced Navi's managing his symptoms of anxiety and worries to engage in walking as planned. Navi downplayed this accomplishment.     Writer reviewed and provided education about the thought/feeling/behavior triangle and common thinking styles. Navi identified frequently noticing black and white thinking. Explored the idea of having flexibility with each other and self during times of stress and change.      Navi reports a bout of increased drinking and that he frequently skips medications when drinking. Review past recommendations from Dr. Vega regarding taking medications (Dr. Vega wrote in past notes to take current medications even if using alcohol). Navi reported fear of doing this. Encouraged Navi to discuss his concerns with Dr. Vega.    Navi acknowledged that he is \"drinking too much.\" Explored how he notices this; he got sick and vomited and is also very " "tired. Explored strategies to address his concerns about drinking and he reported that he knows \"buying smaller bottles\" would help. Navi appears mildly committed to drinking less alcohol. Navi reports being highly confident that taking his medications helps his symptoms.     Review plan for the upcoming week and to go for a walk with mom at least 2 times.     Writer provides supportive listening. Provide encouragement to both mom and Navi in their commitment to supporting each other. Writer agrees to send education about cognitive behavioral therapy to mom (as discussed in this session).     Overall family and Navi seemed engaged in conversation. They did express interest in continuing to meet for family therapy and psychoeducation. As of today's appt their insight into Navi's mental illness appears fair. They seem they would benefit from continued clinical intervention aimed at assisting them implement helpful strategies at home and increase their understanding of psychosis.    12. Plan/Referrals:     Will meet with family weekly as schedule allows for evidence based family psychoeducation and therapeutic support aimed at maximizing Navi's opportunity for recovery from psychosis.     Laura Maldonado MA, LP   NAVIGATE   The author of this note documented a reason for not sharing it with the patient.    "

## 2024-09-03 NOTE — Clinical Note
FYI- good conversation with Navi and mom about money and drinking. We will see what they decide to do! Happy to talk more in team if that would be helpful!

## 2024-09-04 ENCOUNTER — VIRTUAL VISIT (OUTPATIENT)
Dept: PSYCHIATRY | Facility: CLINIC | Age: 29
End: 2024-09-04
Payer: COMMERCIAL

## 2024-09-04 DIAGNOSIS — F29 PSYCHOSIS, UNSPECIFIED PSYCHOSIS TYPE (H): Primary | ICD-10-CM

## 2024-09-04 NOTE — PROGRESS NOTES
NAVIGATE/STRENGTHS Virtual Visit Progress Note  For Supported Employment & Education    NAVIGATE Enrollee: Navi Morales (1995)     MRN: [id number]  Date of Visit: 9/04/24  Contacted: ALICE  Appointment Length: 15    Discussed:   Writer met with Navi Morales for virtual visit check-in. Reason for telehealth: To reduce barriers in accessing services, due to but not limited to transportation, location, or scheduling reasons. Meeting agenda included still waiting on the college to get back us on approval. Also going to start thinking skills for work next session    Follow-up:     Delmi Davis  NAVIGATE/STRENGTHS  Supported Employment & Education  Supported Employment & EducationThis is a non-billable encounter as it was solely for the purposes of outreach and/or care coordination. SEE services are non billable services

## 2024-09-05 ENCOUNTER — VIRTUAL VISIT (OUTPATIENT)
Dept: PSYCHIATRY | Facility: CLINIC | Age: 29
End: 2024-09-05
Payer: COMMERCIAL

## 2024-09-05 DIAGNOSIS — F29 PSYCHOSIS, UNSPECIFIED PSYCHOSIS TYPE (H): Primary | ICD-10-CM

## 2024-09-05 ASSESSMENT — PATIENT HEALTH QUESTIONNAIRE - PHQ9
10. IF YOU CHECKED OFF ANY PROBLEMS, HOW DIFFICULT HAVE THESE PROBLEMS MADE IT FOR YOU TO DO YOUR WORK, TAKE CARE OF THINGS AT HOME, OR GET ALONG WITH OTHER PEOPLE: VERY DIFFICULT
SUM OF ALL RESPONSES TO PHQ QUESTIONS 1-9: 22
SUM OF ALL RESPONSES TO PHQ QUESTIONS 1-9: 22

## 2024-09-05 NOTE — PROGRESS NOTES
NAVIGATE Clinician Contact & Progress Note   For Family Education Program    NAVIGATE Enrollee: Navi Morales (1995)     MRN: [id number]  Date:  9/03/24  Diagnosis(es):   Psychosis unspecified  Clinician: LILIANA Family Clinician, Laura Maldonado MA, SELWYN     1. Type of contact: (majority of time spent)  Family Session via telehealth  Mode of communication:    Linwood Verduzco consented verbally to this mode of therapy today.  Reason for telehealth: To reduce barriers in accessing services, due to but not limited to transportation, location, or scheduling reasons.     2. People present:   Writer  Client: Yes  Significant Other/Family/Friend:  Mother    3. Length of Actual Contact: Start Time: 2:05pm to 3pm     4. Location of contact:  Originating Location (patient location):[place]   Distant Location (provider location): Home office, located in [place], [place], using appropriate privacy considerations and procedures    5. Did the client complete the home practice option(s) from the previous session: Partially Completed    6. Motivational Teaching Strategies:  Connect info and skills with personal goals  Promote hope and positive expectations    7. Educational Teaching Strategies:  Review of written material/education  Relate information to client's experience  Ask questions to check comprehension  Break down information into small chunks    8. CBT Teaching Strategies:  Reinforcement and shaping (positive feedback for steps towards goals and gains in knowledge & skills)    9. Psychoeducational Topic(s) Addressed:  Just the Facts - Effective Communication    10. Techniques utilized:   [place] announced at beginning of session  Review of previous meeting  Present new material  Family Therapy  Motivational Interviewing (Connect info and skills with personal goals, Promote hope and positive expectations, Explore pros and cons of change, Re-frame experiences in a positive light)  Educational  "Teaching Strategies (Review written material/education on: Psychosis, Medications for psychosis, Coping with stress, Strategies to build resiliency, Relapse prevention planning, Developing a collaboration with mental health professionals, Effective communication, A relative s guide to supporting recovery from psychosis, Basic facts about alcohol and drugs)  CBT (Reinforcement and shaping, Social skills training, Relapse prevention planning, Coping skills training, Relaxation training, Cognitive restructuring, Behavioral tailoring)    11. Assessment/Progress Note:     Writer met with Navi and Navi's mom on this day via [employer].  Writer set agenda to review the last week, discuss Navi's engagement in NAVIGATE, and to continue education module on communication.     Mom reports that she and Navi went for a walk two times as planned in the last few days. Encourage Navi and mom in their continuing to make progress on this goal.     Neither Navi nor mom report urgent concerns on this day. Navi reports that he did drink alcohol 2x over the last few days. He reports that he was able to limit his use by buying a smaller bottle (700ml bottle spread over 2 days). Navi reports being ambivalent about buying smaller bottles in the future, because he wants to drink enough to get relief but not too much that he gets sick.  Navi reports noticing an increase in AH when he skipped his medications. Navi reports only missing medications one time since last visit.    Mom voices worries about Navi drinking too much and missing medications. Navi voices frustration about mom voicing her worries. Navi states that drinking is his \"one thing he can do as an adult.\" Reflect on how this helps him to feel some sense of power or agency in his life. Explore how Navi obtains alcohol and financial support. Navi reports that mom buys the alcohol and provides all of his money for his spending.     Reflect on ways to increase differentiation as well as " Navi's autonomy as he has voiced this as a need. Reflect on mom's ability to choose related to her financial support and how she provides this to Navi. When given money, Navi's choices would include how he spends this money. Navi is open to exploring more independence and is also unsure about how to manage money. Discuss idea of talking with IRT and/or SEE specialist to discuss budgeting if he and mom decide to shift how Navi receives financial support.     Writer provides supportive listening. Provide encouragement to both mom and Navi in their commitment to supporting each other.     Plan: mom and Navi will go for a walk 2x in the next week together to increase time out of the house and increase relational connection.    Overall family and Navi seemed engaged in conversation. They did express interest in continuing to meet for family therapy and psychoeducation. As of today's appt their insight into Navi's mental illness appears fair. They seem they would benefit from continued clinical intervention aimed at assisting them implement helpful strategies at home and increase their understanding of psychosis.    12. Plan/Referrals:     Will meet with family weekly as schedule allows for evidence based family psychoeducation and therapeutic support aimed at maximizing Navi's opportunity for recovery from psychosis.     Laura Maldonado MA, LP   NAVIGATE   The author of this note documented a reason for not sharing it with the patient.

## 2024-09-06 NOTE — PROGRESS NOTES
NAVIGGALINDO Clinician Contact & Progress Note  For Individual Resiliency Training (IRT)  A Part of the [facility] First Episode of Psychosis Program    NAVIGATE Enrollee: Navi Morales (1995)     MRN: [id number]  Date:  9/05/24  Diagnosis: Psychosis, unspecified psychosis type (H) [F29]   Clinician: LILIANA Individual Resiliency Trainer, MERCEDES Mchugh     1. Type of contact: (majority of time spent)  IRT Session via telehealth  Mode of communication: [employer] (HIPAA compliant, secure platform). Patient consented verbally to this mode of therapy today.  Reason for telehealth: To reduce barriers in accessing services, due to but not limited to transportation, location, or scheduling reasons.    2. People present:   Writer  Client: Yes - Navi    3. Length of Actual Contact: Start Time: 230; End Time: 310     4. Location of contact:  Originating Location (patient location): [facility], located in [place], [place]  Distant Location (provider location): Remote location    5. Did the client complete the home practice option(s) from the previous session: Yes, kept gratitude journal 6/7 days    6. Motivational Teaching Strategies:  Connect info and skills with personal goals  Promote hope and positive expectations  Explore pros and cons of change  Re-frame experiences in positive light    7. Educational Teaching Strategies:  Review of written material/education  Relate information to client's experience  Ask questions to check comprehension  Break down information into small chunks  Adopt client's language     8. CBT Teaching Strategies:  Reinforcement and shaping (positive feedback for steps towards goals and gains in knowledge & skills)  Cognitive restructuring (identify thoughts related to negative feelings)    9. IRT Module(s) Addressed:  Module 3 - Education about Psychosis    10. Techniques utilized:   [place] announced at beginning of session  Review of goal  Review of previous meeting  Present new  "material  Help client choose a home practice option  Summarize progress made in current session    11. Measures:  Answers submitted by the patient for this visit:      Mental Status Exam  Alertness: alert   Behavior/Demeanor: cooperative, pleasant, and calm  Speech: regular rate and rhythm  Language: intact.   Mood: depressed and anxious  Thought Process/Associations: unremarkable  Thought Content:  Reports suicidal ideation;  Denies suicidal ideation with plan; with intent [details in Interim History]  Perception:  Reports auditory hallucinations;  Denies visual hallucinations  Insight: good  Judgment: adequate for safety  Cognition: does  appear grossly intact; formal cognitive testing was not done      12. Assessment/Progress Note:     Writer and client met for IRT visit via [employer]. Set agenda to check in, reported current symptoms to include: AH, depression, anxiety but noted that they are \"alright, with the medication\". Writer used relational and interpersonal approach to explore feelings, motivations, and behavior. Writer offered support, feedback, validation, and reinforced use of skills taught in IRT from modalities including cognitive behavioral therapy, psycho education, and skills training. Promoted understanding of their experiences of psychosis and how it impacts them in important areas of their life and in recovery goals. Reflected on client's strengths and resiliency factors and facilitated discussion on how these can assist in symptom management, recovery, and well-being. Explored symptoms and coping from a stress-vulnerability lens. Current stressors include: symptoms. In regards to medications, client reports medication adherence to the prescribed antipsychotic.     Navi reports he completed his home practice of starting a gratitude journal. He reflected that he was grateful for things in his life such as plumbing, his bed and headphones. Navi reported this was \"sort of hard, but I'm weird like " "that, picking small things to be grateful for\". Writer validated and celebrated his achievements including walking with his mom a few times a week to a local park. Navi would like to cnotinue walking with mom and wants to set a goal of going to the park when there are more people around. Reminded Navi of skills he can use such as 4 square breathing, grounding and using the CBT triangle. Set home practice to start tracking thoughts and introduced idea of \"the observing self\" while making note of thoughts without judgement. Navi was open to this. Next time will review CBT triangle.      13. Plan/Referrals:     Continue [facility] services, weekly IRT    Billing for \"Interactive Complexity\"?    No      Brandie Moran, [facility]    NAVIGATE Individual Resiliency Trainer   Answers submitted by the patient for this visit:  Patient Health Questionnaire (Submitted on 9/5/2024)  If you checked off any problems, how difficult have these problems made it for you to do your work, take care of things at home, or get along with other people?: Very difficult  PHQ9 TOTAL SCORE: 22  Depression Screening Follow-up        9/5/2024     1:53 PM   PHQ   PHQ-9 Total Score 22   Q9: Thoughts of better off dead/self-harm past 2 weeks More than half the days   F/U: Thoughts of suicide or self-harm Yes   F/U: Self harm-plan Yes   F/U: Self-harm action No   F/U: Safety concerns No         [place] Protocol Risk Identification  1) Have you wished you were dead or wished you could go to sleep and not wake up? Yes  2) Have you actually had any thoughts about killing yourself? No  If YES to 2, answer questions 3, 4, 5, 6  If NO to 2, go directly to question 6  3) Have you thought about how you might do this? Yes  4) Have you had any intension of acting on these thoughts of killing yourself, as opposed to you have the thoughts but you definitely would not act on them? No  5) Have you started to work out or worked out the details of how to kill yourself? " Do you intend to carry out this plan? No  Always Ask Question 6  6) Have you done anything, started to do anything, or prepared to do anything to end your life? No  Examples: collected pills, obtained a gun, gave away valuables, wrote a will or suicide note, held a gun but changed your mind, cut yourself, tried to hang yourself, etc.      Follow Up     Follow Up Actions Taken  Crisis resource information provided in the After Visit Summary    Discussed the following ways the patient can remain in a safe environment:  remove things I could use to hurt myself: chemicals, etc, be around others, and have access to less alcohol. Other things Nvai can do to maintain safety: distraction, walking, talking to mom, calling hotline or visiting ED/calling 911 or 988 for crisis.    I have reviewed the results of the [place] Screening and proposed plan of care. The patient agrees with the follow up and safety plan.    MERCEDES Mchugh

## 2024-09-09 ENCOUNTER — VIRTUAL VISIT (OUTPATIENT)
Dept: PSYCHIATRY | Facility: CLINIC | Age: 29
End: 2024-09-09
Payer: COMMERCIAL

## 2024-09-09 DIAGNOSIS — F12.90 CANNABIS USE DISORDER: ICD-10-CM

## 2024-09-09 DIAGNOSIS — F25.1 SCHIZOAFFECTIVE DISORDER, DEPRESSIVE TYPE (H): Primary | ICD-10-CM

## 2024-09-09 DIAGNOSIS — F10.90 ALCOHOL USE DISORDER: ICD-10-CM

## 2024-09-09 ASSESSMENT — PATIENT HEALTH QUESTIONNAIRE - PHQ9
SUM OF ALL RESPONSES TO PHQ QUESTIONS 1-9: 23
10. IF YOU CHECKED OFF ANY PROBLEMS, HOW DIFFICULT HAVE THESE PROBLEMS MADE IT FOR YOU TO DO YOUR WORK, TAKE CARE OF THINGS AT HOME, OR GET ALONG WITH OTHER PEOPLE: VERY DIFFICULT
10. IF YOU CHECKED OFF ANY PROBLEMS, HOW DIFFICULT HAVE THESE PROBLEMS MADE IT FOR YOU TO DO YOUR WORK, TAKE CARE OF THINGS AT HOME, OR GET ALONG WITH OTHER PEOPLE: VERY DIFFICULT
SUM OF ALL RESPONSES TO PHQ QUESTIONS 1-9: 23

## 2024-09-09 NOTE — PROGRESS NOTES
"Virtual Visit Details  Type of service:  Video Visit   Originating Location (patient location): Home  Distant Location (provider location):  On-site  Platform used for Video Visit: Insightpool    NAVIGATE Medication Management Progress Note  A Part of the [facility] First Episode of Psychosis Program    NAVIGATE Enrollee: Navi Morales (1995)     MRN: [id number]  Date:  24         Contributors to the Assessment     Chart Reviewed.   Interview completed with Navi Morales.  Collateral information obtained from none.         Interim History      Navi Morales is a 29 year old male who was last seen in [facility] on 3/18/24 at which time cross-taper from quetiapine to olanzapine was continued. The patient reports good treatment adherence. History was provided by Navi who was a good historian.  Since the last visit:  -Trying to do \"the simple things in life,\" like taking his dog for a walk. Focusing on the anxiety and what the anxiety is, rather than it being linked to another things   -SI goes almost hand in hand with psychosis symptoms. Much more rare for it be caused by anxiety. Has had a lot of relief from reduction of AH  -Had one time when he drank too much, liter of bourbon lasts 2-3 days  -Once he woke up and he had thrown up in bed, ended up having to throw out some pillows  -Coping with anxiety and depression leading to drinking--\"guess I'm a little bit bored.\"  -Definitely knows the feeling of being \"a heavy drinker.\" Feels like he could stop easily.    PSYCH ROS:  Clinician Rating Form in COMPASS  1. Depressed Mood: Ratin  0 Not reported     1 Very mild occasionally feels sad or  down ; of questionable clinical significance   2 Mild occasionally feels moderately depressed or often feels sad or  down    3 Moderate occasionally feels very depressed or often feels moderately depressed   4 Moderately severe often feels very depressed   5 Severe feels very depressed most of the time   6 Very severe constant " extremely painful feelings of depression   U Unable to assess        2. Anxiety/Worry: Rating: 3  0 Not reported     1 Very mild occasionally feels a little anxious; of questionable clinical significance   2 Mild occasionally feels moderately anxious or often feels a little anxious or worried   3 Moderate occasionally feels very anxious or often feels moderately anxious   4 Moderately severe often feels very anxious or often feels moderately anxious   5 Severe feels very anxious or worried most of the time   6 Very severe patient is continually preoccupied with severe anxiety   U Unable to assess        3. Suicidal Ideation/Behavior: Ratin         0 Not reported     1 Very mild occasional thoughts of dying,  I d be better off dead  or  I wish I were dead    2 Mild frequents thoughts of dying or occasional thoughts of killing self, without plan or method   3 Moderate often thinks of suicide or has though of a specific method   4 Moderately severe has mentally rehearsed a specific method of suicide or has made a suicide attempt with questionable intent to die (e.r. takes aspirins and then tells family)   5 Severe has made preparations for a potentially lethal suicide attempt (e.g acquires a gun and bullets for an attempt)   6 Very severe has made a suicide attempt with an intent to die   U Unable to assess                      4. Elevated/Expansive Mood: Ratin  0 Not at all     1 Very mild questionable; more cheerful than most people in his/her circumstances but of only possible clinical significance   2 Mild brief elevated/expansive mood but only somewhat out of proportion to the circumstances   3 Moderate brief/occasional elevation of mood which is clearly out of proportion to the circumstances   4 Moderately severe sustained/frequent elevation of mood which is clearly out of proportion to the circumstances   5 Severe mood is euphoric most of the time   6 Very severe sustained elevation;  everything is  wonderful  almost all of the time   U Unable to assess        5. Hostility/Anger/Irritability/Aggressiveness: Ratin  0 Not at all     1 Very mild occasional irritability of doubtful clinical significance   2 Mild occasionally feels angry or mild or indirect expressions of anger, e.g. sarcasm, disrespect or hostile gestures   3 Moderate frequently feels angry, frequent irritability or occasional direct expression of anger, e.g. yelling at others   4 Moderately severe often feels very angry, often yells at others or occasionally threatens to harm others   5 Severe has acted on his anger by becoming physically abusive on one or two occasions or makes frequent threats to harm others or is very angry most of the time   6 Very severe has been physically aggressive and/or required intervention to prevent assaultiveness on several occasions; or any serious assaultive act   U Unable to assess        6. Impulsive Behavior: Ratin  0 Not at all     1 Very mild one instance of impulsive behavior which is of doubtful clinical significance   2 Mild occasional impulsive acts, e.g. making phone calls at odd hours   3 Moderate occasional impulsive acts with some potential negative consequence, e.g. leaving work abruptly; changing plans without thinking   4 Moderately severe impulsive acts with definite negative consequences, e.g. overspending on non-essentials; repeated reckless sexual behavior   5 Severe impulsive acts with direct negative consequences, e.g. spends entire income on nonessentials without regard for basic needs   6 Very severe impulsive behavior which is potentially life threatening, e.g. jumps from dangerous height (without suicidal intent) or criminal behavior, e.g. impulsive robbery   U Unable to assess        7. Suspiciousness: Ratin  0 Not present     1 Very mild Seems on guard. Reluctant to respond to some  personal  questions. Reports being overly self-conscious in public   2 Mild Describes  incidents in which others have harmed or wanted to harm him/her that sound plausible. Patient feels as if others are watching, laughing, or criticizing him/her in public, but this occurs only occasionally or rarely. Little or no preoccupation   3 Moderate Says others are talking about him/her maliciously, have negative intentions, or may harm him/her. Beyond the likelihood of plausibility, but not delusional. Incidents of suspected persecution occur occasionally (less than once per week) with some preoccupation   4 Moderately severe Same as 3, but incidents occur frequently such as more than once a week. Patient is moderately preoccupied with ideas of persecution OR patient reports persecutory delusions expressed with much doubt (e.g. partial delusion)   5 Severe Delusional -- speaks of Mafia plots, the FBI, or others poisoning his/her food, persecution by supernatural forces   6 Extremely severe Same as 5, but the beliefs are bizarre or more preoccupying. Patient tends to disclose or act on persecutory delusions.   U Unable to assess        8. Unusual Thought Content: Ratin  0 Not present     1 Very mild Ideas of reference (people may stare or may laugh at him), ideas of persecution (people may mistreat him). Unusual beliefs in psychic tyler, spirits, UFOs, or unrealistic beliefs in one's own abilities. Not strongly held. Some doubt   2 Mild Same as 1, but degree of reality distortion is more severe as indicated by highly unusual ideas or greater conviction. Content may be typical of delusions (even bizarre), but without full conviction. The delusion does not seem to have fully formed, but is considered as one possible explanation for an unusual experience   3 Moderate Delusion present but no preoccupation or functional impairment. May be an encapsulated delusion or a firmly endorsed absurd belief about past delusional circumstances   4 Moderately severe Full delusion(s) present with some preoccupation OR some  areas of functioning disrupted by delusional thinking   5 Severe Full delusion(s) present with much preoccupation OR many areas of functioning are disrupted by delusional thinking   6 Extremely severe Full delusions present with almost   U Unable to assess        9. Hallucinations: Ratin  0 Not present     1 Very mild While resting or going to sleep, sees visions, smells odors, or hears voices, sounds or whispers in the absence of external stimulation, but no impairment in functioning   2 Mild While in a clear state of consciousness, hears a voice calling the subject s name, experiences non-verbal auditory hallucinations (e.g., sounds or whispers), formless visual hallucinations, or has sensory experiences in the presence of a modality-relevant stimulus (e.g., visual illusions) infrequently (e.g., 1-2 times per week) and with no functional impairment   3 Moderate Occasional verbal, visual, gustatory, olfactory, or tactile hallucinations with no functional impairment OR non-verbal auditory hallucinations/visual illusions more than infrequently or with impairment   4 Moderately severe Experiences daily hallucinations OR some areas of functioning are disrupted by hallucinations   5 Severe Experiences verbal or visual hallucinations several times a day OR many areas of functioning are disrupted by these hallucinations   6 Extremely severe Persistent verbal or visual hallucinations throughout the day OR most areas of functioning are disrupted by these hallucinations   U Unable to assess        10. Conceptual Disorganization: Ratin  0 Not present     1 Very mild Peculiar use of words or rambling but speech is comprehensible   2 Mild Speech a bit hard to understand or make sense of due to tangentiality, circumstantiality, or sudden topic shifts   3 Moderate Speech difficult to understand due to tangentiality, circumstantiality, idiosyncratic speech, or topic shifts on many occasions OR 1-2 instances of incoherent  phrases   4 Moderately severe Speech difficult to understand due to circumstantiality, tangentiality, neologisms, blocking, or topic shifts most of the time OR 3-5 instances of incoherent phrases   5 Severe Speech is incomprehensible due to severe impairments most of the time. Many PSRS items cannot be rated by self-report alone   6 Extremely severe Speech is incomprehensible throughout interview   U Unable to assess        11. Avolition/Apathy: Ratin  0 Not at all     1 Very mild questionable decrease in time spent in goal-directed activities   2 Mild spends less time in goal-directed activities than is appropriate for situation and age   3 Moderate initiates activities at times but does not follow through   4 Moderately severe rarely initiates activity but will passively engage with encouragement   5 Severe almost never initiates activities; requires assistance to accomplish basic activities   6 Very severe does not initiate or persist in any goal-directed activity even with outside assistance   U Unable to assess        12. Asociality/Low Social Drive: Ratin  0 Not at all     1 Very mild questionable   2 Mild slow to initiate social interactions but usually responds to overtures by others   3 Moderate rarely initiates social interactions; sometimes responds to overtures by others   4 Moderately severe does not initiate but sometimes responds to overtures by others; little social interaction outside close family members   5 Severe never initiates and rarely encourages conversations or activities; avoids being with others unless prodded, may have contacts with family   6 Very severe avoids being with others (even family members) whenever possible, extreme social isolation   U Unable to assess        13. Adherence: Days: 0  The longest, continuous amount of time in days, since the last visit when the subject did not take medication      RECENT SOCIAL HISTORY:  SEE HPI    Medical ROS:  none         First  Episode of Psychosis History      DUP (duration untreated psychosis):  4 years  Route to initial care: outpatient  Medication adherence overall:  See above, Clinician Rating Form in COMPASS Item 13  General frequency of visits:  weekly IRT, monthly med management  Participation in groups:  No  Cognitive Remediation:  No  Other treatment history:     Reviewed for completion of First Episode work-up:  Yes  First episode workup:  Not Done (if completed, see LABS for results)  MATRICS Consensus Cognitive Battery:  Not Done (if completed, see LABS for results)       Medical/Surgical History     Patient has no known allergies.    Patient Active Problem List   Diagnosis    Alcohol use disorder, mild, abuse    MELY (generalized anxiety disorder)    Induration penis plastica    Moderate episode of recurrent major depressive disorder (H)    Open displaced fracture of neck of second metacarpal bone of right hand    Psychosis (H)    Social anxiety disorder    TMJ (temporomandibular joint syndrome)    Traumatic compression fracture of T3 vertebra (H)            Medications     Current Outpatient Medications   Medication Sig Dispense Refill    cholecalciferol, vitamin D3, 1,000 unit (25 mcg) tablet [CHOLECALCIFEROL, VITAMIN D3, 1,000 UNIT (25 MCG) TABLET] Take 2,000 Units by mouth daily.      dutasteride (AVODART) 0.5 MG capsule Take 0.5 mg by mouth daily      ketoconazole (NIZORAL) 2 % external shampoo Apply topically daily as needed LATHER INTO SCALP AND RINSE AFTER 2-3 MINUTES. USE THREE TIMES A WEEK.      magnesium chloride (SLOW-MAG) 64 mg TbEC delayed-release tablet [MAGNESIUM CHLORIDE (SLOW-MAG) 64 MG TBEC DELAYED-RELEASE TABLET] Take 64 mg by mouth daily.      naltrexone (DEPADE/REVIA) 50 MG tablet Take 1 tablet (50 mg) by mouth daily 30 tablet 2    OLANZapine (ZYPREXA) 15 MG tablet Take 2 tablets (30 mg) by mouth at bedtime 180 tablet 2    QUEtiapine (SEROQUEL) 100 MG tablet Take 1 tablet (100 mg) by mouth at bedtime. May  also take 0.5-1 tablets ( mg) 2 times daily as needed (anxiety). 60 tablet 3    tadalafil (CIALIS) 10 MG tablet Take 10 mg by mouth daily      zinc gluconate 50 mg tablet [ZINC GLUCONATE 50 MG TABLET] Take 100 mg by mouth daily.            Vitals     There were no vitals taken for this visit.      Weight prior to medication: unknown         Mental Status Exam     Alertness: alert  and oriented  Appearance: well groomed  Behavior/Demeanor: cooperative, pleasant, and calm, with good  eye contact   Speech: regular rate and rhythm, not pressured  Language: no obvious problem  Psychomotor: normal or unremarkable  Mood: depressed  Affect: blunted; was congruent to mood; was congruent to content  Thought Process/Associations:  overinclusive at times  Thought Content:  Reports none;  Denies suicidal and violent ideation and delusions  Perception:  Reports auditory hallucinations;  Denies visual hallucinations  Insight: good  Judgment: fair and adequate for safety  Cognition: does  appear grossly intact; formal cognitive testing was not done         Labs and Data     RATING SCALES:  AIMS: next in person visit    PHQ9 TODAY =       9/3/2024     4:02 PM 9/5/2024     1:53 PM 9/9/2024     2:55 PM   PHQ-9 SCORE   PHQ-9 Total Score MyChart 23 (Severe depression) 22 (Severe depression) 23 (Severe depression)   PHQ-9 Total Score 23 22 23    23     ANTIPSYCHOTIC LABS ROUTINE    [glu, A1C, lipids (focus LDL), liver enzymes, WBC, ANEU, Hgb, plts]   q12 mo  Recent Labs   Lab Test 06/30/23  1048   GLC 84     No lab results found.  Recent Labs   Lab Test 06/30/23  1048   AST 39   ALT 40   ALKPHOS 88     Recent Labs   Lab Test 06/30/23  1048   WBC 7.7   HGB 14.9             Psychiatric Diagnoses     Psychosis, schizophrenia vs schizoaffective disorder, r/o substance-induced  AUD in early remission  Cannabis use disorder with active use         Assessment   Jack A [place] is a 28 year old male with first onset of psychiatric  "symptoms at age 7 (\"social anxiety\"), and psychotic symptoms at age 23. The above duration of untreated psychosis was approximately 4 years.  Prodromal symptoms seem to have been present since at least college (notable substance use) though patient does note a substantially longer history of depression and physical health concerns. Presenting symptoms appear to include hallucinations, delusional thoughts. Navi attributes symptoms to physical health issues and history of trauma.  Substance use does seem to be a present concern. There are possible medical comorbidities which impact this treatment [suicide attempt, suicidal ideation, SIB, psychosis, aggression, and substance use: alcohol].     Today, Navi reports he is doing well overall. ***              SUICIDE RISK ASSESSMENT:  Risk factors for self-harm: previous suicide attempt, substance use/pending treatment, recent symptom worsening, hallucinations, recent loss, and lives alone/ isolated.  Mitigating factors: describes a safety plan, h/o seeking help , future oriented, commitment to family, and stable housing.  The patient does not appear to be at imminent risk for self-harm, hospitalization is not recommended which the pt does  agree with. No hospitalization will be arranged. Based on degree of symptoms close psych follow-up was/were recommended which the pt does  agree to. Additional steps to minimize risk: med changes.      [place] PRESCRIPTION MONITORING PROGRAM [] was not checked today: not using controlled substances.    PSYCHOTROPIC DRUG INTERACTIONS:   Quetiapine/olanzapine: additive CNS depression, QTc prolongation.    MANAGEMENT:  use lowest therapeutic doses of both and routine monitoring         Plan     1) PSYCHOTROPIC MEDICATIONS:  - Quetiapine 100 mg PO at bedtime + 50-100mg BID PRN for anxiety  - Olanzapine 30 mg PO at bedtime   - Naltrexone 50 mg PO daily    2) THERAPY:  Continue IRT with Brandie Moran    3) NEXT DUE:    Labs: FEP workup " due  Rating Scales: AIMS due    4) REFERRALS:    none    5) RTC: 4 weeks    6) CRISIS NUMBERS:   Provided routinely in AVS.    TREATMENT RISK STATEMENT:  The risks, benefits, alternatives and potential adverse effects have been discussed and are understood by the pt. The pt understands the risks of using street drugs or alcohol. There are no medical contraindications, the pt agrees to treatment with the ability to do so. The pt knows to call the clinic for any problems or to access emergency care if needed.  Medical and substance use concerns are documented above.  Psychotropic drug interaction check was done, including changes made today.    PROVIDER:   Heidi Vega MD  [facility]  , Department of Psychiatry and Behavioral Sciences  [facility]       Psychiatry Individual Psychotherapy Note   Psychotherapy start time 4:20 PM  Psychotherapy end time 4:50 PM  Date treatment plan last reviewed with patient - 1/16/24, with LEENA Lay  Subjective: This supportive psychotherapy session addressed issues related to goals of therapy and current psychosocial stressors. Patient's reaction: Preparatory and Relapse in the context of mental status appropriate for ambulatory setting.    Interactive complexity indicated? No  Plan: RTC in timeframe noted above  Psychotherapy services during this visit included myself and the patient.   Treatment Plan      SYMPTOMS; PROBLEMS   MEASURABLE GOALS;    FUNCTIONAL IMPROVEMENT / GAINS INTERVENTIONS DISCHARGE CRITERIA   Depression: depressed mood, concentration problems, suicidal ideation, and feeling hopelesss  Psychosis: auditory hallucinations without commands [details in Interim History] and paranoia  Substance Use: alcohol  and cannabis      Pt s measurable objectives (list 3)  Reduce intensity of psychosis symptoms  Demonstrate understanding of symptoms regarding causes, treatment  Learn two skills to manage  symptoms of psychosis  In Pt s own words    I want to have stability both mentally and psychically.   Interventions  IRT  Medication Management  SEE  Family support and education  Social work care coordination    Supportive / CBT marked symptom improvement and transition to stepdown therapy     The longitudinal plan of care for the diagnosis(es)/condition(s) as documented were addressed during this visit. Due to the added complexity in care, I will continue to support Naiv in the subsequent management and with ongoing continuity of care.    Answers submitted by the patient for this visit:  Patient Health Questionnaire (Submitted on 9/9/2024)  If you checked off any problems, how difficult have these problems made it for you to do your work, take care of things at home, or get along with other people?: Very difficult  PHQ9 TOTAL SCORE: 23

## 2024-09-09 NOTE — PROGRESS NOTES
"Navi is a 29 year old who is being evaluated via a billable video visit.    How would you like to obtain your AVS? MyChart  If the video visit is dropped, the invitation should be resent by: Send to e-mail at: [email or web address]  Will anyone else be joining your video visit? No  {If patient encounters technical issues they should call [phone] :254631}    {PROVIDER CHARTING PREFERENCE:881442}    Subjective   Navi is a 29 year old, presenting for the following health issues:  Follow Up (Medication Recheck )    Video Start Time: {video visit start/end time for provider to select:481645}    HPI     ***    {ROS Picklists (Optional):136298}      Objective           Vitals:  No vitals were obtained today due to virtual visit.    Physical Exam   {video visit exam brief selected:834958}    {Diagnostic Test Results (Optional):955494}      Video-Visit Details    Type of service:  Video Visit   Video End Time:{video visit start/end time for provider to select:272488}  Originating Location (pt. Location): {video visit patient location:084525::\"Home\"}  {PROVIDER LOCATION On-site should be selected for visits conducted from your clinic location or adjoining [facility] hospital, academic office, or other nearby [facility] building. Off-site should be selected for all other provider locations, including home:416976}  Distant Location (provider location):  {virtual location provider:160049}  Platform used for Video Visit: {Virtual Visit Platforms:358769::\"[employer]\"}  Signed Electronically by: Heidi Vega MD  {Email feedback regarding this note to [email or web address]   :815493}   "

## 2024-09-10 ENCOUNTER — TELEPHONE (OUTPATIENT)
Dept: PSYCHIATRY | Facility: CLINIC | Age: 29
End: 2024-09-10

## 2024-09-10 ENCOUNTER — VIRTUAL VISIT (OUTPATIENT)
Dept: PSYCHIATRY | Facility: CLINIC | Age: 29
End: 2024-09-10
Payer: COMMERCIAL

## 2024-09-10 DIAGNOSIS — F29 PSYCHOSIS, UNSPECIFIED PSYCHOSIS TYPE (H): Primary | ICD-10-CM

## 2024-09-10 NOTE — PROGRESS NOTES
NAVIGGALINDO Clinician Contact & Progress Note  For Individual Resiliency Training (IRT)  A Part of the [facility] First Episode of Psychosis Program    NAVIGATE Enrollee: Navi Morales (1995)     MRN: [id number]  Date:  9/10/24  Diagnosis: Psychosis, unspecified psychosis type (H) [F29]   Clinician: LILIANA Individual Resiliency Trainer, MERCEDES Mchugh     1. Type of contact: (majority of time spent)  IRT Session via telehealth  Mode of communication: [employer] Well (HIPAA compliant, secure platform). Patient consented verbally to this mode of therapy today.  Reason for telehealth: To reduce barriers in accessing services, due to but not limited to transportation, location, or scheduling reasons.    2. People present:   Writer  Client: Yes - Navi    3. Length of Actual Contact: Start Time: 3; End Time: 4p     4. Location of contact:  Originating Location (patient location): [facility], located in [place], [place]  Distant Location (provider location): Remote location    5. Did the client complete the home practice option(s) from the previous session: Yes, kept gratitude journal for 2 weeks    6. Motivational Teaching Strategies:  Connect info and skills with personal goals  Promote hope and positive expectations  Explore pros and cons of change  Re-frame experiences in positive light    7. Educational Teaching Strategies:  Review of written material/education  Relate information to client's experience  Ask questions to check comprehension  Break down information into small chunks  Adopt client's language     8. CBT Teaching Strategies:  Reinforcement and shaping (positive feedback for steps towards goals and gains in knowledge & skills)  Cognitive restructuring (identify thoughts related to negative feelings)    9. IRT Module(s) Addressed:  Module 3 - Education about Psychosis    10. Techniques utilized:   [place] announced at beginning of session  Review of goal  Review of previous meeting  Present new  "material  Help client choose a home practice option  Summarize progress made in current session    11. Measures:  Answers submitted by the patient for this visit:      Mental Status Exam  Alertness: alert   Behavior/Demeanor: cooperative, pleasant, and calm  Speech: regular rate and rhythm  Language: intact.   Mood: depressed and anxious  Thought Process/Associations: unremarkable  Thought Content:  Reports suicidal ideation;  Denies suicidal ideation with plan; with intent [details in Interim History]  Perception:  Reports auditory hallucinations;  Denies visual hallucinations  Insight: good  Judgment: adequate for safety  Cognition: does  appear grossly intact; formal cognitive testing was not done      12. Assessment/Progress Note:     Writer and client met for IRT visit via [employer]. Set agenda to check in, reported current symptoms to include: AH, depression, anxiety but noted that they are \"alright, with the medication\". Writer used relational and interpersonal approach to explore feelings, motivations, and behavior. Writer offered support, feedback, validation, and reinforced use of skills taught in IRT from modalities including cognitive behavioral therapy, psycho education, and skills training. Promoted understanding of their experiences of psychosis and how it impacts them in important areas of their life and in recovery goals. Reflected on client's strengths and resiliency factors and facilitated discussion on how these can assist in symptom management, recovery, and well-being. Explored symptoms and coping from a stress-vulnerability lens. Current stressors include: symptoms. In regards to medications, client reports medication adherence to the prescribed antipsychotic.     Navi reports he completed his home practice of starting a gratitude journal and continues to walk with mom. He also reports he went to the grocery store and said his symptoms were manageable. Still reports low mood and anxiety. Helped " "Navi recognize his thoughts in order to start practicing the Cognitive triangle. We practiced using an upcoming social situation where he felt scared that he wouldn't know what to say or that a person might be judgmental. Identifying his feelings was more difficult, writer will share feelings wheel next time.   Related to drinking: \"Its not out of hand - but I could do with less alcohol.\"  \"I know the magic recipe to stop this\" - buying smaller bottles.  Last night had 4 glasses of whiskey - \"3 finger drink\". Threw up in bed. [place] embarassed. \" It Relaxes me\" - some days won't take medication - doesn't want to mix them. Skipping one or two days he can tell when AH return. Causes problems with mom - she gives him money for alcohol. Set home practice of noting thoughts and identifying feelings.    13. Plan/Referrals:     Continue Navigate services, weekly IRT    Billing for \"Interactive Complexity\"?    No      Brandie Moran, [facility]    NAVIGATE Individual Resiliency Trainer   Answers submitted by the patient for this visit:  Answers submitted by the patient for this visit:  Patient Health Questionnaire (Submitted on 9/9/2024)  If you checked off any problems, how difficult have these problems made it for you to do your work, take care of things at home, or get along with other people?: Very difficult  PHQ9 TOTAL SCORE: 23      [place] Protocol Risk Identification  1) Have you wished you were dead or wished you could go to sleep and not wake up? Yes  2) Have you actually had any thoughts about killing yourself? No  If YES to 2, answer questions 3, 4, 5, 6  If NO to 2, go directly to question 6  3) Have you thought about how you might do this? Yes  4) Have you had any intension of acting on these thoughts of killing yourself, as opposed to you have the thoughts but you definitely would not act on them? No  5) Have you started to work out or worked out the details of how to kill yourself? Do you intend to carry out this " plan? No  Always Ask Question 6  6) Have you done anything, started to do anything, or prepared to do anything to end your life? No  Examples: collected pills, obtained a gun, gave away valuables, wrote a will or suicide note, held a gun but changed your mind, cut yourself, tried to hang yourself, etc.      Follow Up     Follow Up Actions Taken  Crisis resource information provided in the After Visit Summary    Discussed the following ways the patient can remain in a safe environment:  remove things I could use to hurt myself: chemicals, etc, be around others, and have access to less alcohol. Other things Navi can do to maintain safety: distraction, walking, talking to mom, calling hotline or visiting ED/calling 911 or 988 for crisis.    I have reviewed the results of the [place] Screening and proposed plan of care. The patient agrees with the follow up and safety plan.    MERCEDES Mchugh

## 2024-09-10 NOTE — TELEPHONE ENCOUNTER
NAVIGATE Telephone Call or E-mail Correspondence    NAVIGATE Enrollee: Navi Morales (1995)     MRN: [id number]        Response to mom:       Anh Dunn,  Thank you for this information. I have sent this email to Brandie (his current therapist) and will also notify Dr. Vega of his increased drinking. I hear your concerns and I will consult with the team about recommendations for increased support.  Best,  Laura  __________  Fwd to therapist:     Vern Diego,  I just got this from Navi's mom. I will also paste it in his chart.  Thanks,  Laura    From: [email or web address]   Sent: Tuesday, September 10, 2024 11:03 AM  To: Laura Maldonado <[email or web address]>  Subject: Navi info    This message was sent securely using Smartdate, I wanted to pass along some information to you since Navi has an appt. with Brandie today at 3pm.  (I hope he makes the appt.?).  Last night he was drinking, of course said he would be responsible,     ATTENTION: This email was sent to your  Physicians account from an external source. Please use extra caution before clicking links, opening attachments, or replying to or forwarding this email.          [email or web address]  was sent securely using M[employer] Laura, I wanted to pass along some information to you since Navi has an appt. with Brandie today at 3pm.  (I hope he makes the appt.).  Last night he was drinking, of course said he would be responsible, but he had too much to drink. at 11pm I heard him yelling from his room.  I heard this a few times, then again about 40 mins. later. This is always jarring for me and I was physically upset.  I didn't want to confront him if he was really intoxicated and cause a big ruckus.  I heard him coughing quite abit as well. But,  at midnight it was quiet and I went to sleep.      When I woke up today, he was also awake, but he was still intoxicated. He had to wash his bedding as he said he  "\"spit up\",but he threw up.  He then proceeded to tell me that his laptop won't turn on.  He was very upset that I could not drop everything and bring it to the repair place.  I told him I could do it at 10am before I leave town for the day.  At 9:45am I went to get the laptop from him and he was sound asleep.  I had to raise my voice to ask him if he wanted me to take his laptop and he told me no.  I am thinking that the outbursts I heard last night were very likely because the laptop would not turn on.      I am concerned that without a regular therapist, Navi is sliding backwards.  Maybe he isn't, but it seems like it.  He did tell me that Brandie has had some good suggestions for him about getting out and having hobbies, keeping a journal etc.  But, he just hasn't executed.  Except for our two walks a week with Brennan.      It was nice to hear him say last night in the car, \"It's a nice evening\".  I never hear him say things like that. He always seems to be in his head about having to be out in public.  He also stated that he wishes he didn't have the anxiety so he could get out and do things more.  Like you say, it's an up and down process.      Anyway, I wanted you to know what happened and if you could pass it on, I would appreciate it.       Mona Morales        This message was secured by [employer]aire .       Laura Maldonado MA, SELWYN    Health NAVIGATE     "

## 2024-09-17 ENCOUNTER — VIRTUAL VISIT (OUTPATIENT)
Dept: PSYCHIATRY | Facility: CLINIC | Age: 29
End: 2024-09-17
Payer: COMMERCIAL

## 2024-09-17 DIAGNOSIS — F29 PSYCHOSIS, UNSPECIFIED PSYCHOSIS TYPE (H): Primary | ICD-10-CM

## 2024-09-17 ASSESSMENT — PATIENT HEALTH QUESTIONNAIRE - PHQ9
SUM OF ALL RESPONSES TO PHQ QUESTIONS 1-9: 24
10. IF YOU CHECKED OFF ANY PROBLEMS, HOW DIFFICULT HAVE THESE PROBLEMS MADE IT FOR YOU TO DO YOUR WORK, TAKE CARE OF THINGS AT HOME, OR GET ALONG WITH OTHER PEOPLE: EXTREMELY DIFFICULT
SUM OF ALL RESPONSES TO PHQ QUESTIONS 1-9: 24

## 2024-09-17 ASSESSMENT — ANXIETY QUESTIONNAIRES
GAD7 TOTAL SCORE: 20
GAD7 TOTAL SCORE: 20
7. FEELING AFRAID AS IF SOMETHING AWFUL MIGHT HAPPEN: NEARLY EVERY DAY
8. IF YOU CHECKED OFF ANY PROBLEMS, HOW DIFFICULT HAVE THESE MADE IT FOR YOU TO DO YOUR WORK, TAKE CARE OF THINGS AT HOME, OR GET ALONG WITH OTHER PEOPLE?: EXTREMELY DIFFICULT
GAD7 TOTAL SCORE: 20
8. IF YOU CHECKED OFF ANY PROBLEMS, HOW DIFFICULT HAVE THESE MADE IT FOR YOU TO DO YOUR WORK, TAKE CARE OF THINGS AT HOME, OR GET ALONG WITH OTHER PEOPLE?: EXTREMELY DIFFICULT
GAD7 TOTAL SCORE: 20
GAD7 TOTAL SCORE: 20
7. FEELING AFRAID AS IF SOMETHING AWFUL MIGHT HAPPEN: NEARLY EVERY DAY
GAD7 TOTAL SCORE: 20

## 2024-09-17 NOTE — PROGRESS NOTES
NAVIGGALINDO Clinician Contact & Progress Note  For Individual Resiliency Training (IRT)  A Part of the [facility] First Episode of Psychosis Program    NAVIGATE Enrollee: Navi Morales (1995)     MRN: [id number]  Date:  9/17/24  Diagnosis: Psychosis, unspecified psychosis type (H) [F29]   Clinician: LILIANA Individual Resiliency Trainer, MERCEDES Mchugh     1. Type of contact: (majority of time spent)  IRT Session via telehealth  Mode of communication: [employer] Well (HIPAA compliant, secure platform). Patient consented verbally to this mode of therapy today.  Reason for telehealth: To reduce barriers in accessing services, due to but not limited to transportation, location, or scheduling reasons.    2. People present:   Writer  Client: Yes - Navi    3. Length of Actual Contact: Start Time: 2:09; End Time: 3:05     4. Location of contact:  Originating Location (patient location): [facility], located in [place], [place]  Distant Location (provider location): Remote location    5. Did the client complete the home practice option(s) from the previous session: Yes, kept gratitude journal for 2 weeks    6. Motivational Teaching Strategies:  Connect info and skills with personal goals  Promote hope and positive expectations  Explore pros and cons of change  Re-frame experiences in positive light    7. Educational Teaching Strategies:  Review of written material/education  Relate information to client's experience  Ask questions to check comprehension  Break down information into small chunks  Adopt client's language     8. CBT Teaching Strategies:  Reinforcement and shaping (positive feedback for steps towards goals and gains in knowledge & skills)  Cognitive restructuring (identify thoughts related to negative feelings)    9. IRT Module(s) Addressed:  Module 3 - Education about Psychosis    10. Techniques utilized:   [place] announced at beginning of session  Review of goal  Review of previous meeting  Present new  "material  Help client choose a home practice option  Summarize progress made in current session    11. Measures:  NA  Mental Status Exam  Alertness: alert   Behavior/Demeanor: cooperative, pleasant, and calm  Speech: regular rate and rhythm  Language: intact.   Mood: depressed and anxious  Thought Process/Associations: unremarkable  Thought Content:  Reports suicidal ideation;  Denies suicidal ideation with plan; with intent [details in Interim History]  Perception:  Reports auditory hallucinations;  Denies visual hallucinations  Insight: good  Judgment: adequate for safety  Cognition: does  appear grossly intact; formal cognitive testing was not done      12. Assessment/Progress Note:     Writer and client met for IRT visit via [employer]. Set agenda to check in, reported current symptoms to include: AH, depression, anxiety but noted that they are \"alright, with the medication\". Writer used relational and interpersonal approach to explore feelings, motivations, and behavior. Writer offered support, feedback, validation, and reinforced use of skills taught in IRT from modalities including cognitive behavioral therapy, psycho education, and skills training. Promoted understanding of their experiences of psychosis and how it impacts them in important areas of their life and in recovery goals. Reflected on client's strengths and resiliency factors and facilitated discussion on how these can assist in symptom management, recovery, and well-being. Explored symptoms and coping from a stress-vulnerability lens. Current stressors include: symptoms. In regards to medications, client reports medication adherence to the prescribed antipsychotic. Denied suicidal intent or plan today.    Navi reports he has been doing well. He got his laptop fixed, but was told due to corrosion, he will need a new one. Navi reports Walking with mom several times per week and did end up going to his brother's house to help with the deck, however he " "knew no one else would be there and did not attend on the days when he needed help sanding. Has been Drinking 3 days a week. Agreeable to trying taking meds before drinking. Reviewed the Cognitive triangle and identified the thoughts \"I'm strange and won't fit in\", feelings of irritation. Navi had difficulty identifying his feelings and would often say \"well I don't want to be social anyway\". Writer was able to identify thoughts that Navi has that confirms and reinforces his negative thoughts. Home practice to complete worksheet emailed.       13. Plan/Referrals:     Continue Navigate services, weekly IRT    Billing for \"Interactive Complexity\"?    No      MERCEDES Mchugh    NAVIGATE Individual Resiliency Trainer           "

## 2024-09-18 NOTE — PROGRESS NOTES
NAVIGATE Clinician Contact & Progress Note   For Family Education Program    NAVIGATE Enrollee: Navi Morales (1995)     MRN: [id number]  Date:  9/17/24  Diagnosis(es):   Psychosis unspecified  Clinician: LILIANA Family Clinician, Laura Maldonado MA, LP     1. Type of contact: (majority of time spent)  Family Session via telehealth  Mode of communication:    Linwood Verduzco consented verbally to this mode of therapy today.  Reason for telehealth: To reduce barriers in accessing services, due to but not limited to transportation, location, or scheduling reasons.     2. People present:   Writer  Client: Yes  Significant Other/Family/Friend:  Mother    3. Length of Actual Contact: Start Time: 4:05pm to 5pm     4. Location of contact:  Originating Location (patient location):[place]   Distant Location (provider location): Home office, located in [place], [place], using appropriate privacy considerations and procedures    5. Did the client complete the home practice option(s) from the previous session: Partially Completed    6. Motivational Teaching Strategies:  Connect info and skills with personal goals  Promote hope and positive expectations    7. Educational Teaching Strategies:  Review of written material/education  Relate information to client's experience  Ask questions to check comprehension  Break down information into small chunks    8. CBT Teaching Strategies:  Reinforcement and shaping (positive feedback for steps towards goals and gains in knowledge & skills)    9. Psychoeducational Topic(s) Addressed:  Just the Facts - Effective Communication    10. Techniques utilized:   [employer] announced at beginning of session  Review of previous meeting  Present new material  Family Therapy  Motivational Interviewing (Connect info and skills with personal goals, Promote hope and positive expectations, Explore pros and cons of change, Re-frame experiences in a positive light)  Educational  "Teaching Strategies (Review written material/education on: Psychosis, Medications for psychosis, Coping with stress, Strategies to build resiliency, Relapse prevention planning, Developing a collaboration with mental health professionals, Effective communication, A relative s guide to supporting recovery from psychosis, Basic facts about alcohol and drugs)  CBT (Reinforcement and shaping, Social skills training, Relapse prevention planning, Coping skills training, Relaxation training, Cognitive restructuring, Behavioral tailoring)    11. Assessment/Progress Note:     Writer met with Navi and Navi's mom on this day via [employer].  Writer set agenda to review the last week, discuss Navi's engagement in NAVIGATE, and to continue education module on communication.     Mom and Navi report no urgent concerns on this day. Navi reports that his mental health got worse when his computer wasn't working, but that he is feeling better now that it appears it is working ok.     Navi states, \"I need to stop drinking\" and I can't be missing my doses of medication.\" Navi volunteers that his symptoms are much better when he takes his  medications and avoids alcohol. Validate Navi's insight and ask mom about her perspective on the week. Mom reports that Navi helped his brother stain his deck. Mom thanked Navi and told him that his help was meaningful to both his dad and his brother. Navi struggled to hear this positive feedback and said he wasn't sure \"if it was real.\" Validated how painful that this would be and also restated mom's genuine gratitude for Navi's efforts to help brother despite struggling with anxiety.     Navi reports continued anxiety and that his way of  coping with this is alcohol. Mom reports that this week has not gone well re: alcohol. Mom states that she has not given Navi a budget as discussed last week. Explore what Navi thinks he needs for alcohol and vaping, and he is unsure. Navi agrees to discuss this with " his therapist. Mom is agreeable to buying all his other necessities.     Writer provides supportive listening. Provide encouragement to both mom and Navi in their commitment to their relationship.     Plan: mom and Navi to continue to  go for a walk 2x in the next week together to increase time out of the house and increase relational connection. Navi to talk with therapist about how much money he wants to spend on ETOH and vaping.    Overall family and Navi seemed engaged in conversation. They did express interest in continuing to meet for family therapy and psychoeducation. As of today's appt their insight into Navi's mental illness appears fair. They seem they would benefit from continued clinical intervention aimed at assisting them implement helpful strategies at home and increase their understanding of psychosis.    12. Plan/Referrals:     Will meet with family weekly as schedule allows for evidence based family psychoeducation and therapeutic support aimed at maximizing Navi's opportunity for recovery from psychosis.     Laura Maldonado MA, LP   NAVIGATE   The author of this note documented a reason for not sharing it with the patient.

## 2024-09-19 ENCOUNTER — VIRTUAL VISIT (OUTPATIENT)
Dept: PSYCHIATRY | Facility: CLINIC | Age: 29
End: 2024-09-19
Payer: COMMERCIAL

## 2024-09-19 DIAGNOSIS — F29 PSYCHOSIS, UNSPECIFIED PSYCHOSIS TYPE (H): Primary | ICD-10-CM

## 2024-09-19 NOTE — PROGRESS NOTES
NAVIGATE/STRENGTHS Virtual Visit Progress Note  For Supported Employment & Education    NAVIGATE Enrollee: Navi Morales (1995)     MRN: [id number]  Date of Visit: 9/19/24  Contacted: ALICE  Appointment Length: 30    Discussed:   Writer met with Navi Morales for virtual visit check-in. Reason for telehealth: To reduce barriers in accessing services, due to but not limited to transportation, location, or scheduling reasons. Meeting agenda included Sent an email to [facility] to follow up on application, started on TSFW    Follow-up: Left off on Memory    Delmi Davis  NAVIGATE/STRENGTHS  Supported Employment & Education  Supported Employment & EducationThis is a non-billable encounter as it was solely for the purposes of outreach and/or care coordination. SEE services are non billable services

## 2024-09-24 ENCOUNTER — VIRTUAL VISIT (OUTPATIENT)
Dept: PSYCHIATRY | Facility: CLINIC | Age: 29
End: 2024-09-24
Payer: COMMERCIAL

## 2024-09-24 DIAGNOSIS — F29 PSYCHOSIS, UNSPECIFIED PSYCHOSIS TYPE (H): Primary | ICD-10-CM

## 2024-09-25 NOTE — PROGRESS NOTES
NAVIGATE Clinician Contact & Progress Note   For Family Education Program    NAVIGATE Enrollee: Navi Morales (1995)     MRN: [id number]  Date:  9/24/24  Diagnosis(es):   Psychosis unspecified  Clinician: LILIANA Family Clinician, Laura Maldonado MA, SELWYN     1. Type of contact: (majority of time spent)  Family Session via telehealth  Mode of communication:    Linwood Verduzco consented verbally to this mode of therapy today.  Reason for telehealth: To reduce barriers in accessing services, due to but not limited to transportation, location, or scheduling reasons.     2. People present:   Writer  Client: Yes  Significant Other/Family/Friend:  Mother    3. Length of Actual Contact: Start Time: 4:05pm to 5pm (meet with Mom alone for the first 20 minutes and then with mom and Navi for the last 35 minutes).      4. Location of contact:  Originating Location (patient location):[place]   Distant Location (provider location): Home office, located in [place], [place], using appropriate privacy considerations and procedures    5. Did the client complete the home practice option(s) from the previous session: Partially Completed    6. Motivational Teaching Strategies:  Connect info and skills with personal goals  Promote hope and positive expectations    7. Educational Teaching Strategies:  Review of written material/education  Relate information to client's experience  Ask questions to check comprehension  Break down information into small chunks    8. CBT Teaching Strategies:  Reinforcement and shaping (positive feedback for steps towards goals and gains in knowledge & skills)    9. Psychoeducational Topic(s) Addressed:  Just the Facts - Effective Communication    10. Techniques utilized:   [place] announced at beginning of session  Review of previous meeting  Present new material  Family Therapy  Motivational Interviewing (Connect info and skills with personal goals, Promote hope and positive  "expectations, Explore pros and cons of change, Re-frame experiences in a positive light)  Educational Teaching Strategies (Review written material/education on: Psychosis, Medications for psychosis, Coping with stress, Strategies to build resiliency, Relapse prevention planning, Developing a collaboration with mental health professionals, Effective communication, A relative s guide to supporting recovery from psychosis, Basic facts about alcohol and drugs)  CBT (Reinforcement and shaping, Social skills training, Relapse prevention planning, Coping skills training, Relaxation training, Cognitive restructuring, Behavioral tailoring)    11. Assessment/Progress Note:     Writer met with Navi and Navi's mom on this day via [employer]. Writer met with Mom alone for the first 20 minutes and then met with mom and Navi for the last 35 minutes. Writer set agenda to review the last week, discuss Navi's engagement in [facility]IGATE, and to continue education module on communication.     Mom reports a stressful last week where \"things spiraled out of control\" with Navi's drinking. Mom states that Navi asked her to go to the vape store and then asked while out to go to the liquor store. Mom reports that Navi bought a liter bottle of whiskey. Mom reports that Navi got drunk that and proceeded to yell loudly in the middle of the night and woke up his dad. Navi's dad became very frustrated and mad at Navi for yelling and at mom for letting him buy the alcohol. Mom reports that she is spending about $300 a month on alcohol and vaping.     Writer provides supportive listening. Explore what would be helpful on this day for mom and Navi. Also explore mom's comfort with talking with Navi about his drinking, and mom is unsure about setting limits at this time due to conflict and feeling angry at Navi. Discuss other strategies for the session on this day including exploring the coping with stress module. Mom is open to discussing coping with " "stress.    Writer meets with mom and Navi together. Writer checks in with Navi regarding the last week and how he is doing. Navi reports feeling \"stressed out\" about recently learning of a test he has to get admitted to [facility]. He reports having high anxiety about this requirement. Navi reports missing one dose of medication over the last week and is not wanting to discuss drinking on this day.     Offer idea of discussing coping with stress and Navi is open to this idea. Writer asks about definition of stress. Navi offers that stress is \"tension\" or \"pressure.\" Provide education that stress can be positive or negative as well as information on the sympathetic and parasympathetic nervous system. Validate that sympathetic nervous system can get triggered even when danger is not present; discuss Navi's anxiety and stress in response to leaving the house and going to the grocery store.     Explore strategies for triggering the parasympathetic nervous system including the TIPP skill and diaphragmatic breathing. Both mom and Navi agree to try diaphragmatic breathing at least one time over the next week. Mom and Navi to continue taking walks 2x per week together.     Overall family and Navi seemed engaged in conversation. They did express interest in continuing to meet for family therapy and psychoeducation. As of today's appt their insight into Navi's mental illness appears fair. They seem they would benefit from continued clinical intervention aimed at assisting them implement helpful strategies at home and increase their understanding of psychosis.    12. Plan/Referrals:     Will meet with family weekly as schedule allows for evidence based family psychoeducation and therapeutic support aimed at maximizing Navi's opportunity for recovery from psychosis.     Laura Maldonado MA, LP   NAVIGATE   The author of this note documented a reason for not sharing it with the patient.    "

## 2024-10-01 ENCOUNTER — VIRTUAL VISIT (OUTPATIENT)
Dept: PSYCHIATRY | Facility: CLINIC | Age: 29
End: 2024-10-01
Payer: COMMERCIAL

## 2024-10-01 ENCOUNTER — TELEPHONE (OUTPATIENT)
Dept: PSYCHIATRY | Facility: CLINIC | Age: 29
End: 2024-10-01

## 2024-10-01 DIAGNOSIS — F29 PSYCHOSIS, UNSPECIFIED PSYCHOSIS TYPE (H): Primary | ICD-10-CM

## 2024-10-01 NOTE — TELEPHONE ENCOUNTER
NAVIGATE Telephone Call or E-mail Correspondence    NAVIGATE Enrollee: Navi Morales (1995)     MRN: [id number]      Thanks for this update, Mona. Navigating and managing expectations sounds like a complicated thing right now. Let's see if Navi is up for talking about this today.   I also mention your idea about [facility] to Brandie.  If we need to end early today, that is totally fine! It is good to see friends!  Take care,  Laura    From: [email or web address] <[email or web address]>   Sent: Monday, September 30, 2024 8:08 PM  To: Laura Maldonado <[email or web address]>  Subject: Navi update and appt. tomorrow    This message was sent securely using [employer], just a few things I wanted to pass on to you.  Since our meeting last week, Navi hasn't used alcohol, so that's good. But, he has talked about it and still has the mindset that he can drink.?  ZjQcmQRYFpfptBannerStart     ATTENTION: This email was sent to your  Physicians account from an external source. Please use extra caution before clicking links, opening attachments, or replying to or forwarding this email.          ZjQcmQRYFpfptBannerEnd  This message was sent securely using [employer], just a few things I wanted to pass on to you.  Since our meeting last week, Navi hasn't used alcohol, so that's good. But, he has talked about it and still has the mindset that he can drink.  He also has mentioned to me in the past and recently that once marijuana is legalized, he would be looking into it, since he thinks it will help with his anxiety.  So, he still talks about using substances to manage his anxiety.  He knows exercise and breathing techniques are important, but he seems to find excuses not to do them.  I am just at a loss as to how to persuade him to try other ways to manage his anxiety.  He and I have been trying to walk twice a week with the dog. But, my schedule is getting busier and I am hoping that Navi will either  take Brennan on his own some days, or my  and he could go.  I am working on that.  I just feel like I am being pulled in different directions and my work will suffer.     I would really like Navi to consider having an [facility] worker.  I know it could take several weeks at best to find the right person.  So, I am going to work on getting him to consider it.       Lastly, tomorrow I have a friend who is in town and we are getting together tomorrow about 5pm.  I am wondering if it would be okay if I leave the meeting tomorrow at 4:45pm?     Thank you,     Mona Maldonado MA, LP    Health NAVIGATE

## 2024-10-02 NOTE — PROGRESS NOTES
NAVIGATE Clinician Contact & Progress Note   For Family Education Program    NAVIGATE Enrollee: Navi Morales (1995)     MRN: [id number]  Date:  10/01/24  Diagnosis(es):   Psychosis unspecified  Clinician: LILIANA Family Clinician, Laura Maldonado MA, SELWYN     1. Type of contact: (majority of time spent)  Family Session via telehealth  Mode of communication:    Linwood Verduzco consented verbally to this mode of therapy today.  Reason for telehealth: To reduce barriers in accessing services, due to but not limited to transportation, location, or scheduling reasons.     2. People present:   Writer  Client: Yes  Significant Other/Family/Friend:  Mother    3. Length of Actual Contact: Start Time: 4:10pm to 5pm .      4. Location of contact:  Originating Location (patient location):[place]   Distant Location (provider location): Home office, located in [place], [place], using appropriate privacy considerations and procedures    5. Did the client complete the home practice option(s) from the previous session: Partially Completed    6. Motivational Teaching Strategies:  Connect info and skills with personal goals  Promote hope and positive expectations    7. Educational Teaching Strategies:  Review of written material/education  Relate information to client's experience  Ask questions to check comprehension  Break down information into small chunks    8. CBT Teaching Strategies:  Reinforcement and shaping (positive feedback for steps towards goals and gains in knowledge & skills)    9. Psychoeducational Topic(s) Addressed:  Just the Facts - Coping with Stress    10. Techniques utilized:   [place] announced at beginning of session  Review of previous meeting  Present new material  Family Therapy  Motivational Interviewing (Connect info and skills with personal goals, Promote hope and positive expectations, Explore pros and cons of change, Re-frame experiences in a positive light)  Educational  "Teaching Strategies (Review written material/education on: Psychosis, Medications for psychosis, Coping with stress, Strategies to build resiliency, Relapse prevention planning, Developing a collaboration with mental health professionals, Effective communication, A relative s guide to supporting recovery from psychosis, Basic facts about alcohol and drugs)  CBT (Reinforcement and shaping, Social skills training, Relapse prevention planning, Coping skills training, Relaxation training, Cognitive restructuring, Behavioral tailoring)    11. Assessment/Progress Note:     Writer met with Navi and Navi's mom on this day via [employer].  Set agenda to check in with each of them and to continue education on coping with stress. Family were agreeable to this agenda.    Navi reports that the last week has \"been alright\" and denies any urgent concerns on this day. Navi's mom likewise reports no major changes. Mom reports that her stress has increased for her over the last week due to work. Navi declines discussing alcohol use.     Continue education on coping with stress module. Explore signs for stress that they each notice in themselves. Navi reports increased heart rate, feeling out of breath, chest pain, muscle pain, and difficulty sleeping. Mom likewise reports having an increased heart rate, becoming more irritable, and talking more quickly. Provide education on nervous system responsive to stress and how stress can impact communication.     Provide education on window of tolerance and how each notice when they are in their window or out of their window. Mom reports that she knows that when she is outside of her window of tolerance she will become angry and tell people to do things. Model a way to express distress and also informing Navi that she can tolerate her distress. Mom is open to this suggestions. Also, explored strategy of mindfulness as a tool for finding window of tolerance.  Reviewed strategies for triggering the " parasympathetic nervous system (getting back into window of tolerance) including the TIPP skill, mindfulness, and diaphragmatic breathing. Both mom and Navi agreed to practice one of these skills over the next week.    Overall family and Navi seemed engaged in conversation. They did express interest in continuing to meet for family therapy and psychoeducation. As of today's appt their insight into Navi's mental illness appears fair. They seem they would benefit from continued clinical intervention aimed at assisting them implement helpful strategies at home and increase their understanding of psychosis.    12. Plan/Referrals:     Will meet with family weekly as schedule allows for evidence based family psychoeducation and therapeutic support aimed at maximizing Navi's opportunity for recovery from psychosis.     Laura Maldonado MA, LP   NAVIGATE   The author of this note documented a reason for not sharing it with the patient.

## 2024-10-03 ENCOUNTER — VIRTUAL VISIT (OUTPATIENT)
Dept: PSYCHIATRY | Facility: CLINIC | Age: 29
End: 2024-10-03
Payer: COMMERCIAL

## 2024-10-03 DIAGNOSIS — F29 PSYCHOSIS, UNSPECIFIED PSYCHOSIS TYPE (H): Primary | ICD-10-CM

## 2024-10-03 NOTE — PROGRESS NOTES
NAVIGATE/STRENGTHS Virtual Visit Progress Note  For Supported Employment & Education    NAVIGATE Enrollee: Navi Morales (1995)     MRN: [id number]  Date of Visit: 10/03/24  Contacted: ALICE  Appointment Length: 30    Discussed:   Writer met with Navi Morales for virtual visit check-in. Reason for telehealth: To reduce barriers in accessing services, due to but not limited to transportation, location, or scheduling reasons. Meeting agenda included checking in he needs to take [employer] for [employer] and is nervous. We did talk about it and I sent a practice test to help prepare. He plans to take it befoore we meet next    Follow-up:     Delmi Davis  NAVIGATE/STRENGTHS  Supported Employment & Education  Supported Employment & EducationThis is a non-billable encounter as it was solely for the purposes of outreach and/or care coordination. SEE services are non billable services

## 2024-10-04 ENCOUNTER — MYC MEDICAL ADVICE (OUTPATIENT)
Dept: PSYCHIATRY | Facility: CLINIC | Age: 29
End: 2024-10-04

## 2024-10-08 ENCOUNTER — VIRTUAL VISIT (OUTPATIENT)
Dept: PSYCHIATRY | Facility: CLINIC | Age: 29
End: 2024-10-08
Payer: COMMERCIAL

## 2024-10-08 DIAGNOSIS — F29 PSYCHOSIS, UNSPECIFIED PSYCHOSIS TYPE (H): Primary | ICD-10-CM

## 2024-10-08 ASSESSMENT — ANXIETY QUESTIONNAIRES
8. IF YOU CHECKED OFF ANY PROBLEMS, HOW DIFFICULT HAVE THESE MADE IT FOR YOU TO DO YOUR WORK, TAKE CARE OF THINGS AT HOME, OR GET ALONG WITH OTHER PEOPLE?: VERY DIFFICULT
3. WORRYING TOO MUCH ABOUT DIFFERENT THINGS: NEARLY EVERY DAY
GAD7 TOTAL SCORE: 18
3. WORRYING TOO MUCH ABOUT DIFFERENT THINGS: NEARLY EVERY DAY
2. NOT BEING ABLE TO STOP OR CONTROL WORRYING: NEARLY EVERY DAY
2. NOT BEING ABLE TO STOP OR CONTROL WORRYING: NEARLY EVERY DAY
5. BEING SO RESTLESS THAT IT IS HARD TO SIT STILL: MORE THAN HALF THE DAYS
7. FEELING AFRAID AS IF SOMETHING AWFUL MIGHT HAPPEN: MORE THAN HALF THE DAYS
1. FEELING NERVOUS, ANXIOUS, OR ON EDGE: NEARLY EVERY DAY
GAD7 TOTAL SCORE: 18
8. IF YOU CHECKED OFF ANY PROBLEMS, HOW DIFFICULT HAVE THESE MADE IT FOR YOU TO DO YOUR WORK, TAKE CARE OF THINGS AT HOME, OR GET ALONG WITH OTHER PEOPLE?: VERY DIFFICULT
6. BECOMING EASILY ANNOYED OR IRRITABLE: MORE THAN HALF THE DAYS
IF YOU CHECKED OFF ANY PROBLEMS ON THIS QUESTIONNAIRE, HOW DIFFICULT HAVE THESE PROBLEMS MADE IT FOR YOU TO DO YOUR WORK, TAKE CARE OF THINGS AT HOME, OR GET ALONG WITH OTHER PEOPLE: VERY DIFFICULT
GAD7 TOTAL SCORE: 18
7. FEELING AFRAID AS IF SOMETHING AWFUL MIGHT HAPPEN: MORE THAN HALF THE DAYS
4. TROUBLE RELAXING: NEARLY EVERY DAY
6. BECOMING EASILY ANNOYED OR IRRITABLE: MORE THAN HALF THE DAYS
GAD7 TOTAL SCORE: 18
IF YOU CHECKED OFF ANY PROBLEMS ON THIS QUESTIONNAIRE, HOW DIFFICULT HAVE THESE PROBLEMS MADE IT FOR YOU TO DO YOUR WORK, TAKE CARE OF THINGS AT HOME, OR GET ALONG WITH OTHER PEOPLE: VERY DIFFICULT
7. FEELING AFRAID AS IF SOMETHING AWFUL MIGHT HAPPEN: MORE THAN HALF THE DAYS
7. FEELING AFRAID AS IF SOMETHING AWFUL MIGHT HAPPEN: MORE THAN HALF THE DAYS
GAD7 TOTAL SCORE: 18
1. FEELING NERVOUS, ANXIOUS, OR ON EDGE: NEARLY EVERY DAY
4. TROUBLE RELAXING: NEARLY EVERY DAY
GAD7 TOTAL SCORE: 18
5. BEING SO RESTLESS THAT IT IS HARD TO SIT STILL: MORE THAN HALF THE DAYS

## 2024-10-08 ASSESSMENT — PATIENT HEALTH QUESTIONNAIRE - PHQ9
10. IF YOU CHECKED OFF ANY PROBLEMS, HOW DIFFICULT HAVE THESE PROBLEMS MADE IT FOR YOU TO DO YOUR WORK, TAKE CARE OF THINGS AT HOME, OR GET ALONG WITH OTHER PEOPLE: VERY DIFFICULT
SUM OF ALL RESPONSES TO PHQ QUESTIONS 1-9: 21
SUM OF ALL RESPONSES TO PHQ QUESTIONS 1-9: 21

## 2024-10-10 ENCOUNTER — VIRTUAL VISIT (OUTPATIENT)
Dept: PSYCHIATRY | Facility: CLINIC | Age: 29
End: 2024-10-10
Payer: COMMERCIAL

## 2024-10-10 DIAGNOSIS — F29 PSYCHOSIS, UNSPECIFIED PSYCHOSIS TYPE (H): Primary | ICD-10-CM

## 2024-10-10 NOTE — PROGRESS NOTES
NAVIGATE Clinician Contact & Progress Note   For Family Education Program    NAVIGATE Enrollee: Navi Morales (1995)     MRN: [id number]  Date:  10/08/24  Diagnosis(es):   Psychosis unspecified  Clinician: LILIANA Family Clinician, Laura Maldonado MA, SELWYN     1. Type of contact: (majority of time spent)  Family Session via telehealth  Mode of communication:    Linwood Verduzco consented verbally to this mode of therapy today.  Reason for telehealth: To reduce barriers in accessing services, due to but not limited to transportation, location, or scheduling reasons.     2. People present:   Writer  Client: Yes  Significant Other/Family/Friend:  Mother    3. Length of Actual Contact: Start Time: 4:10pm to 5pm .      4. Location of contact:  Originating Location (patient location):[place]   Distant Location (provider location): Home office, located in [place], [place], using appropriate privacy considerations and procedures    5. Did the client complete the home practice option(s) from the previous session: Partially Completed    6. Motivational Teaching Strategies:  Connect info and skills with personal goals  Promote hope and positive expectations    7. Educational Teaching Strategies:  Review of written material/education  Relate information to client's experience  Ask questions to check comprehension  Break down information into small chunks    8. CBT Teaching Strategies:  Reinforcement and shaping (positive feedback for steps towards goals and gains in knowledge & skills)    9. Psychoeducational Topic(s) Addressed:  Just the Facts - Coping with Stress    10. Techniques utilized:   [place] announced at beginning of session  Review of previous meeting  Present new material  Family Therapy  Motivational Interviewing (Connect info and skills with personal goals, Promote hope and positive expectations, Explore pros and cons of change, Re-frame experiences in a positive light)  Educational  "Teaching Strategies (Review written material/education on: Psychosis, Medications for psychosis, Coping with stress, Strategies to build resiliency, Relapse prevention planning, Developing a collaboration with mental health professionals, Effective communication, A relative s guide to supporting recovery from psychosis, Basic facts about alcohol and drugs)  CBT (Reinforcement and shaping, Social skills training, Relapse prevention planning, Coping skills training, Relaxation training, Cognitive restructuring, Behavioral tailoring)    11. Assessment/Progress Note:     Writer met with Navi and Navi's mom on this day via [employer].  Set agenda to check in with each of them and to continue education on coping with stress. Family were agreeable to this agenda.    Navi reports that the last week has \"been nothing too crazy\" and denies any urgent concerns on this day. Navi reports that he continues to experience anxiety. Navi's mom likewise reports no major changes.    Continue conversation on coping with stress including mindfulness. Navi describes anxiety about current exam he needs to take to go to [facility] [employer]. Navi describes how his anxiety about this leads to thinking about the future and how he will not be able to manage a job or living on his own. Explore mindfulness and bringing Navi back to the present moment as being a way of coping with his stress.     Mom and Navi report taking walks with the dog this last week. Writer provides positive reinforcement and reflect on the value of having time where they are both experiencing less stress.     Navi reports concerns about not leaving the home. He reports high anticipatory anxiety which causes him to avoid leaving home. Explore one step Navi could take to \"avoid avoiding.\" Explore a step could be going into the grocery store to buy 1 or 2 items when it isn't busy. Navi is open to trying this task with mom over the next week.     Overall family and Navi " seemed engaged in conversation. They did express interest in continuing to meet for family therapy and psychoeducation. As of today's appt their insight into Navi's mental illness appears fair. They seem they would benefit from continued clinical intervention aimed at assisting them implement helpful strategies at home and increase their understanding of psychosis.    12. Plan/Referrals:     Will meet with family weekly as schedule allows for evidence based family psychoeducation and therapeutic support aimed at maximizing Navi's opportunity for recovery from psychosis.     Laura Maldonado MA, LP   NAVIGATE   The author of this note documented a reason for not sharing it with the patient.

## 2024-10-11 NOTE — PROGRESS NOTES
NAVIGATE Clinician Contact & Progress Note  For Individual Resiliency Training (IRT)  A Part of the [facility] First Episode of Psychosis Program    NAVIGATE Enrollee: Navi Morales (1995)     MRN: [id number]  Date:  10/10/24  Diagnosis: Psychosis, unspecified psychosis type (H) [F29]   Clinician: LILIANA Individual Resiliency Trainer, MERCEDES Mchugh     1. Type of contact: (majority of time spent)  IRT Session via telehealth  Mode of communication: [employer] (HIPAA compliant, secure platform). Patient consented verbally to this mode of therapy today.  Reason for telehealth: To reduce barriers in accessing services, due to but not limited to transportation, location, or scheduling reasons.    2. People present:   Writer  Client: Yes - Navi    3. Length of Actual Contact: Start Time: 2:05; End Time: 3:04     4. Location of contact:  Originating Location (patient location): [facility], located in [place], [place]  Distant Location (provider location): Remote location    5. Did the client complete the home practice option(s) from the previous session: not complete    6. Motivational Teaching Strategies:  Connect info and skills with personal goals  Promote hope and positive expectations  Explore pros and cons of change  Re-frame experiences in positive light    7. Educational Teaching Strategies:  Review of written material/education  Relate information to client's experience  Ask questions to check comprehension  Break down information into small chunks  Adopt client's language     8. CBT Teaching Strategies:  Reinforcement and shaping (positive feedback for steps towards goals and gains in knowledge & skills)  Cognitive restructuring (identify thoughts related to negative feelings)    9. IRT Module(s) Addressed:  Module 3 - Education about Psychosis    10. Techniques utilized:   [place] announced at beginning of session  Review of goal  Review of previous meeting  Present new material  Help client choose  "a home practice option  Summarize progress made in current session    11. Measures:  NA  Mental Status Exam  Alertness: alert   Behavior/Demeanor: cooperative, pleasant, and calm  Speech: regular rate and rhythm  Language: intact.   Mood: depressed and anxious  Thought Process/Associations: unremarkable  Thought Content:  Reports suicidal ideation;  Denies suicidal ideation with plan; with intent [details in Interim History]  Perception:  Reports auditory hallucinations;  Denies visual hallucinations  Insight: good  Judgment: adequate for safety  Cognition: does  appear grossly intact; formal cognitive testing was not done      12. Assessment/Progress Note:     Writer and client met for IRT visit via [employer]. Set agenda to check in, reported current symptoms to include: AH, depression, anxiety but noted that they are \"alright, with the medication\". Writer used relational and interpersonal approach to explore feelings, motivations, and behavior. Writer offered support, feedback, validation, and reinforced use of skills taught in IRT from modalities including cognitive behavioral therapy, psycho education, and skills training. Promoted understanding of their experiences of psychosis and how it impacts them in important areas of their life and in recovery goals. Reflected on client's strengths and resiliency factors and facilitated discussion on how these can assist in symptom management, recovery, and well-being. Explored symptoms and coping from a stress-vulnerability lens. Current stressors include: symptoms, social anxiety. In regards to medications, client reports medication adherence to the prescribed antipsychotic. Denied suicidal intent or plan today.    Navi reports he has been doing well. He reports he went to the grocery store for the first time in years. He said he didn't \"focus so much on the symptoms, I was more thinking about meal planning\". Had some difficulty recognizing this as \"a win\" though discussed " "how small steps can be celebrated each day.      He said he hasn't been drinking as much as his parents asked him to not drink while at home. Navi was having some thoughts about using other substances such as LSD and \"didn't think it would cause an increase in symptoms\". We explored his previous use of LSD and Navi does describe hearing voices and remembering the symptoms being worse. Writer clearly identified concerns about using substances and discussed his current coping strategies including going for walks. Set home practice to complete worksheet emailed to him.       13. Plan/Referrals:     Continue Navigate services, weekly IRT    Billing for \"Interactive Complexity\"?    No      MERCEDES Mchugh    NAVIGATE Individual Resiliency Trainer           "

## 2024-10-12 NOTE — PROGRESS NOTES
[facility]  Psychiatry Clinic  Psychological Testing by Psychometrist     Navi Morales MRN# [id number]   Age: 28 year old YOB: 1995     Date:  1/22/24    Video- Visit Details   Type of service:  video visit  Video start time:  1:05pm  Video end time:  1:15pm  Originating location (patient location):  [facility]   Location- Home  Distant Site (provider location): HIPAA compliant location Off-site  Platform used for video visit:  Secure real time interactive audio and visual telecommunication system via [employer]     Attendees: Patient attended the session alone  : No, English    Identifying Information/Reason for Referral  Navi Morales is a 28 year old male who prefers the name Navi & pronouns he, him, his.  Navi has a history of possible psychosis  .  The patient was seen for psychological testing. The purpose of the current psychological evaluation was to provide information about Navi's social, emotional and cognitive functioning, to assist with diagnostic clarification and to guide his treatment and recovery planning. Results of the following assessments are to be used in conjunction with the standard diagnostic evaluation.    A total of 10 minutes this session, 80 minutes total were spent in test administration and scoring by this writer, veronica Boyd.  See Testing Evaluation Report on future date for a full interpretation of the findings and data.     Summary of Services/Procedures  Navi was administered a psychological test battery tailored to his age and the referral questions.     Tests Administered  Vinogradov Symptom Severity Scale  Audit-C  DAST-10  PROMIS-Sleep Disturbance  International Physical Activity Questionnaire (IPAQ)  Behavioral Inhibition and Activation Scale- BIS/BAS  Brief [place]-28 Item  Life Event Checklist- LEC Intake / Follow Up      Behavioral Observations  Overall, Navi demonstrated good effort throughout  testing. Therefore, the current evaluation results are thought to provide a accurate representation of his functioning in the areas assessed.     Plan  The patient and their invited support system will participate in a feedback appointment on a future date with their clinician.     Will coordinate care with other treatment providers to assist with planning as needed.      Latonia Schrader  Psychometrist      Billing for this encounter (CPT 04799, 82904) will occur on a later date when a qualified health professional reviews the findings with the patient in a psychological evaluation appointment (CPT 12271, 19531).   36.5

## 2024-10-15 ENCOUNTER — VIRTUAL VISIT (OUTPATIENT)
Dept: PSYCHIATRY | Facility: CLINIC | Age: 29
End: 2024-10-15
Payer: COMMERCIAL

## 2024-10-15 DIAGNOSIS — F29 PSYCHOSIS, UNSPECIFIED PSYCHOSIS TYPE (H): Primary | ICD-10-CM

## 2024-10-15 NOTE — PROGRESS NOTES
NAVIGGALINDO Clinician Contact & Progress Note  For Individual Resiliency Training (IRT)  A Part of the [facility] First Episode of Psychosis Program    NAVIGATE Enrollee: Navi Morales (1995)     MRN: [id number]  Date:  10/15/24  Diagnosis: Psychosis, unspecified psychosis type (H) [F29]   Clinician: LILIANA Individual Resiliency Trainer, MERCEDES Mchugh     1. Type of contact: (majority of time spent)  IRT Session via telehealth  Mode of communication: [employer] (HIPAA compliant, secure platform). Patient consented verbally to this mode of therapy today.  Reason for telehealth: To reduce barriers in accessing services, due to but not limited to transportation, location, or scheduling reasons.    2. People present:   Writer  Client: Yes - Navi    3. Length of Actual Contact: Start Time: 3:06; End Time: 4:06     4. Location of contact:  Originating Location (patient location): [facility], located in [place], [place]  Distant Location (provider location): Remote location    5. Did the client complete the home practice option(s) from the previous session: partly, printed worksheet but did not complete. Set home practice to try with a specific worry.    6. Motivational Teaching Strategies:  Connect info and skills with personal goals  Promote hope and positive expectations  Explore pros and cons of change  Re-frame experiences in positive light    7. Educational Teaching Strategies:  Review of written material/education  Relate information to client's experience  Ask questions to check comprehension  Break down information into small chunks  Adopt client's language     8. CBT Teaching Strategies:  Reinforcement and shaping (positive feedback for steps towards goals and gains in knowledge & skills)  Cognitive restructuring (identify thoughts related to negative feelings)    9. IRT Module(s) Addressed:  Module 3 - Education about Psychosis    10. Techniques utilized:   [place] announced at beginning of  "session  Review of goal  Review of previous meeting  Present new material  Help client choose a home practice option  Summarize progress made in current session    11. Measures:    Answers submitted by the patient for this visit:  Patient Health Questionnaire (G7) (Submitted on 10/8/2024)  MELY 7 TOTAL SCORE: 18      Mental Status Exam  Alertness: alert   Behavior/Demeanor: cooperative, pleasant, and calm  Speech: regular rate and rhythm  Language: intact.   Mood: depressed and anxious  Thought Process/Associations: unremarkable  Thought Content:  Reports suicidal ideation;  Denies suicidal ideation with plan; with intent [details in Interim History]  Perception:  Reports auditory hallucinations;  Denies visual hallucinations  Insight: good  Judgment: adequate for safety  Cognition: does  appear grossly intact; formal cognitive testing was not done      12. Assessment/Progress Note:     Writer and client met for IRT visit via [employer]. Set agenda to check in, reported current symptoms to include: AH, depression, anxiety but noted that they are \"alright, with the medication\". Writer used relational and interpersonal approach to explore feelings, motivations, and behavior. Writer offered support, feedback, validation, and reinforced use of skills taught in IRT from modalities including cognitive behavioral therapy, psycho education, and skills training. Promoted understanding of their experiences of psychosis and how it impacts them in important areas of their life and in recovery goals. Reflected on client's strengths and resiliency factors and facilitated discussion on how these can assist in symptom management, recovery, and well-being. Explored symptoms and coping from a stress-vulnerability lens. Current stressors include: symptoms, social anxiety. In regards to medications, client reports medication adherence to the prescribed antipsychotic. Denied suicidal intent or plan today.    Navi reports he has been doing " "well. Reports he continues walking with mom. Wasn't able to complete home practice, was agreeable to trying again otherwise will go through it with writer during next session. He reports he has not been drinking but is unsure if he wants to stop completely (e.g.\"Maybe I can drink once a month\"). Reports continued significant Anxiety. Wanted clarification if he had severe vs mild anxiety. He wants to do more social things - fears of sxs returning and humiliation/embarrassment are primary. When he experiences anxiety or panic, he notes an increased heart rate, faster breathing and racing thoughts. Navi also says he does not have an internal monologue and that he \"just thinks\". Continues to use 4 square breathing, \"I am safe\" affirmations. Writer also provided basic education on fight or flight.      13. Plan/Referrals:     Continue Navigate services, weekly IRT    Billing for \"Interactive Complexity\"?    No      "

## 2024-10-21 NOTE — PROGRESS NOTES
NAVIGATE Clinician Contact & Progress Note   For Family Education Program    NAVIGATE Enrollee: Navi Morales (1995)     MRN: [id number]  Date:  10/15/24  Diagnosis(es):   Psychosis unspecified  Clinician: LILIANA Family Clinician, Laura Maldonado MA, SELWYN     1. Type of contact: (majority of time spent)  Family Session via telehealth  Mode of communication:    Linwood Verduzco consented verbally to this mode of therapy today.  Reason for telehealth: To reduce barriers in accessing services, due to but not limited to transportation, location, or scheduling reasons.     2. People present:   Writer  Client: Yes  Significant Other/Family/Friend:  Mother    3. Length of Actual Contact: Start Time: 4:10pm to 5pm .      4. Location of contact:  Originating Location (patient location):[place]   Distant Location (provider location): Home office, located in [place], [place], using appropriate privacy considerations and procedures    5. Did the client complete the home practice option(s) from the previous session: Partially Completed    6. Motivational Teaching Strategies:  Connect info and skills with personal goals  Promote hope and positive expectations    7. Educational Teaching Strategies:  Review of written material/education  Relate information to client's experience  Ask questions to check comprehension  Break down information into small chunks    8. CBT Teaching Strategies:  Reinforcement and shaping (positive feedback for steps towards goals and gains in knowledge & skills)    9. Psychoeducational Topic(s) Addressed:  Just the Facts - Coping with Stress    10. Techniques utilized:   [place] announced at beginning of session  Review of previous meeting  Present new material  Family Therapy  Motivational Interviewing (Connect info and skills with personal goals, Promote hope and positive expectations, Explore pros and cons of change, Re-frame experiences in a positive light)  Educational  "Teaching Strategies (Review written material/education on: Psychosis, Medications for psychosis, Coping with stress, Strategies to build resiliency, Relapse prevention planning, Developing a collaboration with mental health professionals, Effective communication, A relative s guide to supporting recovery from psychosis, Basic facts about alcohol and drugs)  CBT (Reinforcement and shaping, Social skills training, Relapse prevention planning, Coping skills training, Relaxation training, Cognitive restructuring, Behavioral tailoring)    11. Assessment/Progress Note:     Writer met with Navi and Navi's mom on this day via [employer].  Set agenda to check in with each of them and to continue education on coping with stress. Family were agreeable to this agenda.    Navi's mom reports that the last week has \"really pleasant\" and  that she and Navi have had some really good conversations. Navi reports that he went to the grocery store with mom and that this went \"well.\" Writer affirms Navi's action on this goal and reflects back his willingness to challenge himself. Neither mom nor Navi report any urgent concerns on this day. Navi reports that he continues to experience anxiety.     Continue conversation on coping with stress including mindfulness. Explore one of the characteristics of mindfulness is non-judgmental stance. Navi appears to struggle in the session with being critical of himself and also in receiving compliments or praise. Navi reports that one of the ways his motivates himself is to be \"tough\" on himself. Writer encouraged Navi to observe this self talk over the coming week and to notice what feelings follow these thoughts. Mom also agrees that Navi struggles to accept compliments, and also reports having negative beliefs about self. Explore being willing to notice signs of progress over the coming week. Will continue coping with stress in the coming week.    Overall family and Navi seemed engaged in " conversation. They did express interest in continuing to meet for family therapy and psychoeducation. As of today's appt their insight into Navi's mental illness appears fair. They seem they would benefit from continued clinical intervention aimed at assisting them implement helpful strategies at home and increase their understanding of psychosis.    12. Plan/Referrals:     Will meet with family weekly as schedule allows for evidence based family psychoeducation and therapeutic support aimed at maximizing Navi's opportunity for recovery from psychosis.     Laura Maldonado MA, LP   NAVIGATE   The author of this note documented a reason for not sharing it with the patient.

## 2024-10-22 ENCOUNTER — VIRTUAL VISIT (OUTPATIENT)
Dept: PSYCHIATRY | Facility: CLINIC | Age: 29
End: 2024-10-22
Payer: COMMERCIAL

## 2024-10-22 DIAGNOSIS — F29 PSYCHOSIS, UNSPECIFIED PSYCHOSIS TYPE (H): Primary | ICD-10-CM

## 2024-10-22 ASSESSMENT — ANXIETY QUESTIONNAIRES
6. BECOMING EASILY ANNOYED OR IRRITABLE: MORE THAN HALF THE DAYS
1. FEELING NERVOUS, ANXIOUS, OR ON EDGE: NEARLY EVERY DAY
GAD7 TOTAL SCORE: 19
7. FEELING AFRAID AS IF SOMETHING AWFUL MIGHT HAPPEN: NEARLY EVERY DAY
8. IF YOU CHECKED OFF ANY PROBLEMS, HOW DIFFICULT HAVE THESE MADE IT FOR YOU TO DO YOUR WORK, TAKE CARE OF THINGS AT HOME, OR GET ALONG WITH OTHER PEOPLE?: VERY DIFFICULT
2. NOT BEING ABLE TO STOP OR CONTROL WORRYING: NEARLY EVERY DAY
5. BEING SO RESTLESS THAT IT IS HARD TO SIT STILL: MORE THAN HALF THE DAYS
IF YOU CHECKED OFF ANY PROBLEMS ON THIS QUESTIONNAIRE, HOW DIFFICULT HAVE THESE PROBLEMS MADE IT FOR YOU TO DO YOUR WORK, TAKE CARE OF THINGS AT HOME, OR GET ALONG WITH OTHER PEOPLE: VERY DIFFICULT
7. FEELING AFRAID AS IF SOMETHING AWFUL MIGHT HAPPEN: NEARLY EVERY DAY
IF YOU CHECKED OFF ANY PROBLEMS ON THIS QUESTIONNAIRE, HOW DIFFICULT HAVE THESE PROBLEMS MADE IT FOR YOU TO DO YOUR WORK, TAKE CARE OF THINGS AT HOME, OR GET ALONG WITH OTHER PEOPLE: VERY DIFFICULT
4. TROUBLE RELAXING: NEARLY EVERY DAY
3. WORRYING TOO MUCH ABOUT DIFFERENT THINGS: NEARLY EVERY DAY
3. WORRYING TOO MUCH ABOUT DIFFERENT THINGS: NEARLY EVERY DAY
7. FEELING AFRAID AS IF SOMETHING AWFUL MIGHT HAPPEN: NEARLY EVERY DAY
6. BECOMING EASILY ANNOYED OR IRRITABLE: MORE THAN HALF THE DAYS
1. FEELING NERVOUS, ANXIOUS, OR ON EDGE: NEARLY EVERY DAY
3. WORRYING TOO MUCH ABOUT DIFFERENT THINGS: NEARLY EVERY DAY
5. BEING SO RESTLESS THAT IT IS HARD TO SIT STILL: MORE THAN HALF THE DAYS
7. FEELING AFRAID AS IF SOMETHING AWFUL MIGHT HAPPEN: NEARLY EVERY DAY
6. BECOMING EASILY ANNOYED OR IRRITABLE: MORE THAN HALF THE DAYS
1. FEELING NERVOUS, ANXIOUS, OR ON EDGE: NEARLY EVERY DAY
GAD7 TOTAL SCORE: 19
IF YOU CHECKED OFF ANY PROBLEMS ON THIS QUESTIONNAIRE, HOW DIFFICULT HAVE THESE PROBLEMS MADE IT FOR YOU TO DO YOUR WORK, TAKE CARE OF THINGS AT HOME, OR GET ALONG WITH OTHER PEOPLE: VERY DIFFICULT
7. FEELING AFRAID AS IF SOMETHING AWFUL MIGHT HAPPEN: NEARLY EVERY DAY
2. NOT BEING ABLE TO STOP OR CONTROL WORRYING: NEARLY EVERY DAY
4. TROUBLE RELAXING: NEARLY EVERY DAY
7. FEELING AFRAID AS IF SOMETHING AWFUL MIGHT HAPPEN: NEARLY EVERY DAY
4. TROUBLE RELAXING: NEARLY EVERY DAY
GAD7 TOTAL SCORE: 19
GAD7 TOTAL SCORE: 19
8. IF YOU CHECKED OFF ANY PROBLEMS, HOW DIFFICULT HAVE THESE MADE IT FOR YOU TO DO YOUR WORK, TAKE CARE OF THINGS AT HOME, OR GET ALONG WITH OTHER PEOPLE?: VERY DIFFICULT
8. IF YOU CHECKED OFF ANY PROBLEMS, HOW DIFFICULT HAVE THESE MADE IT FOR YOU TO DO YOUR WORK, TAKE CARE OF THINGS AT HOME, OR GET ALONG WITH OTHER PEOPLE?: VERY DIFFICULT
2. NOT BEING ABLE TO STOP OR CONTROL WORRYING: NEARLY EVERY DAY
GAD7 TOTAL SCORE: 19
GAD7 TOTAL SCORE: 19
5. BEING SO RESTLESS THAT IT IS HARD TO SIT STILL: MORE THAN HALF THE DAYS
GAD7 TOTAL SCORE: 19

## 2024-10-22 NOTE — PROGRESS NOTES
NAVIGGALINDO Clinician Contact & Progress Note  For Individual Resiliency Training (IRT)  A Part of the [facility] First Episode of Psychosis Program    NAVIGATE Enrollee: Navi Morales (1995)     MRN: [id number]  Date:  10/22/24  Diagnosis: Psychosis, unspecified psychosis type (H) [F29]   Clinician: LILIANA Individual Resiliency Trainer, MERCEDES Mchugh     1. Type of contact: (majority of time spent)  IRT Session via telehealth  Mode of communication: [employer] Well (HIPAA compliant, secure platform). Patient consented verbally to this mode of therapy today.  Reason for telehealth: To reduce barriers in accessing services, due to but not limited to transportation, location, or scheduling reasons.    2. People present:   Writer  Client: Yes - Navi    3. Length of Actual Contact: Start Time: 3:00; End Time: 4:00     4. Location of contact:  Originating Location (patient location): [facility], located in [place], [place]  Distant Location (provider location): Remote location    5. Did the client complete the home practice option(s) from the previous session: partly, printed worksheet but did not complete. Set home practice to try with a specific worry.    6. Motivational Teaching Strategies:  Connect info and skills with personal goals  Promote hope and positive expectations  Explore pros and cons of change  Re-frame experiences in positive light    7. Educational Teaching Strategies:  Review of written material/education  Relate information to client's experience  Ask questions to check comprehension  Break down information into small chunks  Adopt client's language     8. CBT Teaching Strategies:  Reinforcement and shaping (positive feedback for steps towards goals and gains in knowledge & skills)  Cognitive restructuring (identify thoughts related to negative feelings)    9. IRT Module(s) Addressed:  Module 3 - Education about Psychosis    10. Techniques utilized:   [place] announced at beginning of  "session  Review of goal  Review of previous meeting  Present new material  Help client choose a home practice option  Summarize progress made in current session    11. Measures:    Answers submitted by the patient for this visit:  Patient Health Questionnaire (Submitted on 10/22/2024)  If you checked off any problems, how difficult have these problems made it for you to do your work, take care of things at home, or get along with other people?: Very difficult  PHQ9 TOTAL SCORE: 20  Patient Health Questionnaire (G7) (Submitted on 10/22/2024)  MELY 7 TOTAL SCORE: 19      Mental Status Exam  Alertness: alert   Behavior/Demeanor: cooperative, pleasant, and calm  Speech: regular rate and rhythm  Language: intact.   Mood: depressed and anxious  Thought Process/Associations: unremarkable  Thought Content:  Reports suicidal ideation;  Denies suicidal ideation with plan; with intent [details in Interim History]  Perception:  Reports auditory hallucinations;  Denies visual hallucinations  Insight: good  Judgment: adequate for safety  Cognition: does  appear grossly intact; formal cognitive testing was not done      12. Assessment/Progress Note:     Writer and client met for IRT visit via [employer]. Set agenda to check in, reported current symptoms to include: AH, depression, anxiety but noted that they are \"alright, with the medication\". Writer used relational and interpersonal approach to explore feelings, motivations, and behavior. Writer offered support, feedback, validation, and reinforced use of skills taught in IRT from modalities including cognitive behavioral therapy, psycho education, and skills training. Promoted understanding of their experiences of psychosis and how it impacts them in important areas of their life and in recovery goals. Reflected on client's strengths and resiliency factors and facilitated discussion on how these can assist in symptom management, recovery, and well-being. Explored symptoms and coping " "from a stress-vulnerability lens. Current stressors include: symptoms, social anxiety. In regards to medications, client reports medication adherence to the prescribed antipsychotic. Denied suicidal intent or plan today.    Navi reports he has been doing \"okay\" - Navi shared many details of what he describes as his \"episode\". He discussed experiences such as unusual speech and behavior, confusion, low self esteem and self talk. Navi said he often feels \"belittled\" and so writer and Navi explored that feeling. We discussed when he first felt that way, and how oftentimes auditory hallucinations stem from core beliefs. Writer gently introduced CBT for psychosis and challenging his thoughts. Will continue together weekly and will review CBT worksheet next time.     13. Plan/Referrals:     Continue Navigate services, weekly IRT    Billing for \"Interactive Complexity\"?    No      "

## 2024-10-23 NOTE — PROGRESS NOTES
NAVIGATE Clinician Contact & Progress Note   For Family Education Program    NAVIGATE Enrollee: Navi Morales (1995)     MRN: [id number]  Date:  10/22/24  Diagnosis(es):   Psychosis unspecified  Clinician: LILIANA Family Clinician, Laura Maldonado MA, SELWYN     1. Type of contact: (majority of time spent)  Family Session via telehealth  Mode of communication:    Linwood Verduzco consented verbally to this mode of therapy today.  Reason for telehealth: To reduce barriers in accessing services, due to but not limited to transportation, location, or scheduling reasons.     2. People present:   Writer  Client: Yes  Significant Other/Family/Friend:  Mother    3. Length of Actual Contact: Start Time: 4:10pm to 5pm .      4. Location of contact:  Originating Location (patient location):[place]   Distant Location (provider location): Home office, located in [place], [place], using appropriate privacy considerations and procedures    5. Did the client complete the home practice option(s) from the previous session: Partially Completed    6. Motivational Teaching Strategies:  Connect info and skills with personal goals  Promote hope and positive expectations    7. Educational Teaching Strategies:  Review of written material/education  Relate information to client's experience  Ask questions to check comprehension  Break down information into small chunks    8. CBT Teaching Strategies:  Reinforcement and shaping (positive feedback for steps towards goals and gains in knowledge & skills)    9. Psychoeducational Topic(s) Addressed:  Just the Facts - Coping with Stress    10. Techniques utilized:   [place] announced at beginning of session  Review of previous meeting  Present new material  Family Therapy  Motivational Interviewing (Connect info and skills with personal goals, Promote hope and positive expectations, Explore pros and cons of change, Re-frame experiences in a positive light)  Educational  "Teaching Strategies (Review written material/education on: Psychosis, Medications for psychosis, Coping with stress, Strategies to build resiliency, Relapse prevention planning, Developing a collaboration with mental health professionals, Effective communication, A relative s guide to supporting recovery from psychosis, Basic facts about alcohol and drugs)  CBT (Reinforcement and shaping, Social skills training, Relapse prevention planning, Coping skills training, Relaxation training, Cognitive restructuring, Behavioral tailoring)    11. Assessment/Progress Note:     Writer met with Navi and Navi's mom on this day via [employer].  Set agenda to check in with each of them and to continue education on coping with stress. Family were agreeable to this agenda.    Navi reports that the last week has \"been ok.\" Mom reports she and Navi have had have gone a couple walks. Navi reports that he has not gone back to the grocery store. Navi voices wanting to return to the grocery store with mom over the next week for a few items on a list and mom is agreeable to this.  Navi reports that he continues to experience anxiety, but that it has been better overall.     Continue conversation on coping with stress and review skill of mindfulness. Explore one of the characteristics of mindfulness is non-judgmental stance. Navi reports trying to use mindfulness more often.     Provide education on types of stressors including major life events and daily hassles. Engage both Navi and mom in an assessment of major life events. Navi reports having six major life events over the last year and mom reports four, both indicating very high levels of stress.     Both lucille and Navi agree that their dog, Milo,dying was one of the most stressful life events. Lucille and Navi describe their relationship and time with Dannie. Navi reports that Milo bit him on the face when he was 16 and he needed emergency plastic surgery. Both mom and Navi are tearful in talking " about Milo and how much they miss him. Explore what they each take with them in what gained from [place]. Navi reports learning patience and empathy. Reflect in how their patience and empathy appears to play out in their relationship.     Overall family and Navi seemed engaged in conversation. They did express interest in continuing to meet for family therapy and psychoeducation. As of today's appt their insight into Navi's mental illness appears fair. They seem they would benefit from continued clinical intervention aimed at assisting them implement helpful strategies at home and increase their understanding of psychosis.    12. Plan/Referrals:     Will meet with family weekly as schedule allows for evidence based family psychoeducation and therapeutic support aimed at maximizing Navi's opportunity for recovery from psychosis.     Laura Maldonado MA, LP   NAVIGATE   The author of this note documented a reason for not sharing it with the patient.

## 2024-10-24 ENCOUNTER — VIRTUAL VISIT (OUTPATIENT)
Dept: PSYCHIATRY | Facility: CLINIC | Age: 29
End: 2024-10-24
Payer: COMMERCIAL

## 2024-10-24 DIAGNOSIS — F29 PSYCHOSIS, UNSPECIFIED PSYCHOSIS TYPE (H): Primary | ICD-10-CM

## 2024-10-24 NOTE — PROGRESS NOTES
NAVIGATE/STRENGTHS Virtual Visit Progress Note  For Supported Employment & Education    NAVIGATE Enrollee: Navi Morales (1995)     MRN: [id number]  Date of Visit: 10/24/24  Contacted: ALICE  Appointment Length: 30    Discussed:   Writer met with Navi Morales for virtual visit check-in. Reason for telehealth: To reduce barriers in accessing services, due to but not limited to transportation, location, or scheduling reasons. Meeting agenda included we just did a check in. He did not do his accuplacer yet    Follow-up:     Delmi HOPSONATE/STRENGTHS  Supported Employment & Education  Supported Employment & EducationThis is a non-billable encounter as it was solely for the purposes of outreach and/or care coordination. SEE services are non billable services

## 2024-10-29 ENCOUNTER — VIRTUAL VISIT (OUTPATIENT)
Dept: PSYCHIATRY | Facility: CLINIC | Age: 29
End: 2024-10-29
Payer: COMMERCIAL

## 2024-10-29 DIAGNOSIS — F29 PSYCHOSIS, UNSPECIFIED PSYCHOSIS TYPE (H): Primary | ICD-10-CM

## 2024-10-29 ASSESSMENT — PATIENT HEALTH QUESTIONNAIRE - PHQ9
SUM OF ALL RESPONSES TO PHQ QUESTIONS 1-9: 20
SUM OF ALL RESPONSES TO PHQ QUESTIONS 1-9: 20
10. IF YOU CHECKED OFF ANY PROBLEMS, HOW DIFFICULT HAVE THESE PROBLEMS MADE IT FOR YOU TO DO YOUR WORK, TAKE CARE OF THINGS AT HOME, OR GET ALONG WITH OTHER PEOPLE: VERY DIFFICULT

## 2024-10-29 NOTE — PROGRESS NOTES
NAVIGATE Clinician Contact & Progress Note  For Individual Resiliency Training (IRT)  A Part of the [facility] First Episode of Psychosis Program    NAVIGATE Enrollee: Navi Morales (1995)     MRN: [id number]  Date:  10/29/24  Diagnosis: Psychosis, unspecified psychosis type (H) [F29]   Clinician: LILIANA Individual Resiliency Trainer, MERCEDES Mchugh     1. Type of contact: (majority of time spent)  IRT Session via telehealth  Mode of communication: [employer] (HIPAA compliant, secure platform). Patient consented verbally to this mode of therapy today.  Reason for telehealth: To reduce barriers in accessing services, due to but not limited to transportation, location, or scheduling reasons.    2. People present:   Writer  Client: Yes - Navi    3. Length of Actual Contact: Start Time: 3:00; End Time: 4:07     4. Location of contact:  Originating Location (patient location): [facility] [place], located in [place], [place]  Distant Location (provider location): Remote location    5. Did the client complete the home practice option(s) from the previous session: partially completed    6. Motivational Teaching Strategies:  Connect info and skills with personal goals  Promote hope and positive expectations  Explore pros and cons of change  Re-frame experiences in positive light    7. Educational Teaching Strategies:  Review of written material/education  Relate information to client's experience  Ask questions to check comprehension  Break down information into small chunks  Adopt client's language     8. CBT Teaching Strategies:  Reinforcement and shaping (positive feedback for steps towards goals and gains in knowledge & skills)  Cognitive restructuring (identify thoughts related to negative feelings)    9. IRT Module(s) Addressed:  Module 3 - Education about Psychosis    10. Techniques utilized:   [place] announced at beginning of session  Review of goal  Review of previous meeting  Present new material  Help client  "choose a home practice option  Summarize progress made in current session    11. Measures:    Answers submitted by the patient for this visit:  Patient Health Questionnaire (Submitted on 10/29/2024)  If you checked off any problems, how difficult have these problems made it for you to do your work, take care of things at home, or get along with other people?: Very difficult  PHQ9 TOTAL SCORE: 20  Patient Health Questionnaire (G7) (Submitted on 10/22/2024)  MELY 7 TOTAL SCORE: 19      Mental Status Exam  Alertness: alert   Behavior/Demeanor: cooperative, pleasant, and calm  Speech: regular rate and rhythm  Language: intact.   Mood: depressed and anxious  Thought Process/Associations: unremarkable  Thought Content:  Reports suicidal ideation;  Denies suicidal ideation with plan; with intent [details in Interim History]  Perception:  Reports auditory hallucinations;  Denies visual hallucinations  Insight: good  Judgment: adequate for safety  Cognition: does  appear grossly intact; formal cognitive testing was not done      12. Assessment/Progress Note:     Writer and client met for IRT visit via [employer]. Set agenda to check in, reported current symptoms to include: AH, depression, anxiety but noted that they are \"alright, with the medication\". Writer used relational and interpersonal approach to explore feelings, motivations, and behavior. Writer offered support, feedback, validation, and reinforced use of skills taught in IRT from modalities including cognitive behavioral therapy, psycho education, and skills training. Promoted understanding of their experiences of psychosis and how it impacts them in important areas of their life and in recovery goals. Reflected on client's strengths and resiliency factors and facilitated discussion on how these can assist in symptom management, recovery, and well-being. Explored symptoms and coping from a stress-vulnerability lens. Current stressors include: symptoms, social anxiety. " "In regards to medications, client reports medication adherence to the prescribed antipsychotic. Denied suicidal intent or plan today.    Navi reports he has been doing \"okay\". Says he feels like his auditory hallucinations have reduced \"by about 95%\". He reports anxiety related to symptoms returning. Reviewed Cognitive restructuring by addressing the thought: \"My symptoms will come back and I will humiliate myself\" - together we weighed evidence for an against the thought, identified feelings (restless, anxious, uncertain, uncomfortable) and typical behaviors (\"running away\"). Navi was able to reflect on how these are connected and how a more neutral or positive thought such as \"if my symptoms come back I can always leave here\" or \"I can handle my symptoms while out socializing\". Briefly discussed Satiation scale for eating - home practice tonight for dinner      13. Plan/Referrals:     Continue Navigate services, weekly IRT, scheduled next week Wed 11/6/24 at 2pm    Billing for \"Interactive Complexity\"?    No      "

## 2024-10-31 ENCOUNTER — VIRTUAL VISIT (OUTPATIENT)
Dept: PSYCHIATRY | Facility: CLINIC | Age: 29
End: 2024-10-31
Payer: COMMERCIAL

## 2024-10-31 DIAGNOSIS — F29 PSYCHOSIS, UNSPECIFIED PSYCHOSIS TYPE (H): Primary | ICD-10-CM

## 2024-11-05 ENCOUNTER — VIRTUAL VISIT (OUTPATIENT)
Dept: PSYCHIATRY | Facility: CLINIC | Age: 29
End: 2024-11-05
Payer: COMMERCIAL

## 2024-11-05 DIAGNOSIS — F29 PSYCHOSIS, UNSPECIFIED PSYCHOSIS TYPE (H): Primary | ICD-10-CM

## 2024-11-05 NOTE — PROGRESS NOTES
NAVIGATE Clinician Contact & Progress Note   For Family Education Program    NAVIGATE Enrollee: Navi Morales (1995)     MRN: [id number]  Date:  10/31/24  Diagnosis(es):   Psychosis unspecified  Clinician: LILIANA Family Clinician, Laura Maldonado MA, SELWYN     1. Type of contact: (majority of time spent)  Family Session via telehealth  Mode of communication:    Linwood Verduzco consented verbally to this mode of therapy today.  Reason for telehealth: To reduce barriers in accessing services, due to but not limited to transportation, location, or scheduling reasons.     2. People present:   Writer  Client: Yes  Significant Other/Family/Friend:  Mother    3. Length of Actual Contact: Start Time: 4:10pm to 5pm .      4. Location of contact:  Originating Location (patient location):[place]   Distant Location (provider location): Home office, located in [place], [place], using appropriate privacy considerations and procedures    5. Did the client complete the home practice option(s) from the previous session: Partially Completed    6. Motivational Teaching Strategies:  Connect info and skills with personal goals  Promote hope and positive expectations    7. Educational Teaching Strategies:  Review of written material/education  Relate information to client's experience  Ask questions to check comprehension  Break down information into small chunks    8. CBT Teaching Strategies:  Reinforcement and shaping (positive feedback for steps towards goals and gains in knowledge & skills)    9. Psychoeducational Topic(s) Addressed:  Just the Facts - Coping with Stress    10. Techniques utilized:   [place] announced at beginning of session  Review of previous meeting  Present new material  Family Therapy  Motivational Interviewing (Connect info and skills with personal goals, Promote hope and positive expectations, Explore pros and cons of change, Re-frame experiences in a positive light)  Educational  Teaching Strategies (Review written material/education on: Psychosis, Medications for psychosis, Coping with stress, Strategies to build resiliency, Relapse prevention planning, Developing a collaboration with mental health professionals, Effective communication, A relative s guide to supporting recovery from psychosis, Basic facts about alcohol and drugs)  CBT (Reinforcement and shaping, Social skills training, Relapse prevention planning, Coping skills training, Relaxation training, Cognitive restructuring, Behavioral tailoring)    11. Assessment/Progress Note:     Writer met with Navi and Navi's mom on this day via [employer].  Set agenda to check in with each of them and to continue education on coping with stress. Family were agreeable to this agenda.    Navi reports that he and mom went to the grocery store and also went for two walks. Discuss this experience and provide supportive listening. No concerns regarding symptoms; overall Navi is reporting very low level of psychosis symptoms.     Navi's mom reports that they received communication regarding his SNAP benefits. Writer will communicate their questions to Brandie Moran.     Continue conversation on coping with stress and review information on nervous system response and the window of tolerance. Discuss upcoming family gathering on this night; discuss strategies for staying in their window of tolerance.    Overall family and Navi seemed engaged in conversation. They did express interest in continuing to meet for family therapy and psychoeducation. As of today's appt their insight into Nellas mental illness appears fair. They seem they would benefit from continued clinical intervention aimed at assisting them implement helpful strategies at home and increase their understanding of psychosis.    12. Plan/Referrals:     Will meet with family weekly as schedule allows for evidence based family psychoeducation and therapeutic support aimed at maximizing Nellas  opportunity for recovery from psychosis.     Laura Maldonado MA, LP   NAVIGATE   The author of this note documented a reason for not sharing it with the patient.

## 2024-11-06 ENCOUNTER — VIRTUAL VISIT (OUTPATIENT)
Dept: PSYCHIATRY | Facility: CLINIC | Age: 29
End: 2024-11-06
Payer: COMMERCIAL

## 2024-11-06 DIAGNOSIS — F10.90 ALCOHOL USE DISORDER: ICD-10-CM

## 2024-11-06 DIAGNOSIS — F25.1 SCHIZOAFFECTIVE DISORDER, DEPRESSIVE TYPE (H): Primary | ICD-10-CM

## 2024-11-06 DIAGNOSIS — F29 PSYCHOSIS, UNSPECIFIED PSYCHOSIS TYPE (H): Primary | ICD-10-CM

## 2024-11-06 ASSESSMENT — ANXIETY QUESTIONNAIRES
3. WORRYING TOO MUCH ABOUT DIFFERENT THINGS: NEARLY EVERY DAY
GAD7 TOTAL SCORE: 19
7. FEELING AFRAID AS IF SOMETHING AWFUL MIGHT HAPPEN: NEARLY EVERY DAY
4. TROUBLE RELAXING: NEARLY EVERY DAY
5. BEING SO RESTLESS THAT IT IS HARD TO SIT STILL: MORE THAN HALF THE DAYS
7. FEELING AFRAID AS IF SOMETHING AWFUL MIGHT HAPPEN: NEARLY EVERY DAY
GAD7 TOTAL SCORE: 19
1. FEELING NERVOUS, ANXIOUS, OR ON EDGE: NEARLY EVERY DAY
7. FEELING AFRAID AS IF SOMETHING AWFUL MIGHT HAPPEN: NEARLY EVERY DAY
8. IF YOU CHECKED OFF ANY PROBLEMS, HOW DIFFICULT HAVE THESE MADE IT FOR YOU TO DO YOUR WORK, TAKE CARE OF THINGS AT HOME, OR GET ALONG WITH OTHER PEOPLE?: VERY DIFFICULT
7. FEELING AFRAID AS IF SOMETHING AWFUL MIGHT HAPPEN: NEARLY EVERY DAY
GAD7 TOTAL SCORE: 19
GAD7 TOTAL SCORE: 19
4. TROUBLE RELAXING: NEARLY EVERY DAY
GAD7 TOTAL SCORE: 19
7. FEELING AFRAID AS IF SOMETHING AWFUL MIGHT HAPPEN: NEARLY EVERY DAY
2. NOT BEING ABLE TO STOP OR CONTROL WORRYING: NEARLY EVERY DAY
7. FEELING AFRAID AS IF SOMETHING AWFUL MIGHT HAPPEN: NEARLY EVERY DAY
6. BECOMING EASILY ANNOYED OR IRRITABLE: MORE THAN HALF THE DAYS
6. BECOMING EASILY ANNOYED OR IRRITABLE: MORE THAN HALF THE DAYS
GAD7 TOTAL SCORE: 19
5. BEING SO RESTLESS THAT IT IS HARD TO SIT STILL: MORE THAN HALF THE DAYS
4. TROUBLE RELAXING: NEARLY EVERY DAY
GAD7 TOTAL SCORE: 19
6. BECOMING EASILY ANNOYED OR IRRITABLE: MORE THAN HALF THE DAYS
2. NOT BEING ABLE TO STOP OR CONTROL WORRYING: NEARLY EVERY DAY
GAD7 TOTAL SCORE: 19
1. FEELING NERVOUS, ANXIOUS, OR ON EDGE: NEARLY EVERY DAY
2. NOT BEING ABLE TO STOP OR CONTROL WORRYING: NEARLY EVERY DAY
5. BEING SO RESTLESS THAT IT IS HARD TO SIT STILL: MORE THAN HALF THE DAYS
8. IF YOU CHECKED OFF ANY PROBLEMS, HOW DIFFICULT HAVE THESE MADE IT FOR YOU TO DO YOUR WORK, TAKE CARE OF THINGS AT HOME, OR GET ALONG WITH OTHER PEOPLE?: VERY DIFFICULT
1. FEELING NERVOUS, ANXIOUS, OR ON EDGE: NEARLY EVERY DAY
GAD7 TOTAL SCORE: 19
IF YOU CHECKED OFF ANY PROBLEMS ON THIS QUESTIONNAIRE, HOW DIFFICULT HAVE THESE PROBLEMS MADE IT FOR YOU TO DO YOUR WORK, TAKE CARE OF THINGS AT HOME, OR GET ALONG WITH OTHER PEOPLE: VERY DIFFICULT
3. WORRYING TOO MUCH ABOUT DIFFERENT THINGS: NEARLY EVERY DAY
3. WORRYING TOO MUCH ABOUT DIFFERENT THINGS: NEARLY EVERY DAY
IF YOU CHECKED OFF ANY PROBLEMS ON THIS QUESTIONNAIRE, HOW DIFFICULT HAVE THESE PROBLEMS MADE IT FOR YOU TO DO YOUR WORK, TAKE CARE OF THINGS AT HOME, OR GET ALONG WITH OTHER PEOPLE: VERY DIFFICULT
IF YOU CHECKED OFF ANY PROBLEMS ON THIS QUESTIONNAIRE, HOW DIFFICULT HAVE THESE PROBLEMS MADE IT FOR YOU TO DO YOUR WORK, TAKE CARE OF THINGS AT HOME, OR GET ALONG WITH OTHER PEOPLE: VERY DIFFICULT
8. IF YOU CHECKED OFF ANY PROBLEMS, HOW DIFFICULT HAVE THESE MADE IT FOR YOU TO DO YOUR WORK, TAKE CARE OF THINGS AT HOME, OR GET ALONG WITH OTHER PEOPLE?: VERY DIFFICULT

## 2024-11-06 ASSESSMENT — PATIENT HEALTH QUESTIONNAIRE - PHQ9
SUM OF ALL RESPONSES TO PHQ QUESTIONS 1-9: 20
SUM OF ALL RESPONSES TO PHQ QUESTIONS 1-9: 20
10. IF YOU CHECKED OFF ANY PROBLEMS, HOW DIFFICULT HAVE THESE PROBLEMS MADE IT FOR YOU TO DO YOUR WORK, TAKE CARE OF THINGS AT HOME, OR GET ALONG WITH OTHER PEOPLE: VERY DIFFICULT
SUM OF ALL RESPONSES TO PHQ QUESTIONS 1-9: 20
10. IF YOU CHECKED OFF ANY PROBLEMS, HOW DIFFICULT HAVE THESE PROBLEMS MADE IT FOR YOU TO DO YOUR WORK, TAKE CARE OF THINGS AT HOME, OR GET ALONG WITH OTHER PEOPLE: VERY DIFFICULT
SUM OF ALL RESPONSES TO PHQ QUESTIONS 1-9: 20

## 2024-11-06 NOTE — PROGRESS NOTES
NAVIGGALINDO Clinician Contact & Progress Note  For Individual Resiliency Training (IRT)  A Part of the [facility] First Episode of Psychosis Program    NAVIGATE Enrollee: Navi Morales (1995)     MRN: [id number]  Date:  11/06/24  Diagnosis: Psychosis, unspecified psychosis type (H) [F29]   Clinician: LILIANA Individual Resiliency Trainer, MERCEDES Mchugh     1. Type of contact: (majority of time spent)  IRT Session via telehealth  Mode of communication: [employer] (HIPAA compliant, secure platform). Patient consented verbally to this mode of therapy today.  Reason for telehealth: To reduce barriers in accessing services, due to but not limited to transportation, location, or scheduling reasons.    2. People present:   Writer  Client: Yes - Navi    3. Length of Actual Contact: Start Time: 2:00; End Time: 2:58     4. Location of contact:  Originating Location (patient location): [facility], located in [place], [place]  Distant Location (provider location): Remote location    5. Did the client complete the home practice option(s) from the previous session: partially completed    6. Motivational Teaching Strategies:  Connect info and skills with personal goals  Promote hope and positive expectations  Explore pros and cons of change  Re-frame experiences in positive light    7. Educational Teaching Strategies:  Review of written material/education  Relate information to client's experience  Ask questions to check comprehension  Break down information into small chunks  Adopt client's language     8. CBT Teaching Strategies:  Reinforcement and shaping (positive feedback for steps towards goals and gains in knowledge & skills)  Cognitive restructuring (identify thoughts related to negative feelings)    9. IRT Module(s) Addressed:  Module 9 - Dealing with Negative Symptoms    10. Techniques utilized:   [place] announced at beginning of session  Review of goal  Review of previous meeting  Present new material  Help  "client choose a home practice option  Summarize progress made in current session    11. Measures:    Answers submitted by the patient for this visit:  Patient Health Questionnaire (Submitted on 11/6/2024)  If you checked off any problems, how difficult have these problems made it for you to do your work, take care of things at home, or get along with other people?: Very difficult  PHQ9 TOTAL SCORE: 20  Patient Health Questionnaire (G7) (Submitted on 11/6/2024)  MELY 7 TOTAL SCORE: 19        Mental Status Exam  Alertness: alert   Behavior/Demeanor: cooperative, pleasant, and calm  Speech: regular rate and rhythm  Language: intact.   Mood: depressed and anxious  Thought Process/Associations: unremarkable  Thought Content:  Reports suicidal ideation;  Denies suicidal ideation with plan; with intent [details in Interim History]  Perception:  Reports auditory hallucinations;  Denies visual hallucinations  Insight: good  Judgment: adequate for safety  Cognition: does  appear grossly intact; formal cognitive testing was not done      12. Assessment/Progress Note:     Writer and client met for IRT visit via [employer]. Set agenda to check in, reported current symptoms to include: AH, depression and negative symptoms (flat affect, anhedonia), \"social anxiety\". Writer used relational and interpersonal approach to explore feelings, motivations, and behavior. Writer offered support, feedback, validation, and reinforced use of skills taught in IRT from modalities including cognitive behavioral therapy, psycho education, and skills training. Promoted understanding of their experiences of psychosis and how it impacts them in important areas of their life and in recovery goals. Reflected on client's strengths and resiliency factors and facilitated discussion on how these can assist in symptom management, recovery, and well-being. Explored symptoms and coping from a stress-vulnerability lens.     Current stressors include: symptoms, " "social anxiety. In regards to medications, client reports medication adherence to the prescribed antipsychotic. Denied suicidal intent or plan today - Navi was future oriented, denied access to means and was able to state his safety plan of talking with mom or going to the [facility] unit.    Navi reports he has been doing \"okay\" he reports trying to eat healthier and stated \"I would like to diet and count my calories for 8 months\". Writer reflected on his goal and acknowledged his objective to \"feel more comfortable in my body\". Reviewed previous goal of using satiation scale, while Navi reported was helpful to avoid snacking. We also reviewed common thinking styles and particularly focused on all or nothing/black or white thinking as well as emotional reasoning. Navi will write down when he notices using certain thinking styles as home practice.       13. Plan/Referrals:     Continue Navigate services, weekly IRT, scheduled next week tue at 3    Billing for \"Interactive Complexity\"?    No      "

## 2024-11-06 NOTE — PROGRESS NOTES
NAVIGATE Clinician Contact & Progress Note   For Family Education Program    NAVIGATE Enrollee: Navi Morales (1995)     MRN: [id number]  Date:  11/05/24  Diagnosis(es):   Psychosis unspecified  Clinician: LILIANA Family Clinician, Laura Maldonado MA, SELWYN     1. Type of contact: (majority of time spent)  Family Session via telehealth  Mode of communication:    Linwood Verduzco consented verbally to this mode of therapy today.  Reason for telehealth: To reduce barriers in accessing services, due to but not limited to transportation, location, or scheduling reasons.     2. People present:   Writer  Client: Yes  Significant Other/Family/Friend:  Mother    3. Length of Actual Contact: Start Time: 4:20pm to 5:05pm .      4. Location of contact:  Originating Location (patient location):[place]   Distant Location (provider location): Home office, located in [place], [place], using appropriate privacy considerations and procedures    5. Did the client complete the home practice option(s) from the previous session: Partially Completed    6. Motivational Teaching Strategies:  Connect info and skills with personal goals  Promote hope and positive expectations    7. Educational Teaching Strategies:  Review of written material/education  Relate information to client's experience  Ask questions to check comprehension  Break down information into small chunks    8. CBT Teaching Strategies:  Reinforcement and shaping (positive feedback for steps towards goals and gains in knowledge & skills)    9. Psychoeducational Topic(s) Addressed:  Just the Facts - Coping with Stress    10. Techniques utilized:   [place] announced at beginning of session  Review of previous meeting  Present new material  Family Therapy  Motivational Interviewing (Connect info and skills with personal goals, Promote hope and positive expectations, Explore pros and cons of change, Re-frame experiences in a positive light)  Educational  "Teaching Strategies (Review written material/education on: Psychosis, Medications for psychosis, Coping with stress, Strategies to build resiliency, Relapse prevention planning, Developing a collaboration with mental health professionals, Effective communication, A relative s guide to supporting recovery from psychosis, Basic facts about alcohol and drugs)  CBT (Reinforcement and shaping, Social skills training, Relapse prevention planning, Coping skills training, Relaxation training, Cognitive restructuring, Behavioral tailoring)    11. Assessment/Progress Note:     Writer met with Navi and Navi's mom on this day via [employer].  Set agenda to check in with each of them and to continue education on coping with stress. Family were agreeable to this agenda.    Navi reports that he is doing \"Ok.\" Navi states that he went to the grocery store with mom and got his food support benefits. Navi continues to report anxiety about using his card for food benefits.     Navi reports also using the treadmill two times in the last week as he and mom have been unable to walk outside due to weather.     Mom and Navi report no concerns regarding symptoms; overall Navi is reporting very low level of psychosis symptoms.     Navi does report stress related to new coffeemaker.     Review conversation on coping with stress and review information on nervous system response and the window of tolerance. Explore signs of stress for both mom and Navi. Navi reports racing heart, shortness of breath, feeling tired, tense back. Mom reports increased heart rate, chest pain. Explored strategy of mindfulness; by noticing present moment including thoughts, feelings in the present moment. Home practice includes using mindfulness, taking a time out, or practicing curiosity of the present moment.     Overall family and Navi seemed engaged in conversation. They did express interest in continuing to meet for family therapy and psychoeducation. As of today's " appt their insight into Navi's mental illness appears fair. They seem they would benefit from continued clinical intervention aimed at assisting them implement helpful strategies at home and increase their understanding of psychosis.    12. Plan/Referrals:     Will meet with family weekly as schedule allows for evidence based family psychoeducation and therapeutic support aimed at maximizing Navi's opportunity for recovery from psychosis.     Laura Maldonado MA, LP   NAVIGATE   The author of this note documented a reason for not sharing it with the patient.

## 2024-11-06 NOTE — PROGRESS NOTES
Navi is a 29 year old who is being evaluated via a billable video visit.    How would you like to obtain your AVS? Cj  If the video visit is dropped, the invitation should be resent by: Send to e-mail at: [email or web address]  Will anyone else be joining your video visit? No         NAVIGATE Patient Self-Rating Form    Since your last medication management visit--    Have you been feeling depressed, sad, or down? No  Have you been feeling anxious, worried or nervous? Yes  Have you been thinking about death or have you had any feelings that you would be better off dead? No   Have you been feeling particularly good? Yes  Have you been feeling annoyed, angry, or resentful (whether you showed it or not)? No  Did you do anything that could have gotten you in trouble? No  Have you felt dizzy or faint? No  Have you had blurred vision? No  Have you had dry mouth? Yes  Have you had too much saliva in your mouth or had drooling? No  Have you felt nauseous? No  Have you been constipated? No  Has you appetite for food been increased? Yes  Have you gained weight? Yes  Have you lost weight? No  Have you felt restless or like you cannot sit still? No  Any shaking of your hands, legs, or other muscles? No  Any problems walking or moving or any problems feeling stiff or rigid? No  Have you felt tired or fatigued? Yes  Have you felt drowsy during the day? Yes  Have you been sleeping too much at night? Yes  Have you been sleeping too little or had problems sleeping at night? No  Any decrease in your interest in sex? Yes  Any other problems with sex? No  Any problems with your breasts such as swelling or discharge? No  For women, any problems with your period? N/A  Are there other medical or side effect problems you wish to discuss with your prescriber? No  Since your last visit, how many days have you not taken your medication? 0  Have you had trouble remembering to take your medication? No  Do you find the number of medicines or  "the times when you are supposed to take then confusing or burdensome? No  Are you afraid of the medication? No  Do you think that you have an illness that requires taking medication? Yes  Do you think that other people would think poorly of you if they knew that you take medication? No  On average, how many cigarettes do you smoke per day? 0  Since you last visit, did you drink alcohol? No  Since your last visit, have you used any marijuana? Yes  Since your last visit, have you used any stress drugs other than marijuana? Yes (Nicotine)   Between now and your next visit, do you think we should keep your medication the same or consider changing the medications? \"Keep it the same but maybe introduce a new medication for anxiety\" suggestion from the patient.    Vitals:  No vitals were obtained today due to virtual visit.    Answers submitted by the patient for this visit:  Patient Health Questionnaire (Submitted on 11/6/2024)  If you checked off any problems, how difficult have these problems made it for you to do your work, take care of things at home, or get along with other people?: Very difficult  PHQ9 TOTAL SCORE: 20  Patient Health Questionnaire (G7) (Submitted on 11/6/2024)  MELY 7 TOTAL SCORE: 19    "

## 2024-11-06 NOTE — PROGRESS NOTES
"Virtual Visit Details    Type of service:  Video Visit   Video Start Time:  1500  Video End Time: 1530  Originating Location (pt. Location): Home    Distant Location (provider location):  On-site  Platform used for Video Visit: [address] Medication Management Progress Note  A Part of the [facility] First Episode of Psychosis Program    NAVIGATE Enrollee: Navi Morales (1995)     MRN: [id number]  Date:  11/06/24         Contributors to the Assessment     Chart Reviewed.   Interview completed with Navi Morales.  Collateral information obtained from none.         Interim History      Navi Morales is a 29 year old male who was last seen in [facility] clinic a month ago at which time no med changes were made. The patient reports good treatment adherence. History was provided by Navi who was a good historian.  Since the last visit:  -Navi reports he has been the same as usual  -Still training the dog who is getting riled up and starts \"tearing things apart.\" Realizing that he really wants attention. Has been aggressive sometimes with jumping and growling. Pretty good, walks have been better. Able to walk in a straight line  -Can't let the dog run at full speed because his joints are not fused. Zoomies, puppy brain still.  -Anxiety has been \"showcasing itself a bit more because I'm doing a bit more.\"  -Stopped drinking, has been 1-2 months. Was \"kind of easy to stop because he never really got drunk,\" [though he had previously reported his use as getting \"blackout\" frequently] has also been walking on the treadmill on the incline.  -Voices--pretty clear, right before he goes to sleep, \"might think that I hear some female voices\" but overall has been improved. Places like the grocery store and park has been pretty good so far.   -AH don't really interfere with sleep that much, gets anxious whenever he thinks he hears them, \"not really thoughts, but straight to a feeling.\"    PSYCH ROS:  See HPI     RECENT SOCIAL " HISTORY:  SEE HPI    Medical ROS:  none         First Episode of Psychosis History      DUP (duration untreated psychosis):  4 years  Route to initial care: outpatient  Medication adherence overall:  See above, Clinician Rating Form in COMPASS Item 13  General frequency of visits:  weekly IRT, monthly med management  Participation in groups:  No  Cognitive Remediation:  No  Other treatment history:     Reviewed for completion of First Episode work-up:  Yes  First episode workup:  Not Done (if completed, see LABS for results)  MATRICS Consensus Cognitive Battery:  Not Done (if completed, see LABS for results)       Medical/Surgical History     Patient has no known allergies.    Patient Active Problem List   Diagnosis    Alcohol use disorder, mild, abuse    MELY (generalized anxiety disorder)    Induration penis plastica    Moderate episode of recurrent major depressive disorder (H)    Open displaced fracture of neck of second metacarpal bone of right hand    Psychosis (H)    Social anxiety disorder    TMJ (temporomandibular joint syndrome)    Traumatic compression fracture of T3 vertebra (H)            Medications     Current Outpatient Medications   Medication Sig Dispense Refill    cholecalciferol, vitamin D3, 1,000 unit (25 mcg) tablet [CHOLECALCIFEROL, VITAMIN D3, 1,000 UNIT (25 MCG) TABLET] Take 2,000 Units by mouth daily.      dutasteride (AVODART) 0.5 MG capsule Take 0.5 mg by mouth daily      ketoconazole (NIZORAL) 2 % external shampoo Apply topically daily as needed LATHER INTO SCALP AND RINSE AFTER 2-3 MINUTES. USE THREE TIMES A WEEK.      magnesium chloride (SLOW-MAG) 64 mg TbEC delayed-release tablet [MAGNESIUM CHLORIDE (SLOW-MAG) 64 MG TBEC DELAYED-RELEASE TABLET] Take 64 mg by mouth daily.      naltrexone (DEPADE/REVIA) 50 MG tablet Take 1 tablet (50 mg) by mouth daily 30 tablet 2    OLANZapine (ZYPREXA) 15 MG tablet Take 2 tablets (30 mg) by mouth at bedtime 180 tablet 2    QUEtiapine (SEROQUEL) 100 MG  tablet Take 1 tablet (100 mg) by mouth at bedtime. May also take 0.5-1 tablets ( mg) 2 times daily as needed (anxiety). 60 tablet 3    tadalafil (CIALIS) 10 MG tablet Take 10 mg by mouth daily      zinc gluconate 50 mg tablet [ZINC GLUCONATE 50 MG TABLET] Take 100 mg by mouth daily.            Vitals     There were no vitals taken for this visit.      Weight prior to medication: unknown         Mental Status Exam     Alertness: alert  and oriented  Appearance: well groomed  Behavior/Demeanor: cooperative, pleasant, and calm, with good  eye contact   Speech: regular rate and rhythm, not pressured  Language: no obvious problem  Psychomotor: normal or unremarkable  Mood: depressed  Affect: blunted; was congruent to mood; was congruent to content  Thought Process/Associations:  overinclusive at times  Thought Content:  Reports none;  Denies suicidal and violent ideation and delusions  Perception:  Reports auditory hallucinations;  Denies visual hallucinations  Insight: good  Judgment: fair and adequate for safety  Cognition: does  appear grossly intact; formal cognitive testing was not done         Labs and Data     RATING SCALES:  AIMS: next in person visit    PHQ9 TODAY =       10/22/2024     2:53 PM 10/29/2024     2:58 PM 11/6/2024     1:51 PM   PHQ-9 SCORE   PHQ-9 Total Score MyChart 20 (Severe depression) 20 (Severe depression) 20 (Severe depression)   PHQ-9 Total Score 20    20 20  20        Patient-reported    Multiple values from one day are sorted in reverse-chronological order     ANTIPSYCHOTIC LABS ROUTINE    [glu, A1C, lipids (focus LDL), liver enzymes, WBC, ANEU, Hgb, plts]   q12 mo  Recent Labs   Lab Test 06/30/23  1048   GLC 84     No lab results found.  Recent Labs   Lab Test 06/30/23  1048   AST 39   ALT 40   ALKPHOS 88     Recent Labs   Lab Test 06/30/23  1048   WBC 7.7   HGB 14.9             Psychiatric Diagnoses     Psychosis, schizophrenia vs schizoaffective disorder  AUD, moderate, in  "early remission  Cannabis use disorder with active use  Generalized anxiety disorder  Agoraphobia         Assessment   Navi HO Manchester is a 28 year old male with first onset of psychiatric symptoms at age 7 (\"social anxiety\"), and psychotic symptoms at age 23. The above duration of untreated psychosis was approximately 4 years.  Prodromal symptoms seem to have been present since at least college (notable substance use) though patient does note a substantially longer history of depression and physical health concerns. Presenting symptoms appear to include hallucinations, delusional thoughts. Navi attributes symptoms to physical health issues and history of trauma.  Substance use does seem to be a present concern. There are possible medical comorbidities which impact this treatment [suicide attempt, suicidal ideation, SIB, psychosis, aggression, and substance use: alcohol].     Today,  Navi has abstained from alcohol consumption for about a month or two, which is a significant improvement. He has been doing incline treadmill exercise. Navi's anxiety symptoms have exacerbated recently, but he attributes this to doing more activities. AH are significantly reduced, predominantly nocturnal, occurring pre-sleep, but he is uncertain whether these are true auditory hallucinations or external noises from his neighbors.     Discussed social anxiety and agoraphobia, and risks/benefits of starting an selective serotonin reuptake inhibitor when he has had problems with that class of medications in the past. I advised him that he may benefit from them with the olanzapine on board to prevent júnior, etc. However, given that in the past he experienced the antidepressants altering his experience of THC and making it less pleasurable, he prefers to continue without an antidepressant for now and focus on therapy/skills.    Future considerations: starting selective serotonin reuptake inhibitor               SUICIDE RISK ASSESSMENT:  Risk " factors for self-harm: previous suicide attempt, substance use/pending treatment, and hallucinations.  Mitigating factors: describes a safety plan, h/o seeking help , future oriented, commitment to family, and stable housing.  The patient does not appear to be at imminent risk for self-harm, hospitalization is not recommended which the pt does  agree with. No hospitalization will be arranged. Based on degree of symptoms close psych follow-up was/were recommended which the pt does  agree to. Additional steps to minimize risk: med changes.      [place] PRESCRIPTION MONITORING PROGRAM [] was not checked today: not using controlled substances.    PSYCHOTROPIC DRUG INTERACTIONS:   Quetiapine/olanzapine: additive CNS depression, QTc prolongation.    MANAGEMENT:  use lowest therapeutic doses of both and routine monitoring         Plan     1) PSYCHOTROPIC MEDICATIONS:  - Olanzapine 30 mg PO at bedtime   - Naltrexone 50 mg PO daily    2) THERAPY:  Continue IRT with Brandie Moran    3) NEXT DUE:    Labs: FEP workup due  Rating Scales: AIMS due    4) REFERRALS:    none    5) RTC: 4 weeks    6) CRISIS NUMBERS:   Provided routinely in AVS.    TREATMENT RISK STATEMENT:  The risks, benefits, alternatives and potential adverse effects have been discussed and are understood by the pt. The pt understands the risks of using street drugs or alcohol. There are no medical contraindications, the pt agrees to treatment with the ability to do so. The pt knows to call the clinic for any problems or to access emergency care if needed.  Medical and substance use concerns are documented above.  Psychotropic drug interaction check was done, including changes made today.    PROVIDER:   Heidi Vega MD  [facility]  , Department of Psychiatry and Behavioral Sciences  [employer]     The longitudinal plan of care for the diagnosis(es)/condition(s) as documented were addressed during this  visit. Due to the added complexity in care, I will continue to support Navi in the subsequent management and with ongoing continuity of care.

## 2024-11-12 ENCOUNTER — VIRTUAL VISIT (OUTPATIENT)
Dept: PSYCHIATRY | Facility: CLINIC | Age: 29
End: 2024-11-12
Payer: COMMERCIAL

## 2024-11-12 ENCOUNTER — APPOINTMENT (OUTPATIENT)
Dept: PSYCHIATRY | Facility: CLINIC | Age: 29
End: 2024-11-12
Payer: COMMERCIAL

## 2024-11-12 DIAGNOSIS — F25.1 SCHIZOAFFECTIVE DISORDER, DEPRESSIVE TYPE (H): Primary | ICD-10-CM

## 2024-11-14 ENCOUNTER — VIRTUAL VISIT (OUTPATIENT)
Dept: PSYCHIATRY | Facility: CLINIC | Age: 29
End: 2024-11-14
Payer: COMMERCIAL

## 2024-11-14 DIAGNOSIS — F29 PSYCHOSIS, UNSPECIFIED PSYCHOSIS TYPE (H): Primary | ICD-10-CM

## 2024-11-14 NOTE — PROGRESS NOTES
NAVIGATE Clinician Contact & Progress Note   For Family Education Program    NAVIGATE Enrollee: Navi Morales (1995)     MRN: [id number]  Date:  11/12/24  Diagnosis(es):   Psychosis unspecified  Clinician: LILIANA Family Clinician, Laura Maldonado MA, SELWYN     1. Type of contact: (majority of time spent)  Family Session via telehealth  Mode of communication:    Linwood Verduzco consented verbally to this mode of therapy today.  Reason for telehealth: To reduce barriers in accessing services, due to but not limited to transportation, location, or scheduling reasons.     2. People present:   Writer  Client: Yes  Significant Other/Family/Friend:  Mother  Family clinician in training: Nadeem Borja    3. Length of Actual Contact: Start Time: 4:20pm to 5:05pm .      4. Location of contact:  Originating Location (patient location):[place]   Distant Location (provider location): Home office, located in [place], [place], using appropriate privacy considerations and procedures    5. Did the client complete the home practice option(s) from the previous session: Partially Completed    6. Motivational Teaching Strategies:  Connect info and skills with personal goals  Promote hope and positive expectations    7. Educational Teaching Strategies:  Review of written material/education  Relate information to client's experience  Ask questions to check comprehension  Break down information into small chunks    8. CBT Teaching Strategies:  Reinforcement and shaping (positive feedback for steps towards goals and gains in knowledge & skills)    9. Psychoeducational Topic(s) Addressed:  Just the Facts - Coping with Stress    10. Techniques utilized:   [place] announced at beginning of session  Review of previous meeting  Present new material  Family Therapy  Motivational Interviewing (Connect info and skills with personal goals, Promote hope and positive expectations, Explore pros and cons of change, Re-frame  "experiences in a positive light)  Educational Teaching Strategies (Review written material/education on: Psychosis, Medications for psychosis, Coping with stress, Strategies to build resiliency, Relapse prevention planning, Developing a collaboration with mental health professionals, Effective communication, A relative s guide to supporting recovery from psychosis, Basic facts about alcohol and drugs)  CBT (Reinforcement and shaping, Social skills training, Relapse prevention planning, Coping skills training, Relaxation training, Cognitive restructuring, Behavioral tailoring)    11. Assessment/Progress Note:     Writer met with Navi and Navi's mom on this day via [employer].  Set agenda to check in with each of them and to continue education on coping with stress. Family were agreeable to this agenda. Family agreed to have new family clinician, Nadeem Borja, join this visit.    Navi reports that he went to [employer] this last week and that this was more challenging than going to the grocery store. Navi describes in detail what about the trip to [employer] was stressful including the size of the store, the amount of people in the store, and the small size of the aisles. Navi reports that after [employer] he also went to another grocery store. Positively reflect on Navi's persistence and his ability to manage his anxiety despite the stress of going to [employer]. Reflect on past conversation on the window of tolerance and Navi was able to acknowledge that he was able to stay in his window during these experiences.     Mom and Navi report no urgent concerns on this day. Navi easily engages in conversation with writer and mom.    Navi reports that he and mom also went to [employer] grocery store and Navi reported feeling pretty \"comfortable\" in this store. Navi would like to try visiting this store at a busier time.     Review Last weeks conversation on coping with stress and explored what strategies both mom and Navi utilized in the past " week. Navi reports that he doesn't think he has used any of the skills. Writer reflected that it sounded like Navi used some mindfulness while in [employer] as evidenced by noticing his physical sensations, thoughts, etc. Navi was open to this feedback. Mom reports that she did not use the skills over the last week.     Provided education on relaxation breathing and practiced relaxation breathing with both mom and Navi. Family and Navi will plan to practice one of the coping skills over the next week.     Overall family and Navi seemed engaged in conversation. They did express interest in continuing to meet for family therapy and psychoeducation. As of today's appt their insight into Navi's mental illness appears fair. They seem they would benefit from continued clinical intervention aimed at assisting them implement helpful strategies at home and increase their understanding of psychosis.    12. Plan/Referrals:     Will meet with family weekly as schedule allows for evidence based family psychoeducation and therapeutic support aimed at maximizing Navi's opportunity for recovery from psychosis.     Laura Maldonado MA, LP   NAVIGATE   The author of this note documented a reason for not sharing it with the patient.

## 2024-11-18 NOTE — PROGRESS NOTES
NAVIGGALINDO Clinician Contact & Progress Note  For Individual Resiliency Training (IRT)  A Part of the [facility] First Episode of Psychosis Program    NAVIGATE Enrollee: Navi Morales (1995)     MRN: [id number]  Date:  11/12/24  Diagnosis: Psychosis, unspecified psychosis type (H) [F29]   Clinician: LILIANA Individual Resiliency Trainer, MERCEDES Mchugh     1. Type of contact: (majority of time spent)  IRT Session via telehealth  Mode of communication: [employer] (HIPAA compliant, secure platform). Patient consented verbally to this mode of therapy today.  Reason for telehealth: To reduce barriers in accessing services, due to but not limited to transportation, location, or scheduling reasons.    2. People present:   Writer  Client: Yes - Navi    3. Length of Actual Contact: Start Time: 3:00p; End Time: 4:05p     4. Location of contact:  Originating Location (patient location): [facility], located in [place], [place]  Distant Location (provider location): Remote location    5. Did the client complete the home practice option(s) from the previous session: partially completed    6. Motivational Teaching Strategies:  Connect info and skills with personal goals  Promote hope and positive expectations  Explore pros and cons of change  Re-frame experiences in positive light    7. Educational Teaching Strategies:  Review of written material/education  Relate information to client's experience  Ask questions to check comprehension  Break down information into small chunks  Adopt client's language     8. CBT Teaching Strategies:  Reinforcement and shaping (positive feedback for steps towards goals and gains in knowledge & skills)  Cognitive restructuring (identify thoughts related to negative feelings)    9. IRT Module(s) Addressed:  Module 9 - Dealing with Negative Symptoms    10. Techniques utilized:   [place] announced at beginning of session  Review of goal  Review of previous meeting  Present new material  Help  "client choose a home practice option  Summarize progress made in current session    11. Measures:    Answers submitted by the patient for this visit:  Patient Health Questionnaire (G7) (Submitted on 11/6/2024)  MELY 7 TOTAL SCORE: 19      Mental Status Exam  Alertness: alert   Behavior/Demeanor: cooperative, pleasant, and calm  Speech: regular rate and rhythm  Language: intact.   Mood: depressed and anxious  Thought Process/Associations: unremarkable  Thought Content:  Reports suicidal ideation;  Denies suicidal ideation with plan; with intent [details in Interim History]  Perception:  Reports auditory hallucinations;  Denies visual hallucinations  Insight: good  Judgment: adequate for safety  Cognition: does  appear grossly intact; formal cognitive testing was not done      12. Assessment/Progress Note:     Writer and client met for IRT visit via [employer] Set agenda to check in, reported current symptoms to include: AH, depression and negative symptoms (flat affect, anhedonia), \"social anxiety\". Writer used relational and interpersonal approach to explore feelings, motivations, and behavior. Writer offered support, feedback, validation, and reinforced use of skills taught in IRT from modalities including cognitive behavioral therapy, psycho education, and skills training. Promoted understanding of their experiences of psychosis and how it impacts them in important areas of their life and in recovery goals. Reflected on client's strengths and resiliency factors and facilitated discussion on how these can assist in symptom management, recovery, and well-being. Explored symptoms and coping from a stress-vulnerability lens.   Current stressors include: symptoms, social anxiety. In regards to medications, client reports medication adherence to the prescribed antipsychotic. Denied suicidal intent or plan today - Navi was future oriented, denied access to means and was able to state his safety plan of talking with mom or " "going to the [facility] unit.  Has not been drinking  Reviewed common thinking styles  Went to grocery store, wants to try going earlier in the day. Was open to in person meetings starting in the new year.       13. Plan/Referrals:     Continue [facility] services, weekly IRT, scheduled next week wed at 3    Billing for \"Interactive Complexity\"?    No      "

## 2024-11-20 ENCOUNTER — VIRTUAL VISIT (OUTPATIENT)
Dept: PSYCHIATRY | Facility: CLINIC | Age: 29
End: 2024-11-20
Payer: COMMERCIAL

## 2024-11-20 ENCOUNTER — BEH TREATMENT PLAN (OUTPATIENT)
Dept: PSYCHIATRY | Facility: CLINIC | Age: 29
End: 2024-11-20

## 2024-11-20 DIAGNOSIS — F25.1 SCHIZOAFFECTIVE DISORDER, DEPRESSIVE TYPE (H): Primary | ICD-10-CM

## 2024-11-20 NOTE — PROGRESS NOTES
NAVIGGALINDO Clinician Contact & Progress Note  For Individual Resiliency Training (IRT)  A Part of the [facility] First Episode of Psychosis Program    NAVIGATE Enrollee: Navi Morales (1995)     MRN: [id number]  Date:  11/20/24  Diagnosis: Psychosis, unspecified psychosis type (H) [F29]   Clinician: LILIANA Individual Resiliency Trainer, MERCEDES Mchugh     1. Type of contact: (majority of time spent)  IRT Session via telehealth  Mode of communication: [employer] (HIPAA compliant, secure platform). Patient consented verbally to this mode of therapy today.  Reason for telehealth: To reduce barriers in accessing services, due to but not limited to transportation, location, or scheduling reasons.    2. People present:   Writer  Client: Yes - Navi    3. Length of Actual Contact: Start Time: 3:00p; End Time: 4:05p     4. Location of contact:  Originating Location (patient location): [facility], located in [place], [place]  Distant Location (provider location): Remote location    5. Did the client complete the home practice option(s) from the previous session: partially completed    6. Motivational Teaching Strategies:  Connect info and skills with personal goals  Promote hope and positive expectations  Explore pros and cons of change  Re-frame experiences in positive light    7. Educational Teaching Strategies:  Review of written material/education  Relate information to client's experience  Ask questions to check comprehension  Break down information into small chunks  Adopt client's language     8. CBT Teaching Strategies:  Reinforcement and shaping (positive feedback for steps towards goals and gains in knowledge & skills)  Cognitive restructuring (identify thoughts related to negative feelings)    9. IRT Module(s) Addressed:  Module 9 - Dealing with Negative Symptoms    10. Techniques utilized:   [place] announced at beginning of session  Review of goal  Review of previous meeting  Present new material  Help  "client choose a home practice option  Summarize progress made in current session    11. Measures:    Answers submitted by the patient for this visit:  Patient Health Questionnaire (G7) (Submitted on 11/6/2024)  MELY 7 TOTAL SCORE: 19      Mental Status Exam  Alertness: alert   Behavior/Demeanor: cooperative, pleasant, and calm  Speech: regular rate and rhythm  Language: intact.   Mood: depressed and anxious  Thought Process/Associations: unremarkable  Thought Content:  Reports suicidal ideation;  Denies suicidal ideation with plan; with intent [details in Interim History]  Perception:  Reports auditory hallucinations;  Denies visual hallucinations  Insight: good  Judgment: adequate for safety  Cognition: does  appear grossly intact; formal cognitive testing was not done      12. Assessment/Progress Note:     Writer and client met for IRT visit via [employer]. Set agenda to check in, reported current symptoms to include: AH, depression and negative symptoms (flat affect, anhedonia), \"social anxiety\". Writer used relational and interpersonal approach to explore feelings, motivations, and behavior. Writer offered support, feedback, validation, and reinforced use of skills taught in IRT from modalities including cognitive behavioral therapy, psycho education, and skills training. Promoted understanding of their experiences of psychosis and how it impacts them in important areas of their life and in recovery goals. Reflected on client's strengths and resiliency factors and facilitated discussion on how these can assist in symptom management, recovery, and well-being. Explored symptoms and coping from a stress-vulnerability lens.   Current stressors include: symptoms, social anxiety. In regards to medications, client reports medication adherence to the prescribed antipsychotic. Denied suicidal intent or plan today - Navi was future oriented, denied access to means and was able to state his safety plan of talking with mom or " "going to the [facility] unit.  Has not been drinking  Reviewed common thinking styles  Went to grocery store, wants to try going earlier in the day. Was open to in person meetings starting in the new year.       13. Plan/Referrals:     Continue [facility] services, weekly IRT, scheduled next week wed at 3    Billing for \"Interactive Complexity\"?    No      "

## 2024-11-21 ENCOUNTER — VIRTUAL VISIT (OUTPATIENT)
Dept: PSYCHIATRY | Facility: CLINIC | Age: 29
End: 2024-11-21
Payer: COMMERCIAL

## 2024-11-21 DIAGNOSIS — F29 PSYCHOSIS, UNSPECIFIED PSYCHOSIS TYPE (H): Primary | ICD-10-CM

## 2024-11-26 ENCOUNTER — VIRTUAL VISIT (OUTPATIENT)
Dept: PSYCHIATRY | Facility: CLINIC | Age: 29
End: 2024-11-26
Payer: COMMERCIAL

## 2024-11-26 DIAGNOSIS — F29 PSYCHOSIS, UNSPECIFIED PSYCHOSIS TYPE (H): Primary | ICD-10-CM

## 2024-11-26 DIAGNOSIS — F25.1 SCHIZOAFFECTIVE DISORDER, DEPRESSIVE TYPE (H): Primary | ICD-10-CM

## 2024-11-26 NOTE — PROGRESS NOTES
NAVIGATE Clinician Contact & Progress Note   For Family Education Program    NAVIGATE Enrollee: Navi Morales (1995)     MRN: [id number]  Date:  11/21/24  Diagnosis(es):   Psychosis unspecified  Clinician: LILIANA Family Clinician, Laura Maldonado MA, SELWYN     1. Type of contact: (majority of time spent)  Family Session via telehealth  Mode of communication:    Linwood Verduzco consented verbally to this mode of therapy today.  Reason for telehealth: To reduce barriers in accessing services, due to but not limited to transportation, location, or scheduling reasons.     2. People present:   Writer  Client: Yes  Significant Other/Family/Friend:  Mother    3. Length of Actual Contact: Start Time: 2:05pm to 2:55pm .      4. Location of contact:  Originating Location (patient location):[place]   Distant Location (provider location): Home office, located in [place], [place], using appropriate privacy considerations and procedures    5. Did the client complete the home practice option(s) from the previous session: Partially Completed    6. Motivational Teaching Strategies:  Connect info and skills with personal goals  Promote hope and positive expectations    7. Educational Teaching Strategies:  Review of written material/education  Relate information to client's experience  Ask questions to check comprehension  Break down information into small chunks    8. CBT Teaching Strategies:  Reinforcement and shaping (positive feedback for steps towards goals and gains in knowledge & skills)    9. Psychoeducational Topic(s) Addressed:  Just the Facts - Coping with Stress    10. Techniques utilized:   [place] announced at beginning of session  Review of previous meeting  Present new material  Family Therapy  Motivational Interviewing (Connect info and skills with personal goals, Promote hope and positive expectations, Explore pros and cons of change, Re-frame experiences in a positive light)  Educational  "Teaching Strategies (Review written material/education on: Psychosis, Medications for psychosis, Coping with stress, Strategies to build resiliency, Relapse prevention planning, Developing a collaboration with mental health professionals, Effective communication, A relative s guide to supporting recovery from psychosis, Basic facts about alcohol and drugs)  CBT (Reinforcement and shaping, Social skills training, Relapse prevention planning, Coping skills training, Relaxation training, Cognitive restructuring, Behavioral tailoring)    11. Assessment/Progress Note:     Writer met with Navi and Navi's mom on this day via [employer].  Set agenda to check in with each of them and to continue education on coping with stress. Family were agreeable to this agenda.     Navi reports that he has had no major changes over the last week. Mom and Navi report no urgent concerns on this day.     Mom and Navi both had additional stressors related to dad's surgery and this has added to both mom's and Navi's responsibilities around the house.     In addition, Navi reports an upsetting experience going to [facility] recently where he saw a former teaching. Navi states that the teaching did not recognize him, but he reports imagining if he did and what he would say. He reported having the thoughts, \"I can't even leave my house\" and \"This is why I can't leave.\" Explore what happened at [employer] and Navi expressed strong feelings of hopelessness and shame due to this experience. Restated what Navi reported happened and reflected his feelings and his thoughts and that his thoughts and feelings were about a situation that \"could\" have happened. Validated feelings about his current situation of not working and the shame he has about this. Inquired about what would be helpful from mom and writer when he is in the midst of these feelings. Navi stated that he would like to be reminded of his progress. Navi's mom and writer provided examples of Navi's " progress and that his experience in the grocery store does not take this progress away even though the feelings are overwhelming.     Discussed plan to try to notice these thoughts and feelings over the week and for mom to remind Jack as needed of his progress.     Overall family and Jack seemed engaged in conversation. They did express interest in continuing to meet for family therapy and psychoeducation. As of today's appt their insight into Jack's mental illness appears fair. They seem they would benefit from continued clinical intervention aimed at assisting them implement helpful strategies at home and increase their understanding of psychosis.    12. Plan/Referrals:     Will meet with family weekly as schedule allows for evidence based family psychoeducation and therapeutic support aimed at maximizing Jack's opportunity for recovery from psychosis.     Laura Maldonado MA, LP   NAVIGATE   The author of this note documented a reason for not sharing it with the patient.

## 2024-11-26 NOTE — PROGRESS NOTES
"NAVIGATE/ESMD Virtual Visit Progress Note  For Supported Employment & Education    NAVIGATE Enrollee: Navi Morales (1995)     MRN: [id number]  Date of Visit: 11/26/24  Contacted: ALICE  Appointment Length: 30    Discussed:   Writer met with Navi Morales for virtual visit check-in. Reason for telehealth: To reduce barriers in accessing services, due to but not limited to transportation, location, or scheduling reasons. Meeting agenda included navi let me know he is doing good but having rouble with his sleep so we went over strategies to help him with his sleep schedule. He is currently going to bed at 4am ad getting up later the next day because he doesn't have much to do and no reason to eeake up early. He recently got  a new puppy so we talked about ways to go to bed earlier and ealking up to walk the puppy while its still light out compared to night when its dark.He did mention that he hears 2 female voices occasionally ion the bathroom but will \"reality test\" these voices by telling himself that the voices aren't real because he isnt in thei house. He believe these voices are his neighbors.     Follow-up: Email cris manley about [employer] and him graduating hs 10 years ao in the spring    Delmi Davis  NAVIGATE/ESMD  Supported Employment & Education  Supported Employment & EducationThis is a non-billable encounter as it was solely for the purposes of outreach and/or care coordination. SEE services are non billable services  "

## 2024-11-27 NOTE — PROGRESS NOTES
NAVIGGALINDO Clinician Contact & Progress Note  For Individual Resiliency Training (IRT)  A Part of the [facility] First Episode of Psychosis Program    NAVIGATE Enrollee: Navi Morales (1995)     MRN: [id number]  Date:  11/26/24  Diagnosis: Psychosis, unspecified psychosis type (H) [F29]   Clinician: LILIANA Individual Resiliency Trainer, MERCEDES Mchugh     1. Type of contact: (majority of time spent)  IRT Session via telehealth  Mode of communication: [employer] (HIPAA compliant, secure platform). Patient consented verbally to this mode of therapy today.  Reason for telehealth: To reduce barriers in accessing services, due to but not limited to transportation, location, or scheduling reasons.    2. People present:   Writer  Client: Yes - Navi    3. Length of Actual Contact: Start Time: 3:00p; End Time: 4:05p     4. Location of contact:  Originating Location (patient location): [facility], located in [place], [place]  Distant Location (provider location): Remote location    5. Did the client complete the home practice option(s) from the previous session: completed - go to grocery store, notice common thinking styles    6. Motivational Teaching Strategies:  Connect info and skills with personal goals  Promote hope and positive expectations  Explore pros and cons of change  Re-frame experiences in positive light    7. Educational Teaching Strategies:  Review of written material/education  Relate information to client's experience  Ask questions to check comprehension  Break down information into small chunks  Adopt client's language     8. CBT Teaching Strategies:  Reinforcement and shaping (positive feedback for steps towards goals and gains in knowledge & skills)  Cognitive restructuring (identify thoughts related to negative feelings)    9. IRT Module(s) Addressed:  Module 9 - Dealing with Negative Symptoms  CBT Triangle    10. Techniques utilized:   [place] announced at beginning of session  Review of  "goal  Review of previous meeting  Present new material  Help client choose a home practice option  Summarize progress made in current session    11. Measures:  None - denied SI    Mental Status Exam  Alertness: alert   Behavior/Demeanor: cooperative, pleasant, and calm, new haircut  Speech: regular rate and rhythm  Language: intact.   Mood: depressed and anxious  Thought Process/Associations: unremarkable  Thought Content:  Reports suicidal ideation;  Denies suicidal ideation with plan; with intent [details in Interim History]  Perception:  Reports auditory hallucinations;  Denies visual hallucinations  Insight: good  Judgment: adequate for safety  Cognition: does  appear grossly intact; formal cognitive testing was not done      12. Assessment/Progress Note:     Writer and client met for IRT visit via [email or web address]. Set agenda to check in, reported current symptoms to include: AH, depression and negative symptoms (flat affect, anhedonia), \"social anxiety\". Writer used relational and interpersonal approach to explore feelings, motivations, and behavior. Writer offered support, feedback, validation, and reinforced use of skills taught in IRT from modalities including cognitive behavioral therapy, psycho education, and skills training. Promoted understanding of their experiences of psychosis and how it impacts them in important areas of their life and in recovery goals. Reflected on client's strengths and resiliency factors and facilitated discussion on how these can assist in symptom management, recovery, and well-being. Explored symptoms and coping from a stress-vulnerability lens. Reported suicidal thoughts but no plan or intent. Reviewed safety plan.    Navi reports \"no big changes\" this week. He continues to visit the grocery store and processed a situation where he saw someone he knew at the grocery but did not speak with them. Navi was able to recognize his distorted thinking and said \"I think I'm making a bigger deal " "that it needs to be\". We reviewed the cognitive triangle and Navi recognized he often feels anxiety or dread then thinks of why he might feel that way. We also processed a situation that happened in family session where Navi noted, \"my window of tolerance was really small\". Today we also prepped for upcoming holiday and plan for a situation where his anxiety might spike. Will work on the Behavior portion of the CBT triangle with practicing skills such as deep breathing, 4-7-8, physiological sigh, grounding, etc.      13. Plan/Referrals:     Continue Navigate services, weekly IRT, scheduled next week wed at 3    Billing for \"Interactive Complex[employer]\"?    No      "

## 2024-11-27 NOTE — PROGRESS NOTES
NAVIGATE Clinician Contact & Progress Note   For Family Education Program    NAVIGATE Enrollee: Navi Morales (1995)     MRN: [id number]  Date:  11/26/24  Diagnosis(es):   Psychosis unspecified  Clinician: LILIANA Family Clinician, Laura Maldonado MA, SELWYN     1. Type of contact: (majority of time spent)  Family Session via telehealth  Mode of communication:    Linwood Verduzco consented verbally to this mode of therapy today.  Reason for telehealth: To reduce barriers in accessing services, due to but not limited to transportation, location, or scheduling reasons.     2. People present:   Writer  Client: Yes  Significant Other/Family/Friend:  Mother    3. Length of Actual Contact: Start Time: 4:05pm to 5pm .      4. Location of contact:  Originating Location (patient location):[place]   Distant Location (provider location): Home office, located in [place], [place], using appropriate privacy considerations and procedures    5. Did the client complete the home practice option(s) from the previous session: Partially Completed    6. Motivational Teaching Strategies:  Connect info and skills with personal goals  Promote hope and positive expectations    7. Educational Teaching Strategies:  Review of written material/education  Relate information to client's experience  Ask questions to check comprehension  Break down information into small chunks    8. CBT Teaching Strategies:  Reinforcement and shaping (positive feedback for steps towards goals and gains in knowledge & skills)    9. Psychoeducational Topic(s) Addressed:  Just the Facts - Coping with Stress    10. Techniques utilized:   [place] announced at beginning of session  Review of previous meeting  Present new material  Family Therapy  Motivational Interviewing (Connect info and skills with personal goals, Promote hope and positive expectations, Explore pros and cons of change, Re-frame experiences in a positive light)  Educational  Teaching Strategies (Review written material/education on: Psychosis, Medications for psychosis, Coping with stress, Strategies to build resiliency, Relapse prevention planning, Developing a collaboration with mental health professionals, Effective communication, A relative s guide to supporting recovery from psychosis, Basic facts about alcohol and drugs)  CBT (Reinforcement and shaping, Social skills training, Relapse prevention planning, Coping skills training, Relaxation training, Cognitive restructuring, Behavioral tailoring)    11. Assessment/Progress Note:     Writer met with Navi and Naiv's mom on this day via [employer].  Set agenda to check in with each of them and to continue education on coping with stress. Family were agreeable to this agenda.     Navi reports that he went to [facility] with mom yesterday. He states that he thought it went well. Writer provide encouragement to Navi in this accomplishment given the stress associated with his [facility] visit last week.     Navi and mom report a plan to make a pumpkin pie on this day. Navi and mom also report a plan to make a blueberry apple crumble. Navi appears happy about this plan and mom also notes that they look forward to this.     Navi reports that symptoms continue to be greatly improved. Navi voices a goal to go to the store by himself in the next week and also a plan to walk their dog at 10 am most mornings as a way of starting a routine. Mom is supportive of this and writer also reflects Navi's motivation to make changes.     Review skill for coping with stress last week including mindfulness of emotion. Navi reports progress he has made in therapy with recognizing emotions, thoughts, and actions. Navi describes his experiences to his mom and this writer. Provide support and encouragement at Navi's insight. Also validate and normalize that these are difficult things to do.     Explore any concerns or stress for the holiday. Navi reports some anxiety talking  "with brother because he \"doesn't know what to say.\" Mom and writer validate the challenge of conversations. Writer offers the idea of listening and asking questions. Jack is open to trying this. Also, encourage taking breaks and timeouts as needed over the holiday. Both mom and Jack are supportive of plan to take breaks as needed.     Overall family and Jack seemed engaged in conversation. They did express interest in continuing to meet for family therapy and psychoeducation. As of today's appt their insight into Jack's mental illness appears fair. They seem they would benefit from continued clinical intervention aimed at assisting them implement helpful strategies at home and increase their understanding of psychosis.    12. Plan/Referrals:     Will meet with family weekly as schedule allows for evidence based family psychoeducation and therapeutic support aimed at maximizing Jack's opportunity for recovery from psychosis.     Laura Maldonado MA, LP   NAVIGATE   The author of this note documented a reason for not sharing it with the patient.    "

## 2024-12-03 ENCOUNTER — VIRTUAL VISIT (OUTPATIENT)
Dept: PSYCHIATRY | Facility: CLINIC | Age: 29
End: 2024-12-03
Payer: COMMERCIAL

## 2024-12-03 DIAGNOSIS — F25.1 SCHIZOAFFECTIVE DISORDER, DEPRESSIVE TYPE (H): Primary | ICD-10-CM

## 2024-12-03 NOTE — PROGRESS NOTES
NAVIGATE Clinician Contact & Progress Note   For Family Education Program    NAVIGATE Enrollee: Navi Morales (1995)     MRN: [id number]  Date:  12/03/24  Diagnosis(es):   Psychosis unspecified  Clinician: LILIANA Family Clinician, Laura Maldonado MA, SELWYN     1. Type of contact: (majority of time spent)  Family Session via telehealth  Mode of communication:    Linwood Verduzco consented verbally to this mode of therapy today.  Reason for telehealth: To reduce barriers in accessing services, due to but not limited to transportation, location, or scheduling reasons.     2. People present:   Writer  Client: Yes  Significant Other/Family/Friend:  Mother    3. Length of Actual Contact: Start Time: 4:05pm to 5pm .      4. Location of contact:  Originating Location (patient location):[place]   Distant Location (provider location): Home office, located in [place], [place], using appropriate privacy considerations and procedures    5. Did the client complete the home practice option(s) from the previous session: Partially Completed    6. Motivational Teaching Strategies:  Connect info and skills with personal goals  Promote hope and positive expectations    7. Educational Teaching Strategies:  Review of written material/education  Relate information to client's experience  Ask questions to check comprehension  Break down information into small chunks    8. CBT Teaching Strategies:  Reinforcement and shaping (positive feedback for steps towards goals and gains in knowledge & skills)    9. Psychoeducational Topic(s) Addressed:  Just the Facts - Coping with Stress    10. Techniques utilized:   [place] announced at beginning of session  Review of previous meeting  Present new material  Family Therapy  Motivational Interviewing (Connect info and skills with personal goals, Promote hope and positive expectations, Explore pros and cons of change, Re-frame experiences in a positive light)  Educational  "Teaching Strategies (Review written material/education on: Psychosis, Medications for psychosis, Coping with stress, Strategies to build resiliency, Relapse prevention planning, Developing a collaboration with mental health professionals, Effective communication, A relative s guide to supporting recovery from psychosis, Basic facts about alcohol and drugs)  CBT (Reinforcement and shaping, Social skills training, Relapse prevention planning, Coping skills training, Relaxation training, Cognitive restructuring, Behavioral tailoring)    11. Assessment/Progress Note:     Writer met with Navi and Nvai's mom on this day via [employer].  Set agenda to check in with each of them and to continue education on coping with stress. Family were agreeable to this agenda.     Navi reports that he has been enjoying baking recently and that he went to [employer] with mom yesterday. Navi appears to be slightly more irritable on this day and reports that he had 1-2 experiences of higher anxiety over the week. He also states, \"it's not that big of a deal.\"    Navi reported that he has noticed three factors impacting his anxiety when going to a store and they include: if he is familiar with the store or place, if it is not crowded, and if it is not busy at check out. Writer reflects Navi's observations as important information in understanding his anxiety.     Inquire to mom about her observations over the last week. Mom reports that she noticed Navi being more conversational with family. Navi downplays this engagement and appears irritable with mom. Writer attempts to highlight mom's intent and reflects how Navi appears frustrated by an acknowledgement of an accomplishment or a change. Navi says that he is unsure if writer and mom \"really mean\" what they are saying. Writer reflects that this is helpful information and offers to restate the progress Navi has made in recent weeks including going to the grocery store, talking to brother, talking to " sister in law; reflect on how he had described these as challenges, but continued to do these tasks anyway. Mom also affirms her intent in providing positive feedback as being authentic.     Reviewed additional skills for coping with stress including planning for stressful events.     Overall family and Navi seemed engaged in conversation. They did express interest in continuing to meet for family therapy and psychoeducation. As of today's appt their insight into Navi's mental illness appears fair. They seem they would benefit from continued clinical intervention aimed at assisting them implement helpful strategies at home and increase their understanding of psychosis.    12. Plan/Referrals:     Will meet with family weekly as schedule allows for evidence based family psychoeducation and therapeutic support aimed at maximizing Navi's opportunity for recovery from psychosis.     Laura Maldonado MA, LP   NAVIGATE   The author of this note documented a reason for not sharing it with the patient.

## 2024-12-04 ENCOUNTER — VIRTUAL VISIT (OUTPATIENT)
Dept: PSYCHIATRY | Facility: CLINIC | Age: 29
End: 2024-12-04
Payer: COMMERCIAL

## 2024-12-04 DIAGNOSIS — F25.1 SCHIZOAFFECTIVE DISORDER, DEPRESSIVE TYPE (H): Primary | ICD-10-CM

## 2024-12-04 NOTE — PROGRESS NOTES
NAVIGGALINDO Clinician Contact & Progress Note  For Individual Resiliency Training (IRT)  A Part of the [facility] First Episode of Psychosis Program    NAVIGATE Enrollee: Navi Morales (1995)     MRN: [id number]  Date:  12/04/24  Diagnosis: Psychosis, unspecified psychosis type (H) [F29]   Clinician: LILIANA Individual Resiliency Trainer, MERCEDES Mchugh     1. Type of contact: (majority of time spent)  IRT Session via telehealth  Mode of communication: [employer] (HIPAA compliant, secure platform). Patient consented verbally to this mode of therapy today.  Reason for telehealth: To reduce barriers in accessing services, due to but not limited to transportation, location, or scheduling reasons.    2. People present:   Writer  Client: Yes - Navi    3. Length of Actual Contact: Start Time: 3:05p; End Time: 4:00p     4. Location of contact:  Originating Location (patient location): [facility], located in [place], [place]  Distant Location (provider location): Remote location    5. Did the client complete the home practice option(s) from the previous session: completed - go to grocery store, notice common thinking styles    6. Motivational Teaching Strategies:  Connect info and skills with personal goals  Promote hope and positive expectations  Explore pros and cons of change  Re-frame experiences in positive light    7. Educational Teaching Strategies:  Review of written material/education  Relate information to client's experience  Ask questions to check comprehension  Break down information into small chunks  Adopt client's language     8. CBT Teaching Strategies:  Reinforcement and shaping (positive feedback for steps towards goals and gains in knowledge & skills)  Cognitive restructuring (identify thoughts related to negative feelings)    9. IRT Module(s) Addressed:  Goal setting  CBT [place]    10. Techniques utilized:   [place] announced at beginning of session  Review of goal  Review of previous  "meeting  Present new material  Help client choose a home practice option  Summarize progress made in current session    11. Measures:  None - denied SI    Mental Status Exam  Alertness: alert   Behavior/Demeanor: cooperative, pleasant, and calm, new haircut  Speech: regular rate and rhythm  Language: intact.   Mood: depressed and anxious  Thought Process/Associations: unremarkable  Thought Content:  Reports suicidal ideation;  Denies suicidal ideation with plan; with intent [details in Interim History]  Perception:  Reports auditory hallucinations;  Denies visual hallucinations  Insight: good  Judgment: adequate for safety  Cognition: does  appear grossly intact; formal cognitive testing was not done      12. Assessment/Progress Note:     Writer and client met for IRT visit via [facility]. Set agenda to check in, reported current symptoms to include: AH, depression and negative symptoms (flat affect, anhedonia), \"social anxiety\". Writer used relational and interpersonal approach to explore feelings, motivations, and behavior. Writer offered support, feedback, validation, and reinforced use of skills taught in IRT from modalities including cognitive behavioral therapy, psycho education, and skills training. Promoted understanding of their experiences of psychosis and how it impacts them in important areas of their life and in recovery goals. Reflected on client's strengths and resiliency factors and facilitated discussion on how these can assist in symptom management, recovery, and well-being. Explored symptoms and coping from a stress-vulnerability lens. Reported suicidal thoughts but no plan or intent. Reviewed safety plan.    Navi reports \"no big changes\" this week. He say his mood has been stable and his family's Thanksgiving was \"low atkins\". Navi shared that he helped prepare many food items including 2 new things he has never cooked. Navi said this felt \"good\" but \"always worries it wont be good\". Reflected on this " "past experience and how now    Water  Shoes  Shorts  Towel  Shirt  Plug in treadmill   An hour after I wake up is best time to walk and time shower        13. Plan/Referrals:     Continue [facility] services, weekly IRT, scheduled next week wed at 3    Billing for \"Interactive Complexity\"?    No      "

## 2024-12-10 ENCOUNTER — VIRTUAL VISIT (OUTPATIENT)
Dept: PSYCHIATRY | Facility: CLINIC | Age: 29
End: 2024-12-10
Payer: COMMERCIAL

## 2024-12-10 DIAGNOSIS — F29 PSYCHOSIS, UNSPECIFIED PSYCHOSIS TYPE (H): Primary | ICD-10-CM

## 2024-12-10 NOTE — PROGRESS NOTES
NAVIGATE Clinician Contact & Progress Note   For Family Education Program    NAVIGATE Enrollee: Navi Morales (1995)     MRN: [id number]  Date:  12/10/24  Diagnosis(es):   Psychosis unspecified  Clinician: LILIANA Family Clinician, Laura Maldonado MA, SELWYN     1. Type of contact: (majority of time spent)  Family Session via telehealth  Mode of communication:    Linwood Verduzco consented verbally to this mode of therapy today.  Reason for telehealth: To reduce barriers in accessing services, due to but not limited to transportation, location, or scheduling reasons.     2. People present:   Writer  Client: Yes  Significant Other/Family/Friend:  Mother    3. Length of Actual Contact: Start Time: 4:05pm to 5pm .      4. Location of contact:  Originating Location (patient location):[place]   Distant Location (provider location): Home office, located in [place], [place], using appropriate privacy considerations and procedures    5. Did the client complete the home practice option(s) from the previous session: Partially Completed    6. Motivational Teaching Strategies:  Connect info and skills with personal goals  Promote hope and positive expectations    7. Educational Teaching Strategies:  Review of written material/education  Relate information to client's experience  Ask questions to check comprehension  Break down information into small chunks    8. CBT Teaching Strategies:  Reinforcement and shaping (positive feedback for steps towards goals and gains in knowledge & skills)    9. Psychoeducational Topic(s) Addressed:  Just the Facts - Coping with Stress    10. Techniques utilized:   [place] announced at beginning of session  Review of previous meeting  Present new material  Family Therapy  Motivational Interviewing (Connect info and skills with personal goals, Promote hope and positive expectations, Explore pros and cons of change, Re-frame experiences in a positive light)  Educational  "Teaching Strategies (Review written material/education on: Psychosis, Medications for psychosis, Coping with stress, Strategies to build resiliency, Relapse prevention planning, Developing a collaboration with mental health professionals, Effective communication, A relative s guide to supporting recovery from psychosis, Basic facts about alcohol and drugs)  CBT (Reinforcement and shaping, Social skills training, Relapse prevention planning, Coping skills training, Relaxation training, Cognitive restructuring, Behavioral tailoring)    11. Assessment/Progress Note:     Writer met with Navi and Navi's mom on this day via [employer].  Set agenda to check in with each of them and to continue education on coping with stress. Family were agreeable to this agenda.     Navi reports that he feels \"pretty much the same.\" Mom reports that she and Navi went for a walk this week, but did not go to the grocery store. Neither mom nor Navi report urgent concerns to be addressed on this day.     Continued with coping with stress module. Review physical signs of stress for both mom and Navi. Navi reports noticing racing heart, dropping gut, feeling a sense of urgency. Mom reports noticing rapid heart, racing thoughts, and a queasy stomach.     Introduced coping with stress strategies including being aware of stressful situations and preparing and engaging in meaningful activities. Discussed the holidays and how both Navi and mom prepared by talking about taking breaks. Discussed meaningful activities and Navi was able to identify that he exercises and watches [employer] videos. Mom reports playing pickleball.     Provided supportive listening and reflect both Navi and mom's flexibility in managing psychosis as well as signs of recovery.     Overall family and Navi seemed engaged in conversation. They did express interest in continuing to meet for family therapy and psychoeducation. As of today's appt their insight into Navi's mental illness " appears fair. They seem they would benefit from continued clinical intervention aimed at assisting them implement helpful strategies at home and increase their understanding of psychosis.    12. Plan/Referrals:     Will meet with family weekly as schedule allows for evidence based family psychoeducation and therapeutic support aimed at maximizing Navi's opportunity for recovery from psychosis.     Laura Maldonado MA, LP   NAVIGATE   The author of this note documented a reason for not sharing it with the patient.

## 2024-12-10 NOTE — PROGRESS NOTES
NAVIGATE/ESMD Virtual Visit Progress Note  For Supported Employment & Education    NAVIGATE Enrollee: Navi Morales (1995)     MRN: [id number]  Date of Visit: 12/10/24  Contacted: ALICE  Appointment Length: 30    Discussed:   Writer met with Navi Morales for virtual visit check-in. Reason for telehealth: To reduce barriers in accessing services, due to but not limited to transportation, location, or scheduling reasons. Meeting agenda included talked about his resume, he would like to get his resume updated. I suggested using chat GPT to help guide him in his bullet points and to sent it to me afterwards. We talked through how this could be beneficial for him    Follow-up:     Delmi Davis  NAVIGATE/ESMD  Supported Employment & Education  Supported Employment & EducationThis is a non-billable encounter as it was solely for the purposes of outreach and/or care coordination. SEE services are non billable services

## 2024-12-11 ENCOUNTER — VIRTUAL VISIT (OUTPATIENT)
Dept: PSYCHIATRY | Facility: CLINIC | Age: 29
End: 2024-12-11
Payer: COMMERCIAL

## 2024-12-11 DIAGNOSIS — F20.9 SCHIZOPHRENIA, UNSPECIFIED TYPE (H): Primary | ICD-10-CM

## 2024-12-11 DIAGNOSIS — F41.1 GAD (GENERALIZED ANXIETY DISORDER): ICD-10-CM

## 2024-12-11 DIAGNOSIS — F10.90 ALCOHOL USE DISORDER: ICD-10-CM

## 2024-12-11 RX ORDER — OLANZAPINE 10 MG/1
TABLET ORAL
Qty: 45 TABLET | Refills: 2 | Status: SHIPPED | OUTPATIENT
Start: 2024-12-11

## 2024-12-11 NOTE — PROGRESS NOTES
"Virtual Visit Details    Type of service:  Video Visit   Video Start Time:  1500  Video End Time: 1530  Originating Location (pt. Location): Home    Distant Location (provider location):  On-site  Platform used for Video Visit: [employer] Medication Management Progress Note  A Part of the [facility] First Episode of Psychosis Program    NAVIGATE Enrollee: Navi Morales (1995)     MRN: [id number]  Date:  12/11/24         Contributors to the Assessment     Chart Reviewed.   Interview completed with Navi Morales.  Collateral information obtained from none.         Interim History      Navi Morales is a 29 year old male who was last seen in [facility] a month ago at which time no med changes were made. The patient reports good treatment adherence. History was provided by Navi who was a good historian.  Since the last visit:  -Navi reports he has been alright, the same as usual  -Still training his dog, meeting with a  who is helping with behavior like not jumping on people.   -States anxiety has been \"okay\" and has \"heightened anxiety when out\". States its been okay going at 8:30-9pm with his mom and now wants to try going. Describes the anxiety as coming on suddenly, faster than he can think, as a \"gut feeling\" and \"hurried heart rate\". States it's up and down throughout the grocery store visit, and feels settled once he's back in his core. Notes his mom being there is nice, admits feeling paranoia about people around hearing them talk.  - Notes not drinking for the past 2+ months, that the cravings are \"fine\" and \"wasn't hard to stop\".  - Describes sleep as \"good\" once he's asleep, he stays asleep, notes the quetiapine makes him sedated. Notes AH as residual effects of psychosis. Gives an example of the bath fan in the bathroom and his brain makes him think theres people. Notes he is in the best place he has been in, and this is a big improvement. Notes he is able to ground himself by saying \"that's " "not real\".  - Notes he is watching his weight, that he has been hungrier, and has gained 15-20lbs. He has been doing 20-30minutes of inclined walking and has the goal of doing 1 hour.    PSYCH ROS:  See HPI     RECENT SOCIAL HISTORY:  SEE HPI    Medical ROS:  none         First Episode of Psychosis History      DUP (duration untreated psychosis):  4 years  Route to initial care: outpatient  Medication adherence overall:  See above, Clinician Rating Form in COMPASS Item 13  General frequency of visits:  weekly IRT, monthly med management  Participation in groups:  No  Cognitive Remediation:  No  Other treatment history:     Reviewed for completion of First Episode work-up:  Yes  First episode workup:  Not Done (if completed, see LABS for results)  MATRICS Consensus Cognitive Battery:  Not Done (if completed, see LABS for results)       Medical/Surgical History     Patient has no known allergies.    Patient Active Problem List   Diagnosis    Alcohol use disorder, mild, abuse    MELY (generalized anxiety disorder)    Induration penis plastica    Moderate episode of recurrent major depressive disorder (H)    Open displaced fracture of neck of second metacarpal bone of right hand    Psychosis (H)    Social anxiety disorder    TMJ (temporomandibular joint syndrome)    Traumatic compression fracture of T3 vertebra (H)            Medications     Current Outpatient Medications   Medication Sig Dispense Refill    cholecalciferol, vitamin D3, 1,000 unit (25 mcg) tablet [CHOLECALCIFEROL, VITAMIN D3, 1,000 UNIT (25 MCG) TABLET] Take 2,000 Units by mouth daily.      dutasteride (AVODART) 0.5 MG capsule Take 0.5 mg by mouth daily      ketoconazole (NIZORAL) 2 % external shampoo Apply topically daily as needed LATHER INTO SCALP AND RINSE AFTER 2-3 MINUTES. USE THREE TIMES A WEEK.      magnesium chloride (SLOW-MAG) 64 mg TbEC delayed-release tablet [MAGNESIUM CHLORIDE (SLOW-MAG) 64 MG TBEC DELAYED-RELEASE TABLET] Take 64 mg by mouth " daily.      naltrexone (DEPADE/REVIA) 50 MG tablet Take 1 tablet (50 mg) by mouth daily 30 tablet 2    OLANZapine (ZYPREXA) 15 MG tablet Take 2 tablets (30 mg) by mouth at bedtime 180 tablet 2    QUEtiapine (SEROQUEL) 100 MG tablet Take 1 tablet (100 mg) by mouth at bedtime. May also take 0.5-1 tablets ( mg) 2 times daily as needed (anxiety). 60 tablet 3    tadalafil (CIALIS) 10 MG tablet Take 10 mg by mouth daily      zinc gluconate 50 mg tablet [ZINC GLUCONATE 50 MG TABLET] Take 100 mg by mouth daily.            Vitals     There were no vitals taken for this visit.      Weight prior to medication: unknown         Mental Status Exam     Alertness: alert  and oriented  Appearance: well groomed  Behavior/Demeanor: cooperative, pleasant, and calm, with good  eye contact   Speech: regular rate and rhythm, not pressured  Language: no obvious problem  Psychomotor: normal or unremarkable  Mood: depressed  Affect: blunted; was congruent to mood; was congruent to content  Thought Process/Associations:  overinclusive at times  Thought Content:  Reports none;  Denies suicidal and violent ideation and delusions  Perception:  Reports auditory hallucinations;  Denies visual hallucinations  Insight: good  Judgment: fair and adequate for safety  Cognition: does  appear grossly intact; formal cognitive testing was not done         Labs and Data     RATING SCALES:  AIMS: next in person visit    PHQ9 TODAY =       10/22/2024     2:53 PM 10/29/2024     2:58 PM 11/6/2024     1:51 PM   PHQ-9 SCORE   PHQ-9 Total Score MyChart 20 (Severe depression) 20 (Severe depression) 20 (Severe depression)   PHQ-9 Total Score 20    20 20  20        Patient-reported    Multiple values from one day are sorted in reverse-chronological order     ANTIPSYCHOTIC LABS ROUTINE    [glu, A1C, lipids (focus LDL), liver enzymes, WBC, ANEU, Hgb, plts]   q12 mo  Recent Labs   Lab Test 06/30/23  1048   GLC 84     No lab results found.  Recent Labs   Lab Test  "06/30/23  1048   AST 39   ALT 40   ALKPHOS 88     Recent Labs   Lab Test 06/30/23  1048   WBC 7.7   HGB 14.9             Psychiatric Diagnoses     Psychosis, schizophrenia vs schizoaffective disorder  AUD, moderate, in early remission  Cannabis use disorder with active use  Generalized anxiety disorder  Agoraphobia         Assessment   Navi HO [place] is a 28 year old male with first onset of psychiatric symptoms at age 7 (\"social anxiety\"), and psychotic symptoms at age 23. The above duration of untreated psychosis was approximately 4 years.  Prodromal symptoms seem to have been present since at least college (notable substance use) though patient does note a substantially longer history of depression and physical health concerns. Presenting symptoms appear to include hallucinations, delusional thoughts. Navi attributes symptoms to physical health issues and history of trauma.  Substance use does seem to be a present concern. There are possible medical comorbidities which impact this treatment [suicide attempt, suicidal ideation, SIB, psychosis, aggression, and substance use: alcohol].     Today,  Navi has abstained from alcohol consumption for about a two+ months, which is a significant improvement. He has been doing incline treadmill exercise to mitigate weight gain. Navi's anxiety symptoms have exacerbated recently, but he attributes this to doing more activities such as going to the grocery store with his mom which is uncomfortable. AH are significantly reduced, predominantly nocturnal, occurring pre-sleep, but he is uncertain whether these are true auditory hallucinations or external noises. Discussed moving olanzapine 5mg qAM + 25mg at bedtime to further target psychosis.    Future considerations: starting selective serotonin reuptake inhibitor for social anxiety and agoraphobia              SUICIDE RISK ASSESSMENT:  Risk factors for self-harm: previous suicide attempt, substance use/pending treatment, " and hallucinations.  Mitigating factors: describes a safety plan, h/o seeking help , future oriented, commitment to family, and stable housing.  The patient does not appear to be at imminent risk for self-harm, hospitalization is not recommended which the pt does  agree with. No hospitalization will be arranged. Based on degree of symptoms close psych follow-up was/were recommended which the pt does  agree to. Additional steps to minimize risk: med changes.      [place] PRESCRIPTION MONITORING PROGRAM [] was not checked today: not using controlled substances.    PSYCHOTROPIC DRUG INTERACTIONS:   Quetiapine/olanzapine: additive CNS depression, QTc prolongation.    MANAGEMENT:  use lowest therapeutic doses of both and routine monitoring         Plan     1) PSYCHOTROPIC MEDICATIONS:  - Start Olanzapine 5mg qAM + Olanzapine 25 mg PO at bedtime   - Naltrexone 50 mg PO daily    2) THERAPY:  Continue IRT with Brandie Moran    3) NEXT DUE:    Labs: FEP workup due  Rating Scales: AIMS due    4) REFERRALS:    none    5) RTC: 4 weeks    6) CRISIS NUMBERS:   Provided routinely in AVS.    TREATMENT RISK STATEMENT:  The risks, benefits, alternatives and potential adverse effects have been discussed and are understood by the pt. The pt understands the risks of using street drugs or alcohol. There are no medical contraindications, the pt agrees to treatment with the ability to do so. The pt knows to call the clinic for any problems or to access emergency care if needed.  Medical and substance use concerns are documented above.  Psychotropic drug interaction check was done, including changes made today.    PROVIDER:   Heidi Vega MD  [facility] Psych[facility]  , Department of Psychiatry and Behavioral Sciences  [facility] [employer]     The longitudinal plan of care for the diagnosis(es)/condition(s) as documented were addressed during this visit. Due to the added complexity in care, I will  continue to support Navi in the subsequent management and with ongoing continuity of care.

## 2024-12-11 NOTE — NURSING NOTE
Is the patient currently in the state of [place]? YES    Visit mode:VIDEO    If the visit is dropped, the patient can be reconnected by: VIDEO VISIT: Send to e-mail at: [email or web address]    Will anyone else be joining the visit? NO      How would you like to obtain your AVS? MyChart    Are changes needed to the allergy or medication list? NO    Reason for visit: Follow Up      NAVIGATE Patient Self-Rating Form    Since your last medication management visit--    Have you been feeling depressed, sad, or down? Yes  Have you been feeling anxious, worried or nervous? Yes  Have you been thinking about death or have you had any feelings that you would be better off dead? No   Have you been feeling particularly good? No  Have you been feeling annoyed, angry, or resentful (whether you showed it or not)? No  Did you do anything that could have gotten you in trouble? No  Have you felt dizzy or faint? No  Have you had blurred vision? No  Have you had dry mouth? Yes  Have you had too much saliva in your mouth or had drooling? No  Have you felt nauseous? No  Have you been constipated? No  Has you appetite for food been increased? Yes  Have you gained weight? Yes  Have you lost weight? No  Have you felt restless or like you cannot sit still? No  Any shaking of your hands, legs, or other muscles? No  Any problems walking or moving or any problems feeling stiff or rigid? No  Have you felt tired or fatigued? No  Have you felt drowsy during the day? Yes  Have you been sleeping too much at night? No  Have you been sleeping too little or had problems sleeping at night? No  Any decrease in your interest in sex? Yes  Any other problems with sex? No  Any problems with your breasts such as swelling or discharge? N/A  For women, any problems with your period? N/A  Are there other medical or side effect problems you wish to discuss with your prescriber? Yes, drossiness  Since your last visit, how many days have you not taken your medication?  0  Have you had trouble remembering to take your medication? No  Do you find the number of medicines or the times when you are supposed to take then confusing or burdensome? No  Are you afraid of the medication? No  Do you think that you have an illness that requires taking medication? Yes  Do you think that other people would think poorly of you if they knew that you take medication? No  On average, how many cigarettes do you smoke per day? 0  Since you last visit, did you drink alcohol? No  Since your last visit, have you used any marijuana? Yes  Since your last visit, have you used any stress drugs other than marijuana? No  Between now and your next visit, do you think we should keep your medication the same or consider changing the medications? Keep them the same.

## 2024-12-11 NOTE — PATIENT INSTRUCTIONS
Navi-It was nice to meet with you today. Here is what we discussed:    -Switch olanzapine to 5 mg in the morning and 25 mg at bedtime. That means take 1x15 mg pill + 1x10 mg pill at bedtime. Take 0.5 x 10 mg pill in the morning.     -Keep up the great work!    -See you in 1 month      Heidi Vega MD  [facility]  , Department of Psychiatry and Behavioral Sciences  [employer] [facility] [facility] [employer]         **For crisis resources, please see the information at the end of this document**   Patient Education    Thank you for coming to the [facility].     Lab Testing:  If you had lab testing today and your results are reassuring or normal they will be mailed to you or sent through [employer] within 7 days. If the lab tests need quick action we will call you with the results. The phone number we will call with results is # [phone]. If this is not the best number please call our clinic and change the number.     Medication Refills:  If you need any refills please call your pharmacy and they will contact us. Our fax number for refills is [phone].   Three business days of notice are needed for general medication refill requests.   Five business days of notice are needed for controlled substance refill requests.   If you need to change to a different pharmacy, please contact the new pharmacy directly. The new pharmacy will help you get your medications transferred.     Contact Us:  Please call [phone] during business hours (8-5:00 M-F).   If you have medication related questions after clinic hours, or on the weekend, please call [phone].     Financial Assistance [phone]   Medical Records [phone]       MENTAL HEALTH CRISIS RESOURCES:  For a emergency help, please call 911 or go to the nearest Emergency Department.     Emergency Walk-In Options:   EmPATH Unit @ [facility] ([place]): [phone] - Specialized mental health emergency area  designed to be calming  [facility] ([place]): [phone]  [facility] ([place]): [phone]  [facility] ([place]): [phone]    County Crisis Information:   [place]: [phone]  [place]: [phone]  [place] (JO[place]) - Adult: [phone]     Child: [phone]  [place] - Adult: [phone]     Child: [phone]  [place]: [phone]  List of all [facility] resources:   https://[place].gov/dhs/people-we-serve/adults/health-care/mental-health/resources/crisis-contacts.jsp    National Crisis Information:   Crisis Text Line: Text  [place]  to 484385  Suicide & Crisis Lifeline: 988  National Suicide Prevention Lifeline: 9-185-673-TALK (1-224.120.4395)       For online chat options, visit https://suicidepreventionlifeline.org/chat/  Poison Control Center: 1-854.142.7689  Trans Lifeline: 1-798.205.4220 - Hotline for transgender people of all ages  [employer]: 8-601-979-3395 - Hotline for LGBT youth     For Non-Emergency Support:   Fast Tracker: Mental Health & Substance Use Disorder Resources -   https://www.Clear Standards[employer]NetDocumentsn.org/

## 2024-12-17 ENCOUNTER — VIRTUAL VISIT (OUTPATIENT)
Dept: PSYCHIATRY | Facility: CLINIC | Age: 29
End: 2024-12-17
Payer: COMMERCIAL

## 2024-12-17 DIAGNOSIS — F20.9 SCHIZOPHRENIA, UNSPECIFIED TYPE (H): Primary | ICD-10-CM

## 2024-12-17 NOTE — PROGRESS NOTES
NAVIGATE Clinician Contact & Progress Note   For Family Education Program    NAVIGATE Enrollee: Navi Morales (1995)     MRN: [id number]  Date:  12/17/24  Diagnosis(es):   Psychosis unspecified  Clinician: LILIANA Family Clinician, Laura Maldonado MA, SELWYN     1. Type of contact: (majority of time spent)  Family Session via telehealth  Mode of communication:    Linwood Verduzco consented verbally to this mode of therapy today.  Reason for telehealth: To reduce barriers in accessing services, due to but not limited to transportation, location, or scheduling reasons.     2. People present:   Writer  Client: Yes  Significant Other/Family/Friend:  Mother    3. Length of Actual Contact: Start Time: 4:05pm to 5pm .      4. Location of contact:  Originating Location (patient location):[place]   Distant Location (provider location): Home office, located in [place], [place], using appropriate privacy considerations and procedures    5. Did the client complete the home practice option(s) from the previous session: Partially Completed    6. Motivational Teaching Strategies:  Connect info and skills with personal goals  Promote hope and positive expectations    7. Educational Teaching Strategies:  Review of written material/education  Relate information to client's experience  Ask questions to check comprehension  Break down information into small chunks    8. CBT Teaching Strategies:  Reinforcement and shaping (positive feedback for steps towards goals and gains in knowledge & skills)    9. Psychoeducational Topic(s) Addressed:  Just the Facts - Coping with Stress    10. Techniques utilized:   [place] announced at beginning of session  Review of previous meeting  Present new material  Family Therapy  Motivational Interviewing (Connect info and skills with personal goals, Promote hope and positive expectations, Explore pros and cons of change, Re-frame experiences in a positive light)  Educational  "Teaching Strategies (Review written material/education on: Psychosis, Medications for psychosis, Coping with stress, Strategies to build resiliency, Relapse prevention planning, Developing a collaboration with mental health professionals, Effective communication, A relative s guide to supporting recovery from psychosis, Basic facts about alcohol and drugs)  CBT (Reinforcement and shaping, Social skills training, Relapse prevention planning, Coping skills training, Relaxation training, Cognitive restructuring, Behavioral tailoring)    11. Assessment/Progress Note:     Writer met with Navi and Navi's mom on this day via garbs.  Set agenda to check in with each of them and to continue education on coping with stress. Family were agreeable to this agenda.     Navi reports, \"no real update\" and mom reports that's they had to cancel their \"tamale day\" this last weekend due to her son being ill. Mom reports that they have not gone out grocery shopping or for a walk in the last week.     Navi appears tired and reports that he has been having trouble sleeping. He reports going to bed at about 4 am and then getting up at about noon. Navi reports that this helps to decrease his stress so he eats less and experiences less anxiety. Navi notes his anxiety between midnight and 4 am is pretty low.     Continued with coping with stress module. Introduce coping with stress strategies including incorporating relaxation activities and having balance in life. Both mo and Navi plan to work to incorporate these strategies over the next week.    Provided supportive listening and reflect Navi's ongoing recovery from psychosis.     Overall family and Navi seemed engaged in conversation. They did express interest in continuing to meet for family therapy and psychoeducation. As of today's appt their insight into Navi's mental illness appears fair. They seem they would benefit from continued clinical intervention aimed at assisting them " implement helpful strategies at home and increase their understanding of psychosis.    12. Plan/Referrals:     Will meet with family weekly as schedule allows for evidence based family psychoeducation and therapeutic support aimed at maximizing Navi's opportunity for recovery from psychosis.     Laura Maldonado MA, LP   NAVIGATE   The author of this note documented a reason for not sharing it with the patient.

## 2024-12-18 NOTE — PROGRESS NOTES
NAVIGGALINDO Clinician Contact & Progress Note  For Individual Resiliency Training (IRT)  A Part of the [facility] First Episode of Psychosis Program    NAVIGATE Enrollee: Navi Morales (1995)     MRN: [id number]  Date:  12/17/24  Diagnosis: Psychosis, unspecified psychosis type (H) [F29]   Clinician: LILIANA Individual Resiliency Trainer, MERCEDES Mchugh     1. Type of contact: (majority of time spent)  IRT Session via telehealth  Mode of communication: [employer] (HIPAA compliant, secure platform). Patient consented verbally to this mode of therapy today.  Reason for telehealth: To reduce barriers in accessing services, due to but not limited to transportation, location, or scheduling reasons.    2. People present:   Writer  Client: Yes - Navi    3. Length of Actual Contact: Start Time: 2:05p; End Time: 3:06p     4. Location of contact:  Originating Location (patient location): [facility], located in [place], [place]  Distant Location (provider location): Remote location    5. Did the client complete the home practice option(s) from the previous session: completed - go to grocery store, notice common thinking styles    6. Motivational Teaching Strategies:  Connect info and skills with personal goals  Promote hope and positive expectations  Explore pros and cons of change  Re-frame experiences in positive light    7. Educational Teaching Strategies:  Review of written material/education  Relate information to client's experience  Ask questions to check comprehension  Break down information into small chunks  Adopt client's language     8. CBT Teaching Strategies:  Reinforcement and shaping (positive feedback for steps towards goals and gains in knowledge & skills)  Cognitive restructuring (identify thoughts related to negative feelings)    9. IRT Module(s) Addressed:  Goal setting  CBT [place]    10. Techniques utilized:   [place] announced at beginning of session  Review of goal  Review of previous  "meeting  Present new material  Help client choose a home practice option  Summarize progress made in current session    11. Measures:  None - denied SI    Mental Status Exam  Alertness: alert   Behavior/Demeanor: cooperative, pleasant, and calm, new haircut  Speech: regular rate and rhythm  Language: intact.   Mood: depressed and anxious  Thought Process/Associations: unremarkable  Thought Content:  Reports suicidal ideation;  Denies suicidal ideation with plan; with intent [details in Interim History]  Perception:  Reports auditory hallucinations;  Denies visual hallucinations  Insight: good  Judgment: adequate for safety  Cognition: does  appear grossly intact; formal cognitive testing was not done      12. Assessment/Progress Note:     Writer and client met for IRT visit via [employer]. Set agenda to check in, reported current symptoms to include: AH, depression and negative symptoms (flat affect, anhedonia), \"social anxiety\". Writer used relational and interpersonal approach to explore feelings, motivations, and behavior. Writer offered support, feedback, validation, and reinforced use of skills taught in IRT from modalities including cognitive behavioral therapy, psycho education, and skills training. Promoted understanding of their experiences of psychosis and how it impacts them in important areas of their life and in recovery goals. Reflected on client's strengths and resiliency factors and facilitated discussion on how these can assist in symptom management, recovery, and well-being. Explored symptoms and coping from a stress-vulnerability lens. Reported suicidal thoughts but no plan or intent. Reviewed safety plan.    Navi reports \"no big changes\" this week. He say he has been experiencing some auditory hallucinations over the past month \"like whispers\". Says he talked with Dr Vega and made a med increase.  Writer thanked Navi for sharing and inquired on his decision to inform me. Navi said he felt some " "embarassment and fear of psychosis returning. Writer reitereated the new skills and knowledge he has in order to cope with breakthrough symptoms. Discussed skill of distraction today (what's worked, what doesn't anymore, what he is willing to try, etc): talk with mom, play with puppy, listening to music, singing. Next week will focus on breathing and mindfulness.        13. Plan/Referrals:     Continue [facility] services, weekly IRT, scheduled 12/31 at 1    Billing for \"Interactive Complexity\"?    No         "

## 2024-12-18 NOTE — PROGRESS NOTES
NAVIGGALINDO Clinician Contact & Progress Note  For Individual Resiliency Training (IRT)  A Part of the [facility] First Episode of Psychosis Program    NAVIGATE Enrollee: Navi Morales (1995)     MRN: [id number]  Date:  12/11/24  Diagnosis: Psychosis, unspecified psychosis type (H) [F29]   Clinician: LILIANA Individual Resiliency Trainer, MERCEDES Mchugh     1. Type of contact: (majority of time spent)  IRT Session via telehealth  Mode of communication: [employer] (HIPAA compliant, secure platform). Patient consented verbally to this mode of therapy today.  Reason for telehealth: To reduce barriers in accessing services, due to but not limited to transportation, location, or scheduling reasons.    2. People present:   Writer  Client: Yes - Navi    3. Length of Actual Contact: Start Time: 2:00p; End Time: 3:00p     4. Location of contact:  Originating Location (patient location): [facility], located in [place], [place]  Distant Location (provider location): Remote location    5. Did the client complete the home practice option(s) from the previous session: completed - brought out items for walking on treadmill    6. Motivational Teaching Strategies:  Connect info and skills with personal goals  Promote hope and positive expectations  Explore pros and cons of change  Re-frame experiences in positive light    7. Educational Teaching Strategies:  Review of written material/education  Relate information to client's experience  Ask questions to check comprehension  Break down information into small chunks  Adopt client's language     8. CBT Teaching Strategies:  Reinforcement and shaping (positive feedback for steps towards goals and gains in knowledge & skills)  Cognitive restructuring (identify thoughts related to negative feelings)    9. IRT Module(s) Addressed:  Coping with negative symptoms    10. Techniques utilized:   [place] announced at beginning of session  Review of goal  Review of previous  "meeting  Present new material  Help client choose a home practice option  Summarize progress made in current session    11. Measures:  NA    Mental Status Exam  Alertness: alert   Behavior/Demeanor: cooperative, pleasant, and calm, new haircut  Speech: regular rate and rhythm  Language: intact.   Mood: depressed and anxious  Thought Process/Associations: unremarkable  Thought Content:  Reports suicidal ideation;  Denies suicidal ideation with plan; with intent [details in Interim History]  Perception:  Reports auditory hallucinations;  Denies visual hallucinations  Insight: good  Judgment: adequate for safety  Cognition: does  appear grossly intact; formal cognitive testing was not done      12. Assessment/Progress Note:     Writer and client met for IRT visit via [employer]. Set agenda to check in, reported current symptoms to include: AH, depression and negative symptoms (flat affect, anhedonia), \"social anxiety\". Writer used relational and interpersonal approach to explore feelings, motivations, and behavior. Writer offered support, feedback, validation, and reinforced use of skills taught in IRT from modalities including cognitive behavioral therapy, psycho education, and skills training. Promoted understanding of their experiences of psychosis and how it impacts them in important areas of their life and in recovery goals. Reflected on client's strengths and resiliency factors and facilitated discussion on how these can assist in symptom management, recovery, and well-being. Explored symptoms and coping from a stress-vulnerability lens.  Denied SI today. Reviewed safety plan.    Navi reports \"no big changes\" this week. Reports he completed home practice of walking on treadmill. Writer checked in on how Navi felt thearpy was going as a whole. He would like \"more hands on skills\" so we will discuss more next session. Navi discussed the origins of his social anxiety starting in high school. He said in college he began " "to withdraw socially and began experiencing hallucinations. Navi had good insight into his prodromal phase and was able to recognize that at the time he wasn't willing to get help. Writer validated, reminded Navi of mindful practices to stay in the here and now. Will discuss more skills/coping next week.         13. Plan/Referrals:     Continue [facility] services, weekly IRT, scheduled next week tue at 3    Billing for \"Interactive Complexity\"?    No      "

## 2024-12-31 ENCOUNTER — VIRTUAL VISIT (OUTPATIENT)
Dept: PSYCHIATRY | Facility: CLINIC | Age: 29
End: 2024-12-31
Payer: COMMERCIAL

## 2024-12-31 DIAGNOSIS — F20.9 SCHIZOPHRENIA, UNSPECIFIED TYPE (H): Primary | ICD-10-CM

## 2024-12-31 NOTE — Clinical Note
LYDIA, my apologies for getting this note out late : \ What do you think about Navi meet with the peer specialist once she is up and running?

## 2025-01-02 ENCOUNTER — VIRTUAL VISIT (OUTPATIENT)
Dept: PSYCHIATRY | Facility: CLINIC | Age: 30
End: 2025-01-02
Payer: COMMERCIAL

## 2025-01-02 DIAGNOSIS — F20.9 SCHIZOPHRENIA, UNSPECIFIED TYPE (H): Primary | ICD-10-CM

## 2025-01-02 ASSESSMENT — ANXIETY QUESTIONNAIRES
1. FEELING NERVOUS, ANXIOUS, OR ON EDGE: NEARLY EVERY DAY
2. NOT BEING ABLE TO STOP OR CONTROL WORRYING: NEARLY EVERY DAY
GAD7 TOTAL SCORE: 18
3. WORRYING TOO MUCH ABOUT DIFFERENT THINGS: NEARLY EVERY DAY
4. TROUBLE RELAXING: NEARLY EVERY DAY
IF YOU CHECKED OFF ANY PROBLEMS ON THIS QUESTIONNAIRE, HOW DIFFICULT HAVE THESE PROBLEMS MADE IT FOR YOU TO DO YOUR WORK, TAKE CARE OF THINGS AT HOME, OR GET ALONG WITH OTHER PEOPLE: VERY DIFFICULT
GAD7 TOTAL SCORE: 18
GAD7 TOTAL SCORE: 18
7. FEELING AFRAID AS IF SOMETHING AWFUL MIGHT HAPPEN: MORE THAN HALF THE DAYS
7. FEELING AFRAID AS IF SOMETHING AWFUL MIGHT HAPPEN: MORE THAN HALF THE DAYS
6. BECOMING EASILY ANNOYED OR IRRITABLE: MORE THAN HALF THE DAYS
1. FEELING NERVOUS, ANXIOUS, OR ON EDGE: NEARLY EVERY DAY
6. BECOMING EASILY ANNOYED OR IRRITABLE: MORE THAN HALF THE DAYS
7. FEELING AFRAID AS IF SOMETHING AWFUL MIGHT HAPPEN: MORE THAN HALF THE DAYS
8. IF YOU CHECKED OFF ANY PROBLEMS, HOW DIFFICULT HAVE THESE MADE IT FOR YOU TO DO YOUR WORK, TAKE CARE OF THINGS AT HOME, OR GET ALONG WITH OTHER PEOPLE?: VERY DIFFICULT
2. NOT BEING ABLE TO STOP OR CONTROL WORRYING: NEARLY EVERY DAY
5. BEING SO RESTLESS THAT IT IS HARD TO SIT STILL: MORE THAN HALF THE DAYS
5. BEING SO RESTLESS THAT IT IS HARD TO SIT STILL: MORE THAN HALF THE DAYS
IF YOU CHECKED OFF ANY PROBLEMS ON THIS QUESTIONNAIRE, HOW DIFFICULT HAVE THESE PROBLEMS MADE IT FOR YOU TO DO YOUR WORK, TAKE CARE OF THINGS AT HOME, OR GET ALONG WITH OTHER PEOPLE: VERY DIFFICULT
8. IF YOU CHECKED OFF ANY PROBLEMS, HOW DIFFICULT HAVE THESE MADE IT FOR YOU TO DO YOUR WORK, TAKE CARE OF THINGS AT HOME, OR GET ALONG WITH OTHER PEOPLE?: VERY DIFFICULT
3. WORRYING TOO MUCH ABOUT DIFFERENT THINGS: NEARLY EVERY DAY
GAD7 TOTAL SCORE: 18
7. FEELING AFRAID AS IF SOMETHING AWFUL MIGHT HAPPEN: MORE THAN HALF THE DAYS
4. TROUBLE RELAXING: NEARLY EVERY DAY

## 2025-01-02 ASSESSMENT — PATIENT HEALTH QUESTIONNAIRE - PHQ9
SUM OF ALL RESPONSES TO PHQ QUESTIONS 1-9: 17
SUM OF ALL RESPONSES TO PHQ QUESTIONS 1-9: 17
10. IF YOU CHECKED OFF ANY PROBLEMS, HOW DIFFICULT HAVE THESE PROBLEMS MADE IT FOR YOU TO DO YOUR WORK, TAKE CARE OF THINGS AT HOME, OR GET ALONG WITH OTHER PEOPLE: VERY DIFFICULT

## 2025-01-02 NOTE — PROGRESS NOTES
NAVIGATE Clinician Contact & Progress Note   For Family Education Program    NAVIGATE Enrollee: Navi Morales (1995)     MRN: [id number]  Date:  12/31/24  Diagnosis(es):   Psychosis unspecified  Clinician: LILIANA Family Clinician, Laura Maldonado MA, SELWYN     1. Type of contact: (majority of time spent)  Family Session via telehealth  Mode of communication:    Linwood Verduzco consented verbally to this mode of therapy today.  Reason for telehealth: To reduce barriers in accessing services, due to but not limited to transportation, location, or scheduling reasons.     2. People present:   Writer  Client: Yes  Significant Other/Family/Friend:  Mother    3. Length of Actual Contact: Start Time: 4:05pm to 5pm .      4. Location of contact:  Originating Location (patient location):[place]   Distant Location (provider location): Home office, located in [place], [place], using appropriate privacy considerations and procedures    5. Did the client complete the home practice option(s) from the previous session: Partially Completed    6. Motivational Teaching Strategies:  Connect info and skills with personal goals  Promote hope and positive expectations    7. Educational Teaching Strategies:  Review of written material/education  Relate information to client's experience  Ask questions to check comprehension  Break down information into small chunks    8. CBT Teaching Strategies:  Reinforcement and shaping (positive feedback for steps towards goals and gains in knowledge & skills)    9. Psychoeducational Topic(s) Addressed:  Just the Facts - Coping with Stress    10. Techniques utilized:   [place] announced at beginning of session  Review of previous meeting  Present new material  Family Therapy  Motivational Interviewing (Connect info and skills with personal goals, Promote hope and positive expectations, Explore pros and cons of change, Re-frame experiences in a positive light)  Educational  "Teaching Strategies (Review written material/education on: Psychosis, Medications for psychosis, Coping with stress, Strategies to build resiliency, Relapse prevention planning, Developing a collaboration with mental health professionals, Effective communication, A relative s guide to supporting recovery from psychosis, Basic facts about alcohol and drugs)  CBT (Reinforcement and shaping, Social skills training, Relapse prevention planning, Coping skills training, Relaxation training, Cognitive restructuring, Behavioral tailoring)    11. Assessment/Progress Note:     Writer met with Navi and Navi's mom on this day via [employer].  Set agenda to check in with each of them and to continue education on coping with stress. Family were agreeable to this agenda.     Navi reported feeling \"better than usual.\" Navi reports that he was able to take breaks over the holiday in talking with family. Mom supports this observation. Neither mom nor Navi reported urgent concerns to be addressed on this day. Most of the session was spent discussing how to talk with family.     Navi reports feeling \"stupid\" when having small talk with family. Mom provides feedback that she also struggles with this. Writer attempts to normalize some discomfort in talking about others' work that we don't understand. Also reflect that communicating with family is not only about content but is also about having connection and relationship and that family want to talk to you to connect with you not only to exchange information.     Continued with coping with stress module. Discussed strategy of increasing social support. Discuss new option of working with peer specialist. Provide information about this role and Navi is interested in possibly meeting with this person once they are oriented to the program. Writer will communicate with Elliott' therapist about this.   Provided supportive listening and reflect Navi's ongoing recovery from psychosis.     Overall " family and Navi seemed engaged in conversation. They did express interest in continuing to meet for family therapy and psychoeducation. As of today's appt their insight into Navi's mental illness appears fair. They seem they would benefit from continued clinical intervention aimed at assisting them implement helpful strategies at home and increase their understanding of psychosis.    12. Plan/Referrals:     Will meet with family weekly as schedule allows for evidence based family psychoeducation and therapeutic support aimed at maximizing Navi's opportunity for recovery from psychosis.     Laura Maldonado MA, LP   NAVIGATE   The author of this note documented a reason for not sharing it with the patient.

## 2025-01-07 ENCOUNTER — VIRTUAL VISIT (OUTPATIENT)
Dept: PSYCHIATRY | Facility: CLINIC | Age: 30
End: 2025-01-07
Payer: COMMERCIAL

## 2025-01-07 DIAGNOSIS — F20.9 SCHIZOPHRENIA, UNSPECIFIED TYPE (H): Primary | ICD-10-CM

## 2025-01-07 NOTE — PROGRESS NOTES
NAVIGGALINDO Clinician Contact & Progress Note  For Individual Resiliency Training (IRT)  A Part of the [facility] First Episode of Psychosis Program    NAVIGATE Enrollee: Navi Morales (1995)     MRN: [id number]  Date:  1/02/25  Diagnosis: Psychosis, unspecified psychosis type (H) [F29]   Clinician: LILIANA Individual Resiliency Trainer, MERCEDES Mchugh     1. Type of contact: (majority of time spent)  IRT Session via telehealth  Mode of communication: [employer] Well (HIPAA compliant, secure platform). Patient consented verbally to this mode of therapy today.  Reason for telehealth: To reduce barriers in accessing services, due to but not limited to transportation, location, or scheduling reasons.    2. People present:   Writer  Client: Yes - Navi    3. Length of Actual Contact: Start Time: 2:05p; End Time: 3:05p     4. Location of contact:  Originating Location (patient location): [facility], located in [place], [place]  Distant Location (provider location): Remote location    5. Did the client complete the home practice option(s) from the previous session: completed - go to grocery store, notice common thinking styles    6. Motivational Teaching Strategies:  Connect info and skills with personal goals  Promote hope and positive expectations  Explore pros and cons of change  Re-frame experiences in positive light    7. Educational Teaching Strategies:  Review of written material/education  Relate information to client's experience  Ask questions to check comprehension  Break down information into small chunks  Adopt client's language     8. CBT Teaching Strategies:  Reinforcement and shaping (positive feedback for steps towards goals and gains in knowledge & skills)  Cognitive restructuring (identify thoughts related to negative feelings)    9. IRT Module(s) Addressed:  Goal setting  Mindfulness/coping with anxiety    10. Techniques utilized:   [place] announced at beginning of session  Review of goal  Review of  "previous meeting  Present new material  Help client choose a home practice option  Summarize progress made in current session    11. Measures:  Answers submitted by the patient for this visit:  Patient Health Questionnaire (Submitted on 1/2/2025)  If you checked off any problems, how difficult have these problems made it for you to do your work, take care of things at home, or get along with other people?: Very difficult  PHQ9 TOTAL SCORE: 17  Patient Health Questionnaire (G7) (Submitted on 1/2/2025)  MELY 7 TOTAL SCORE: 18      Mental Status Exam  Alertness: alert   Behavior/Demeanor: cooperative, pleasant, and calm, new haircut  Speech: regular rate and rhythm  Language: intact.   Mood: depressed and anxious  Thought Process/Associations: unremarkable  Thought Content:  Reports suicidal ideation;  Denies suicidal ideation with plan; with intent [details in Interim History]  Perception:  Reports auditory hallucinations;  Denies visual hallucinations  Insight: good  Judgment: adequate for safety  Cognition: does  appear grossly intact; formal cognitive testing was not done      12. Assessment/Progress Note:     Writer and client met for IRT visit via [employer]. Set agenda to check in, reported current symptoms to include: AH, depression and negative symptoms (flat affect, anhedonia), \"social anxiety\". Writer used relational and interpersonal approach to explore feelings, motivations, and behavior. Writer offered support, feedback, validation, and reinforced use of skills taught in IRT from modalities including cognitive behavioral therapy, psycho education, and skills training. Promoted understanding of their experiences of psychosis and how it impacts them in important areas of their life and in recovery goals. Reflected on client's strengths and resiliency factors and facilitated discussion on how these can assist in symptom management, recovery, and well-being. Explored symptoms and coping from a stress-vulnerability " "lens. Reported suicidal thoughts but no plan or intent. Reviewed safety plan.    Navi reports \"no big changes\" this week. He say he has been experiencing some auditory hallucinations over the past month \"like whispers\" but says he finds going to the grocery store a little easier. Reports hearing the word \"loser\" while in public. Discussed his goals of learning more skills for anxiety. Together we reviewed module coping with anxiety and reviewed relaxed breathing as a technique. Navi was able to identify how anxiety feels to him, identified skills he has tried in the past and skills he would be open to trying. Writer modeled the breathing skill and practiced with Navi in session. Set home practice to try during times when he feels the most relaxed (12-4am).       13. Plan/Referrals:     Continue Navigate services, weekly IRT, scheduled 1/8/25 at 2pm    Billing for \"Interactive Complexity\"?    No         "

## 2025-01-08 ENCOUNTER — VIRTUAL VISIT (OUTPATIENT)
Dept: PSYCHIATRY | Facility: CLINIC | Age: 30
End: 2025-01-08
Payer: COMMERCIAL

## 2025-01-08 ASSESSMENT — PATIENT HEALTH QUESTIONNAIRE - PHQ9
SUM OF ALL RESPONSES TO PHQ QUESTIONS 1-9: 22
SUM OF ALL RESPONSES TO PHQ QUESTIONS 1-9: 22
10. IF YOU CHECKED OFF ANY PROBLEMS, HOW DIFFICULT HAVE THESE PROBLEMS MADE IT FOR YOU TO DO YOUR WORK, TAKE CARE OF THINGS AT HOME, OR GET ALONG WITH OTHER PEOPLE: VERY DIFFICULT

## 2025-01-08 NOTE — PROGRESS NOTES
NAVIGATE Clinician Contact & Progress Note   For Family Education Program    NAVIGATE Enrollee: Navi Morales (1995)     MRN: [id number]  Date:  1/07/25  Diagnosis(es):   Psychosis unspecified  Clinician: LILIANA Family Clinician, Laura Maldonado MA, SELWYN     1. Type of contact: (majority of time spent)  Family Session via telehealth  Mode of communication:    Linwood Verduzco consented verbally to this mode of therapy today.  Reason for telehealth: To reduce barriers in accessing services, due to but not limited to transportation, location, or scheduling reasons.     2. People present:   Writer  Client: Yes  Significant Other/Family/Friend:  Mother    3. Length of Actual Contact: Start Time: 4:05pm to 5pm .      4. Location of contact:  Originating Location (patient location):[place]   Distant Location (provider location): Home office, located in [place], [place], using appropriate privacy considerations and procedures    5. Did the client complete the home practice option(s) from the previous session: Partially Completed    6. Motivational Teaching Strategies:  Connect info and skills with personal goals  Promote hope and positive expectations    7. Educational Teaching Strategies:  Review of written material/education  Relate information to client's experience  Ask questions to check comprehension  Break down information into small chunks    8. CBT Teaching Strategies:  Reinforcement and shaping (positive feedback for steps towards goals and gains in knowledge & skills)    9. Psychoeducational Topic(s) Addressed:  Just the Facts - Coping with Stress    10. Techniques utilized:   [place] announced at beginning of session  Review of previous meeting  Present new material  Family Therapy  Motivational Interviewing (Connect info and skills with personal goals, Promote hope and positive expectations, Explore pros and cons of change, Re-frame experiences in a positive light)  Educational Teaching  "Strategies (Review written material/education on: Psychosis, Medications for psychosis, Coping with stress, Strategies to build resiliency, Relapse prevention planning, Developing a collaboration with mental health professionals, Effective communication, A relative s guide to supporting recovery from psychosis, Basic facts about alcohol and drugs)  CBT (Reinforcement and shaping, Social skills training, Relapse prevention planning, Coping skills training, Relaxation training, Cognitive restructuring, Behavioral tailoring)    11. Assessment/Progress Note:     Writer met with Navi and Navi's mom on this day via [employer].  Set agenda to check in with each of them and to continue education on coping with stress. Family were agreeable to this agenda. Navi and mom report no urgent concerns to be addressed on this day.     Navi reported feeling \"about the same.\" He reported some increase in symptoms. He stated he has not gone on any walks and only one trip to the grocery store. He states that he had agreed to split his medications so he takes some in the morning and some at night, but was having difficulty splitting his medications. He planned to talk with Dr. Vega about this concern.    Discuss possible factors for increased symptoms such as stress. Navi reported that he didn't think his stress was increased. Mom reports some family stress related to dad being more agitated and more engaged with Navi. Mom empathized with Navi about this. Navi described how conflict with dad impacts him and how it reinforces his beliefs about himself and the voices he hears.     Continued with coping with stress module. Discussed using strategies over the last week and family have not used strategies as originally planned. However, writer discussed other ways family has coped with stress including talking to each other. Explore strategy of engaging in positive self talk and Navi reported how he has been trying to do this more often. "     Provided supportive listening and reflect Navi's ongoing recovery from psychosis.     Overall family and Navi seemed engaged in conversation. They did express interest in continuing to meet for family therapy and psychoeducation. As of today's appt their insight into Navi's mental illness appears fair. They seem they would benefit from continued clinical intervention aimed at assisting them implement helpful strategies at home and increase their understanding of psychosis.    12. Plan/Referrals:     Will meet with family weekly as schedule allows for evidence based family psychoeducation and therapeutic support aimed at maximizing Navi's opportunity for recovery from psychosis.     Laura Maldonado MA, LP   NAVIGATE   The author of this note documented a reason for not sharing it with the patient.

## 2025-01-08 NOTE — PROGRESS NOTES
NAVIGATE Clinician Contact & Progress Note  For Individual Resiliency Training (IRT)  A Part of the [facility] First Episode of Psychosis Program    NAVIGATE Enrollee: Navi Morales (1995)     MRN: [id number]  Date:  1/08/25  Diagnosis: Psychosis, unspecified psychosis type (H) [F29]   Clinician: LILIANA Individual Resiliency Trainer, MERCEDES Mchugh     1. Type of contact: (majority of time spent)  IRT Session via telehealth  Mode of communication: [employer] Well (HIPAA compliant, secure platform). Patient consented verbally to this mode of therapy today.  Reason for telehealth: To reduce barriers in accessing services, due to but not limited to transportation, location, or scheduling reasons.    2. People present:   Writer  Client: Yes - Navi    3. Length of Actual Contact: Start Time: 2:05p; End Time: &(((***p     4. Location of contact:  Originating Location (patient location): family home, located in [place], [place]  Distant Location (provider location): Remote location    5. Did the client complete the home practice option(s) from the previous session: completed - go to grocery store, notice common thinking styles    6. Motivational Teaching Strategies:  Connect info and skills with personal goals  Promote hope and positive expectations  Explore pros and cons of change  Re-frame experiences in positive light    7. Educational Teaching Strategies:  Review of written material/education  Relate information to client's experience  Ask questions to check comprehension  Break down information into small chunks  Adopt client's language     8. CBT Teaching Strategies:  Reinforcement and shaping (positive feedback for steps towards goals and gains in knowledge & skills)  Cognitive restructuring (identify thoughts related to negative feelings)    9. IRT Module(s) Addressed:  Goal setting  Mindfulness/coping with anxiety    10. Techniques utilized:   [place] announced at beginning of session  Review of goal  Review  "of previous meeting  Present new material  Help client choose a home practice option  Summarize progress made in current session    11. Measures:  Answers submitted by the patient for this visit:  Patient Health Questionnaire (G7) (Submitted on 1/2/2025)  MELY 7 TOTAL SCORE: 18      Mental Status Exam  Alertness: alert   Behavior/Demeanor: cooperative, pleasant, and calm, new haircut  Speech: regular rate and rhythm  Language: intact.   Mood: depressed and anxious  Thought Process/Associations: unremarkable  Thought Content:  Reports suicidal ideation;  Denies suicidal ideation with plan; with intent [details in Interim History]  Perception:  Reports auditory hallucinations;  Denies visual hallucinations  Insight: good  Judgment: adequate for safety  Cognition: does  appear grossly intact; formal cognitive testing was not done      12. Assessment/Progress Note:     Writer and client met for IRT visit via [employer]. Set agenda to check in, reported current symptoms to include: AH, depression and negative symptoms (flat affect, anhedonia), \"social anxiety\". Writer used relational and interpersonal approach to explore feelings, motivations, and behavior. Writer offered support, feedback, validation, and reinforced use of skills taught in IRT from modalities including cognitive behavioral therapy, psycho education, and skills training. Promoted understanding of their experiences of psychosis and how it impacts them in important areas of their life and in recovery goals. Reflected on client's strengths and resiliency factors and facilitated discussion on how these can assist in symptom management, recovery, and well-being. Explored symptoms and coping from a stress-vulnerability lens. Reported suicidal thoughts but no plan or intent. Reviewed safety plan.    Navi reports       13. Plan/Referrals:     Continue Navigate services, weekly IRT, scheduled 1/8/25 at 2pm    Billing for \"Interactive Complexity\"?    No         "

## 2025-01-09 ENCOUNTER — TELEPHONE (OUTPATIENT)
Dept: PSYCHIATRY | Facility: CLINIC | Age: 30
End: 2025-01-09

## 2025-01-09 NOTE — TELEPHONE ENCOUNTER
NAVIGATE Telephone Call or E-mail Correspondence    NAVIGATE Enrollee: Navi Morales (1995)     MRN: [id number]    Email exchange with mom starting with writer's response to mom:     Vern Dunn,  Thank you for your message. I was not able to respond before our visit. This is helpful information.   Always, feel free to provide feedback our sessions!  Laura Goldstein  From: [email or web address] <[email or web address]   Sent: Tuesday, January 7, 2025 3:32 PM  To: Laura Maldonado <[email or web address]>  Subject: Navi    This message was sent securely using [employer]  Vern Sanhcez, we have a meeting in about 30 mins.  I wanted to let you know about a few things that have been weighing on my mind.  It's looking more and more like my  will be retiring. He is  ZjQcmQRYFpfptBannerStart     ATTENTION: This email was sent to your  Physicians account from an external source. Please use extra caution before clicking links, opening attachments, or replying to or forwarding this email.          ZjQcmQRYFpfptBannerEnd  This message was sent securely using [employer]       Vern Sanchez, we have a meeting in about 30 mins.  I wanted to let you know about a few things that have been weighing on my mind.  It's looking more and more like my  will be retiring. He is 64, so it's not too unexpected.  However, I think the change in income is going to prompt him to put more pressure on Navi to work and make some wages.  I don't want to mention it to Navi as I don't want to give him more anxiety.  I just know things are going to change around here once my  decides, and it will be in the next 3 months.  My  comes from the mindset that Navi could do any job, he is pretty much disregarding his mental health.  No matter how much I explain, he doesn't want to hear it.  So that is very unfortunate and frustrating.  Also, my  has been in alot of pain from his shoulder surgery.  This has made him very cranky and he  has verbally lashed out at Navi and I.  However, my  knows he is doing it and told me that he mentioned this to his physical therapist today.  I am urging my  to see his primary doctor about treating him for depression. He has taken meds for that in the past.  It's been a struggle for him not being able to do the things he used to with 2 rotator cuff surgeries within 6 months.       Navi has been talking about working and has been going through scenarios of working and what that would look like and what he sees himself doing down the road.  He just seems very concerned about the anxiety of being around others triggering his psychosis.  Not having the psychosis symptoms seems to be something he really wants to protect.  I just thought you and your team would want to know this information.       Also, I noticed in our meetings that although you are really upbeat and complimentary of Navi, he doesn't seem to appreciate it.  I do think that he appreciates it, but just isn't comfortable with compliments.      Anyway, for the most part all has been pretty good this past week.  Talk to you soon.     Mona Maldonado MA, LP    Health NAVIGATE

## 2025-01-14 ENCOUNTER — VIRTUAL VISIT (OUTPATIENT)
Dept: PSYCHIATRY | Facility: CLINIC | Age: 30
End: 2025-01-14
Payer: COMMERCIAL

## 2025-01-14 DIAGNOSIS — F20.9 SCHIZOPHRENIA, UNSPECIFIED TYPE (H): Primary | ICD-10-CM

## 2025-01-14 ASSESSMENT — PATIENT HEALTH QUESTIONNAIRE - PHQ9
SUM OF ALL RESPONSES TO PHQ QUESTIONS 1-9: 22
10. IF YOU CHECKED OFF ANY PROBLEMS, HOW DIFFICULT HAVE THESE PROBLEMS MADE IT FOR YOU TO DO YOUR WORK, TAKE CARE OF THINGS AT HOME, OR GET ALONG WITH OTHER PEOPLE: VERY DIFFICULT
10. IF YOU CHECKED OFF ANY PROBLEMS, HOW DIFFICULT HAVE THESE PROBLEMS MADE IT FOR YOU TO DO YOUR WORK, TAKE CARE OF THINGS AT HOME, OR GET ALONG WITH OTHER PEOPLE: VERY DIFFICULT
SUM OF ALL RESPONSES TO PHQ QUESTIONS 1-9: 22

## 2025-01-14 NOTE — PROGRESS NOTES
NAVIGGALINDO Clinician Contact & Progress Note  For Individual Resiliency Training (IRT)  A Part of the [facility] First Episode of Psychosis Program    NAVIGATE Enrollee: Navi Morales (1995)     MRN: [id number]  Date:  1/14/25  Diagnosis: Psychosis, unspecified psychosis type (H) [F29]   Clinician: LILIANA Individual Resiliency Trainer, MERCEDES Mchugh     1. Type of contact: (majority of time spent)  IRT Session via telehealth  Mode of communication: American Well (HIPAA compliant, secure platform). Patient consented verbally to this mode of therapy today.  Reason for telehealth: To reduce barriers in accessing services, due to but not limited to transportation, location, or scheduling reasons.    2. People present:   Writer  Client: Yes - Navi    3. Length of Actual Contact: Start Time: 3:75p; End Time: 4:00p     4. Location of contact:  Originating Location (patient location): [facility], located in [place], [place]  Distant Location (provider location): Remote location    5. Did the client complete the home practice option(s) from the previous session: completed - made list of 20 things he likes about himself    6. Motivational Teaching Strategies:  Connect info and skills with personal goals  Promote hope and positive expectations  Explore pros and cons of change  Re-frame experiences in positive light    7. Educational Teaching Strategies:  Review of written material/education  Relate information to client's experience  Ask questions to check comprehension  Break down information into small chunks  Adopt client's language     8. CBT Teaching Strategies:  Reinforcement and shaping (positive feedback for steps towards goals and gains in knowledge & skills)  Cognitive restructuring (identify thoughts related to negative feelings)  Mindfulness and breathing    9. IRT Module(s) Addressed:  Coping with symptoms    10. Techniques utilized:   [place] announced at beginning of session  Review of goal  Review of  previous meeting  Present new material  Help client choose a home practice option  Summarize progress made in current session    11. Measures:  Answers submitted by the patient for this visit:  Patient Health Questionnaire (Submitted on 1/14/2025)  If you checked off any problems, how difficult have these problems made it for you to do your work, take care of things at home, or get along with other people?: Very difficult  PHQ9 TOTAL SCORE: 22  Depression Screening Follow-up        1/14/2025     2:03 PM   PHQ   PHQ-9 Total Score 22    Q9: Thoughts of better off dead/self-harm past 2 weeks More than half the days   F/U: Thoughts of suicide or self-harm Yes   F/U: Self harm-plan Yes   F/U: Self-harm action No   F/U: Safety concerns No       Patient-reported     [place] Protocol Risk Identification  1) Have you wished you were dead or wished you could go to sleep and not wake up? Yes  2) Have you actually had any thoughts about killing yourself? Yes  If YES to 2, answer questions 3, 4, 5, 6  If NO to 2, go directly to question 6  3) Have you thought about how you might do this? Yes  4) Have you had any intension of acting on these thoughts of killing yourself, as opposed to you have the thoughts but you definitely would not act on them? No  5) Have you started to work out or worked out the details of how to kill yourself? Do you intend to carry out this plan? No  Always Ask Question 6  6) Have you done anything, started to do anything, or prepared to do anything to end your life? No  Examples: collected pills, obtained a gun, gave away valuables, wrote a will or suicide note, held a gun but changed your mind, cut yourself, tried to hang yourself, etc.     Follow Up     Follow Up Actions Taken  Crisis resource information provided in the After Visit Summary    Discussed the following ways the patient can remain in a safe environment:  remove things I could use to hurt myself: not driving while elevated, avoid alcohol  "and other drugs, and be around others. Also identified going for a walk, calling crisis 988 or going to [facility]    I have reviewed the results of the [place] Screening and proposed plan of care. The patient agrees with the follow up and safety plan.  Brandie Moran, [facility]      Mental Status Exam  Alertness: alert   Behavior/Demeanor: cooperative, pleasant, and calm  Speech: regular rate and rhythm  Language: intact.   Mood: depressed and anxious  Thought Process/Associations: unremarkable  Thought Content:  Reports suicidal ideation;  Denies suicidal ideation with plan; with intent [details in Interim History]  Perception:  Reports auditory hallucinations;  Denies visual hallucinations  Insight: good  Judgment: adequate for safety  Cognition: does  appear grossly intact; formal cognitive testing was not done      12. Assessment/Progress Note:     Writer and client met for IRT visit via [employer]. Set agenda to check in, reported current symptoms to include: AH, depression and negative symptoms (flat affect, anhedonia), \"social anxiety\". Writer used relational and interpersonal approach to explore feelings, motivations, and behavior. Writer offered support, feedback, validation, and reinforced use of skills taught in IRT from modalities including cognitive behavioral therapy, psycho education, and skills training. Promoted understanding of their experiences of psychosis and how it impacts them in important areas of their life and in recovery goals. Reflected on client's strengths and resiliency factors and facilitated discussion on how these can assist in symptom management, recovery, and well-being. Explored symptoms and coping from a stress-vulnerability lens. Reported suicidal thoughts without plan or intent today. Reviewed safety plan and [place].    Navi reports he completed his assignment of listing 20 things he likes about himself. He titled it : things I like about me. He said it felt silly to do but that it " "got easier as he was thinking of things. Writer praised Navi for completing the assignment and noticed he likes how he is appreciative of nature, family, animals, food, cooking and that he likes that he is health-conscious. He also said he likes how he is introspective and is less impulsive now. Navi would like to continue focusing on positives and gratitude as coping skills for depression. Navi also reported that after he made the list, he noticed an increase in suicidal ideation. He said he worries that if he gets a job and moves out that he will not be able to function and will lose everything. Writer helped identify the cognitive distortion and that we will take his goals one small step at a time, while equipping him with more skills.     13. Plan/Referrals:     Continue Navigate services, weekly IRT, scheduled 1/21/25 at 2pm via video  Introduce   Schedule in home or in clinic therapy appointment - Navi reports he doesn't feel ready when asked about it, he said \"I want to get in better shape\".     Billing for \"Interactive Complexity\"?    No  Brandie Moran, MERCEDES     "

## 2025-01-15 ENCOUNTER — VIRTUAL VISIT (OUTPATIENT)
Dept: PSYCHIATRY | Facility: CLINIC | Age: 30
End: 2025-01-15
Payer: COMMERCIAL

## 2025-01-15 DIAGNOSIS — F20.9 SCHIZOPHRENIA, UNSPECIFIED TYPE (H): Primary | ICD-10-CM

## 2025-01-15 NOTE — PROGRESS NOTES
"Virtual Visit Details    Type of service:  Video Visit   Video Start Time:  1500  Video End Time: 1530  Originating Location (pt. Location): Home    Distant Location (provider location):  On-site  Platform used for Video Visit: [email or web address]      EMILIANALoomio Medication Management Progress Note  A Part of the [facility] First Episode of Psychosis Program    NAVIGATE Enrollee: Navi Morales (1995)     MRN: [id number]  Date:  1/15/25         Contributors to the Assessment     Chart Reviewed.   Interview completed with Navi Morales.  Collateral information obtained from none.         Interim History    Navi Morales is a 29 year old male who was last seen in [facility] clinic a month ago at which time no med changes were made. The patient reports good treatment adherence. History was provided by Navi who was a good historian.  Since the last visit:  -Dog really likes being outside in the cold. They are trying to figure out when is a good time to get him neutered. He is still kind of a puppy. Really likes jumping up on countertops.   -Saw brother and wife and baby.  -Navi made cookies for the holidays with his family. Lots of different kinds of cookies. They were using an old mixer and were just trying to whip up some egg whites and it shorted out and started arcing. Thankfully   -Feet got sore from standing around in the kitchen.  -Errands-has been doing a little bit more recently. He knows where things are so   -Kind of has the \"anticipation\" where he is worried that he will have hallucinations. He walked into the store with someone talking on his phone and then worried that was going to contribute to   -Anxiety peaked when he was checking out  -hallucinations have been in the bathroom downstairs late at night.   -Mood has been \"calmer\" on a day-to-day basis. He has been struggling with weight gain recently--biggest concern    -Calves get really tight with incline walking. Wears Hokas and that seems to help  -Didn't change to the 5 mg " in the morning and the 25 mg at night because he was confused    PSYCH ROS:  See HPI     RECENT SOCIAL HISTORY:  SEE HPI    Medical ROS:  none         First Episode of Psychosis History      DUP (duration untreated psychosis):  4 years  Route to initial care: outpatient  Medication adherence overall:  See above, Clinician Rating Form in COMPASS Item 13  General frequency of visits:  weekly IRT, monthly med management  Participation in groups:  No  Cognitive Remediation:  No  Other treatment history:     Reviewed for completion of First Episode work-up:  Yes  First episode workup:  Not Done (if completed, see LABS for results)  MATRICS Consensus Cognitive Battery:  Not Done (if completed, see LABS for results)       Medical/Surgical History     Patient has no known allergies.    Patient Active Problem List   Diagnosis    Alcohol use disorder, mild, abuse    MELY (generalized anxiety disorder)    Induration penis plastica    Moderate episode of recurrent major depressive disorder (H)    Open displaced fracture of neck of second metacarpal bone of right hand    Psychosis (H)    Social anxiety disorder    TMJ (temporomandibular joint syndrome)    Traumatic compression fracture of T3 vertebra (H)            Medications     Current Outpatient Medications   Medication Sig Dispense Refill    cholecalciferol, vitamin D3, 1,000 unit (25 mcg) tablet [CHOLECALCIFEROL, VITAMIN D3, 1,000 UNIT (25 MCG) TABLET] Take 2,000 Units by mouth daily.      dutasteride (AVODART) 0.5 MG capsule Take 0.5 mg by mouth daily      ketoconazole (NIZORAL) 2 % external shampoo Apply topically daily as needed LATHER INTO SCALP AND RINSE AFTER 2-3 MINUTES. USE THREE TIMES A WEEK.      magnesium chloride (SLOW-MAG) 64 mg TbEC delayed-release tablet [MAGNESIUM CHLORIDE (SLOW-MAG) 64 MG TBEC DELAYED-RELEASE TABLET] Take 64 mg by mouth daily.      naltrexone (DEPADE/REVIA) 50 MG tablet Take 1 tablet (50 mg) by mouth daily 30 tablet 2    OLANZapine  (ZYPREXA) 10 MG tablet Take 0.5 tablets (5 mg) by mouth daily AND 1 tablet (10 mg) at bedtime. Take with one of the 15 mg tabs at bedtime for a total of 25 mg at bedtime.. 45 tablet 2    OLANZapine (ZYPREXA) 15 MG tablet Take 2 tablets (30 mg) by mouth at bedtime 180 tablet 2    QUEtiapine (SEROQUEL) 100 MG tablet Take 1 tablet (100 mg) by mouth at bedtime. May also take 0.5-1 tablets ( mg) 2 times daily as needed (anxiety). 60 tablet 3    tadalafil (CIALIS) 10 MG tablet Take 10 mg by mouth daily      zinc gluconate 50 mg tablet [ZINC GLUCONATE 50 MG TABLET] Take 100 mg by mouth daily.            Vitals     There were no vitals taken for this visit.      Weight prior to medication: unknown         Mental Status Exam     Alertness: alert  and oriented  Appearance: well groomed  Behavior/Demeanor: cooperative, pleasant, and calm, with good  eye contact   Speech: regular rate and rhythm, not pressured  Language: no obvious problem  Psychomotor: normal or unremarkable  Mood: depressed  Affect: blunted; was congruent to mood; was congruent to content  Thought Process/Associations:  overinclusive at times  Thought Content:  Reports none;  Denies suicidal and violent ideation and delusions  Perception:  Reports auditory hallucinations;  Denies visual hallucinations  Insight: good  Judgment: fair and adequate for safety  Cognition: does  appear grossly intact; formal cognitive testing was not done         Labs and Data     RATING SCALES:  AIMS: next in person visit    PHQ9 TODAY =       1/2/2025     1:59 PM 1/8/2025     1:59 PM 1/14/2025     2:03 PM   PHQ-9 SCORE   PHQ-9 Total Score MyChart 17 (Moderately severe depression) 22 (Severe depression) 22 (Severe depression)   PHQ-9 Total Score 17  22  22        Patient-reported     ANTIPSYCHOTIC LABS ROUTINE    [glu, A1C, lipids (focus LDL), liver enzymes, WBC, ANEU, Hgb, plts]   q12 mo  Recent Labs   Lab Test 06/30/23  1048   GLC 84     No lab results found.  Recent Labs  "  Lab Test 06/30/23  1048   AST 39   ALT 40   ALKPHOS 88     Recent Labs   Lab Test 06/30/23  1048   WBC 7.7   HGB 14.9             Psychiatric Diagnoses     Psychosis, schizophrenia vs schizoaffective disorder  AUD, moderate, in early remission  Cannabis use disorder with active use  Generalized anxiety disorder  Agoraphobia         Assessment   Navi HO Andrew is a 28 year old male with first onset of psychiatric symptoms at age 7 (\"social anxiety\"), and psychotic symptoms at age 23. The above duration of untreated psychosis was approximately 4 years.  Prodromal symptoms seem to have been present since at least college (notable substance use) though patient does note a substantially longer history of depression and physical health concerns. Presenting symptoms appear to include hallucinations, delusional thoughts. Navi attributes symptoms to physical health issues and history of trauma.  Substance use does seem to be a present concern. There are possible medical comorbidities which impact this treatment [suicide attempt, suicidal ideation, SIB, psychosis, aggression, and substance use: alcohol].     Today,  Navi continues to experience improvement in anxiety with graded exposures of going on errands, going on walks with the dog. Remains sober from alcohol. Discussed AH which are most bothersome at night. He is interested in losing weight (note h/o body dysmorphia) due to recent weight gain due to increased hunger. We discussed the importance of monitoring for ED recurrence, but did discuss Lybalvi and he was interested in trying it.    Future considerations: starting selective serotonin reuptake inhibitor for social anxiety and agoraphobia              SUICIDE RISK ASSESSMENT:  Risk factors for self-harm: previous suicide attempt, substance use/pending treatment, and hallucinations.  Mitigating factors: describes a safety plan, h/o seeking help , future oriented, commitment to family, and stable housing.  " The patient does not appear to be at imminent risk for self-harm, hospitalization is not recommended which the pt does  agree with. No hospitalization will be arranged. Based on degree of symptoms close psych follow-up was/were recommended which the pt does  agree to. Additional steps to minimize risk: med changes.      [place] PRESCRIPTION MONITORING PROGRAM [] was not checked today: not using controlled substances.    PSYCHOTROPIC DRUG INTERACTIONS:   Quetiapine/olanzapine: additive CNS depression, QTc prolongation.    MANAGEMENT:  use lowest therapeutic doses of both and routine monitoring         Plan     1) PSYCHOTROPIC MEDICATIONS:  - Start Olanzapine 5mg qAM  and switch 20 mg of olanzapine at bedtime to olanzapine-samidorphan    2) THERAPY:  Continue IRT with Brandie Moran    3) NEXT DUE:    Labs: FEP workup due  Rating Scales: AIMS due    4) REFERRALS:    none    5) RTC: 4 weeks    6) CRISIS NUMBERS:   Provided routinely in AVS.    TREATMENT RISK STATEMENT:  The risks, benefits, alternatives and potential adverse effects have been discussed and are understood by the pt. The pt understands the risks of using street drugs or alcohol. There are no medical contraindications, the pt agrees to treatment with the ability to do so. The pt knows to call the clinic for any problems or to access emergency care if needed.  Medical and substance use concerns are documented above.  Psychotropic drug interaction check was done, including changes made today.    PROVIDER:   Heidi Vega MD  [facility]  , Department of Psychiatry and Behavioral Sciences  [employer]     The longitudinal plan of care for the diagnosis(es)/condition(s) as documented were addressed during this visit. Due to the added complexity in care, I will continue to support Navi in the subsequent management and with ongoing continuity of care.

## 2025-01-16 ENCOUNTER — VIRTUAL VISIT (OUTPATIENT)
Dept: PSYCHIATRY | Facility: CLINIC | Age: 30
End: 2025-01-16
Payer: COMMERCIAL

## 2025-01-16 DIAGNOSIS — F20.9 SCHIZOPHRENIA, UNSPECIFIED TYPE (H): Primary | ICD-10-CM

## 2025-01-21 ENCOUNTER — VIRTUAL VISIT (OUTPATIENT)
Dept: PSYCHIATRY | Facility: CLINIC | Age: 30
End: 2025-01-21
Payer: COMMERCIAL

## 2025-01-21 DIAGNOSIS — F20.9 SCHIZOPHRENIA, UNSPECIFIED TYPE (H): Primary | ICD-10-CM

## 2025-01-21 ASSESSMENT — ANXIETY QUESTIONNAIRES
7. FEELING AFRAID AS IF SOMETHING AWFUL MIGHT HAPPEN: NEARLY EVERY DAY
5. BEING SO RESTLESS THAT IT IS HARD TO SIT STILL: MORE THAN HALF THE DAYS
4. TROUBLE RELAXING: NEARLY EVERY DAY
1. FEELING NERVOUS, ANXIOUS, OR ON EDGE: NEARLY EVERY DAY
3. WORRYING TOO MUCH ABOUT DIFFERENT THINGS: NEARLY EVERY DAY
2. NOT BEING ABLE TO STOP OR CONTROL WORRYING: NEARLY EVERY DAY
GAD7 TOTAL SCORE: 19
GAD7 TOTAL SCORE: 19
6. BECOMING EASILY ANNOYED OR IRRITABLE: MORE THAN HALF THE DAYS
IF YOU CHECKED OFF ANY PROBLEMS ON THIS QUESTIONNAIRE, HOW DIFFICULT HAVE THESE PROBLEMS MADE IT FOR YOU TO DO YOUR WORK, TAKE CARE OF THINGS AT HOME, OR GET ALONG WITH OTHER PEOPLE: VERY DIFFICULT
4. TROUBLE RELAXING: NEARLY EVERY DAY
7. FEELING AFRAID AS IF SOMETHING AWFUL MIGHT HAPPEN: NEARLY EVERY DAY
GAD7 TOTAL SCORE: 19
7. FEELING AFRAID AS IF SOMETHING AWFUL MIGHT HAPPEN: NEARLY EVERY DAY
GAD7 TOTAL SCORE: 19
7. FEELING AFRAID AS IF SOMETHING AWFUL MIGHT HAPPEN: NEARLY EVERY DAY
GAD7 TOTAL SCORE: 19
3. WORRYING TOO MUCH ABOUT DIFFERENT THINGS: NEARLY EVERY DAY
8. IF YOU CHECKED OFF ANY PROBLEMS, HOW DIFFICULT HAVE THESE MADE IT FOR YOU TO DO YOUR WORK, TAKE CARE OF THINGS AT HOME, OR GET ALONG WITH OTHER PEOPLE?: VERY DIFFICULT
2. NOT BEING ABLE TO STOP OR CONTROL WORRYING: NEARLY EVERY DAY
5. BEING SO RESTLESS THAT IT IS HARD TO SIT STILL: MORE THAN HALF THE DAYS
IF YOU CHECKED OFF ANY PROBLEMS ON THIS QUESTIONNAIRE, HOW DIFFICULT HAVE THESE PROBLEMS MADE IT FOR YOU TO DO YOUR WORK, TAKE CARE OF THINGS AT HOME, OR GET ALONG WITH OTHER PEOPLE: VERY DIFFICULT
8. IF YOU CHECKED OFF ANY PROBLEMS, HOW DIFFICULT HAVE THESE MADE IT FOR YOU TO DO YOUR WORK, TAKE CARE OF THINGS AT HOME, OR GET ALONG WITH OTHER PEOPLE?: VERY DIFFICULT
6. BECOMING EASILY ANNOYED OR IRRITABLE: MORE THAN HALF THE DAYS
GAD7 TOTAL SCORE: 19
1. FEELING NERVOUS, ANXIOUS, OR ON EDGE: NEARLY EVERY DAY

## 2025-01-21 ASSESSMENT — PATIENT HEALTH QUESTIONNAIRE - PHQ9
SUM OF ALL RESPONSES TO PHQ QUESTIONS 1-9: 20
10. IF YOU CHECKED OFF ANY PROBLEMS, HOW DIFFICULT HAVE THESE PROBLEMS MADE IT FOR YOU TO DO YOUR WORK, TAKE CARE OF THINGS AT HOME, OR GET ALONG WITH OTHER PEOPLE: VERY DIFFICULT
SUM OF ALL RESPONSES TO PHQ QUESTIONS 1-9: 20

## 2025-01-21 NOTE — PROGRESS NOTES
NAVIGATE Clinician Contact & Progress Note   For Family Education Program    NAVIGATE Enrollee: Navi Morales (1995)     MRN: [id number]  Date:  1/16/25  Diagnosis(es):   Psychosis unspecified  Clinician: LILIANA Family Clinician, Laura Maldonado MA, SELWYN     1. Type of contact: (majority of time spent)  Family Session via telehealth  Mode of communication:    Linwood Verduzco consented verbally to this mode of therapy today.  Reason for telehealth: To reduce barriers in accessing services, due to but not limited to transportation, location, or scheduling reasons.     2. People present:   Writer  Client: Yes  Significant Other/Family/Friend:  Mother    3. Length of Actual Contact: Start Time: 4:05pm to 5pm .      4. Location of contact:  Originating Location (patient location):[place]   Distant Location (provider location): Home office, located in [place], [place], using appropriate privacy considerations and procedures    5. Did the client complete the home practice option(s) from the previous session: Partially Completed    6. Motivational Teaching Strategies:  Connect info and skills with personal goals  Promote hope and positive expectations    7. Educational Teaching Strategies:  Review of written material/education  Relate information to client's experience  Ask questions to check comprehension  Break down information into small chunks    8. CBT Teaching Strategies:  Reinforcement and shaping (positive feedback for steps towards goals and gains in knowledge & skills)    9. Psychoeducational Topic(s) Addressed:  Just the Facts - Coping with Stress    10. Techniques utilized:   [place] announced at beginning of session  Review of previous meeting  Present new material  Family Therapy  Motivational Interviewing (Connect info and skills with personal goals, Promote hope and positive expectations, Explore pros and cons of change, Re-frame experiences in a positive light)  Educational Teaching  Strategies (Review written material/education on: Psychosis, Medications for psychosis, Coping with stress, Strategies to build resiliency, Relapse prevention planning, Developing a collaboration with mental health professionals, Effective communication, A relative s guide to supporting recovery from psychosis, Basic facts about alcohol and drugs)  CBT (Reinforcement and shaping, Social skills training, Relapse prevention planning, Coping skills training, Relaxation training, Cognitive restructuring, Behavioral tailoring)    11. Assessment/Progress Note:     Writer met with Navi and Navi's mom on this day via [employer].  Set agenda to check in with each of them and to continue education on coping with stress. Family were agreeable to this agenda. Navi and mom report no urgent concerns to be addressed on this day.     Navi's mom reports the last week has been busy at work, but has gone well. Navi's dad has been recovering from shoulder surgery and he has been in pain. This has increased stress in the home.    Navi reported he had gone to the grocery store on his own while mom waited in the car. Mom and writer praised Navi for this significant accomplishment. Navi described anticipatory anxiety. Provided supportive listening and validated that the anticipatory anxiety is very common. Navi describes long history of social anxiety; also validated that this is common in people recovering from psychosis. Offered to have peer  join visit in the future. Explained the role of a peer  and offered to invite her to the next visit. Navi is open to this plan.     Provided supportive listening and reflect Navi's ongoing recovery from psychosis. Mom also encouraged and validated Navi in his ongoing recovery.     Overall family and Navi seemed engaged in conversation. They did express interest in continuing to meet for family therapy and psychoeducation. As of today's appt their insight into Navi's  mental illness appears fair. They seem they would benefit from continued clinical intervention aimed at assisting them implement helpful strategies at home and increase their understanding of psychosis.    12. Plan/Referrals:     Will meet with family weekly as schedule allows for evidence based family psychoeducation and therapeutic support aimed at maximizing Navi's opportunity for recovery from psychosis.     Laura Maldonado MA, LP   NAVIGATE   The author of this note documented a reason for not sharing it with the patient.

## 2025-01-21 NOTE — Clinical Note
Hi team, here is my note from Navi's last appt. Highlights are that Navi reports vaping delta 9 right before he goes to the grocery store. He agreed to try it without next time to see if there is a difference. He got quite nervous when I brought up treatment for his body dysmorphia so I said we can continue to check in on it. He is open to meeting matteo and was excited to start some interest and personality inventories to help with his bigger goals.

## 2025-01-23 ENCOUNTER — VIRTUAL VISIT (OUTPATIENT)
Dept: PSYCHIATRY | Facility: CLINIC | Age: 30
End: 2025-01-23
Payer: COMMERCIAL

## 2025-01-23 DIAGNOSIS — F20.9 SCHIZOPHRENIA, UNSPECIFIED TYPE (H): Primary | ICD-10-CM

## 2025-01-23 NOTE — PROGRESS NOTES
NAVIGGALINDO Clinician Contact & Progress Note  For Individual Resiliency Training (IRT)  A Part of the [facility] First Episode of Psychosis Program    NAVIGATE Enrollee: Navi Morales (1995)     MRN: [id number]  Date:  1/21/25  Diagnosis: Psychosis, unspecified psychosis type (H) [F29]   Clinician: LILIANA Individual Resiliency Trainer, MERCEDES Mchugh     1. Type of contact: (majority of time spent)  IRT Session via telehealth  Mode of communication: [employer] (HIPAA compliant, secure platform). Patient consented verbally to this mode of therapy today.  Reason for telehealth: To reduce barriers in accessing services, due to but not limited to transportation, location, or scheduling reasons.    2. People present:   Writer  Client: Yes - Navi    3. Length of Actual Contact: Start Time: 2:05p; End Time: 3:05p     4. Location of contact:  Originating Location (patient location): [facility], located in [place], [place]  Distant Location (provider location): Remote location    5. Did the client complete the home practice option(s) from the previous session: completed - went to grocery store alone    6. Motivational Teaching Strategies:  Connect info and skills with personal goals  Promote hope and positive expectations  Explore pros and cons of change  Re-frame experiences in positive light    7. Educational Teaching Strategies:  Review of written material/education  Relate information to client's experience  Ask questions to check comprehension  Break down information into small chunks  Adopt client's language     8. CBT Teaching Strategies:  Reinforcement and shaping (positive feedback for steps towards goals and gains in knowledge & skills)  Cognitive restructuring (identify thoughts related to negative feelings)  Mindfulness and breathing    9. IRT Module(s) Addressed:  Coping with symptoms  Goal setting   Mindfulness skills    10. Techniques utilized:   [place] announced at beginning of session  Review of  "goal  Review of previous meeting  Present new material  Modeling  Help client choose a home practice option  Summarize progress made in current session    11. Measures:  Answers submitted by the patient for this visit:  Patient Health Questionnaire (Submitted on 1/21/2025)  If you checked off any problems, how difficult have these problems made it for you to do your work, take care of things at home, or get along with other people?: Very difficult  PHQ9 TOTAL SCORE: 20  Patient Health Questionnaire (G7) (Submitted on 1/21/2025)  MELY 7 TOTAL SCORE: 19        Mental Status Exam  Alertness: alert   Behavior/Demeanor: cooperative, pleasant, and calm  Speech: regular rate and rhythm  Language: intact.   Mood: depressed and anxious  Thought Process/Associations: unremarkable  Thought Content:  Reports suicidal ideation;  Denies suicidal ideation with plan; with intent [details in Interim History]  Perception:  Reports auditory hallucinations;  Denies visual hallucinations  Insight: good  Judgment: adequate for safety  Cognition: does  appear grossly intact; formal cognitive testing was not done      12. Assessment/Progress Note:     Writer and client met for IRT visit via [employer]. Set agenda to check in, reported current symptoms to include: AH, depression and negative symptoms (flat affect, anhedonia), \"social anxiety\". Writer used relational and interpersonal approach to explore feelings, motivations, and behavior. Writer offered support, feedback, validation, and reinforced use of skills taught in IRT from modalities including cognitive behavioral therapy, psycho education, and skills training. Promoted understanding of their experiences of psychosis and how it impacts them in important areas of their life and in recovery goals. Reflected on client's strengths and resiliency factors and facilitated discussion on how these can assist in symptom management, recovery, and well-being. Explored symptoms and coping from a " "stress-vulnerability lens. Reported suicidal thoughts without plan or intent today. Reviewed safety plan.     Navi reports he completed his home practice of going to the grocery store alone. We had discussed purchasing only one or two things to start as to gradually increase his exposures. His goal states to \"practice mindfulness skills, gradually increasing time spent at grocery store, with increasing social interactions as to reduce symptoms of anxiety and feelings of fear.\" Navi said he went to the store and purchased several items across the store and completed self checkout \"without any problems\". Writer reflected on this achievement and how he went beyond the expected goal. Navi tends to minimize  achievements saying \"its not a big deal it was easy\". Reflected on the efforts he took and successful outcome. Navi says he uses breathing skills as well as vaping cannabis before these outings. Navi was willing next time to not use before and see if there was a difference btn the two outings as he notices more voices at the store.     Navi would like to work on identifying his interests to help with other goals including returning to school and work. Writer set home practice for personality and interest assessment. Navi also identified ongoing symptoms of body dysmorphia. Writer introduced idea of treatment for these symptoms in the future and Navi appeared to get jittery and fidgety. Writer brought this to Navi's attention, worked on practicing relaxed breathing. Navi was ambivalent about BD treatment but did say it was problematic for him. Will continue to monitor.      13. Plan/Referrals:     Continue [facility] services, weekly IRT, scheduled 1/28/25 at 2pm via video      Billing for \"Interactive Complexity\"?    No  MERCEDES Mchugh     "

## 2025-01-23 NOTE — PROGRESS NOTES
NAVIGATE Clinician Contact & Progress Note   For Family Education Program    NAVIGATE Enrollee: Navi Morales (1995)     MRN: [id number]  Date:  1/23/25  Diagnosis(es):   Psychosis unspecified  Clinician: LILIANA Family Clinician, Laura Maldonado MA, SELWYN     1. Type of contact: (majority of time spent)  Family Session via telehealth  Mode of communication:    Linwood Verduzco consented verbally to this mode of therapy today.  Reason for telehealth: To reduce barriers in accessing services, due to but not limited to transportation, location, or scheduling reasons.     2. People present:   Writer  Client: Yes  Significant Other/Family/Friend:  Mother    3. Length of Actual Contact: Start Time: 11:05am to 11:55am      4. Location of contact:  Originating Location (patient location):[place]   Distant Location (provider location): Home office, located in [place], [place], using appropriate privacy considerations and procedures    5. Did the client complete the home practice option(s) from the previous session: Partially Completed    6. Motivational Teaching Strategies:  Connect info and skills with personal goals  Promote hope and positive expectations    7. Educational Teaching Strategies:  Review of written material/education  Relate information to client's experience  Ask questions to check comprehension  Break down information into small chunks    8. CBT Teaching Strategies:  Reinforcement and shaping (positive feedback for steps towards goals and gains in knowledge & skills)    9. Psychoeducational Topic(s) Addressed:  Just the Facts - Coping with Stress    10. Techniques utilized:   [place] announced at beginning of session  Review of previous meeting  Present new material  Family Therapy  Motivational Interviewing (Connect info and skills with personal goals, Promote hope and positive expectations, Explore pros and cons of change, Re-frame experiences in a positive light)  Educational  "Teaching Strategies (Review written material/education on: Psychosis, Medications for psychosis, Coping with stress, Strategies to build resiliency, Relapse prevention planning, Developing a collaboration with mental health professionals, Effective communication, A relative s guide to supporting recovery from psychosis, Basic facts about alcohol and drugs)  CBT (Reinforcement and shaping, Social skills training, Relapse prevention planning, Coping skills training, Relaxation training, Cognitive restructuring, Behavioral tailoring)    11. Assessment/Progress Note:     Writer met with Navi and Navi's mom on this day via [employer].  Set agenda to check in with each of them and to continue education on coping with stress. Family were agreeable to this agenda. Navi and mom report no urgent concerns to be addressed on this day.     Navi reported that this week had been \"really anxious.\" He reported a plan to go to the grocery store alone, but then had increased anxiety and so asked to go with mom. Discussed his experienced and was able to identify a couple factors that increased anxiety including: having a request to get stuff for mom and being faced with trying to go into a part of the grocery store he didn't know. Validated this anxiety and reflected on how well Navi pivoted and asked for more support. Reinforced Navi's steps to not avoid his anxiety and explored Navi's plan to increase his ability to tolerate anxiety by helping to grocery shop for mom.     Explored ways of related to anxiety and reflected how Navi is changing this pattern. Mom also positively reflected on the changes Navi has made.    Navi would like to meet with , but requests to meet with her during his individual therapy. Writer agrees to communicate this request to his individual therapist.    Provided supportive listening and reflect Navi's ongoing recovery from psychosis. Mom also encouraged and validated Navi in his ongoing " recovery.     Overall family and Navi seemed engaged in conversation. They did express interest in continuing to meet for family therapy and psychoeducation. As of today's appt their insight into Navi's mental illness appears fair. They seem they would benefit from continued clinical intervention aimed at assisting them implement helpful strategies at home and increase their understanding of psychosis.    12. Plan/Referrals:     Will meet with family weekly as schedule allows for evidence based family psychoeducation and therapeutic support aimed at maximizing Navi's opportunity for recovery from psychosis.     Laura Maldonado MA, LP   NAVIGATE   The author of this note documented a reason for not sharing it with the patient.

## 2025-01-28 ENCOUNTER — VIRTUAL VISIT (OUTPATIENT)
Dept: PSYCHIATRY | Facility: CLINIC | Age: 30
End: 2025-01-28
Payer: COMMERCIAL

## 2025-01-28 DIAGNOSIS — F20.9 SCHIZOPHRENIA, UNSPECIFIED TYPE (H): Primary | ICD-10-CM

## 2025-01-28 ASSESSMENT — PATIENT HEALTH QUESTIONNAIRE - PHQ9
SUM OF ALL RESPONSES TO PHQ QUESTIONS 1-9: 21
10. IF YOU CHECKED OFF ANY PROBLEMS, HOW DIFFICULT HAVE THESE PROBLEMS MADE IT FOR YOU TO DO YOUR WORK, TAKE CARE OF THINGS AT HOME, OR GET ALONG WITH OTHER PEOPLE: VERY DIFFICULT
SUM OF ALL RESPONSES TO PHQ QUESTIONS 1-9: 21
10. IF YOU CHECKED OFF ANY PROBLEMS, HOW DIFFICULT HAVE THESE PROBLEMS MADE IT FOR YOU TO DO YOUR WORK, TAKE CARE OF THINGS AT HOME, OR GET ALONG WITH OTHER PEOPLE: VERY DIFFICULT

## 2025-01-28 NOTE — PROGRESS NOTES
NAVIGGALINDO Clinician Contact & Progress Note  For Individual Resiliency Training (IRT)  A Part of the [facility] First Episode of Psychosis Program    NAVIGATE Enrollee: Navi Morales (1995)     MRN: [id number]  Date:  1/28/25  Diagnosis: Psychosis, unspecified psychosis type (H) [F29]   Clinician: LILIANA Individual Resiliency Trainer, MERCEDES Mchugh     1. Type of contact: (majority of time spent)  IRT Session via telehealth  Mode of communication: [employer] (HIPAA compliant, secure platform). Patient consented verbally to this mode of therapy today.  Reason for telehealth: To reduce barriers in accessing services, due to but not limited to transportation, location, or scheduling reasons.    2. People present:   Writer  Client: Yes - Navi    3. Length of Actual Contact: Start Time: 2:05p; End Time: 3:05p     4. Location of contact:  Originating Location (patient location): [facility], located in [place], [place]  Distant Location (provider location): Remote location    5. Did the client complete the home practice option(s) from the previous session:  Completed home practice of personality and interest assessments.     6. Motivational Teaching Strategies:  Connect info and skills with personal goals  Promote hope and positive expectations  Explore pros and cons of change  Re-frame experiences in positive light    7. Educational Teaching Strategies:  Review of written material/education  Relate information to client's experience  Ask questions to check comprehension  Break down information into small chunks  Adopt client's language     8. CBT Teaching Strategies:  Reinforcement and shaping (positive feedback for steps towards goals and gains in knowledge & skills)  Cognitive restructuring (identify thoughts related to negative feelings)  Mindfulness and breathing    9. IRT Module(s) Addressed:  Coping with symptoms  Goal setting   Mindfulness skills    10. Techniques utilized:   [place] announced at  "beginning of session  Review of goal  Review of previous meeting  Present new material  Modeling  Help client choose a home practice option  Summarize progress made in current session    11. Measures:  Answers submitted by the patient for this visit:  Patient Health Questionnaire (Submitted on 1/28/2025)  If you checked off any problems, how difficult have these problems made it for you to do your work, take care of things at home, or get along with other people?: Very difficult  PHQ9 TOTAL SCORE: 21    Mental Status Exam  Alertness: alert   Behavior/Demeanor: cooperative, pleasant, and calm  Speech: regular rate and rhythm  Language: intact.   Mood: depressed and anxious  Thought Process/Associations: unremarkable  Thought Content:  Reports suicidal ideation;  Denies suicidal ideation with plan; with intent [details in Interim History]  Perception:  Reports auditory hallucinations;  Denies visual hallucinations  Insight: good  Judgment: adequate for safety  Cognition: does  appear grossly intact; formal cognitive testing was not done      12. Assessment/Progress Note:     Writer and client met for IRT visit via [employer]. Set agenda to check in, reported current symptoms to include: AH, depression and negative symptoms (flat affect, anhedonia), \"social anxiety\". Writer used relational and interpersonal approach to explore feelings, motivations, and behavior. Writer offered support, feedback, validation, and reinforced use of skills taught in IRT from modalities including cognitive behavioral therapy, psycho education, and skills training. Promoted understanding of their experiences of psychosis and how it impacts them in important areas of their life and in recovery goals. Reflected on client's strengths and resiliency factors and facilitated discussion on how these can assist in symptom management, recovery, and well-being. Explored symptoms and coping from a stress-vulnerability lens. Completed home practice of " "personality and interest assessment. Reported suicidal thoughts without plan or intent today. Reviewed safety plan.     Navi reports feeling anxious this week- went to grocery store, still smoked weed right before. Didn't think of it as an exercise, more \"just going with my mom as something to do\". Writer reviewed purpose of exposures and how Navi uses avoidance, intellectualization and rationalization to cope. Navi said he didn't feel anxious at the store so he saw it as \"okay\" . Identified the need to tell his mom why he is doing the solo trips to the grocery store (to gain independence, use coping strategies we're discussing, etc) and to only ask for a handful of items for him to .     The rest of session was spent coaching cognitive restructuring, using a recent stressful event (mom asked him to shovel).     5 Steps to Cognitive Restructuring   1- Describe situation: It snowed and my mom asked me to shovel  2- feeling: Anxiety, fear (where is this held?)  3- Thought: \"My neighbors will see me outside and think I'm a loser\"  4-   Evidence for: I live in my parents basement, my neighbors are gossipy  Evidence against: They might think I'm being helpful for my parents or that I'm a good son, people might not see me  5- Action - step outside and affirm I am being helpful. \"They can think what they want\" \"I need to live somewhere\"  --> eager, helpful --> driveway done, feels good to help mom    Completed home practice of personality and interest assessment. Will review next time.     Interest assessment: Conventional 30, Enterprising 13, Artistic 37, Realistic 32, Investigative 38, Social 28  [facility] Personality Type: INFP - T - the  - will review next time.     13. Plan/Referrals:     Continue Navigate services, weekly IRT, scheduled 2/5/25 at 2      Billing for \"Interactive Complexity\"?    No  Brandie Moran, [facility]     "

## 2025-01-30 NOTE — PROGRESS NOTES
NAVIGATE Clinician Contact & Progress Note   For Family Education Program    NAVIGATE Enrollee: Navi Morales (1995)     MRN: [id number]  Date:  1/28/25  Diagnosis(es):   Psychosis unspecified  Clinician: LILIANA Family Clinician, Laura Maldonado MA, SELWYN     1. Type of contact: (majority of time spent)  Family Session via telehealth  Mode of communication:    Linwood Verduzco consented verbally to this mode of therapy today.  Reason for telehealth: To reduce barriers in accessing services, due to but not limited to transportation, location, or scheduling reasons.     2. People present:   Writer  Client: Yes  Significant Other/Family/Friend:  Mother    3. Length of Actual Contact: Start Time: 11:05am to 11:55am      4. Location of contact:  Originating Location (patient location):[place]   Distant Location (provider location): Home office, located in [place], [place], using appropriate privacy considerations and procedures    5. Did the client complete the home practice option(s) from the previous session: Partially Completed    6. Motivational Teaching Strategies:  Connect info and skills with personal goals  Promote hope and positive expectations    7. Educational Teaching Strategies:  Review of written material/education  Relate information to client's experience  Ask questions to check comprehension  Break down information into small chunks    8. CBT Teaching Strategies:  Reinforcement and shaping (positive feedback for steps towards goals and gains in knowledge & skills)    9. Psychoeducational Topic(s) Addressed:  Just the Facts - Coping with Stress    10. Techniques utilized:   [place] announced at beginning of session  Review of previous meeting  Present new material  Family Therapy  Motivational Interviewing (Connect info and skills with personal goals, Promote hope and positive expectations, Explore pros and cons of change, Re-frame experiences in a positive light)  Educational  Teaching Strategies (Review written material/education on: Psychosis, Medications for psychosis, Coping with stress, Strategies to build resiliency, Relapse prevention planning, Developing a collaboration with mental health professionals, Effective communication, A relative s guide to supporting recovery from psychosis, Basic facts about alcohol and drugs)  CBT (Reinforcement and shaping, Social skills training, Relapse prevention planning, Coping skills training, Relaxation training, Cognitive restructuring, Behavioral tailoring)    11. Assessment/Progress Note:     Writer met with Navi and Navi's mom on this day via [employer].  Set agenda to check in with each of them and to continue education on coping with stress. Family were agreeable to this agenda. Navi and mom report no urgent concerns to be addressed on this day.     Discussed idea of Navi's parents going on vacation in late Feb or early March for a week. Navi reported concerns about taking care of the dog. Validated that this is something that can be good to plan for and encouraged family to think about what resources might be needed to make the vacation work well for Navi such as having another dog training appointment and other family or friends that could help with dog care.     Explored other updates and Navi reported he had not gone to the grocery store since our last appt but informed mom he wanted to go on this evening and then again on Thursday or Friday on his own. Navi reported that he helps him to not talk to much about the trip prior as this increases his anticipatory anxiety.     Provided supportive listening and reflected Navi's ongoing recovery from psychosis.     Returned to education about coping with stress and reflected on strategies for managing stress/anxiety when at the grocery store including breathing techniques. Will continue this at next session.     Overall family and Navi seemed engaged in conversation. They did express interest in  continuing to meet for family therapy and psychoeducation. As of today's appt their insight into Navi's mental illness appears fair. They seem they would benefit from continued clinical intervention aimed at assisting them implement helpful strategies at home and increase their understanding of psychosis.    12. Plan/Referrals:     Will meet with family weekly as schedule allows for evidence based family psychoeducation and therapeutic support aimed at maximizing Navi's opportunity for recovery from psychosis.     Laura Maldonado MA, LP   NAVIGATE   The author of this note documented a reason for not sharing it with the patient.

## 2025-02-04 ENCOUNTER — VIRTUAL VISIT (OUTPATIENT)
Dept: PSYCHIATRY | Facility: CLINIC | Age: 30
End: 2025-02-04
Payer: COMMERCIAL

## 2025-02-04 DIAGNOSIS — F20.9 SCHIZOPHRENIA, UNSPECIFIED TYPE (H): Primary | ICD-10-CM

## 2025-02-04 ASSESSMENT — ANXIETY QUESTIONNAIRES
7. FEELING AFRAID AS IF SOMETHING AWFUL MIGHT HAPPEN: MORE THAN HALF THE DAYS
IF YOU CHECKED OFF ANY PROBLEMS ON THIS QUESTIONNAIRE, HOW DIFFICULT HAVE THESE PROBLEMS MADE IT FOR YOU TO DO YOUR WORK, TAKE CARE OF THINGS AT HOME, OR GET ALONG WITH OTHER PEOPLE: VERY DIFFICULT
7. FEELING AFRAID AS IF SOMETHING AWFUL MIGHT HAPPEN: MORE THAN HALF THE DAYS
3. WORRYING TOO MUCH ABOUT DIFFERENT THINGS: MORE THAN HALF THE DAYS
2. NOT BEING ABLE TO STOP OR CONTROL WORRYING: MORE THAN HALF THE DAYS
6. BECOMING EASILY ANNOYED OR IRRITABLE: MORE THAN HALF THE DAYS
GAD7 TOTAL SCORE: 15
GAD7 TOTAL SCORE: 15
8. IF YOU CHECKED OFF ANY PROBLEMS, HOW DIFFICULT HAVE THESE MADE IT FOR YOU TO DO YOUR WORK, TAKE CARE OF THINGS AT HOME, OR GET ALONG WITH OTHER PEOPLE?: VERY DIFFICULT
1. FEELING NERVOUS, ANXIOUS, OR ON EDGE: NEARLY EVERY DAY
6. BECOMING EASILY ANNOYED OR IRRITABLE: MORE THAN HALF THE DAYS
8. IF YOU CHECKED OFF ANY PROBLEMS, HOW DIFFICULT HAVE THESE MADE IT FOR YOU TO DO YOUR WORK, TAKE CARE OF THINGS AT HOME, OR GET ALONG WITH OTHER PEOPLE?: VERY DIFFICULT
GAD7 TOTAL SCORE: 15
4. TROUBLE RELAXING: MORE THAN HALF THE DAYS
GAD7 TOTAL SCORE: 15
IF YOU CHECKED OFF ANY PROBLEMS ON THIS QUESTIONNAIRE, HOW DIFFICULT HAVE THESE PROBLEMS MADE IT FOR YOU TO DO YOUR WORK, TAKE CARE OF THINGS AT HOME, OR GET ALONG WITH OTHER PEOPLE: VERY DIFFICULT
2. NOT BEING ABLE TO STOP OR CONTROL WORRYING: MORE THAN HALF THE DAYS
GAD7 TOTAL SCORE: 15
7. FEELING AFRAID AS IF SOMETHING AWFUL MIGHT HAPPEN: MORE THAN HALF THE DAYS
GAD7 TOTAL SCORE: 15
GAD7 TOTAL SCORE: 15
7. FEELING AFRAID AS IF SOMETHING AWFUL MIGHT HAPPEN: MORE THAN HALF THE DAYS
4. TROUBLE RELAXING: MORE THAN HALF THE DAYS
2. NOT BEING ABLE TO STOP OR CONTROL WORRYING: MORE THAN HALF THE DAYS
GAD7 TOTAL SCORE: 15
7. FEELING AFRAID AS IF SOMETHING AWFUL MIGHT HAPPEN: MORE THAN HALF THE DAYS
3. WORRYING TOO MUCH ABOUT DIFFERENT THINGS: MORE THAN HALF THE DAYS
IF YOU CHECKED OFF ANY PROBLEMS ON THIS QUESTIONNAIRE, HOW DIFFICULT HAVE THESE PROBLEMS MADE IT FOR YOU TO DO YOUR WORK, TAKE CARE OF THINGS AT HOME, OR GET ALONG WITH OTHER PEOPLE: VERY DIFFICULT
8. IF YOU CHECKED OFF ANY PROBLEMS, HOW DIFFICULT HAVE THESE MADE IT FOR YOU TO DO YOUR WORK, TAKE CARE OF THINGS AT HOME, OR GET ALONG WITH OTHER PEOPLE?: VERY DIFFICULT
1. FEELING NERVOUS, ANXIOUS, OR ON EDGE: NEARLY EVERY DAY
3. WORRYING TOO MUCH ABOUT DIFFERENT THINGS: MORE THAN HALF THE DAYS
7. FEELING AFRAID AS IF SOMETHING AWFUL MIGHT HAPPEN: MORE THAN HALF THE DAYS
5. BEING SO RESTLESS THAT IT IS HARD TO SIT STILL: MORE THAN HALF THE DAYS
GAD7 TOTAL SCORE: 15
6. BECOMING EASILY ANNOYED OR IRRITABLE: MORE THAN HALF THE DAYS
4. TROUBLE RELAXING: MORE THAN HALF THE DAYS
1. FEELING NERVOUS, ANXIOUS, OR ON EDGE: NEARLY EVERY DAY
5. BEING SO RESTLESS THAT IT IS HARD TO SIT STILL: MORE THAN HALF THE DAYS
5. BEING SO RESTLESS THAT IT IS HARD TO SIT STILL: MORE THAN HALF THE DAYS

## 2025-02-04 ASSESSMENT — PATIENT HEALTH QUESTIONNAIRE - PHQ9
SUM OF ALL RESPONSES TO PHQ QUESTIONS 1-9: 18
10. IF YOU CHECKED OFF ANY PROBLEMS, HOW DIFFICULT HAVE THESE PROBLEMS MADE IT FOR YOU TO DO YOUR WORK, TAKE CARE OF THINGS AT HOME, OR GET ALONG WITH OTHER PEOPLE: VERY DIFFICULT
SUM OF ALL RESPONSES TO PHQ QUESTIONS 1-9: 18
SUM OF ALL RESPONSES TO PHQ QUESTIONS 1-9: 18
10. IF YOU CHECKED OFF ANY PROBLEMS, HOW DIFFICULT HAVE THESE PROBLEMS MADE IT FOR YOU TO DO YOUR WORK, TAKE CARE OF THINGS AT HOME, OR GET ALONG WITH OTHER PEOPLE: VERY DIFFICULT
SUM OF ALL RESPONSES TO PHQ QUESTIONS 1-9: 18

## 2025-02-04 NOTE — PROGRESS NOTES
NAVIGATE Clinician Contact & Progress Note   For Family Education Program    NAVIGATE Enrollee: Navi Morales (1995)     MRN: [id number]  Date:  2/04/25  Diagnosis(es):   Psychosis unspecified  Clinician: LILIANA Family Clinician, Laura Maldonado MA, SELWYN     1. Type of contact: (majority of time spent)  Family Session via telehealth  Mode of communication:    Linwood Verduzco consented verbally to this mode of therapy today.  Reason for telehealth: To reduce barriers in accessing services, due to but not limited to transportation, location, or scheduling reasons.     2. People present:   Writer  Client: Yes  Significant Other/Family/Friend:  Mother    3. Length of Actual Contact: Start Time: 4:10 to 5pm      4. Location of contact:  Originating Location (patient location):[place]   Distant Location (provider location): Home office, located in [place], [place], using appropriate privacy considerations and procedures    5. Did the client complete the home practice option(s) from the previous session: Partially Completed    6. Motivational Teaching Strategies:  Connect info and skills with personal goals  Promote hope and positive expectations    7. Educational Teaching Strategies:  Review of written material/education  Relate information to client's experience  Ask questions to check comprehension  Break down information into small chunks    8. CBT Teaching Strategies:  Reinforcement and shaping (positive feedback for steps towards goals and gains in knowledge & skills)    9. Psychoeducational Topic(s) Addressed:  Just the Facts - Coping with Stress    10. Techniques utilized:   [place] announced at beginning of session  Review of previous meeting  Present new material  Family Therapy  Motivational Interviewing (Connect info and skills with personal goals, Promote hope and positive expectations, Explore pros and cons of change, Re-frame experiences in a positive light)  Educational Teaching  "Strategies (Review written material/education on: Psychosis, Medications for psychosis, Coping with stress, Strategies to build resiliency, Relapse prevention planning, Developing a collaboration with mental health professionals, Effective communication, A relative s guide to supporting recovery from psychosis, Basic facts about alcohol and drugs)  CBT (Reinforcement and shaping, Social skills training, Relapse prevention planning, Coping skills training, Relaxation training, Cognitive restructuring, Behavioral tailoring)    11. Assessment/Progress Note:     Writer met with Navi and Navi's mom on this day via [employer].  Set agenda to check in with each of them and to continue education on coping with stress. Family were agreeable to this agenda. Navi and mom report no urgent concerns to be addressed on this day.     Mom reports that Navi had just got up and was struggling with sleeping during the day and being awake at night. Navi did not report this as a concern and noted that he had been awake prior to this appt. Navi reports going to the grocery store one time over the last week and also taking the dog for a walk. Navi reported noticing some anxious thoughts prior to going to the grocery store including, \"something bad is going to happen\" or \"I could do make a mistake.\" Validated that anticipatory anxious thoughts make sense given his feelings of anxiety; reviewed thought/feelings triangle and offered that these negative thoughts have become a familiar cycle in Navi's brain.     Offer to discuss strategies for managing anxiety and interrupting negative thought pattern. Navi and mom are agreeable to this plan for the session. Writer provides education about practicing relaxation breathing (as per family manual) so as to trigger parasympathetic nervous system. Writer modeled breathing and coached Navi and mom to identify the seconds for inhale/exhale that feel most comfortable and encouraged them to only breathe in a " way that is comfortable for them. Offer idea of adding in a calming word to accompany breath, but Navi declines this step. Both mom and Navi report feeling more calm following breathing practice. Discuss ideas for practicing in the next week.     Explore other strategies including grounding strategy, progressive muscle relaxation and imagining a peaceful place. Provided supportive listening and reflected Navi's ongoing recovery from psychosis. Reflect on Navi's noticing of mom's support and also both mom and Navi's efforts to manage this stressful time.     Overall family and Navi seemed engaged in conversation. They did express interest in continuing to meet for family therapy and psychoeducation. As of today's appt their insight into Navi's mental illness appears fair. They seem they would benefit from continued clinical intervention aimed at assisting them implement helpful strategies at home and increase their understanding of psychosis.    12. Plan/Referrals:     Will meet with family weekly as schedule allows for evidence based family psychoeducation and therapeutic support aimed at maximizing Navi's opportunity for recovery from psychosis.     Laura Maldonado MA, LP   NAVIGATE   The author of this note documented a reason for not sharing it with the patient.

## 2025-02-05 ENCOUNTER — VIRTUAL VISIT (OUTPATIENT)
Dept: PSYCHIATRY | Facility: CLINIC | Age: 30
End: 2025-02-05
Payer: COMMERCIAL

## 2025-02-05 DIAGNOSIS — F20.9 SCHIZOPHRENIA, UNSPECIFIED TYPE (H): Primary | ICD-10-CM

## 2025-02-05 NOTE — PROGRESS NOTES
NAVIGGALINDO Clinician Contact & Progress Note  For Individual Resiliency Training (IRT)  A Part of the [facility] First Episode of Psychosis Program    NAVIGATE Enrollee: Navi Morales (1995)     MRN: [id number]  Date:  2/05/25  Diagnosis: Psychosis, unspecified psychosis type (H) [F29]   Clinician: LILIANA Individual Resiliency Trainer, MERCEDES Mchugh     1. Type of contact: (majority of time spent)  IRT Session via telehealth  Mode of communication: [employer] (HIPAA compliant, secure platform). Patient consented verbally to this mode of therapy today.  Reason for telehealth: To reduce barriers in accessing services, due to but not limited to transportation, location, or scheduling reasons.    2. People present:   Writer  Client: Yes - Navi    3. Length of Actual Contact: Start Time: 2:05p; End Time: 3:00p     4. Location of contact:  Originating Location (patient location): [facility], located in [place], [place]  Distant Location (provider location): Remote location    5. Did the client complete the home practice option(s) from the previous session:  Completed home practice of personality and interest assessments.     6. Motivational Teaching Strategies:  Connect info and skills with personal goals  Promote hope and positive expectations  Explore pros and cons of change  Re-frame experiences in positive light    7. Educational Teaching Strategies:  Review of written material/education  Relate information to client's experience  Ask questions to check comprehension  Break down information into small chunks  Adopt client's language     8. CBT Teaching Strategies:  Reinforcement and shaping (positive feedback for steps towards goals and gains in knowledge & skills)  Cognitive restructuring (identify thoughts related to negative feelings)  Mindfulness and breathing    9. IRT Module(s) Addressed:  Coping with symptoms  Goal setting   Mindfulness skills    10. Techniques utilized:   [place] announced at  "beginning of session  Review of goal  Review of previous meeting  Present new material  Modeling  Help client choose a home practice option  Summarize progress made in current session    11. Measures:  Answers submitted by the patient for this visit:  Patient Health Questionnaire (Submitted on 1/28/2025)  If you checked off any problems, how difficult have these problems made it for you to do your work, take care of things at home, or get along with other people?: Very difficult  PHQ9 TOTAL SCORE: 21    Mental Status Exam  Alertness: alert   Behavior/Demeanor: cooperative, pleasant, and calm  Speech: regular rate and rhythm  Language: intact.   Mood: depressed and anxious  Thought Process/Associations: unremarkable  Thought Content:  Reports suicidal ideation;  Denies suicidal ideation with plan; with intent [details in Interim History]  Perception:  Reports auditory hallucinations;  Denies visual hallucinations  Insight: good  Judgment: adequate for safety  Cognition: does  appear grossly intact; formal cognitive testing was not done      12. Assessment/Progress Note:     Writer and client met for IRT visit via [employer]. Set agenda to check in, reported current symptoms to include: AH, depression and negative symptoms (flat affect, anhedonia), \"social anxiety\". Writer used relational and interpersonal approach to explore feelings, motivations, and behavior. Writer offered support, feedback, validation, and reinforced use of skills taught in IRT from modalities including cognitive behavioral therapy, psycho education, and skills training. Promoted understanding of their experiences of psychosis and how it impacts them in important areas of their life and in recovery goals. Reflected on client's strengths and resiliency factors and facilitated discussion on how these can assist in symptom management, recovery, and well-being. Explored symptoms and coping from a stress-vulnerability lens. Completed home practice of " "personality and interest assessment. Reported suicidal thoughts without plan or intent today. Reviewed safety plan.     Navi reports feeling okay this week. Has had difficulty sleeping. Set goal to avoid caffeine 8-10 hours prior to goal bed time. Discussed taking it slowly, trying for an hour earlier over the course of a few days. Navi identified his black and white thinking and said he thought he might \"try just doing it quick in one day\" with a laugh. Discussed how exercise, deep breathing (working on that with Laura in family work) and ASMR are all helpful. Wants to incorporate more exercise.     Navi Completed home practice of personality and interest assessment. We reviewed the 16 personalities website. Writer gave caveat of this being a tool to help him understand himself better. He is the expert, not the assessment. Several items navi identified with including having emotional reactions to art, music and movies as well as the people around him. Discussed at length the meaning of \"empathy\". Writer then shared screen and showed him how to use the website as well as interest inventory.    Interest assessment: Conventional 30, Enterprising 13, Artistic 37, Realistic 32, Investigative 38, Social 28  [facility] Personality Type: INFP - T - the      13. Plan/Referrals:     Continue Navigate services, weekly IRT, scheduled tue 2/11 at 3pm  Will discuss career section      Billing for \"Interactive Complexity\"?    No  Brandie Moran, MERCEDES     "

## 2025-02-11 ENCOUNTER — VIRTUAL VISIT (OUTPATIENT)
Dept: PSYCHIATRY | Facility: CLINIC | Age: 30
End: 2025-02-11
Payer: COMMERCIAL

## 2025-02-11 DIAGNOSIS — F20.9 SCHIZOPHRENIA, UNSPECIFIED TYPE (H): Primary | ICD-10-CM

## 2025-02-11 ASSESSMENT — PATIENT HEALTH QUESTIONNAIRE - PHQ9
10. IF YOU CHECKED OFF ANY PROBLEMS, HOW DIFFICULT HAVE THESE PROBLEMS MADE IT FOR YOU TO DO YOUR WORK, TAKE CARE OF THINGS AT HOME, OR GET ALONG WITH OTHER PEOPLE: VERY DIFFICULT
SUM OF ALL RESPONSES TO PHQ QUESTIONS 1-9: 18
10. IF YOU CHECKED OFF ANY PROBLEMS, HOW DIFFICULT HAVE THESE PROBLEMS MADE IT FOR YOU TO DO YOUR WORK, TAKE CARE OF THINGS AT HOME, OR GET ALONG WITH OTHER PEOPLE: VERY DIFFICULT
SUM OF ALL RESPONSES TO PHQ QUESTIONS 1-9: 18

## 2025-02-11 NOTE — PROGRESS NOTES
NAVIGATE Clinician Contact & Progress Note  For Individual Resiliency Training (IRT)  A Part of the [facility] First Episode of Psychosis Program    NAVIGATE Enrollee: Navi Morales (1995)     MRN: [id number]  Date:  2/11/25  Diagnosis: Psychosis, unspecified psychosis type (H) [F29]   Clinician: LILIANA Individual Resiliency Trainer, MERCEEDS Mchugh     1. Type of contact: (majority of time spent)  IRT Session via telehealth  Mode of communication: [employer] (HIPAA compliant, secure platform). Patient consented verbally to this mode of therapy today.  Reason for telehealth: To reduce barriers in accessing services, due to but not limited to transportation, location, or scheduling reasons.    2. People present:   Writer  Client: Yes - Navi    3. Length of Actual Contact: Start Time: 3:12p; End Time: 4:00p     4. Location of contact:  Originating Location (patient location): [facility], located in [place], [place]  Distant Location (provider location): Remote location    5. Did the client complete the home practice option(s) from the previous session:  Completed home practice of reviewing career sections and adding sleep hygiene to his schedule    6. Motivational Teaching Strategies:  Connect info and skills with personal goals  Promote hope and positive expectations  Explore pros and cons of change  Re-frame experiences in positive light    7. Educational Teaching Strategies:  Review of written material/education  Relate information to client's experience  Ask questions to check comprehension  Break down information into small chunks  Adopt client's language     8. CBT Teaching Strategies:  Reinforcement and shaping (positive feedback for steps towards goals and gains in knowledge & skills)  Cognitive restructuring (identify thoughts related to negative feelings)  Mindfulness and breathing    9. IRT Module(s) Addressed:  Coping with symptoms - anxiety  Goal setting   Mindfulness skills    10.  "Techniques utilized:   [place] announced at beginning of session  Review of goal  Review of previous meeting  Present new material  Modeling  Help client choose a home practice option  Summarize progress made in current session    11. Measures:    [place] Protocol Risk Identification  1) Have you wished you were dead or wished you could go to sleep and not wake up? Yes  2) Have you actually had any thoughts about killing yourself? Yes  If YES to 2, answer questions 3, 4, 5, 6  If NO to 2, go directly to question 6  3) Have you thought about how you might do this? Yes  4) Have you had any intention of acting on these thoughts of killing yourself, as opposed to you have the thoughts but you definitely would not act on them? No  5) Have you started to work out or worked out the details of how to kill yourself? Do you intend to carry out this plan? No  Always Ask Question 6  6) Have you done anything, started to do anything, or prepared to do anything to end your life? No  Examples: collected pills, obtained a gun, gave away valuables, wrote a will or suicide note, held a gun but changed your mind, cut yourself, tried to hang yourself, etc.     Mental Status Exam  Alertness: alert   Behavior/Demeanor: cooperative, pleasant, and calm  Speech: regular rate and rhythm  Language: intact.   Mood: depressed and anxious  Thought Process/Associations: unremarkable  Thought Content:  Reports suicidal ideation;  Denies suicidal ideation with plan; with intent [details in Interim History]  Perception:  Reports auditory hallucinations;  Denies visual hallucinations  Insight: good  Judgment: adequate for safety  Cognition: does  appear grossly intact; formal cognitive testing was not done      12. Assessment/Progress Note:     Writer and client met for IRT visit via [employer]. Set agenda to check in, reported current symptoms to include: \"social anxiety\" and \"some suicidal thoughts\" (see [place] Suicide scale above). Denied intention " "or plan but says he \"thinks about not being around\". Denied     Writer used relational and interpersonal approach to explore feelings, motivations, and behavior. Writer offered support, feedback, validation, and reinforced use of skills taught in IRT from modalities including cognitive behavioral therapy, psycho education, and skills training. Promoted understanding of their experiences of psychosis and how it impacts them in important areas of their life and in recovery goals. Reflected on client's strengths and resiliency factors and facilitated discussion on how these can assist in symptom management, recovery, and well-being. Explored symptoms and coping from a stress-vulnerability lens. Completed home practice of sleep hygiene steps and reviewing career section of personality inventory.    Navi reports feeling okay this week. Has had continued difficulty sleeping. Has been pushing back caffeine 6 hours prior to bed time. Navi reports he is trying but still can't sleep. Broke down his sleep routine and Navi is vaping right before bed. Offered what it might look like without vaping right before bed.  Wants to try moving his \"last vape\" to earlier in the night.  Discussed taking it slowly, trying for an hour earlier over the course of a few days. Discussed how exercise, deep breathing (working on that with Laura in family work) and ASMR are all helpful. Wants to incorporate more exercise.     Majority of session was discussing thoughts and worries about his Family going to [place] in March  for one week - 10 days (mom, dad, grandpa, uncle and his family). Navi reports his main worry is over losing the dog or him running away. Navi has evidence that he has gotten away from dad twice. The evidence against was that he hasn't ran away from Navi himself and that Navi can have a plan when family is gone. Navi identified he could use better leash, have the animal control number handy, make sure he's on his leash/secure the " "area, the dog \"will tell me when he wants to go outside.\"    Mail, garbage - fridays, snow, attend appts, take care of dog. Meet with Navi at home - [place]. Regular checking in on the dog not chewing up stuff.          13. Plan/Referrals:     Continue Navigate services, weekly IRT, scheduled tue 2/18 at 3pm      Billing for \"Interactive Complexity\"?    No  MERCEDES Mchugh     "

## 2025-02-12 NOTE — PROGRESS NOTES
NAVIGATE Clinician Contact & Progress Note   For Family Education Program    NAVIGATE Enrollee: Navi Morales (1995)     MRN: [id number]  Date:  2/11/25  Diagnosis(es):   Psychosis unspecified  Clinician: LILIANA Family Clinician, Laura Maldonado MA, LP     1. Type of contact: (majority of time spent)  Family Session via telehealth  Mode of communication:    Linwood Verduzco consented verbally to this mode of therapy today.  Reason for telehealth: To reduce barriers in accessing services, due to but not limited to transportation, location, or scheduling reasons.     2. People present:   Writer  Client: Yes  Significant Other/Family/Friend:  Mother    3. Length of Actual Contact: Start Time: 4:10 to 5pm      4. Location of contact:  Originating Location (patient location):[place]   Distant Location (provider location): Home office, located in [place], [place], using appropriate privacy considerations and procedures    5. Did the client complete the home practice option(s) from the previous session: Partially Completed    6. Motivational Teaching Strategies:  Connect info and skills with personal goals  Promote hope and positive expectations    7. Educational Teaching Strategies:  Review of written material/education  Relate information to client's experience  Ask questions to check comprehension  Break down information into small chunks    8. CBT Teaching Strategies:  Reinforcement and shaping (positive feedback for steps towards goals and gains in knowledge & skills)    9. Psychoeducational Topic(s) Addressed:  Just the Facts - Coping with Stress    10. Techniques utilized:   [place] announced at beginning of session  Review of previous meeting  Present new material  Family Therapy  Motivational Interviewing (Connect info and skills with personal goals, Promote hope and positive expectations, Explore pros and cons of change, Re-frame experiences in a positive light)  Educational Teaching  "Strategies (Review written material/education on: Psychosis, Medications for psychosis, Coping with stress, Strategies to build resiliency, Relapse prevention planning, Developing a collaboration with mental health professionals, Effective communication, A relative s guide to supporting recovery from psychosis, Basic facts about alcohol and drugs)  CBT (Reinforcement and shaping, Social skills training, Relapse prevention planning, Coping skills training, Relaxation training, Cognitive restructuring, Behavioral tailoring)    11. Assessment/Progress Note:     Writer met with Navi and Navi's mom on this day via [employer].  Set agenda to check in with each of them and to continue education on coping with stress. Family were agreeable to this agenda. Navi and mom report no urgent concerns to be addressed on this day.     Mom joins first and voices concern over Navi's difficulty with sleep. Navi joins appointment as well and reports that his days and nights are flipped. He reports getting up for the day not that long before current appointment. Navi has psychiatry appt next week; offer to communicate with team re: his sleep difficulties. Navi is open to this coordination. Navi reports better sleep when on seroquel, but also had increased sedation.    Explore use of skills for stress management as discussed the last week. Navi reports doing relaxation breathing before bed.    Discuss changes over the last week and Navi reports going to the grocery store one time over the last week and that he hopes to go 2x this week. Navi also reports goal of exercising, but notes that dad is often using the room where the treadmill is located. Mom reports times when dad is gone; also explore other ways to make a request to dad to use this room. However, Navi reports intense \"pressure\" related to exercise and states that what he is going to do for exercise \"doesn't meet the benchmark\" for exercise, and so he is reluctant to ask dad for time " to exercise.     Provide supportive listening to Navi and mom; validate Navi's reluctance for asking due to his experience of pressure and not meeting expectations. Explore strategy of self compassion  such as offering kindness rather than critique. Navi is ambivalent about this strategy, but is agreeable to continuing the conversation at the next session.      Overall family and Navi seemed engaged in conversation. They did express interest in continuing to meet for family therapy and psychoeducation. As of today's appt their insight into Navi's mental illness appears fair. They seem they would benefit from continued clinical intervention aimed at assisting them implement helpful strategies at home and increase their understanding of psychosis.    12. Plan/Referrals:     Will meet with family weekly as schedule allows for evidence based family psychoeducation and therapeutic support aimed at maximizing Navi's opportunity for recovery from psychosis.     Laura Maldonado MA, LP   NAVIGATE   The author of this note documented a reason for not sharing it with the patient.

## 2025-02-18 ENCOUNTER — VIRTUAL VISIT (OUTPATIENT)
Dept: PSYCHIATRY | Facility: CLINIC | Age: 30
End: 2025-02-18
Payer: COMMERCIAL

## 2025-02-18 DIAGNOSIS — F20.9 SCHIZOPHRENIA, UNSPECIFIED TYPE (H): Primary | ICD-10-CM

## 2025-02-19 ENCOUNTER — MYC MEDICAL ADVICE (OUTPATIENT)
Dept: PSYCHIATRY | Facility: CLINIC | Age: 30
End: 2025-02-19

## 2025-02-19 ENCOUNTER — TELEPHONE (OUTPATIENT)
Dept: PSYCHIATRY | Facility: CLINIC | Age: 30
End: 2025-02-19

## 2025-02-19 ENCOUNTER — VIRTUAL VISIT (OUTPATIENT)
Dept: PSYCHIATRY | Facility: CLINIC | Age: 30
End: 2025-02-19
Payer: COMMERCIAL

## 2025-02-19 ENCOUNTER — BEH TREATMENT PLAN (OUTPATIENT)
Dept: PSYCHIATRY | Facility: CLINIC | Age: 30
End: 2025-02-19

## 2025-02-19 DIAGNOSIS — F25.1 SCHIZOAFFECTIVE DISORDER, DEPRESSIVE TYPE (H): Primary | ICD-10-CM

## 2025-02-19 DIAGNOSIS — F40.10 SOCIAL ANXIETY DISORDER: ICD-10-CM

## 2025-02-19 DIAGNOSIS — F41.1 GAD (GENERALIZED ANXIETY DISORDER): ICD-10-CM

## 2025-02-19 RX ORDER — ESCITALOPRAM OXALATE 5 MG/1
5 TABLET ORAL DAILY
Qty: 30 TABLET | Refills: 1 | Status: SHIPPED | OUTPATIENT
Start: 2025-02-19

## 2025-02-19 NOTE — NURSING NOTE
NAVIGATE Patient Self-Rating Form    Since your last medication management visit--    Have you been feeling depressed, sad, or down? Yes  Have you been feeling anxious, worried or nervous? Yes  Have you been thinking about death or have you had any feelings that you would be better off dead? Yes   Have you been feeling particularly good? No  Have you been feeling annoyed, angry, or resentful (whether you showed it or not)? No  Did you do anything that could have gotten you in trouble? No  Have you felt dizzy or faint? No  Have you had blurred vision? No  Have you had dry mouth? Yes  Have you had too much saliva in your mouth or had drooling? No  Have you felt nauseous? No  Have you been constipated? No  Has you appetite for food been increased? Yes  Have you gained weight? Yes  Have you lost weight? No  Have you felt restless or like you cannot sit still? Yes  Any shaking of your hands, legs, or other muscles? No  Any problems walking or moving or any problems feeling stiff or rigid? No  Have you felt tired or fatigued? Yes  Have you felt drowsy during the day? No  Have you been sleeping too much at night? No  Have you been sleeping too little or had problems sleeping at night? Yes  Any decrease in your interest in sex? Yes  Any other problems with sex? No  Any problems with your breasts such as swelling or discharge? N/A  For women, any problems with your period? N/A  Are there other medical or side effect problems you wish to discuss with your prescriber? Yes Insomnia  Since your last visit, how many days have you not taken your medication? 2  Have you had trouble remembering to take your medication? No  Do you find the number of medicines or the times when you are supposed to take then confusing or burdensome? No  Are you afraid of the medication? No  Do you think that you have an illness that requires taking medication? Yes  Do you think that other people would think poorly of you if they knew that you take  medication? No  On average, how many cigarettes do you smoke per day? 0  Since you last visit, did you drink alcohol? No  Since your last visit, have you used any marijuana? Yes  Since your last visit, have you used any stress drugs other than marijuana? No  Between now and your next visit, do you think we should keep your medication the same or consider changing the medications? Keep the same

## 2025-02-19 NOTE — NURSING NOTE
Is the patient currently in the state of [place]? YES    Visit mode:VIDEO    If the visit is dropped, the patient can be reconnected by: VIDEO VISIT: Send to e-mail at: [email or web address]    Will anyone else be joining the visit? NO      How would you like to obtain your AVS? MyChart    Are changes needed to the allergy or medication list? NO    Reason for visit: Follow Up

## 2025-02-19 NOTE — PATIENT INSTRUCTIONS
Navi-It was nice to meet with you today. Here is what we discussed:    -Bright light therapy: see instructions below.    -see you on 3/17.    -For sleep:    Set an alarm daily at an earlier desired time (for example, try noon in the scenario below)   Right after the alarm goes off, use the light box for 15-60 minutes, close to your face.   Take olanzapine 5 mg and escitalopram 5 mg in the morning   Stop caffeine at 6 PM   Do not get into bed until you are starting to get sleepy. If you are waking up at noon, I would not expect you to get sleepy at midnight! It is normal to not get sleepy until 4 AM if you are waking up at noon.   Please continue taking the olanzapine at 1-2 AM.   Get out of bed and do something quiet and calming if you can't fall asleep in 30 minutes or so.    After 1 week of stable sleep schedule with the alarm (ex, 4 AM-12PM), try moving back the alarm time by an hour (ex, 11 AM) and continue to take the olanzapine at 1-2 and go to bed when you are sleepy.    Like many people who struggle to fall asleep, it sounds like you are getting enough sleep, it is just not at the right time for your life. This technique helps you retrain your body by moving your wake time slowly and letting the sleep time follow.       Other sleep reminders:  For insomnia practice good sleep hygiene:   Cut down time in bed (if not asleep, get up)  Use your bed only for sleep and sex; Do not read or watch television in bed   Make the bedroom comfortable:  keep it quiet, cool, dark  Consider ear plugs (silicone)   Deal with your worries before bedtime:  set aside a worry time for 30 minutes earlier  Perform measures to make you tired at bedtime:               --Get regular Exercise each day (6 hours before bedtime)                --Take medications only as directed                --Eat a light bedtime snack or warm drink               --Warm milk or camomile tea (non-caffeinated)     Things to avoid   Do not exercise 2-3  hours before bedtime   No overstimulating activities just before bed   No competitive games before bedtime   No exciting television programs before bedtime   Avoid caffeine (coffee, soda, caffeinated teas) after lunchtime   Do not use alcohol to induce sleep (worsens middle insomnia)   Do not take someone else's sleeping pills   Do not look at the clock when awakening   Do not turn on light when getting up to use bathroom       Heidi Vega MD  [facility]  , Department of Psychiatry and Behavioral Sciences  [employer] [facility] [employer] [facility] [employer]        Bright light therapy information    Instructions:    o If you have SAD, use your light box daily throughout the fall and winter. You can stop in the spring and summer.   o If you have MDD, you will likely need to use your light box daily while you are feeling depressed, even in summer. If you stop using the box too early, your symptoms may return.     Sit in front of the light box every day. This can be while you are waking up in bed, getting ready in the morning, eating breakfast, or even working at a desk.  The instructions that come with the light box will tell you the distance to sit from the box. For 10,000 lux lights (the most common clinical strength) this is less than 18 inches usually.  It is important that the light is in front of you and not behind you, so that the light can reach your eyes.   Do not stare directly into the light. Just  direct the light downward towards your face. Using your light box at a downward angle will help decrease eye side effects.    It is best to use your light box as early as possible in the morning after you wake up. If you use the light box at night, you may find it more difficult to fall asleep at your regular bedtime.     How long you will need to use your light box will depend on the intensity of your box.   For a 10,000-lux box 30 minutes every morning is effective.   Do not use the box  for more than 1 hour.      Background information:    What is bright light therapy?    Bright light therapy (BLT) is a non-drug way to treat depression and sleep troubles.  BLT can be used to treat seasonal affective disorder (SAD), major depressive disorder (MDD), and bipolar disorder. It can also be used for sleep problems related to dementia, the sleep/wake cycle, and insomnia. BLT involves sitting in front of a light box for at least 30 minutes every morning. Light therapy can be used by both adults and children. It can be used alone or along with medications. BLT is safe, and people find it acceptable.      Is BLT effective?    Seasonal affective disorder (SAD)  SAD is a condition in which people have low moods that appear in the fall or winter and improve in the spring or summer. People with SAD may also be very tired, sleep more, be hungrier, crave sugar, and gain weight. BLT is the treatment used first to treat SAD. BLT will help about 60-90% of people who use it correctly. Some people find that it starts to help after only 2-4 days. It may take up to 2-4 weeks for others.    Major depressive disorder (MDD)    MDD is a condition in which individuals suffer from long periods of such low mood that it affects their daily life. Unlike SAD, MDD does not change with the seasons. About 14% of people will experience MDD at some time during their lives. MDD is the second most common cause of disability worldwide. BLT is very effective for treating MDD. BLT along with medications can improve depression more than medications alone. Depression also improves faster when BLT is used in addition to medications.     Bipolar disorder  Bipolar disorder is a condition in which people have extreme mood swings from very low (depression) to very high (júnior). These mood swings affect their energy, activities, ability to do daily tasks, and sleep. BLT can help improve depression in people with bipolar disorder. BLT is safe and  effective for the low moods of bipolar disorder when   used along with medications that help even out mood. BLT can trigger manic mood, so if you think you might have bipolar disorder and are not on medications for it, talk with your health care professional before using BLT. Roya caused by BLT is rare. While helpful, BLT may not help as much for bipolar disorder as it does for SAD and MDD.     Sleep problems  BLT is an effective treatment for some sleep conditions. These include difficulties with the sleep/wake cycle, insomnia, and sleep problems related to Alzheimer s disease and other types of dementia. BLT works best when it is combined with other treatments. Sleep improves the most if a person uses a light box of strong intensity (10,000 lux).     How does BLT work?     How BLT works is not fully understood. SAD is thought to occur because of a decrease in the amount of light a person is exposed to during the fall and winter months. Less light affects the sleep/wake clock in the brain. This changes how some chemicals in the brain (such as serotonin and melatonin) are produced. When these chemicals are not balanced, changes in mood and the symptoms of SAD and MDD can occur.     It is believed that the light used in BLT travels to the cells in the back of the eye. These cells send signals to the brain to change the production of brain chemicals. Low moods, then, improve with BLT because of changes in the balance of brain chemicals and changes in the sleep/wake cycle.    See [facility] Website  [email or web address]

## 2025-02-19 NOTE — PROGRESS NOTES
NAVIGGAL[facility]O Clinician Contact & Progress Note  For Individual Resiliency Training (IRT)  A Part of the [facility] First Episode of Psychosis Program    NAVIGATE Enrollee: Navi Morales (1995)     MRN: [id number]  Date:  2/19/25  Diagnosis: Psychosis, unspecified psychosis type (H) [F29]   Clinician: LILIANA Individual Resiliency Trainer, MERCEDES Mchugh     1. Type of contact: (majority of time spent)  IRT Session via telehealth  Mode of communication: [employer] (HIPAA compliant, secure platform). Patient consented verbally to this mode of therapy today.  Reason for telehealth: To reduce barriers in accessing services, due to but not limited to transportation, location, or scheduling reasons.    2. People present:   Writer  Client: Yes - Navi    3. Length of Actual Contact: Start Time: 2:30p; End Time: 3:05p     4. Location of contact:  Originating Location (patient location): [facility], located in [place], [place]  Distant Location (provider location): Remote location    5. Did the client complete the home practice option(s) from the previous session:  Completed home practice- sleep hygiene, adding exercise     6. Motivational Teaching Strategies:  Connect info and skills with personal goals  Promote hope and positive expectations  Explore pros and cons of change  Re-frame experiences in positive light    7. Educational Teaching Strategies:  Review of written material/education  Relate information to client's experience  Ask questions to check comprehension  Break down information into small chunks  Adopt client's language     8. CBT Teaching Strategies:  Reinforcement and shaping (positive feedback for steps towards goals and gains in knowledge & skills)  Cognitive restructuring (identify thoughts related to negative feelings)  Mindfulness and breathing    9. IRT Module(s) Addressed:  Coping with symptoms - anxiety  Goal setting   Mindfulness skills    10. Techniques utilized:   [place] announced at  "beginning of session  Review of goal  Review of previous meeting  Present new material  Modeling  Help client choose a home practice option  Summarize progress made in current session    11. Measures:    Mental Status Exam  Alertness: alert   Behavior/Demeanor: cooperative, pleasant, and calm  Speech: regular rate and rhythm  Language: intact.   Mood: depressed and anxious  Thought Process/Associations: unremarkable  Thought Content:  Reports suicidal ideation;  Denies suicidal ideation with plan; with intent [details in Interim History]  Perception:  Reports auditory hallucinations;  Denies visual hallucinations  Insight: good  Judgment: adequate for safety  Cognition: does  appear grossly intact; formal cognitive testing was not done      12. Assessment/Progress Note:     Writer and client met for IRT visit via [place]. Today Navi was late to session - he had texted this writer at 2:20 and said he would come on the visit. Set agenda to check in, reported current symptoms to include: \"anxiety\" and \"some voices saying things that I don't want to repeat\". Denied suicidal intention or plan but says he does experience voices that say he should die. Reviewed safety plan again.   Writer used relational and interpersonal approach to explore feelings, motivations, and behavior. Writer offered support, feedback, validation, and reinforced use of skills taught in IRT from modalities including cognitive behavioral therapy, psycho education, and skills training. Promoted understanding of their experiences of psychosis and how it impacts them in important areas of their life and in recovery goals. Reflected on client's strengths and resiliency factors and facilitated discussion on how these can assist in symptom management, recovery, and well-being. Explored symptoms and coping from a stress-vulnerability lens. Completed home practice of sleep hygiene steps and reviewing career section of personality inventory.    Navi reports " "feeling okay this week. Wants to try Being upstairs more. Reports some anxious thoughts regarding an upcoming trip for mom. He is Wondering what dad might demand of him. We created a list of things Navi could be responsible for on the trip- take the dog on walks, feeding the dog - keep the dog busy/playing. Used CBT and cognitive restructuring to address distorted thoughts. Navi thinks a chore schedule with dad would work the best. Offer to take night time puppy duties.     13. Plan/Referrals:     Continue [facility] services, weekly IRT, scheduled tue 2/25 at 2      Billing for \"Interactive Complexity\"?    No  MERCEDES Mchugh     "

## 2025-02-19 NOTE — PROGRESS NOTES
Virtual Visit Details    Type of service:  Video Visit   Video Start Time:  1500  Video End Time: 1530  Originating Location (pt. Location): Home    Distant Location (provider location):  On-site  Platform used for Video Visit: [employer]    [employer] Medication Management Progress Note  A Part of the [facility] First Episode of Psychosis Program    NAVIGATE Enrollee: aNvi Morales (1995)     MRN: [id number]  Date:  2/19/25         Contributors to the Assessment     Chart Reviewed.   Interview completed with Navi Morales.  Collateral information obtained from none.         Interim History    Navi Morales is a 29 year old male who was last seen in MD clinic a month ago at which time no med changes were made. The patient reports good treatment adherence. History was provided by Navi who was a good historian.  Since the last visit:  Navi reported experiencing significant sleep difficulties, describing a pattern of attempting to wind down around 2:00 AM but not falling asleep until 6:00 or 7:00 AM. He previously used Seroquel, which had a drowsy effect that aided sleep, but since switching to olanzapine, he has not experienced the same drowsiness and remains awake. Navi takes olanzapine around 1:00 or 2:00 AM, which is a routine developed for comfort when others are asleep. He wakes up between 12:00 PM and 3:00 PM, feeling somewhat rested but capable of sleeping more. Noted that staying up through the night and waking up earlier did not result in feeling fully rested.    Navi has been trying various strategies to improve sleep, such as not smoking before bed, reducing water intake, lessening screen time, and taking medication earlier. Despite these efforts, he continues to have trouble falling asleep. He drinks caffeine, typically stopping around 6:00 PM, and consumes 3-4 cups of coffee or energy drinks daily. Navi experiences increased anxiety when lying in bed awake, which exacerbates the difficulty in falling  asleep.    He occasionally experiences psychosis symptoms, such as hearing things when getting up at night, which worsen the longer he is awake. Recently, Navi has been having more suicidal thoughts, particularly when trying to fall asleep, often triggered by hearing negative things about himself. These thoughts include feelings of never belonging, not being responsible enough, and hopelessness about independence.    Navi feels pressure from family, particularly from his father, who frequently discusses independence, taking more classes, and getting a job. This pressure, combined with personal timelines not being met, contributes to his anxiety. He has been managing to go out, such as to the grocery store, without significant symptoms, which is seen as positive.    Navi is taking care of the dog while his mother is on vacation, focusing on being around the dog more to prevent it from missing the mother. He engages the dog in activities to prevent boredom, noting that the dog seems unaffected by the cold weather.    PSYCH ROS:  See HPI     RECENT SOCIAL HISTORY:  SEE HPI    Medical ROS:  none         First Episode of Psychosis History      DUP (duration untreated psychosis):  4 years  Route to initial care: outpatient  Medication adherence overall:  See above, Clinician Rating Form in COMPASS Item 13  General frequency of visits:  weekly IRT, monthly med management  Participation in groups:  No  Cognitive Remediation:  No  Other treatment history:     Reviewed for completion of First Episode work-up:  Yes  First episode workup:  Not Done (if completed, see LABS for results)  MATRICS Consensus Cognitive Battery:  Not Done (if completed, see LABS for results)       Medical/Surgical History     Patient has no known allergies.    Patient Active Problem List   Diagnosis    Alcohol use disorder, mild, abuse    MELY (generalized anxiety disorder)    Induration penis plastica    Moderate episode of recurrent major depressive  disorder (H)    Open displaced fracture of neck of second metacarpal bone of right hand    Psychosis (H)    Social anxiety disorder    TMJ (temporomandibular joint syndrome)    Traumatic compression fracture of T3 vertebra (H)            Medications     Current Outpatient Medications   Medication Sig Dispense Refill    cholecalciferol, vitamin D3, 1,000 unit (25 mcg) tablet [CHOLECALCIFEROL, VITAMIN D3, 1,000 UNIT (25 MCG) TABLET] Take 2,000 Units by mouth daily.      dutasteride (AVODART) 0.5 MG capsule Take 0.5 mg by mouth daily      ketoconazole (NIZORAL) 2 % external shampoo Apply topically daily as needed LATHER INTO SCALP AND RINSE AFTER 2-3 MINUTES. USE THREE TIMES A WEEK.      magnesium chloride (SLOW-MAG) 64 mg TbEC delayed-release tablet [MAGNESIUM CHLORIDE (SLOW-MAG) 64 MG TBEC DELAYED-RELEASE TABLET] Take 64 mg by mouth daily.      OLANZapine (ZYPREXA) 10 MG tablet Take 0.5 tablets (5 mg) by mouth daily AND 1 tablet (10 mg) at bedtime. Take with one of the 15 mg tabs at bedtime for a total of 25 mg at bedtime.. 45 tablet 2    OLANZapine (ZYPREXA) 15 MG tablet Take 2 tablets (30 mg) by mouth at bedtime 180 tablet 2    OLANZapine-samidorphan (LYBALVI) 20-10 MG TABS tablet Take 1 tablet by mouth at bedtime. Take with 1/2 of a 10 mg pill for a total of 25 mg of olanzapine at bedtime 30 tablet 2    QUEtiapine (SEROQUEL) 100 MG tablet Take 1 tablet (100 mg) by mouth at bedtime. May also take 0.5-1 tablets ( mg) 2 times daily as needed (anxiety). 60 tablet 3    tadalafil (CIALIS) 10 MG tablet Take 10 mg by mouth daily      zinc gluconate 50 mg tablet [ZINC GLUCONATE 50 MG TABLET] Take 100 mg by mouth daily.            Vitals     There were no vitals taken for this visit.      Weight prior to medication: unknown         Mental Status Exam     Alertness: alert  and oriented  Appearance: well groomed  Behavior/Demeanor: cooperative, pleasant, and calm, with good  eye contact   Speech: regular rate and  "rhythm, not pressured  Language: no obvious problem  Psychomotor: normal or unremarkable  Mood: depressed  Affect: blunted; was congruent to mood; was congruent to content  Thought Process/Associations:  overinclusive at times  Thought Content:  Reports intermittent passive suicidal ideation without plan; without intent [details in Interim History];  Denies suicidal and violent ideation and delusions  Perception:  Reports auditory hallucinations;  Denies visual hallucinations  Insight: good  Judgment: fair and adequate for safety  Cognition: does  appear grossly intact; formal cognitive testing was not done         Labs and Data     RATING SCALES:  AIMS: next in person visit    PHQ9 TODAY =       1/28/2025     1:59 PM 2/4/2025     1:58 PM 2/11/2025     3:07 PM   PHQ-9 SCORE   PHQ-9 Total Score MyChart 21 (Severe depression) 18 (Moderately severe depression) 18 (Moderately severe depression)   PHQ-9 Total Score 21  18  18        Patient-reported     ANTIPSYCHOTIC LABS ROUTINE    [glu, A1C, lipids (focus LDL), liver enzymes, WBC, ANEU, Hgb, plts]   q12 mo  Recent Labs   Lab Test 06/30/23  1048   GLC 84     No lab results found.  Recent Labs   Lab Test 06/30/23  1048   AST 39   ALT 40   ALKPHOS 88     Recent Labs   Lab Test 06/30/23  1048   WBC 7.7   HGB 14.9             Psychiatric Diagnoses     Psychosis, schizophrenia vs schizoaffective disorder  AUD, moderate, in early remission  Cannabis use disorder with active use  Generalized anxiety disorder  Agoraphobia         Assessment   Navi A Andrew is a 28 year old male with first onset of psychiatric symptoms at age 7 (\"social anxiety\"), and psychotic symptoms at age 23. The above duration of untreated psychosis was approximately 4 years.  Prodromal symptoms seem to have been present since at least college (notable substance use) though patient does note a substantially longer history of depression and physical health concerns. Presenting symptoms appear to " include hallucinations, delusional thoughts. Navi attributes symptoms to physical health issues and history of trauma.  Substance use does seem to be a present concern. There are possible medical comorbidities which impact this treatment [suicide attempt, suicidal ideation, SIB, psychosis, aggression, and substance use: alcohol].     Today,  Navi reports increased depressive symptoms and AH in the context of initial insomnia with delayed sleep phase (waking up in the early afternoon). Has implemented some sleep hygiene techniques but not used an alarm and not used a lightbox, not getting out of bed.  He did not  Lybalvi as it was just recently approved by insurance. Unsure if he wants to start an selective serotonin reuptake inhibitor due to having experienced less of a high from cannabis when he was last on one, but would like to fill the prescription and think about it.              SUICIDE RISK ASSESSMENT:  Risk factors for self-harm: previous suicide attempt, substance use/pending treatment, and hallucinations.  Mitigating factors: describes a safety plan, h/o seeking help , future oriented, commitment to family, and stable housing.  The patient does not appear to be at imminent risk for self-harm, hospitalization is not recommended which the pt does  agree with. No hospitalization will be arranged. Based on degree of symptoms close psych follow-up was/were recommended which the pt does  agree to. Additional steps to minimize risk: med changes.      [place] PRESCRIPTION MONITORING PROGRAM [] was not checked today: not using controlled substances.    PSYCHOTROPIC DRUG INTERACTIONS:   Quetiapine/olanzapine: additive CNS depression, QTc prolongation.    MANAGEMENT:  use lowest therapeutic doses of both and routine monitoring         Plan     1) PSYCHOTROPIC MEDICATIONS:  - Start Olanzapine 5mg qAM  and switch 20 mg of olanzapine at bedtime to olanzapine-samidorphan  - start escitalopram 5 mg PO daily    2)  THERAPY:  Continue IRT with Brandie Moran    3) NEXT DUE:    Labs: FEP workup due  Rating Scales: AIMS due    4) REFERRALS:    none    5) RTC: 4 weeks    6) CRISIS NUMBERS:   Provided routinely in AVS.    TREATMENT RISK STATEMENT:  The risks, benefits, alternatives and potential adverse effects have been discussed and are understood by the pt. The pt understands the risks of using street drugs or alcohol. There are no medical contraindications, the pt agrees to treatment with the ability to do so. The pt knows to call the clinic for any problems or to access emergency care if needed.  Medical and substance use concerns are documented above.  Psychotropic drug interaction check was done, including changes made today.    PROVIDER:   Heidi Vega MD  [facility] Psychiatrist  , Department of Psychiatry and Behavioral Sciences  [employer] [facility] [employer]     The longitudinal plan of care for the diagnosis(es)/condition(s) as documented were addressed during this visit. Due to the added complexity in care, I will continue to support Navi in the subsequent management and with ongoing continuity of care.

## 2025-02-19 NOTE — PROGRESS NOTES
NAVIGATE Clinician Contact & Progress Note   For Family Education Program    NAVIGATE Enrollee: Navi Morales (1995)     MRN: [id number]  Date:  2/18/25  Diagnosis(es):   Psychosis unspecified  Clinician: [facility] Family Clinician, Laura Maldonado MA, SELWYN     1. Type of contact: (majority of time spent)  Family Session via telehealth  Mode of communication:    Linwood Verduzco consented verbally to this mode of therapy today.  Reason for telehealth: To reduce barriers in accessing services, due to but not limited to transportation, location, or scheduling reasons.     2. People present:   Writer  Client: Yes (4:25-5pm)  Significant Other/Family/Friend:  Mother    3. Length of Actual Contact: Start Time: 4:05 to 5pm      4. Location of contact:  Originating Location (patient location):[place]   Distant Location (provider location): Home office, located in [place], [place], using appropriate privacy considerations and procedures    5. Did the client complete the home practice option(s) from the previous session: Partially Completed    6. Motivational Teaching Strategies:  Connect info and skills with personal goals  Promote hope and positive expectations    7. Educational Teaching Strategies:  Review of written material/education  Relate information to client's experience  Ask questions to check comprehension  Break down information into small chunks    8. CBT Teaching Strategies:  Reinforcement and shaping (positive feedback for steps towards goals and gains in knowledge & skills)    9. Psychoeducational Topic(s) Addressed:  Just the Facts - Coping with Stress    10. Techniques utilized:   Katy announced at beginning of session  Review of previous meeting  Present new material  Family Therapy  Motivational Interviewing (Connect info and skills with personal goals, Promote hope and positive expectations, Explore pros and cons of change, Re-frame experiences in a positive light)  Educational  "Teaching Strategies (Review written material/education on: Psychosis, Medications for psychosis, Coping with stress, Strategies to build resiliency, Relapse prevention planning, Developing a collaboration with mental health professionals, Effective communication, A relative s guide to supporting recovery from psychosis, Basic facts about alcohol and drugs)  CBT (Reinforcement and shaping, Social skills training, Relapse prevention planning, Coping skills training, Relaxation training, Cognitive restructuring, Behavioral tailoring)    11. Assessment/Progress Note:     Writer met with Navi's mom for 20 minutes on this day before Navi joined. Mom reports ongoing concern about Navi's schedule being \"flipped.\" Mom reports that Navi goes to bed in the early am and will sleep until the late afternoon. Mom is concerned that Navi is missing his medications due to this sleep schedule.     Mom reports plan to go on vacation not next week but the week after. Dad will be home with Navi and dog. Writer will relay information to therapist to discuss plan to support Navi while mom is gone.    Navi joins writer and mom. He reports that he went back to sleep after finding out that his therapist was out ill today. Navi reports having some increased \"symptoms.\" Writer asked Navi if there was anything he would like mom or this writer to know about his symptoms. Navi reports that he hears voices and that he has been having increase suicidal ideation. Navi goes on to talk at length about getting a job and his belief that it is only when he gets a job that things will change for him.     Writer attempts to redirect to present session so as to discuss Navi's SI, but Navi is very fixated on future and obtaining a job. Writer provides supportive listening and empathy regarding his desire to be more independent. Eventually Navi was able to discuss more about his SI and he denies any desire, intent or plan to harm himself.  Navi does report " "that his symptoms have been especially worse in the early morning hours. Navi also describes having very \"high anxiety.\" Provide support and validation for attending this session. Explore areas in life where Navi experiences hope; aNvi reports that he has little hope does feel some hope regarding his relationship with his mom and dad.     Navi states that he was offered an antidepressant at his last visit with Dr. Vega and writer encourages Navi to consider this recommendation. Provide support and validation of the courage it takes to discuss mental health and reinforce that medication could ease Navi's symptoms.     Navi reports a desire to go to the grocery store [employer] which has been a weekly goal of his. Mom provides reassurance and states willingness to do this with Navi.     Writer offers to provide outreach to Navi's dad with education on mental health. Navi declines this effort at this time citing that he feels it could be \"used against\" him in arguments with his dad.     Review upcoming appointments of seeing therapist tomorrow and Dr. Vega tomorrow. Review plan to talk with mom if suicidal ideation were to worsen and Navi is agreeable to this plan. Writer reflects mom's and Navi's care for each other and Navi is receptive to this.    Overall family and Navi seemed engaged in conversation. They did express interest in continuing to meet for family therapy and psychoeducation. As of today's appt their insight into Navi's mental illness appears fair. They seem they would benefit from continued clinical intervention aimed at assisting them implement helpful strategies at home and increase their understanding of psychosis.    12. Plan/Referrals:     Will meet with family weekly as schedule allows for evidence based family psychoeducation and therapeutic support aimed at maximizing Navi's opportunity for recovery from psychosis.     Laura Maldonado MA, LP   NAVIGATE   The author of this note " documented a reason for not sharing it with the patient.

## 2025-02-19 NOTE — TELEPHONE ENCOUNTER
NAVIGATE Telephone Call or E-mail Correspondence    NAVIGATE Enrollee: Navi HO [place] (1995)     MRN: [id number]    Email from mom:     Vern Sanchez, Navi had asked me to wake him up before his 2pm appt. with Brandie and I forgot and just woke him at 2:20pm!  We definitely need a different system for this, as I get busy and cannot remember.  I am so bummed about this because he needed that appointment.  I bought him an alarm clock, awhile ago, and it is extra loud, but he isn't using it.       Secondly, I wanted to let you know that we had a nice evening last night.  We made a casserole together for dinner and then went to [employer] to get some groceries and that went well. Despite the tone of our meeting yesterday, Navi seems to be doing well.  I just think he needs better sleeping habits.  If he was up and around by noon, I could work with him on some things, but when he sleeps so late, it just doesn't work.  I would like to give him some work do for me, but we need to sit down during the day together to go over it. Not to mention maybe doing the weight training, like we discussed.       Anyway, I wanted to give you an update.     Thank you,     Mona Maldonado MA, [facility] NAVIGATE

## 2025-02-25 ENCOUNTER — VIRTUAL VISIT (OUTPATIENT)
Dept: PSYCHIATRY | Facility: CLINIC | Age: 30
End: 2025-02-25
Payer: COMMERCIAL

## 2025-02-25 DIAGNOSIS — F25.1 SCHIZOAFFECTIVE DISORDER, DEPRESSIVE TYPE (H): Primary | ICD-10-CM

## 2025-02-25 NOTE — Clinical Note
"LYDIA my session with Navi today - is considering taking the lexapro. Used MI to explore his reasons and he worries it will be forever and will prevent him from ever being able to get drunk/high again. Reflected that he isn't currently using either and that it could be a longer term solution to his anxiety. He said \"I might be on board\". Some movement! He hit 6 months alcohol free! Brandie Moran, MERCEDES "

## 2025-02-25 NOTE — PROGRESS NOTES
[facility] NAVIGATE Program Treatment Plan Summary  A Part of the [facility] First Episode of Psychosis Program     NAVIGATE Enrollee: Navi Morales  /Age:  1995 (29 year old)  MRN: [id number]    Date of Initial Service: 2023  Date of INTIAL Treatment Plan: 24  Last Review/Update Date:  8/15/24  Today's Date: 24   Next 90-Day Review Due: 2/15/25    The following represents UPDATED treatment plan.    1. DSM-V Diagnosis (include numeric code)  PRIMARY: Schizoaffective, bipolar type, 295.70 (F25)    2. Current symptoms and circumstances that substantiate the diagnosis    Navi has a history of psychosis (paranoia, delusions, thought broadcasting, mind reading, ideas of reference, odd beliefs per family/friends, auditory hallucinations, command auditory hallucinations, disorganized speech, and negative symptoms (diminished emotional expression)), depression (low mood nearly every day, anhedonia most of the time, appetite change (decrease), weight change (decrease), difficulties with sleep, psychomotor changes (agitation), low energy, worthlessness and/or guilt, difficulty concentrating, thinking or making decisions, and suicidal ideation with plan, with intent), júnior (elevated mood/energy, persistent irritability, grandiosity, need less sleep, pressured speech, racing thoughts, distractability , increased drive, and risk taking), trauma (experienced traumatic event, re-experienced trauma, persistent avoidance of recollections of trauma, difficulty recalling trauma, negativity about others or self, blaming self or others, negative emotions, anhedonia, detachment from others, inability to experience happiness, persistent irritability or unprovoked anger/outbursts, reckless behavior, nervousness, easily startled, difficulty concentrating, and difficulty sleeping), anxiety, SI, and low self-esteem.       He presents with current symptoms of psychosis including AH, though greatly improved and paranoia.  Continues to experience social anxiety and low self esteem.    3. How symptoms and/or behaviors are affecting level of function    Jack s systems are impacting functioning with respect to IADLs, social relationships, familial relationships, employment, and academics.    4. Risk Assessment:  Suicide:  Assessed Level of Immediate Risk: High  Ideation: Yes  Plan:  Yes  Means: No  Intent: No    Homicide/Violence:  Assessed Level of Immediate Risk: None  Ideation: No  Plan: No  Means: No  Intent: No    5. Medications    Current Outpatient Medications   Medication Sig Dispense Refill    cholecalciferol, vitamin D3, 1,000 unit (25 mcg) tablet [CHOLECALCIFEROL, VITAMIN D3, 1,000 UNIT (25 MCG) TABLET] Take 2,000 Units by mouth daily.      dutasteride (AVODART) 0.5 MG capsule Take 0.5 mg by mouth daily      escitalopram (LEXAPRO) 5 MG tablet Take 1 tablet (5 mg) by mouth daily. 30 tablet 1    ketoconazole (NIZORAL) 2 % external shampoo Apply topically daily as needed LATHER INTO SCALP AND RINSE AFTER 2-3 MINUTES. USE THREE TIMES A WEEK.      magnesium chloride (SLOW-MAG) 64 mg TbEC delayed-release tablet [MAGNESIUM CHLORIDE (SLOW-MAG) 64 MG TBEC DELAYED-RELEASE TABLET] Take 64 mg by mouth daily.      OLANZapine (ZYPREXA) 10 MG tablet Take 0.5 tablets (5 mg) by mouth daily AND 1 tablet (10 mg) at bedtime. Take with one of the 15 mg tabs at bedtime for a total of 25 mg at bedtime.. 45 tablet 2    OLANZapine-samidorphan (LYBALVI) 20-10 MG TABS tablet Take 1 tablet by mouth at bedtime. Take with 1/2 of a 10 mg pill for a total of 25 mg of olanzapine at bedtime 30 tablet 2    QUEtiapine (SEROQUEL) 100 MG tablet Take 1 tablet (100 mg) by mouth at bedtime. May also take 0.5-1 tablets ( mg) 2 times daily as needed (anxiety). 60 tablet 3    tadalafil (CIALIS) 10 MG tablet Take 10 mg by mouth daily      zinc gluconate 50 mg tablet [ZINC GLUCONATE 50 MG TABLET] Take 100 mg by mouth daily.       No current facility-administered  "medications for this visit.       6. Strengths     Medication adherence  Caution, Prudence, & Discretion    Curiosity    Fairness & Justice   Honest, Authentic, Genuine    Social Intelligence      7. Barriers & Suicide Risk Factors     Command Hallucinations  Depression and/or Hopelessness  Substance use  Environmental Stress (e.g. family conflict or criticism)  Functional decline, large degree of illness-related deterioration  Grandiose or Persecutory Delusions  Male  Physical health problems  SI  Single  Symptoms of psychosis, positive (delusions and/or hallucinations)  Symptoms of psychosis, negative (flat affect, avolition, anhedonia, alogia, and/or apathy)    8. Treatment Domains and Goals    Domain 1: Illness Management & Recovery  Identify and engage possible areas of improvement related to medication optimization, psychosis (paranoia, delusions, auditory hallucinations, and command auditory hallucinations), depression (low mood nearly every day and anhedonia most of the time), and anxiety and ability to management illness.     Measurable Objectives Interventions Target Dates & Discharge Criteria   Medication Objectives    -Paricipate in a medication evaluation   -Take medications as prescribed and have reduced frequency and severity of symptoms  -Learn and implement strategies for overcoming barriers to taking medication  -Support system assists with overcoming barriers to taking medications    In Naiv's own words:  \"See if the meds work for the voices\" Medication Management  -Prescribe and monitor medications  -Monitor and treat side effects  -Psychoeducation  -Behavioral activation  -Initial and routine lab work    IRT/Psychotherapy  -Psychoeducation  -Motivation interviewing  -CBT  -Behavioral activation  -Mindfulness  -Family involvement during portions of sessions    Family Therapy  -Psychoeducation  -Motivational interviewing  -Behavioral family therapy  -CBT  -Behavioral activation    Case " Management  -Motivational interviewing  -Care coordination [place] [facility] [place]   Target date:   9 months from 11/20/24    Discharge criteria:  Marked and sustained symptom improvement     Gains Made:  -Paricipate in a medication evaluation   -Take medications as prescribed and have reduced frequency and severity of symptoms  -Learn and implement strategies for overcoming barriers to taking medication  -Support system assists with overcoming barriers to taking medications     Individual s Objectives    -Complete a safety plan with therapist and share with support system  -Define what recovery means to self  -Identify psychosocial areas of need  -Identify top 5 strengths and use those strengths when working toward goal achievement; simultaneously choose one area for improvement and identify two actionable steps toward improvement  -Create a goal plan consisting of one long-term goal, three short-term goals, and actionable steps toward short-term goal achievement  -Demonstrate understanding of psychosis (paranoia, delusions, auditory hallucinations, and command auditory hallucinations), depression (low mood nearly every day, anhedonia most of the time, difficulties with sleep, low energy, worthlessness and/or guilt, difficulty concentrating, thinking or making decisions, and suicidal ideation with plan, without intent), and anxiety in the context of self with respect to symptoms, causes, course, medications and the impact of stress  -Learn at least 2 coping strategies to successfully target current symptoms  -Demonstrate understanding for how substance use impacts symptoms, identify stage of change, and experiment with reduced use or abstinence from all illicit substances   -Learn strategies to build positive emotions and facilitate resiliency   -Build client build resiliency through the skills of gratitude, savoring, active/constructive communication, and practicing acts of kindness.  -Develop and implement a  "relapse prevention plan including identification of warning signs, triggers, coping mechanisms, and how other persons can be supportive if symptoms increase or reemerge   -Process the psychotic episode by demonstrating understanding of how the episode impacted self, identifying positive coping strategies and resiliency used during that time, challenging self-stigmatizing beliefs, and developing a positive attitude towards facing future life challenges  -Process past trauma by demonstrating understanding of how the traumatic event impacted self, identifying positive coping strategies and resiliency used during that time, challenging self-stigmatizing beliefs, and developing a positive attitude towards facing future life challenges    In Navi's own words:  \"Work on my black and white thinking; I'd like to be more flexible. I'd like to learn more skills to help me cope with my social anxiety and voices\" Medication Management  -Prescribe and monitor medications  -Monitor and treat side effects  -Psychoeducation  -Behavioral activation  -Initial and routine lab work    IRT/Psychotherapy  -Psychoeducation  -Motivation interviewing  -CBT  -Behavioral activation    Family Therapy  -Psychoeducation  -Motivational interviewing  -Behavioral family therapy  -CBT  -Behavioral activation  -Client involvement during all or portions of sessions    Case Management  -NA or None   Target date:   9 months from 11/20/24    Discharge criteria:  Marked and sustained symptom improvement     Navi demonstrates understanding of mental illness     Navi successfully implements strategies to cope with stressors and/or symptoms to mitigate risk for increase in symptom severity or relapse    Gains Made:  -Complete a safety plan with therapist and share with support system  -Define what recovery means to self  -Identify psychosocial areas of need  -Learn at least 2 coping strategies to successfully target current symptoms  -Build client build " "resiliency through the skills of gratitude, savoring, active/constructive communication, and practicing acts of kindness.  -Develop and implement a relapse prevention plan including identification of warning signs, triggers, coping mechanisms, and how other persons can be supportive if symptoms increase or reemerge      Support System Objectives    -Supports agree to provide supervision and monitor suicidal potential   -Supports, including family members, verbally reinforce the client's active attempts to build self-esteem and rapport   -Supports verbalize increased understanding of an knowledge about the client's illness and treatment   -Verbalize understanding of the client's long-term and short-term goals  -Learn the client's signs of stress and possible coping skills  -Demonstrate understanding for how substance use impacts symptoms and how to support decrease in or abstinence from illicit substance use  -Learn and implement communication skills to enhance communication and respect among family members  -Learn and implement problem-solving and/or conflict resolution skills to manage familial, personal and interpersonal problems constructively    In Navi's and/or family's own words:  \"Communicate more clearly with my family. I want to move out but I don't want to end up homeless.\" Medication Management  -Psychoeducation    IRT/Psychotherapy  -Psychoeducation  -Motivation interviewing  -CBT  -Behavioral activation  -Mindfulness  -Family involvement during portions of sessions    Family Therapy  -Psychoeducation  -Motivational interviewing  -Behavioral family therapy  -CBT  -Behavioral activation  -Client involvement during all or portions of sessions    Case Management  -NA or None   Target date:   9months from 11/20/24    Discharge criteria:  Support system demonstrates understanding of mental illness     Support system successfully implements strategies to assist Navi cope with stressors and/or symptoms to " "mitigate risk for increase in symptom severity or relapse     Gains Made:  -Supports agree to provide supervision and monitor suicidal potential   -Supports verbalize increased understanding of an knowledge about the client's illness and treatment        Domain 2: Health & Basic Living Needs  Identify and engage possible areas of improvement related to basic needs being met and maintaining or improving overall health and well-being     Measurable Objectives Interventions Discharge Criteria   -Verbalize an accurate understanding of factors influencing eating, health, and weight  -Learn and implement at least 2 skills to promote health sleep  -Establish and adhere to a plan to increase physical exercise  -Identify areas of improvement for ADLs and IADLs and implement at least two skills with improved outcomes    In Navi's own words:  \"I want to improve my sleep, lose weight and eat healthier, start going to the grocery store to shop, exercise more and start up my PT again for my physical issues.\" Medication Management  -Prescribe and monitor medications  -Monitor and treat side effects  -Psychoeducation  -Behavioral activation  -Initial and routine lab work    IRT/Psychotherapy  -Psychoeducation  -Motivation interviewing  -CBT  -Behavioral activation    Family Therapy  -Psychoeducation  -Motivational interviewing  -Behavioral family therapy  -CBT  -Behavioral activation    Supported Education & Employment  -Motivational interviewing  -Individualized placement and support   -Behavioral Activation  -Family involvement    Case Management  -NA or None   Target date:   9 months from 11/20/24    Discharge criteria:  Navi, his supports and treatment team report no unmet health and basic living needs    Gains Made:  -Verbalize an accurate understanding of factors influencing eating, health, and weight  -Navi has identified some skills to use for sleep though often has a hard time using them in the moment.        Domain 3: " "Family & Other Supports  Identify and engage possible areas of improvement related to engaging family, friends and other supports     Measurable Objectives Interventions Discharge Criteria   -Identify support system  -Invite support system to be involved in treatment  -Participate in family therapy  -Increase communication with the parents, resulting in feeling attended to and understood  -Increase the frequency of positive interactions with parents  -Learn and implement problem-solving and/or conflict resolution skills to manage personal and interpersonal problems constructively    In Navi's and/or family's own words:  \"Get along better with my family, communicate what I need, knowing what's expected of me\" Medication Management  -Psychoeducation    IRT/Psychotherapy  -Psychoeducation  -Motivation interviewing  -CBT  -Behavioral activation  -Family involvement during portions of sessions    Family Therapy  -Psychoeducation  -Motivational interviewing  -Behavioral family therapy  -CBT  -Behavioral activation  -Client involvement during all or portions of sessions    Supported Education & Employment  -Motivational interviewing  -Individualized placement and support   -Behavioral Activation  -Family involvement    Case Management  -NA or None   Target date:   9 months from 11/20/24    Discharge criteria:  Navi and his support system report feeling equipped with the necessary skills to communicate and problem solve during times of disagreement    Conflict with supports and peers are resolved constructively and consistently over time; 6 months    Gains Made:  -Identify support system  -Participate in family therapy       Domain 4: Academic and Employment  Identify and engage possible areas of improvement relates to education and employment     Measurable Objectives Interventions Discharge Criteria   -Explore areas of interest for continued educational opportunities     In Navi's own words:  \"Finish school and get a job\" " Medication Management  -NA or None    IRT/Psychotherapy  -Psychoeducation  -Motivation interviewing  -CBT  -Behavioral activation    Family Therapy  -Client involvement during all or portions of sessions    Supported Education & Employment  -Motivational interviewing  -Individualized placement and support   -Behavioral Activation  -Family involvement    Case Management  -NA or None   Target date:   24 months from 11/20/24    Discharge criteria:  Work and school goals are achieved and maintained without follow along NAVIGATE Supported Education and Employment supports for 6 months    Gains Made:  -Explore areas of interest for continued educational opportunities        9. Frequency of sessions and expected duration of treatment:   1-4x per month Medication Management with Prescriber ongoing  6 months of weekly IRT/Individual Psychotherapy followed by 12-18 months of biweekly or monthly IRT  2-4x per month Supported Education and Employment Services for 6 months  2-4x per month Family Education and Support Services for 6 months    10. Participants in therapy plan:   Navi Morales  Support System: Mom, Navigate team    NAVIGATE Team:   LILIANA Prescriber: Dr Heidi Vega  NAVIGATE Family clinician: Laura Kenney, PhD  NAVIGATE IRT: MERCEDES Mchugh SEE: Delmi Davis    See scanned document for Acknowledgement of Current Treatment Plan    Regulatory Guidelines for Updating Treatment Plan  [place] Medical Assistance: Reviewed & signed at least every 90 days  Medicare:  Update per policy

## 2025-02-25 NOTE — PROGRESS NOTES
[facility] NAVIGATE Program Treatment Plan Summary  A Part of the [facility] First Episode of Psychosis Program     NAVIGATE Enrollee: Navi Morales  /Age:  1995 (29 year old)  MRN: [id number]    Date of Initial Service: 2023  Date of INTIAL Treatment Plan: 24  Last Review/Update Date:    Today's Date: 8/15/24   Next 90-Day Review Due: 11/15/24    The following represents UPDATED treatment plan.    1. DSM-V Diagnosis (include numeric code)  Schizoaffective, bipolar type, 295.70 (F25)    2. Current symptoms and circumstances that substantiate the diagnosis    Navi has a history of psychosis (paranoia, delusions, thought broadcasting, mind reading, ideas of reference, odd beliefs per family/friends, auditory hallucinations, command auditory hallucinations, disorganized speech, and negative symptoms (diminished emotional expression)), depression (low mood nearly every day, anhedonia most of the time, appetite change (decrease), weight change (decrease), difficulties with sleep, psychomotor changes (agitation), low energy, worthlessness and/or guilt, difficulty concentrating, thinking or making decisions, and suicidal ideation with plan, with intent), júnior (elevated mood/energy, persistent irritability, grandiosity, need less sleep, pressured speech, racing thoughts, distractability , increased drive, and risk taking), trauma (experienced traumatic event, re-experienced trauma, persistent avoidance of recollections of trauma, difficulty recalling trauma, negativity about others or self, blaming self or others, negative emotions, anhedonia, detachment from others, inability to experience happiness, persistent irritability or unprovoked anger/outbursts, reckless behavior, nervousness, easily startled, difficulty concentrating, and difficulty sleeping), anxiety, SI, and low self-esteem. He presents with current symptoms of psychosis (paranoia, delusions, thought broadcasting, ideas of reference, auditory  hallucinations, and command auditory hallucinations), depression (low mood nearly every day, anhedonia most of the time, difficulties with sleep, psychomotor changes (agitation), low energy, worthlessness and/or guilt, difficulty concentrating, thinking or making decisions, and suicidal ideation with plan, without intent), trauma (persistent avoidance of recollections of trauma, negativity about others or self, blaming self or others, negative emotions, anhedonia, detachment from others, inability to experience happiness, persistent irritability or unprovoked anger/outbursts, reckless behavior, nervousness, easily startled, difficulty concentrating, and difficulty sleeping), anxiety, SI, and low self-esteem.     3. How symptoms and/or behaviors are affecting level of function    Jack s systems are impacting functioning with respect to IADLs, social relationships, familial relationships, employment, and academics.    4. Risk Assessment:  Suicide:  Assessed Level of Immediate Risk: High  Ideation: Yes  Plan:  Yes  Means: No  Intent: No    Homicide/Violence:  Assessed Level of Immediate Risk: None  Ideation: No  Plan: No  Means: No  Intent: No    5. Medications    Current Outpatient Medications   Medication Sig Dispense Refill    cholecalciferol, vitamin D3, 1,000 unit (25 mcg) tablet [CHOLECALCIFEROL, VITAMIN D3, 1,000 UNIT (25 MCG) TABLET] Take 2,000 Units by mouth daily.      dutasteride (AVODART) 0.5 MG capsule Take 0.5 mg by mouth daily      escitalopram (LEXAPRO) 5 MG tablet Take 1 tablet (5 mg) by mouth daily. 30 tablet 1    ketoconazole (NIZORAL) 2 % external shampoo Apply topically daily as needed LATHER INTO SCALP AND RINSE AFTER 2-3 MINUTES. USE THREE TIMES A WEEK.      magnesium chloride (SLOW-MAG) 64 mg TbEC delayed-release tablet [MAGNESIUM CHLORIDE (SLOW-MAG) 64 MG TBEC DELAYED-RELEASE TABLET] Take 64 mg by mouth daily.      OLANZapine (ZYPREXA) 10 MG tablet Take 0.5 tablets (5 mg) by mouth daily AND 1  tablet (10 mg) at bedtime. Take with one of the 15 mg tabs at bedtime for a total of 25 mg at bedtime.. 45 tablet 2    OLANZapine-samidorphan (LYBALVI) 20-10 MG TABS tablet Take 1 tablet by mouth at bedtime. Take with 1/2 of a 10 mg pill for a total of 25 mg of olanzapine at bedtime 30 tablet 2    QUEtiapine (SEROQUEL) 100 MG tablet Take 1 tablet (100 mg) by mouth at bedtime. May also take 0.5-1 tablets ( mg) 2 times daily as needed (anxiety). 60 tablet 3    tadalafil (CIALIS) 10 MG tablet Take 10 mg by mouth daily      zinc gluconate 50 mg tablet [ZINC GLUCONATE 50 MG TABLET] Take 100 mg by mouth daily.       No current facility-administered medications for this visit.       6. Strengths     Medication adherence  Caution, Prudence, & Discretion    Curiosity    Fairness & Justice   Honest, Authentic, Genuine    Social Intelligence      7. Barriers & Suicide Risk Factors     Command Hallucinations  Depression and/or Hopelessness  Substance use  Environmental Stress (e.g. family conflict or criticism)  Functional decline, large degree of illness-related deterioration  Grandiose or Persecutory Delusions  Male  Physical health problems  SI  Single  Symptoms of psychosis, positive (delusions and/or hallucinations)  Symptoms of psychosis, negative (flat affect, avolition, anhedonia, alogia, and/or apathy)    8. Treatment Domains and Goals    Domain 1: Illness Management & Recovery  Identify and engage possible areas of improvement related to medication optimization, psychosis (paranoia, delusions, auditory hallucinations, and command auditory hallucinations), depression (low mood nearly every day and anhedonia most of the time), and anxiety and ability to management illness.     Measurable Objectives Interventions Target Dates & Discharge Criteria   Medication Objectives    -Paricipate in a medication evaluation   -Take medications as prescribed and have reduced frequency and severity of symptoms  -Learn and  "implement strategies for overcoming barriers to taking medication  -Support system assists with overcoming barriers to taking medications    In Navi's own words:  \"See if the meds work for the voices\" Medication Management  -Prescribe and monitor medications  -Monitor and treat side effects  -Psychoeducation  -Behavioral activation  -Initial and routine lab work    IRT/Psychotherapy  -Psychoeducation  -Motivation interviewing  -CBT  -Behavioral activation  -Mindfulness  -Family involvement during portions of sessions    Family Therapy  -Psychoeducation  -Motivational interviewing  -Behavioral family therapy  -CBT  -Behavioral activation    Case Management  -Motivational interviewing  -Care coordination with [facility] for resources   Target date:   12 months from 8/15/24    Discharge criteria:  Marked and sustained symptom improvement     Gains Made:  -Paricipate in a medication evaluation   -Take medications as prescribed and have reduced frequency and severity of symptoms  -Learn and implement strategies for overcoming barriers to taking medication  -Support system assists with overcoming barriers to taking medications     Individual s Objectives    -Complete a safety plan with therapist and share with support system  -Define what recovery means to self  -Identify psychosocial areas of need  -Identify top 5 strengths and use those strengths when working toward goal achievement; simultaneously choose one area for improvement and identify two actionable steps toward improvement  -Create a goal plan consisting of one long-term goal, three short-term goals, and actionable steps toward short-term goal achievement  -Demonstrate understanding of psychosis (paranoia, delusions, auditory hallucinations, and command auditory hallucinations), depression (low mood nearly every day, anhedonia most of the time, difficulties with sleep, low energy, worthlessness and/or guilt, difficulty concentrating, thinking or making decisions, and " "suicidal ideation with plan, without intent), and anxiety in the context of self with respect to symptoms, causes, course, medications and the impact of stress  -Learn at least 2 coping strategies to successfully target current symptoms  -Demonstrate understanding for how substance use impacts symptoms, identify stage of change, and experiment with reduced use or abstinence from all illicit substances   -Learn strategies to build positive emotions and facilitate resiliency   -Build client build resiliency through the skills of gratitude, savoring, active/constructive communication, and practicing acts of kindness.  -Develop and implement a relapse prevention plan including identification of warning signs, triggers, coping mechanisms, and how other persons can be supportive if symptoms increase or reemerge   -Process the psychotic episode by demonstrating understanding of how the episode impacted self, identifying positive coping strategies and resiliency used during that time, challenging self-stigmatizing beliefs, and developing a positive attitude towards facing future life challenges  -Process past trauma by demonstrating understanding of how the traumatic event impacted self, identifying positive coping strategies and resiliency used during that time, challenging self-stigmatizing beliefs, and developing a positive attitude towards facing future life challenges    In Navi's own words:  \"Work on my black and white thinking; I'd like to be more flexible. I'd like to learn more skills to help me cope with my social anxiety and voices\" Medication Management  -Prescribe and monitor medications  -Monitor and treat side effects  -Psychoeducation  -Behavioral activation  -Initial and routine lab work    IRT/Psychotherapy  -Psychoeducation  -Motivation interviewing  -CBT  -Behavioral activation    Family Therapy  -Psychoeducation  -Motivational interviewing  -Behavioral family therapy  -CBT  -Behavioral " "activation  -Client involvement during all or portions of sessions    Case Management  -NA or None   Target date:   12 months from 8/15/24    Discharge criteria:  Marked and sustained symptom improvement     Navi demonstrates understanding of mental illness     Navi successfully implements strategies to cope with stressors and/or symptoms to mitigate risk for increase in symptom severity or relapse    Gains Made:  -Complete a safety plan with therapist and share with support system  -Define what recovery means to self  -Identify psychosocial areas of need  -Learn at least 2 coping strategies to successfully target current symptoms  -Build client build resiliency through the skills of gratitude, savoring, active/constructive communication, and practicing acts of kindness.  -Develop and implement a relapse prevention plan including identification of warning signs, triggers, coping mechanisms, and how other persons can be supportive if symptoms increase or reemerge      Support System Objectives    -Supports agree to provide supervision and monitor suicidal potential   -Supports, including family members, verbally reinforce the client's active attempts to build self-esteem and rapport   -Supports verbalize increased understanding of an knowledge about the client's illness and treatment   -Verbalize understanding of the client's long-term and short-term goals  -Learn the client's signs of stress and possible coping skills  -Demonstrate understanding for how substance use impacts symptoms and how to support decrease in or abstinence from illicit substance use  -Learn and implement communication skills to enhance communication and respect among family members  -Learn and implement problem-solving and/or conflict resolution skills to manage familial, personal and interpersonal problems constructively    In Navi's and/or family's own words:  \"Communicate more clearly with my family. I want to move out but I don't want to " "end up homeless.\" Medication Management  -Psychoeducation    IRT/Psychotherapy  -Psychoeducation  -Motivation interviewing  -CBT  -Behavioral activation  -Mindfulness  -Family involvement during portions of sessions    Family Therapy  -Psychoeducation  -Motivational interviewing  -Behavioral family therapy  -CBT  -Behavioral activation  -Client involvement during all or portions of sessions    Case Management  -NA or None   Target date:   12 months from 8/15/24    Discharge criteria:  Support system demonstrates understanding of mental illness     Support system successfully implements strategies to assist Navi cope with stressors and/or symptoms to mitigate risk for increase in symptom severity or relapse     Gains Made:  -Supports agree to provide supervision and monitor suicidal potential   -Supports verbalize increased understanding of an knowledge about the client's illness and treatment        Domain 2: Health & Basic Living Needs  Identify and engage possible areas of improvement related to basic needs being met and maintaining or improving overall health and well-being     Measurable Objectives Interventions Discharge Criteria   -Verbalize an accurate understanding of factors influencing eating, health, and weight  -Learn and implement at least 2 skills to promote health sleep  -Establish and adhere to a plan to increase physical exercise  -Identify areas of improvement for ADLs and IADLs and implement at least two skills with improved outcomes    In Navi's own words:  \"I want to improve my sleep, lose weight and eat healthier, start going to the grocery store to shop, exercise more and start up my PT again for my physical issues.\" Medication Management  -Prescribe and monitor medications  -Monitor and treat side effects  -Psychoeducation  -Behavioral activation  -Initial and routine lab work    IRT/Psychotherapy  -Psychoeducation  -Motivation interviewing  -CBT  -Behavioral activation    Family " "Therapy  -Psychoeducation  -Motivational interviewing  -Behavioral family therapy  -CBT  -Behavioral activation    Supported Education & Employment  -Motivational interviewing  -Individualized placement and support   -Behavioral Activation  -Family involvement    Case Management  -NA or None   Target date:   12 months from 8/15/24    Discharge criteria:  Navi, his supports and treatment team report no unmet health and basic living needs    Gains Made:  -Verbalize an accurate understanding of factors influencing eating, health, and weight  -Navi has identified some skills to use for sleep though often has a hard time using them in the moment.        Domain 3: Family & Other Supports  Identify and engage possible areas of improvement related to engaging family, friends and other supports     Measurable Objectives Interventions Discharge Criteria   -Identify support system  -Invite support system to be involved in treatment  -Participate in family therapy  -Increase communication with the parents, resulting in feeling attended to and understood  -Increase the frequency of positive interactions with parents  -Learn and implement problem-solving and/or conflict resolution skills to manage personal and interpersonal problems constructively    In Navi's and/or family's own words:  \"Get along better with my family, communicate what I need, knowing what's expected of me\" Medication Management  -Psychoeducation    IRT/Psychotherapy  -Psychoeducation  -Motivation interviewing  -CBT  -Behavioral activation  -Family involvement during portions of sessions    Family Therapy  -Psychoeducation  -Motivational interviewing  -Behavioral family therapy  -CBT  -Behavioral activation  -Client involvement during all or portions of sessions    Supported Education & Employment  -Motivational interviewing  -Individualized placement and support   -Behavioral Activation  -Family involvement    Case Management  -NA or None   Target date:   12 " "months from 8/15/24    Discharge criteria:  Navi and his support system report feeling equipped with the necessary skills to communicate and problem solve during times of disagreement    Conflict with supports and peers are resolved constructively and consistently over time; 6 months    Gains Made:  -Identify support system  -Participate in family therapy       Domain 4: Academic and Employment  Identify and engage possible areas of improvement relates to education and employment     Measurable Objectives Interventions Discharge Criteria   -Explore areas of interest for continued educational opportunities     In Navi's own words:  \"Finish school and get a job\" Medication Management  -NA or None    IRT/Psychotherapy  -Psychoeducation  -Motivation interviewing  -CBT  -Behavioral activation    Family Therapy  -Client involvement during all or portions of sessions    Supported Education & Employment  -Motivational interviewing  -Individualized placement and support   -Behavioral Activation  -Family involvement    Case Management  -NA or None   Target date:   24 months from 8/15/24    Discharge criteria:  Work and school goals are achieved and maintained without follow along NAVIGATE Supported Education and Employment supports for 6 months    Gains Made:  -Explore areas of interest for continued educational opportunities        9. Frequency of sessions and expected duration of treatment:   1-4x per month Medication Management with Prescriber ongoing  6 months of weekly IRT/Individual Psychotherapy followed by 12-18 months of biweekly or monthly IRT  2-4x per month Supported Education and Employment Services for 6 months  2-4x per month Family Education and Support Services for 6 months    10. Participants in therapy plan:   Navi HO Edgar  Support System: Mom, Navigate team    NAVIGATE Team:   NAVIGATE Prescriber: Dr Heidi Vega  NAVIGATE Family clinician: Laura Kenney, PhD  NAVIGATE IRT: Brandie Moran, " LGSW  NAVIGATE SEE: Delmi Davis    See scanned document for Acknowledgement of Current Treatment Plan    Regulatory Guidelines for Updating Treatment Plan  [place] Medical Assistance: Reviewed & signed at least every 90 days  Medicare:  Update per policy

## 2025-02-25 NOTE — PROGRESS NOTES
NAVIGGALINDO Clinician Contact & Progress Note  For Individual Resiliency Training (IRT)  A Part of the [facility] First Episode of Psychosis Program    NAVIGATE Enrollee: Navi Morales (1995)     MRN: [id number]  Date:  2/25/25  Diagnosis: Psychosis, unspecified psychosis type (H) [F29]   Clinician: LILIANA Individual Resiliency Trainer, MERCEDES Mchugh     1. Type of contact: (majority of time spent)  IRT Session via telehealth  Mode of communication: [employer] (HIPAA compliant, secure platform). Patient consented verbally to this mode of therapy today.  Reason for telehealth: To reduce barriers in accessing services, due to but not limited to transportation, location, or scheduling reasons.    2. People present:   Writer  Client: Yes - Navi    3. Length of Actual Contact: Start Time: 2:20p; End Time: 3:05p     4. Location of contact:  Originating Location (patient location): [facility], located in [place], [place]  Distant Location (provider location): Remote location    5. Did the client complete the home practice option(s) from the previous session:  Completed home practice- spoke with dad about plan for caring for Brennan (dog)    6. Motivational Teaching Strategies:  Connect info and skills with personal goals  Promote hope and positive expectations  Explore pros and cons of change  Re-frame experiences in positive light    7. Educational Teaching Strategies:  Review of written material/education  Relate information to client's experience  Ask questions to check comprehension  Break down information into small chunks  Adopt client's language     8. CBT Teaching Strategies:  Reinforcement and shaping (positive feedback for steps towards goals and gains in knowledge & skills)  Cognitive restructuring (identify thoughts related to negative feelings)  Motivational interviewing    9. IRT Module(s) Addressed:  Coping with symptoms - anxiety    10. Techniques utilized:   [place] announced at beginning of  "session  Review of goal  Review of previous meeting  Present new material  Modeling  Help client choose a home practice option  Summarize progress made in current session    11. Measures:    Mental Status Exam  Alertness: alert   Behavior/Demeanor: cooperative, pleasant, and calm  Speech: regular rate and rhythm  Language: intact.   Mood: depressed and anxious  Thought Process/Associations: unremarkable  Thought Content:  Reports suicidal ideation;  Denies suicidal ideation with plan; with intent [details in Interim History]  Perception:  Reports auditory hallucinations;  Denies visual hallucinations  Insight: good  Judgment: adequate for safety  Cognition: does  appear grossly intact; formal cognitive testing was not done      12. Assessment/Progress Note:     Writer and client met for IRT visit via [employer]. Today Navi was late to session. Writer reviewed expectations of signing on within 15 min of appointment time. Identified using light, alarms and a 3pm appt time as opportunities to improve this.  Set agenda to check in, reported current symptoms to include: \"anxiety\" . Denied suicidal intention or plan but says he does experience voices that say he should die. Reviewed safety plan.     Writer used relational and interpersonal approach to explore feelings, motivations, and behavior. Writer offered support, feedback, validation, and reinforced use of skills taught in IRT from modalities including cognitive behavioral therapy, psycho education, and skills training. Promoted understanding of their experiences of substance use, specifically marijuana. Discussed how this is preventing him from starting a long term treatment like an selective serotonin reuptake inhibitor.  Reflected on client's strengths and resiliency factors and facilitated discussion on how these can assist in symptom management, recovery, and well-being. Explored symptoms and coping from a stress-vulnerability lens. Completed home practice of sleep " "hygiene steps and reviewing career section of personality inventory.    Navi reports feeling \"pretty good\" this week. Said he talked with his dad about helping with the dog. Problem solved home practice to text one another the plan or expectations of the day. Writer used motivational interviewing to explore reasons for Navi's nicotine and cannabis use as well as reasons for not taking an anti dep medication. Navi recognized his thinking pattern and said he would \"be on board to take it, as long as its not forever\"    13. Plan/Referrals:     Continue Navigate services, weekly IRT, scheduled this Fri at 3pm - additional check in as mom is leaving town tomorrow.       Billing for \"Interactive Complexity\"?    No  MERCEDES Mchugh     "

## 2025-02-28 ENCOUNTER — VIRTUAL VISIT (OUTPATIENT)
Dept: PSYCHIATRY | Facility: CLINIC | Age: 30
End: 2025-02-28
Payer: COMMERCIAL

## 2025-02-28 DIAGNOSIS — F25.1 SCHIZOAFFECTIVE DISORDER, DEPRESSIVE TYPE (H): Primary | ICD-10-CM

## 2025-02-28 ASSESSMENT — ANXIETY QUESTIONNAIRES
3. WORRYING TOO MUCH ABOUT DIFFERENT THINGS: MORE THAN HALF THE DAYS
7. FEELING AFRAID AS IF SOMETHING AWFUL MIGHT HAPPEN: MORE THAN HALF THE DAYS
4. TROUBLE RELAXING: MORE THAN HALF THE DAYS
5. BEING SO RESTLESS THAT IT IS HARD TO SIT STILL: MORE THAN HALF THE DAYS
1. FEELING NERVOUS, ANXIOUS, OR ON EDGE: MORE THAN HALF THE DAYS
3. WORRYING TOO MUCH ABOUT DIFFERENT THINGS: MORE THAN HALF THE DAYS
6. BECOMING EASILY ANNOYED OR IRRITABLE: MORE THAN HALF THE DAYS
8. IF YOU CHECKED OFF ANY PROBLEMS, HOW DIFFICULT HAVE THESE MADE IT FOR YOU TO DO YOUR WORK, TAKE CARE OF THINGS AT HOME, OR GET ALONG WITH OTHER PEOPLE?: VERY DIFFICULT
GAD7 TOTAL SCORE: 14
GAD7 TOTAL SCORE: 14
IF YOU CHECKED OFF ANY PROBLEMS ON THIS QUESTIONNAIRE, HOW DIFFICULT HAVE THESE PROBLEMS MADE IT FOR YOU TO DO YOUR WORK, TAKE CARE OF THINGS AT HOME, OR GET ALONG WITH OTHER PEOPLE: VERY DIFFICULT
6. BECOMING EASILY ANNOYED OR IRRITABLE: MORE THAN HALF THE DAYS
1. FEELING NERVOUS, ANXIOUS, OR ON EDGE: MORE THAN HALF THE DAYS
GAD7 TOTAL SCORE: 14
2. NOT BEING ABLE TO STOP OR CONTROL WORRYING: MORE THAN HALF THE DAYS
7. FEELING AFRAID AS IF SOMETHING AWFUL MIGHT HAPPEN: MORE THAN HALF THE DAYS
IF YOU CHECKED OFF ANY PROBLEMS ON THIS QUESTIONNAIRE, HOW DIFFICULT HAVE THESE PROBLEMS MADE IT FOR YOU TO DO YOUR WORK, TAKE CARE OF THINGS AT HOME, OR GET ALONG WITH OTHER PEOPLE: VERY DIFFICULT
GAD7 TOTAL SCORE: 14
7. FEELING AFRAID AS IF SOMETHING AWFUL MIGHT HAPPEN: MORE THAN HALF THE DAYS
7. FEELING AFRAID AS IF SOMETHING AWFUL MIGHT HAPPEN: MORE THAN HALF THE DAYS
4. TROUBLE RELAXING: MORE THAN HALF THE DAYS
8. IF YOU CHECKED OFF ANY PROBLEMS, HOW DIFFICULT HAVE THESE MADE IT FOR YOU TO DO YOUR WORK, TAKE CARE OF THINGS AT HOME, OR GET ALONG WITH OTHER PEOPLE?: VERY DIFFICULT
5. BEING SO RESTLESS THAT IT IS HARD TO SIT STILL: MORE THAN HALF THE DAYS
GAD7 TOTAL SCORE: 14
2. NOT BEING ABLE TO STOP OR CONTROL WORRYING: MORE THAN HALF THE DAYS
GAD7 TOTAL SCORE: 14

## 2025-02-28 ASSESSMENT — PATIENT HEALTH QUESTIONNAIRE - PHQ9
SUM OF ALL RESPONSES TO PHQ QUESTIONS 1-9: 19
SUM OF ALL RESPONSES TO PHQ QUESTIONS 1-9: 19
10. IF YOU CHECKED OFF ANY PROBLEMS, HOW DIFFICULT HAVE THESE PROBLEMS MADE IT FOR YOU TO DO YOUR WORK, TAKE CARE OF THINGS AT HOME, OR GET ALONG WITH OTHER PEOPLE: VERY DIFFICULT

## 2025-02-28 NOTE — PROGRESS NOTES
"   LILIANA Clinician Contact & Progress Note  For Individual Resiliency Training (IRT)  A Part of the [facility] First Episode of Psychosis Program    NAVIGATE Enrollee: Navi Morales (1995)     MRN: [id number]  Date:  2/28/25  Diagnosis: Psychosis, unspecified psychosis type (H) [F29]   Clinician: LILIANA Individual Resiliency Trainer, MERCEDES Mchugh     1. Type of contact: (majority of time spent)  IRT Session via telehealth  Mode of communication: [employer] (HIPAA compliant, secure platform). Patient consented verbally to this mode of therapy today.  Reason for telehealth: To reduce barriers in accessing services, due to but not limited to transportation, location, or scheduling reasons.    2. People present:   Writer  Client: Yes - Navi    3. Length of Actual Contact: Start Time: 3:05p; End Time: 4:00p     4. Location of contact:  Originating Location (patient location): [facility], located in [place], [place]  Distant Location (provider location): Remote location    5. Did the client complete the home practice option(s) from the previous session:  Completed home practice- spoke with dad about plan for caring for Brennan (dog)    6. Motivational Teaching Strategies:  Connect info and skills with personal goals  Promote hope and positive expectations  Explore pros and cons of change  Re-frame experiences in positive light    7. Educational Teaching Strategies:  Review of written material/education  Relate information to client's experience  Ask questions to check comprehension  Break down information into small chunks  Adopt client's language     8. CBT Teaching Strategies:  Reinforcement and shaping (positive feedback for steps towards goals and gains in knowledge & skills)  Cognitive restructuring (identify thoughts related to negative feelings)  Motivational interviewing    9. IRT Module(s) Addressed:  Values assessment  Mindfulness skill \"Outward Mindfulness\"    10. Techniques utilized:   [place] " "announced at beginning of session  Review of goal  Review of previous meeting  Present new material  Modeling  Help client choose a home practice option  Summarize progress made in current session    11. Measures:  Answers submitted by the patient for this visit:  Patient Health Questionnaire (Submitted on 2/28/2025)  If you checked off any problems, how difficult have these problems made it for you to do your work, take care of things at home, or get along with other people?: Very difficult  PHQ9 TOTAL SCORE: 19  Patient Health Questionnaire (G7) (Submitted on 2/28/2025)  MELY 7 TOTAL SCORE: 14    Mental Status Exam  Alertness: alert   Behavior/Demeanor: cooperative, pleasant, and calm  Speech: regular rate and rhythm  Language: intact.   Mood: depressed and anxious  Thought Process/Associations: unremarkable  Thought Content:  Reports suicidal ideation;  Denies suicidal ideation with plan; with intent [details in Interim History]  Perception:  Reports auditory hallucinations;  Denies visual hallucinations  Insight: good  Judgment: adequate for safety  Cognition: does  appear grossly intact; formal cognitive testing was not done      12. Assessment/Progress Note:     Writer and client met for IRT visit via [place]. Today Navi arrived on time for his appt and writer recognized and praised Navi.  Set agenda to check in, reported current symptoms to include: \"anxiety\". Denied suicidal intention or plan but says he does experience voices that say he should die. Reviewed safety plan. Writer used relational and interpersonal approach to explore feelings, motivations, and behavior. Writer offered support, feedback, validation, and reinforced use of skills taught in IRT from modalities including cognitive behavioral therapy, psycho education, and skills training. Reflected on client's strengths and resiliency factors and facilitated discussion on how these can assist in symptom management, recovery, and well-being. Explored " "symptoms and coping from a stress-vulnerability lens. Completed home practice of sleep hygiene steps and discussing with dad his plan when his mom is gone.    Would like dad to \"give me some slack\", He feels dad gets impatient if Navi doesn't start something right away. For example, today, dad started the lawn during our appt when he had asked Navi to help.  Wants to set goal of Being outside more - get sunlight is a goal or a sad lamp    Discussed and introduced the ideas of values/what is important to Navi. He identified the followin) Health - in his own words, \"basically, exercising, your body fat percentage, how much lean muscle tissue have you, your overall strength, bodily awareness, getting my Jaw rearranged\". Discussed differences in how he used to see health as well as how outward appearances and weight influence his idea of health and how he values this. More to explore and assess in this area.  2) [place] -  3) Expertise - people can trust me, people would actually listen to what I say. I can help others in a high capacity, well rounded perspective, grounded, practical, clout and ego  4) Education -  5) Employment  -  6) Family -  7) Media  -     Home practice to continue identifying and clarifying what these values mean to Navi     13. Plan/Referrals:     Continue [facility]igate services, weekly IRT, scheduled for next Tuesday at 3pm     Billing for \"Interactive Complexity\"?    No    MERCEDES Mchugh     "

## 2025-03-03 NOTE — PROGRESS NOTES
NAVIGATE Clinician Contact & Progress Note   For Family Education Program    NAVIGATE Enrollee: Navi Morales (1995)     MRN: [id number]  Date:  2/25/25  Diagnosis(es):   Psychosis unspecified  Clinician: LILIANA Family Clinician, Laura Maldonado MA, SELWYN     1. Type of contact: (majority of time spent)  Family Session via telehealth  Mode of communication:    Linwood Verduzco consented verbally to this mode of therapy today.  Reason for telehealth: To reduce barriers in accessing services, due to but not limited to transportation, location, or scheduling reasons.     2. People present:   Writer  Client: Yes   Significant Other/Family/Friend:  Mother    3. Length of Actual Contact: Start Time: 4:05 to 5pm      4. Location of contact:  Originating Location (patient location):[place]   Distant Location (provider location): Home office, located in [place], [place], using appropriate privacy considerations and procedures    5. Did the client complete the home practice option(s) from the previous session: Partially Completed    6. Motivational Teaching Strategies:  Connect info and skills with personal goals  Promote hope and positive expectations    7. Educational Teaching Strategies:  Review of written material/education  Relate information to client's experience  Ask questions to check comprehension  Break down information into small chunks    8. CBT Teaching Strategies:  Reinforcement and shaping (positive feedback for steps towards goals and gains in knowledge & skills)    9. Psychoeducational Topic(s) Addressed:  Just the Facts - Coping with Stress    10. Techniques utilized:   [place] announced at beginning of session  Review of previous meeting  Present new material  Family Therapy  Motivational Interviewing (Connect info and skills with personal goals, Promote hope and positive expectations, Explore pros and cons of change, Re-frame experiences in a positive light)  Educational Teaching  "Strategies (Review written material/education on: Psychosis, Medications for psychosis, Coping with stress, Strategies to build resiliency, Relapse prevention planning, Developing a collaboration with mental health professionals, Effective communication, A relative s guide to supporting recovery from psychosis, Basic facts about alcohol and drugs)  CBT (Reinforcement and shaping, Social skills training, Relapse prevention planning, Coping skills training, Relaxation training, Cognitive restructuring, Behavioral tailoring)    11. Assessment/Progress Note:     Writer met with Navi and Navi's mom on this day for family education and support. Navi and mom reported no urgent concerns. Writer set agenda to check in about the last week, explore education modules, and review plan for the upcoming week when mom is gone on vacation.      Navi reported some more anxiety over the last week. Mom and Navi reported that they went and got food a couple times and also went to the grocery store. Navi articulated having anxiety about what to say when meeting with people in public. Validated that this can be stressful; briefly discussed some strategies. Validated Navi's continued work to leave the house.     Discussed communication strategies for talking about mom's plan for her vacation next week. Also explored having a preset schedule as Navi gets most anxious when his dad demands things of him in the moment.     Discussed strategies for responding to dad's requests including asking for 5 minutes. Mom and Navi agreed to a general dog responsibility schedule where Navi is responsible for the afternoon/evening and dad is responsible for the morning. Explored strategies for responding to dog including offering a treat, taking him outside, and telling him to \"sit.\" Mom wrote these strategies and schedule down; mom also agreed to talk with dad about Navi needing time to respond to requests.    Writer reflected both Navi's and mom's " flexibility and willingness to problem solve the schedule for the week.     Overall family and Navi seemed engaged in conversation. They did express interest in continuing to meet for family therapy and psychoeducation. As of today's appt their insight into Navi's mental illness appears fair. They seem they would benefit from continued clinical intervention aimed at assisting them implement helpful strategies at home and increase their understanding of psychosis.    12. Plan/Referrals:     Will meet with family weekly as schedule allows for evidence based family psychoeducation and therapeutic support aimed at maximizing Navi's opportunity for recovery from psychosis.     Laura Maldonado MA, LP   NAVIGATE   The author of this note documented a reason for not sharing it with the patient.

## 2025-03-04 ENCOUNTER — VIRTUAL VISIT (OUTPATIENT)
Dept: PSYCHIATRY | Facility: CLINIC | Age: 30
End: 2025-03-04
Payer: COMMERCIAL

## 2025-03-04 DIAGNOSIS — F25.1 SCHIZOAFFECTIVE DISORDER, DEPRESSIVE TYPE (H): Primary | ICD-10-CM

## 2025-03-04 NOTE — PROGRESS NOTES
NAVIGGALINDO Clinician Contact & Progress Note  For Individual Resiliency Training (IRT)  A Part of the [facility] First Episode of Psychosis Program    NAVIGATE Enrollee: Navi Morales (1995)     MRN: [id number]  Date:  3/04/25  Diagnosis: Psychosis, unspecified psychosis type (H) [F29]   Clinician: LILIANA Individual Resiliency Trainer, MERCEDES Mchugh     1. Type of contact: (majority of time spent)  IRT Session via telehealth  Mode of communication: [employer] (HIPAA compliant, secure platform). Patient consented verbally to this mode of therapy today.  Reason for telehealth: To reduce barriers in accessing services, due to but not limited to transportation, location, or scheduling reasons.    2. People present:   Writer  Client: Yes - Navi    3. Length of Actual Contact: Start Time: 3:03p; End Time: 4:00p     4. Location of contact:  Originating Location (patient location): [facility], located in [place], [place]  Distant Location (provider location): Remote location    5. Did the client complete the home practice option(s) from the previous session:  Completed home practice  6. Motivational Teaching Strategies:  Connect info and skills with personal goals  Promote hope and positive expectations  Explore pros and cons of change  Re-frame experiences in positive light    7. Educational Teaching Strategies:  Review of written material/education  Relate information to client's experience  Ask questions to check comprehension  Break down information into small chunks  Adopt client's language     8. CBT Teaching Strategies:  Reinforcement and shaping (positive feedback for steps towards goals and gains in knowledge & skills)  Cognitive restructuring (identify thoughts related to negative feelings)  Motivational interviewing    9. IRT Module(s) Addressed:  Coping with symptoms - negative symptoms    10. Techniques utilized:   [place] announced at beginning of session  Review of goal  Review of previous  meeting  Present new material  Modeling  Help client choose a home practice option  Summarize progress made in current session    11. Measures:  Answers submitted by the patient for this visit:  Patient Health Questionnaire (G7) (Submitted on 2/28/2025)  MELY 7 TOTAL SCORE: 14      Mental Status Exam  Alertness: alert   Behavior/Demeanor: cooperative, pleasant, and calm  Speech: regular rate and rhythm  Language: intact.   Mood: depressed and anxious  Thought Process/Associations: unremarkable  Thought Content:  Reports suicidal ideation;  Denies suicidal ideation with plan; with intent [details in Interim History]  Perception:  Reports auditory hallucinations;  Denies visual hallucinations  Insight: good  Judgment: adequate for safety  Cognition: does  appear grossly intact; formal cognitive testing was not done      12. Assessment/Progress Note:     Writer and client met for IRT visit via [place]. Writer used relational and interpersonal approach to explore feelings, motivations, and behavior. Writer offered support, feedback, validation, and reinforced use of skills taught in IRT from modalities including cognitive behavioral therapy, psycho education, and skills training. Promoted understanding of their experiences of substance use, specifically marijuana. Discussed how this is preventing him from starting a long term treatment like an selective serotonin reuptake inhibitor.  Reflected on client's strengths and resiliency factors and facilitated discussion on how these can assist in symptom management, recovery, and well-being. Explored symptoms and coping from a stress-vulnerability lens. Partially Completed home practice of talking with dad. Did not end up doing leaves with dad last week. Explored reasoning behind this and examined thoughts that may had led to inaction vs action. Discussed mom will return tomorrow, reflected on how well it went without her. Continued exploring values with Navi and he discussed  "his value of 'expertise'. 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     13. Plan/Referrals:     Continue [facility]igate services, weekly IRT, scheduled next Tue at 3 - review DBT skills and PHQ-9 and MELY 7 scores over the last 6 months.      Billing for \"Interactive Complexity\"?    No    MERCEDES Mchugh     "

## 2025-03-06 ENCOUNTER — VIRTUAL VISIT (OUTPATIENT)
Dept: PSYCHIATRY | Facility: CLINIC | Age: 30
End: 2025-03-06
Payer: COMMERCIAL

## 2025-03-06 DIAGNOSIS — F29 PSYCHOSIS, UNSPECIFIED PSYCHOSIS TYPE (H): Primary | ICD-10-CM

## 2025-03-06 NOTE — PROGRESS NOTES
NAVIGATE/ESMD Virtual Visit Progress Note  For Supported Employment & Education    NAVIGATE Enrollee: Navi Morales (1995)     MRN: [id number]  Date of Visit: 3/06/25  Contacted: ALICE  Appointment Length: 30    Discussed:   Writer met with Navi Morales for virtual visit check-in. Reason for telehealth: To reduce barriers in accessing services, due to but not limited to transportation, location, or scheduling reasons. Meeting agenda included just a check n. Its been a while since I've seen Navi so I wanted to check in and establish SEE again. We talked about his puppy who is almost 1 and his moms recent vacation. He said that he's doing well and hopes to possibly start working on his resume soon and may start college again. I reminded him that there is no judgment or pressure from me and I'm here to support him in any way he needs. We agreed to monthly visits to maintain report.    Up Next:     Delmi HOPSONATE/ESMD  Supported Employment & Education  Supported Employment & EducationThis is a non-billable encounter as it was solely for the purposes of outreach and/or care coordination. SEE services are non billable services

## 2025-03-09 ENCOUNTER — HEALTH MAINTENANCE LETTER (OUTPATIENT)
Age: 30
End: 2025-03-09

## 2025-03-11 ENCOUNTER — VIRTUAL VISIT (OUTPATIENT)
Dept: PSYCHIATRY | Facility: CLINIC | Age: 30
End: 2025-03-11
Payer: COMMERCIAL

## 2025-03-11 DIAGNOSIS — F25.1 SCHIZOAFFECTIVE DISORDER, DEPRESSIVE TYPE (H): Primary | ICD-10-CM

## 2025-03-11 NOTE — PROGRESS NOTES
[facility] NAVIGATE Program Treatment Plan Summary  A Part of the [facility] First Episode of Psychosis Program     NAVIGATE Enrollee: Navi Morales  /Age:  1995 (29 year old)  MRN: [id number]    Date of Initial Service: 2023  Date of INTIAL Treatment Plan: 24  Last Review/Update Date:  2024  Today's Date: 25   Next 90-Day Review Due: 25    The following represents UPDATED treatment plan.    1. DSM-V Diagnosis (include numeric code)  PRIMARY: Schizoaffective, bipolar type, 295.70 (F25)    2. Current symptoms and circumstances that substantiate the diagnosis    Navi has a history of psychosis (paranoia, delusions, thought broadcasting, mind reading, ideas of reference, odd beliefs per family/friends, auditory hallucinations, command auditory hallucinations, disorganized speech, and negative symptoms (diminished emotional expression)), depression (low mood nearly every day, anhedonia most of the time, appetite change (decrease), weight change (decrease), difficulties with sleep, psychomotor changes (agitation), low energy, worthlessness and/or guilt, difficulty concentrating, thinking or making decisions, and suicidal ideation with plan, with intent), júnior (elevated mood/energy, persistent irritability, grandiosity, need less sleep, pressured speech, racing thoughts, distractability , increased drive, and risk taking), trauma (experienced traumatic event, re-experienced trauma, persistent avoidance of recollections of trauma, difficulty recalling trauma, negativity about others or self, blaming self or others, negative emotions, anhedonia, detachment from others, inability to experience happiness, persistent irritability or unprovoked anger/outbursts, reckless behavior, nervousness, easily startled, difficulty concentrating, and difficulty sleeping), anxiety, SI, and low self-esteem.       He presents with current symptoms of psychosis including AH, though greatly improved and paranoia.  Continues to experience social anxiety and low self esteem.    3. How symptoms and/or behaviors are affecting level of function    Jack s systems are impacting functioning with respect to IADLs, social relationships, familial relationships, employment, and academics.    4. Risk Assessment:  Suicide:  Assessed Level of Immediate Risk: High  Ideation: Yes  Plan:  Yes  Means: No  Intent: No    Homicide/Violence:  Assessed Level of Immediate Risk: None  Ideation: No  Plan: No  Means: No  Intent: No    5. Medications    Current Outpatient Medications   Medication Sig Dispense Refill    cholecalciferol, vitamin D3, 1,000 unit (25 mcg) tablet [CHOLECALCIFEROL, VITAMIN D3, 1,000 UNIT (25 MCG) TABLET] Take 2,000 Units by mouth daily.      dutasteride (AVODART) 0.5 MG capsule Take 0.5 mg by mouth daily      escitalopram (LEXAPRO) 5 MG tablet Take 1 tablet (5 mg) by mouth daily. 30 tablet 1    ketoconazole (NIZORAL) 2 % external shampoo Apply topically daily as needed LATHER INTO SCALP AND RINSE AFTER 2-3 MINUTES. USE THREE TIMES A WEEK.      magnesium chloride (SLOW-MAG) 64 mg TbEC delayed-release tablet [MAGNESIUM CHLORIDE (SLOW-MAG) 64 MG TBEC DELAYED-RELEASE TABLET] Take 64 mg by mouth daily.      OLANZapine (ZYPREXA) 10 MG tablet Take 0.5 tablets (5 mg) by mouth daily AND 1 tablet (10 mg) at bedtime. Take with one of the 15 mg tabs at bedtime for a total of 25 mg at bedtime.. 45 tablet 2    OLANZapine-samidorphan (LYBALVI) 20-10 MG TABS tablet Take 1 tablet by mouth at bedtime. Take with 1/2 of a 10 mg pill for a total of 25 mg of olanzapine at bedtime 30 tablet 2    QUEtiapine (SEROQUEL) 100 MG tablet Take 1 tablet (100 mg) by mouth at bedtime. May also take 0.5-1 tablets ( mg) 2 times daily as needed (anxiety). 60 tablet 3    tadalafil (CIALIS) 10 MG tablet Take 10 mg by mouth daily      zinc gluconate 50 mg tablet [ZINC GLUCONATE 50 MG TABLET] Take 100 mg by mouth daily.       No current facility-administered  "medications for this visit.       6. Strengths     Medication adherence  Caution, Prudence, & Discretion    Curiosity    Fairness & Justice   Honest, Authentic, Genuine    Social Intelligence      7. Barriers & Suicide Risk Factors     Command Hallucinations  Depression and/or Hopelessness  Substance use  Environmental Stress (e.g. family conflict or criticism)  Functional decline, large degree of illness-related deterioration  Grandiose or Persecutory Delusions  Male  Physical health problems  SI  Single  Symptoms of psychosis, positive (delusions and/or hallucinations)  Symptoms of psychosis, negative (flat affect, avolition, anhedonia, alogia, and/or apathy)    8. Treatment Domains and Goals    Domain 1: Illness Management & Recovery  Identify and engage possible areas of improvement related to medication optimization, psychosis (paranoia, delusions, auditory hallucinations, and command auditory hallucinations), depression (low mood nearly every day and anhedonia most of the time), and anxiety and ability to management illness.     Measurable Objectives Interventions Target Dates & Discharge Criteria   Medication Objectives    -Paricipate in a medication evaluation   -Take medications as prescribed and have reduced frequency and severity of symptoms  -Learn and implement strategies for overcoming barriers to taking medication  -Support system assists with overcoming barriers to taking medications    In Navi's own words:  \"See if the meds work for the voices\" Medication Management  -Prescribe and monitor medications  -Monitor and treat side effects  -Psychoeducation  -Behavioral activation  -Initial and routine lab work    IRT/Psychotherapy  -Psychoeducation  -Motivation interviewing  -CBT  -Behavioral activation  -Mindfulness  -Family involvement during portions of sessions    Family Therapy  -Psychoeducation  -Motivational interviewing  -Behavioral family therapy  -CBT  -Behavioral activation    Case " Management  -Motivational interviewing  -Care coordination [place] [facility] [place]   Target date:   9 months from 2/19/25    Discharge criteria:  Marked and sustained symptom improvement     Gains Made:  -Paricipate in a medication evaluation   -Take medications as prescribed and have reduced frequency and severity of symptoms  -Learn and implement strategies for overcoming barriers to taking medication  -Support system assists with overcoming barriers to taking medications     Individual s Objectives    -Complete a safety plan with therapist and share with support system  -Define what recovery means to self  -Identify psychosocial areas of need  -Identify top 5 strengths and use those strengths when working toward goal achievement; simultaneously choose one area for improvement and identify two actionable steps toward improvement  -Create a goal plan consisting of one long-term goal, three short-term goals, and actionable steps toward short-term goal achievement  -Demonstrate understanding of psychosis (paranoia, delusions, auditory hallucinations, and command auditory hallucinations), depression (low mood nearly every day, anhedonia most of the time, difficulties with sleep, low energy, worthlessness and/or guilt, difficulty concentrating, thinking or making decisions, and suicidal ideation with plan, without intent), and anxiety in the context of self with respect to symptoms, causes, course, medications and the impact of stress  -Learn at least 2 coping strategies to successfully target current symptoms  -Demonstrate understanding for how substance use impacts symptoms, identify stage of change, and experiment with reduced use or abstinence from all illicit substances   -Learn strategies to build positive emotions and facilitate resiliency   -Build client build resiliency through the skills of gratitude, savoring, active/constructive communication, and practicing acts of kindness.  -Develop and implement a  "relapse prevention plan including identification of warning signs, triggers, coping mechanisms, and how other persons can be supportive if symptoms increase or reemerge   -Process the psychotic episode by demonstrating understanding of how the episode impacted self, identifying positive coping strategies and resiliency used during that time, challenging self-stigmatizing beliefs, and developing a positive attitude towards facing future life challenges  -Process past trauma by demonstrating understanding of how the traumatic event impacted self, identifying positive coping strategies and resiliency used during that time, challenging self-stigmatizing beliefs, and developing a positive attitude towards facing future life challenges    In Navi's own words:  \"Work on my black and white thinking; I'd like to be more flexible. I'd like to learn more skills to help me cope with my social anxiety and voices\" Medication Management  -Prescribe and monitor medications  -Monitor and treat side effects  -Psychoeducation  -Behavioral activation  -Initial and routine lab work    IRT/Psychotherapy  -Psychoeducation  -Motivation interviewing  -CBT  -Behavioral activation    Family Therapy  -Psychoeducation  -Motivational interviewing  -Behavioral family therapy  -CBT  -Behavioral activation  -Client involvement during all or portions of sessions    Case Management  -NA or None   Target date:   9 months from 2/19/25    Discharge criteria:  Marked and sustained symptom improvement     Navi demonstrates understanding of mental illness     Navi successfully implements strategies to cope with stressors and/or symptoms to mitigate risk for increase in symptom severity or relapse    Gains Made:  -Complete a safety plan with therapist and share with support system  -Define what recovery means to self  -Identify psychosocial areas of need  -Learn at least 2 coping strategies to successfully target current symptoms  -Build client build " "resiliency through the skills of gratitude, savoring, active/constructive communication, and practicing acts of kindness.  -Develop and implement a relapse prevention plan including identification of warning signs, triggers, coping mechanisms, and how other persons can be supportive if symptoms increase or reemerge      Support System Objectives    -Supports agree to provide supervision and monitor suicidal potential   -Supports, including family members, verbally reinforce the client's active attempts to build self-esteem and rapport   -Supports verbalize increased understanding of an knowledge about the client's illness and treatment   -Verbalize understanding of the client's long-term and short-term goals  -Learn the client's signs of stress and possible coping skills  -Demonstrate understanding for how substance use impacts symptoms and how to support decrease in or abstinence from illicit substance use  -Learn and implement communication skills to enhance communication and respect among family members  -Learn and implement problem-solving and/or conflict resolution skills to manage familial, personal and interpersonal problems constructively    In Navi's and/or family's own words:  \"Communicate more clearly with my family. I want to move out but I don't want to end up homeless.\" Medication Management  -Psychoeducation    IRT/Psychotherapy  -Psychoeducation  -Motivation interviewing  -CBT  -Behavioral activation  -Mindfulness  -Family involvement during portions of sessions    Family Therapy  -Psychoeducation  -Motivational interviewing  -Behavioral family therapy  -CBT  -Behavioral activation  -Client involvement during all or portions of sessions    Case Management  -NA or None   Target date:   9months from 2/19/25    Discharge criteria:  Support system demonstrates understanding of mental illness     Support system successfully implements strategies to assist Navi cope with stressors and/or symptoms to " "mitigate risk for increase in symptom severity or relapse     Gains Made:  -Supports agree to provide supervision and monitor suicidal potential   -Supports verbalize increased understanding of an knowledge about the client's illness and treatment        Domain 2: Health & Basic Living Needs  Identify and engage possible areas of improvement related to basic needs being met and maintaining or improving overall health and well-being     Measurable Objectives Interventions Discharge Criteria   -Verbalize an accurate understanding of factors influencing eating, health, and weight  -Learn and implement at least 2 skills to promote health sleep  -Establish and adhere to a plan to increase physical exercise  -Identify areas of improvement for ADLs and IADLs and implement at least two skills with improved outcomes    In Navi's own words:  \"I want to improve my sleep, lose weight and eat healthier, start going to the grocery store to shop, exercise more and start up my PT again for my physical issues.\" Medication Management  -Prescribe and monitor medications  -Monitor and treat side effects  -Psychoeducation  -Behavioral activation  -Initial and routine lab work    IRT/Psychotherapy  -Psychoeducation  -Motivation interviewing  -CBT  -Behavioral activation    Family Therapy  -Psychoeducation  -Motivational interviewing  -Behavioral family therapy  -CBT  -Behavioral activation    Supported Education & Employment  -Motivational interviewing  -Individualized placement and support   -Behavioral Activation  -Family involvement    Case Management  -NA or None   Target date:   9 months from 2/19/25    Discharge criteria:  Navi, his supports and treatment team report no unmet health and basic living needs    Gains Made:  -Verbalize an accurate understanding of factors influencing eating, health, and weight  -Navi has identified some skills to use for sleep though often has a hard time using them in the moment.        Domain 3: " "Family & Other Supports  Identify and engage possible areas of improvement related to engaging family, friends and other supports     Measurable Objectives Interventions Discharge Criteria   -Identify support system  -Invite support system to be involved in treatment  -Participate in family therapy  -Increase communication with the parents, resulting in feeling attended to and understood  -Increase the frequency of positive interactions with parents  -Learn and implement problem-solving and/or conflict resolution skills to manage personal and interpersonal problems constructively    In Navi's and/or family's own words:  \"Get along better with my family, communicate what I need, knowing what's expected of me\" Medication Management  -Psychoeducation    IRT/Psychotherapy  -Psychoeducation  -Motivation interviewing  -CBT  -Behavioral activation  -Family involvement during portions of sessions    Family Therapy  -Psychoeducation  -Motivational interviewing  -Behavioral family therapy  -CBT  -Behavioral activation  -Client involvement during all or portions of sessions    Supported Education & Employment  -Motivational interviewing  -Individualized placement and support   -Behavioral Activation  -Family involvement    Case Management  -NA or None   Target date:   9 months from 2/19/25    Discharge criteria:  Navi and his support system report feeling equipped with the necessary skills to communicate and problem solve during times of disagreement    Conflict with supports and peers are resolved constructively and consistently over time; 6 months    Gains Made:  -Identify support system  -Participate in family therapy       Domain 4: Academic and Employment  Identify and engage possible areas of improvement relates to education and employment     Measurable Objectives Interventions Discharge Criteria   -Explore areas of interest for continued educational opportunities     In Navi's own words:  \"Finish school and get a job\" " Medication Management  -NA or None    IRT/Psychotherapy  -Psychoeducation  -Motivation interviewing  -CBT  -Behavioral activation    Family Therapy  -Client involvement during all or portions of sessions    Supported Education & Employment  -Motivational interviewing  -Individualized placement and support   -Behavioral Activation  -Family involvement    Case Management  -NA or None   Target date:   24 months from 2/19/25    Discharge criteria:  Work and school goals are achieved and maintained without follow along NAVIGATE Supported Education and Employment supports for 6 months    Gains Made:  -Explore areas of interest for continued educational opportunities        9. Frequency of sessions and expected duration of treatment:   1-4x per month Medication Management with Prescriber ongoing  6 months of weekly IRT/Individual Psychotherapy followed by 12-18 months of biweekly or monthly IRT  2-4x per month Supported Education and Employment Services for 6 months  2-4x per month Family Education and Support Services for 6 months    10. Participants in therapy plan:   Navi Morales  Support System: Mom, Navigate team    NAVIGATE Team:   LILIANA Prescriber: Dr Heidi Vega  NAVIGATE Family clinician: Laura Kenney, PhD  NAVIGATE IRT: MERCEDES Mchugh SEE: Delmi Davis    See scanned document for Acknowledgement of Current Treatment Plan    Regulatory Guidelines for Updating Treatment Plan  [place] Medical Assistance: Reviewed & signed at least every 90 days  Medicare:  Update per policy

## 2025-03-13 DIAGNOSIS — F25.1 SCHIZOAFFECTIVE DISORDER, DEPRESSIVE TYPE (H): ICD-10-CM

## 2025-03-17 ENCOUNTER — VIRTUAL VISIT (OUTPATIENT)
Dept: PSYCHIATRY | Facility: CLINIC | Age: 30
End: 2025-03-17
Payer: COMMERCIAL

## 2025-03-17 DIAGNOSIS — F25.1 SCHIZOAFFECTIVE DISORDER, DEPRESSIVE TYPE (H): Primary | ICD-10-CM

## 2025-03-17 DIAGNOSIS — F12.90 CANNABIS USE DISORDER: ICD-10-CM

## 2025-03-17 DIAGNOSIS — F40.10 SOCIAL ANXIETY DISORDER: ICD-10-CM

## 2025-03-17 RX ORDER — ESCITALOPRAM OXALATE 5 MG/1
5 TABLET ORAL DAILY
Qty: 90 TABLET | Refills: 0 | Status: SHIPPED | OUTPATIENT
Start: 2025-03-17

## 2025-03-17 ASSESSMENT — ANXIETY QUESTIONNAIRES
6. BECOMING EASILY ANNOYED OR IRRITABLE: MORE THAN HALF THE DAYS
3. WORRYING TOO MUCH ABOUT DIFFERENT THINGS: MORE THAN HALF THE DAYS
GAD7 TOTAL SCORE: 14
7. FEELING AFRAID AS IF SOMETHING AWFUL MIGHT HAPPEN: MORE THAN HALF THE DAYS
GAD7 TOTAL SCORE: 14
6. BECOMING EASILY ANNOYED OR IRRITABLE: MORE THAN HALF THE DAYS
IF YOU CHECKED OFF ANY PROBLEMS ON THIS QUESTIONNAIRE, HOW DIFFICULT HAVE THESE PROBLEMS MADE IT FOR YOU TO DO YOUR WORK, TAKE CARE OF THINGS AT HOME, OR GET ALONG WITH OTHER PEOPLE: VERY DIFFICULT
2. NOT BEING ABLE TO STOP OR CONTROL WORRYING: MORE THAN HALF THE DAYS
7. FEELING AFRAID AS IF SOMETHING AWFUL MIGHT HAPPEN: MORE THAN HALF THE DAYS
IF YOU CHECKED OFF ANY PROBLEMS ON THIS QUESTIONNAIRE, HOW DIFFICULT HAVE THESE PROBLEMS MADE IT FOR YOU TO DO YOUR WORK, TAKE CARE OF THINGS AT HOME, OR GET ALONG WITH OTHER PEOPLE: VERY DIFFICULT
GAD7 TOTAL SCORE: 14
GAD7 TOTAL SCORE: 14
2. NOT BEING ABLE TO STOP OR CONTROL WORRYING: MORE THAN HALF THE DAYS
GAD7 TOTAL SCORE: 14
5. BEING SO RESTLESS THAT IT IS HARD TO SIT STILL: MORE THAN HALF THE DAYS
3. WORRYING TOO MUCH ABOUT DIFFERENT THINGS: MORE THAN HALF THE DAYS
1. FEELING NERVOUS, ANXIOUS, OR ON EDGE: MORE THAN HALF THE DAYS
7. FEELING AFRAID AS IF SOMETHING AWFUL MIGHT HAPPEN: MORE THAN HALF THE DAYS
2. NOT BEING ABLE TO STOP OR CONTROL WORRYING: MORE THAN HALF THE DAYS
8. IF YOU CHECKED OFF ANY PROBLEMS, HOW DIFFICULT HAVE THESE MADE IT FOR YOU TO DO YOUR WORK, TAKE CARE OF THINGS AT HOME, OR GET ALONG WITH OTHER PEOPLE?: VERY DIFFICULT
7. FEELING AFRAID AS IF SOMETHING AWFUL MIGHT HAPPEN: MORE THAN HALF THE DAYS
GAD7 TOTAL SCORE: 14
7. FEELING AFRAID AS IF SOMETHING AWFUL MIGHT HAPPEN: MORE THAN HALF THE DAYS
5. BEING SO RESTLESS THAT IT IS HARD TO SIT STILL: MORE THAN HALF THE DAYS
8. IF YOU CHECKED OFF ANY PROBLEMS, HOW DIFFICULT HAVE THESE MADE IT FOR YOU TO DO YOUR WORK, TAKE CARE OF THINGS AT HOME, OR GET ALONG WITH OTHER PEOPLE?: VERY DIFFICULT
5. BEING SO RESTLESS THAT IT IS HARD TO SIT STILL: MORE THAN HALF THE DAYS
4. TROUBLE RELAXING: MORE THAN HALF THE DAYS
1. FEELING NERVOUS, ANXIOUS, OR ON EDGE: MORE THAN HALF THE DAYS
8. IF YOU CHECKED OFF ANY PROBLEMS, HOW DIFFICULT HAVE THESE MADE IT FOR YOU TO DO YOUR WORK, TAKE CARE OF THINGS AT HOME, OR GET ALONG WITH OTHER PEOPLE?: VERY DIFFICULT
3. WORRYING TOO MUCH ABOUT DIFFERENT THINGS: MORE THAN HALF THE DAYS
1. FEELING NERVOUS, ANXIOUS, OR ON EDGE: MORE THAN HALF THE DAYS
6. BECOMING EASILY ANNOYED OR IRRITABLE: MORE THAN HALF THE DAYS
IF YOU CHECKED OFF ANY PROBLEMS ON THIS QUESTIONNAIRE, HOW DIFFICULT HAVE THESE PROBLEMS MADE IT FOR YOU TO DO YOUR WORK, TAKE CARE OF THINGS AT HOME, OR GET ALONG WITH OTHER PEOPLE: VERY DIFFICULT
GAD7 TOTAL SCORE: 14
4. TROUBLE RELAXING: MORE THAN HALF THE DAYS
7. FEELING AFRAID AS IF SOMETHING AWFUL MIGHT HAPPEN: MORE THAN HALF THE DAYS
4. TROUBLE RELAXING: MORE THAN HALF THE DAYS

## 2025-03-17 ASSESSMENT — PATIENT HEALTH QUESTIONNAIRE - PHQ9
10. IF YOU CHECKED OFF ANY PROBLEMS, HOW DIFFICULT HAVE THESE PROBLEMS MADE IT FOR YOU TO DO YOUR WORK, TAKE CARE OF THINGS AT HOME, OR GET ALONG WITH OTHER PEOPLE: VERY DIFFICULT
SUM OF ALL RESPONSES TO PHQ QUESTIONS 1-9: 18
SUM OF ALL RESPONSES TO PHQ QUESTIONS 1-9: 18
10. IF YOU CHECKED OFF ANY PROBLEMS, HOW DIFFICULT HAVE THESE PROBLEMS MADE IT FOR YOU TO DO YOUR WORK, TAKE CARE OF THINGS AT HOME, OR GET ALONG WITH OTHER PEOPLE: VERY DIFFICULT
SUM OF ALL RESPONSES TO PHQ QUESTIONS 1-9: 18
SUM OF ALL RESPONSES TO PHQ QUESTIONS 1-9: 18

## 2025-03-17 NOTE — PROGRESS NOTES
Virtual Visit Details    Type of service:  Video Visit   Video Start Time: 3:54 PM  Video End Time: 4:30 PM    Originating Location (pt. Location): Home    Distant Location (provider location):  On-site  Platform used for Video Visit: Lin[employer] FORD Medication Management Progress Note  A Part of the [facility] First Episode of Psychosis Program    NAVIGATE Enrollee: Navi Morales (1995)     MRN: [id number]  Date:  3/17/25         Contributors to the Assessment     Chart Reviewed.   Interview completed with Navi Morales.  Collateral information obtained from none.         Interim History    Navi Morales is a 29 year old male who was last seen in MD clinic a month ago at which time olanzapine 5mg QAM was started and QHS olanzapine 20mg changed to Lybalvie. Escitalopram 5mg was prescribed, with Navi agreeing to consider it in the interim. The patient reports good treatment adherence. History was provided by Navi who was a good historian.  Since the last visit:  Things have been going well with the Lybalvie over the last several weeks. For the first couple days had some nausea, feeling that certain foods made him feel that way. This appeared to improve after a few days.    Has been waking up many days around 2PM, has had trouble trying to wake up earlier during the day. Has trouble with falling asleep but then sleeps deeply until he wakes up. Has been setting an alarm for 2pm due to not wanting to feel more tired if he were to wake up earlier. Feels like it is easier to stay up late and then sleep later in the day.     There is some work that he has helped his mother with for  related to her work. Has been helping with this on and off when he feels he is able to, but this is somewhat limited by the time he wakes up each day.     Has thought about the escitalopram, but didn't end up starting the medication in the interim. Notes that he thinks he would try taking it were to start a job that he worked on a  "regular basis.     Has been continuing to vape; states that it is made from cannabis but thinks that this version doesn't have psychoactive properties. States that he has been averse to starting an antidepressant because it could interfere with him being able to get high from cannabis in the future if he were to be on it. Notes that he uses cannabis/similar to help him not drink alcohol.    For AH, thinks that they have improved over the past month. Has gone to the grocery store and didn't have any symptom exacerbation. Anxiety does still feel present and thinks this needs better management. Notes that his depression feels \"hard to gauge\", but hasn't been having SI as much with the reductions in AH.    PSYCH ROS:  See HPI     RECENT SOCIAL HISTORY:  SEE HPI    Medical ROS:  none         First Episode of Psychosis History      DUP (duration untreated psychosis):  4 years  Route to initial care: outpatient  Medication adherence overall:  See above, Clinician Rating Form in COMPASS Item 13  General frequency of visits:  weekly IRT, monthly med management  Participation in groups:  No  Cognitive Remediation:  No  Other treatment history:     Reviewed for completion of First Episode work-up:  Yes  First episode workup:  Not Done (if completed, see LABS for results)  MATRICS Consensus Cognitive Battery:  Not Done (if completed, see LABS for results)       Medical/Surgical History     Patient has no known allergies.    Patient Active Problem List   Diagnosis    Alcohol use disorder, mild, abuse    MELY (generalized anxiety disorder)    Induration penis plastica    Moderate episode of recurrent major depressive disorder (H)    Open displaced fracture of neck of second metacarpal bone of right hand    Psychosis (H)    Social anxiety disorder    TMJ (temporomandibular joint syndrome)    Traumatic compression fracture of T3 vertebra (H)          Medications     Current Outpatient Medications   Medication Sig Dispense Refill    " "cholecalciferol, vitamin D3, 1,000 unit (25 mcg) tablet [CHOLECALCIFEROL, VITAMIN D3, 1,000 UNIT (25 MCG) TABLET] Take 2,000 Units by mouth daily.      dutasteride (AVODART) 0.5 MG capsule Take 0.5 mg by mouth daily      escitalopram (LEXAPRO) 5 MG tablet Take 1 tablet (5 mg) by mouth daily. 90 tablet 0    ketoconazole (NIZORAL) 2 % external shampoo Apply topically daily as needed LATHER INTO SCALP AND RINSE AFTER 2-3 MINUTES. USE THREE TIMES A WEEK.      magnesium chloride (SLOW-MAG) 64 mg TbEC delayed-release tablet [MAGNESIUM CHLORIDE (SLOW-MAG) 64 MG TBEC DELAYED-RELEASE TABLET] Take 64 mg by mouth daily.      OLANZapine (ZYPREXA) 10 MG tablet Take 0.5 tablets (5 mg) by mouth daily AND 1 tablet (10 mg) at bedtime. Take with one of the 15 mg tabs at bedtime for a total of 25 mg at bedtime.. 45 tablet 2    OLANZapine-samidorphan (LYBALVI) 20-10 MG TABS tablet Take 1 tablet by mouth at bedtime. Take with 1/2 of a 10 mg pill for a total of 25 mg of olanzapine at bedtime 30 tablet 2    QUEtiapine (SEROQUEL) 100 MG tablet Take 1 tablet (100 mg) by mouth at bedtime. May also take 0.5-1 tablets ( mg) 2 times daily as needed (anxiety). 60 tablet 3    tadalafil (CIALIS) 10 MG tablet Take 10 mg by mouth daily      zinc gluconate 50 mg tablet [ZINC GLUCONATE 50 MG TABLET] Take 100 mg by mouth daily.            Vitals     There were no vitals taken for this visit.      Weight prior to medication: unknown         Mental Status Exam     Alertness: alert  and oriented  Appearance: well groomed  Behavior/Demeanor: cooperative, pleasant, and calm, with good  eye contact   Speech: regular rate and rhythm, not pressured  Language: no obvious problem  Psychomotor: normal or unremarkable  Mood:  \"The same, I guess\"  Affect: blunted; was congruent to mood; was congruent to content  Thought Process/Associations:  Somewhat concrete  Thought Content:  Reports intermittent passive suicidal ideation without plan; without intent " "[details in Interim History];  Denies suicidal and violent ideation and delusions  Perception:  Reports auditory hallucinations;  Denies visual hallucinations  Insight: good  Judgment: fair and adequate for safety  Cognition: does  appear grossly intact; formal cognitive testing was not done         Labs and Data     RATING SCALES:  AIMS: next in person visit    PHQ9 TODAY =       2/11/2025     3:07 PM 2/28/2025     3:01 PM 3/17/2025     3:18 PM   PHQ-9 SCORE   PHQ-9 Total Score MyChart 18 (Moderately severe depression) 19 (Moderately severe depression) 18 (Moderately severe depression)   PHQ-9 Total Score 18  19  18        Patient-reported     ANTIPSYCHOTIC LABS ROUTINE    [glu, A1C, lipids (focus LDL), liver enzymes, WBC, ANEU, Hgb, plts]   q12 mo  Recent Labs   Lab Test 06/30/23  1048   GLC 84     No lab results found.  Recent Labs   Lab Test 06/30/23  1048   AST 39   ALT 40   ALKPHOS 88     Recent Labs   Lab Test 06/30/23  1048   WBC 7.7   HGB 14.9             Psychiatric Diagnoses     Psychosis, schizophrenia vs schizoaffective disorder  AUD, moderate, in early remission  Cannabis use disorder with active use  Generalized anxiety disorder  Agoraphobia         Assessment   Navi HO Andrew is a 28 year old male with first onset of psychiatric symptoms at age 7 (\"social anxiety\"), and psychotic symptoms at age 23. The above duration of untreated psychosis was approximately 4 years.  Prodromal symptoms seem to have been present since at least college (notable substance use) though patient does note a substantially longer history of depression and physical health concerns. Presenting symptoms appear to include hallucinations, delusional thoughts. Navi attributes symptoms to physical health issues and history of trauma.  Substance use does seem to be a present concern. There are possible medical comorbidities which impact this treatment [suicide attempt, suicidal ideation, SIB, psychosis, aggression, and " substance use: alcohol].     Today,  Navi reports ongoing difficulties with delayed sleep phase and then sleeping until about 2PM daily. This is the time he typically sets his alarm. Discussed approaches to sleep and sleep quantity at length during today's assessment. Stated that he wasn't particularly interested in a lightbox due to not thinking it would be much help. Elected not to start the escitalopram in the interim due to concerns it could prevent him from getting high were he to start using cannabis in the future when legal shops eventually open. Counseled about the potential benefits of starting the escitalopram and weighing the possible pros/cons of the medication.              SUICIDE RISK ASSESSMENT:  Risk factors for self-harm: previous suicide attempt, substance use/pending treatment, and hallucinations.  Mitigating factors: describes a safety plan, h/o seeking help , future oriented, commitment to family, and stable housing.  The patient does not appear to be at imminent risk for self-harm, hospitalization is not recommended which the pt does  agree with. No hospitalization will be arranged. Based on degree of symptoms close psych follow-up was/were recommended which the pt does  agree to. Additional steps to minimize risk: med changes.      [place] PRESCRIPTION MONITORING PROGRAM [] was not checked today: not using controlled substances.    PSYCHOTROPIC DRUG INTERACTIONS:   Quetiapine/olanzapine: additive CNS depression, QTc prolongation.    MANAGEMENT:  use lowest therapeutic doses of both and routine monitoring         Plan     1) PSYCHOTROPIC MEDICATIONS:  - Continue Olanzapine 5mg qAM  and olanzapine-samidorphan 20mg QHS  - continue escitalopram 5 mg PO daily prescription, but has not started and wants to consider more before starting    2) THERAPY:  Continue IRT with Brandie Moran    3) NEXT DUE:    Labs: FEP workup due  Rating Scales: AIMS due    4) REFERRALS:    none    5) RTC: 4 weeks    6)  CRISIS NUMBERS:   Provided routinely in AVS.    TREATMENT RISK STATEMENT:  The risks, benefits, alternatives and potential adverse effects have been discussed and are understood by the pt. The pt understands the risks of using street drugs or alcohol. There are no medical contraindications, the pt agrees to treatment with the ability to do so. The pt knows to call the clinic for any problems or to access emergency care if needed.  Medical and substance use concerns are documented above.  Psychotropic drug interaction check was done, including changes made today.    PROVIDER:   Patient seen and discussed with attending physician, Dr. Vega, who is in agreement with my assessment and plan.    Delano Liriano, PGY-4 (Psychiatry)  [facility]    I saw the patient with the resident and agree with the findings and the plan of care as documented in the resident s note.     Heidi Vega MD     The longitudinal plan of care for the diagnosis(es)/condition(s) as documented were addressed during this visit. Due to the added complexity in care, I will continue to support Navi in the subsequent management and with ongoing continuity of care.

## 2025-03-18 ENCOUNTER — VIRTUAL VISIT (OUTPATIENT)
Dept: PSYCHIATRY | Facility: CLINIC | Age: 30
End: 2025-03-18
Payer: COMMERCIAL

## 2025-03-18 DIAGNOSIS — F25.1 SCHIZOAFFECTIVE DISORDER, DEPRESSIVE TYPE (H): Primary | ICD-10-CM

## 2025-03-18 NOTE — PROGRESS NOTES
NAVIGGALINDO Clinician Contact & Progress Note  For Individual Resiliency Training (IRT)  A Part of the [facility] First Episode of Psychosis Program    NAVIGATE Enrollee: Navi Morales (1995)     MRN: [id number]  Date:  3/18/25  Diagnosis: Psychosis, unspecified psychosis type (H) [F29]   Clinician: LILIANA Individual Resiliency Trainer, MERCEDES Mcuhgh     1. Type of contact: (majority of time spent)  IRT Session via telehealth  Mode of communication: [employer] (HIPAA compliant, secure platform). Patient consented verbally to this mode of therapy today.  Reason for telehealth: To reduce barriers in accessing services, due to but not limited to transportation, location, or scheduling reasons.    2. People present:   Writer  Client: Yes - Navi    3. Length of Actual Contact: Start Time: 3:30p; End Time: 4:30p     4. Location of contact:  Originating Location (patient location): [facility], located in [place], [place]  Distant Location (provider location): Remote location    5. Did the client complete the home practice option(s) from the previous session:  Completed home practice  6. Motivational Teaching Strategies:  Connect info and skills with personal goals  Promote hope and positive expectations  Explore pros and cons of change  Re-frame experiences in positive light    7. Educational Teaching Strategies:  Review of written material/education  Relate information to client's experience  Ask questions to check comprehension  Break down information into small chunks  Adopt client's language     8. CBT Teaching Strategies:  Reinforcement and shaping (positive feedback for steps towards goals and gains in knowledge & skills)  Cognitive restructuring (identify thoughts related to negative feelings)  Motivational interviewing    9. IRT Module(s) Addressed:  Coping with symptoms - negative symptoms    10. Techniques utilized:   [place] announced at beginning of session  Review of goal  Review of previous  meeting  Present new material  Modeling  Help client choose a home practice option  Summarize progress made in current session    11. Measures:  Answers submitted by the patient for this visit:  Patient Health Questionnaire (Submitted on 3/17/2025)  If you checked off any problems, how difficult have these problems made it for you to do your work, take care of things at home, or get along with other people?: Very difficult  PHQ9 TOTAL SCORE: 18  Patient Health Questionnaire (G7) (Submitted on 3/17/2025)  MELY 7 TOTAL SCORE: 14    Mental Status Exam  Alertness: alert   Behavior/Demeanor: cooperative, pleasant, and calm  Speech: regular rate and rhythm  Language: intact.   Mood: depressed and anxious  Thought Process/Associations: unremarkable  Thought Content:  Reports suicidal ideation;  Denies suicidal ideation with plan; with intent [details in Interim History]  Perception:  Reports auditory hallucinations;  Denies visual hallucinations  Insight: good  Judgment: adequate for safety  Cognition: does  appear grossly intact; formal cognitive testing was not done      12. Assessment/Progress Note:     Writer and client met for IRT visit via [employer]. Writer used relational and interpersonal approach to explore feelings, motivations, and behavior. Writer offered support, feedback, validation, and reinforced use of skills taught in IRT from modalities including cognitive behavioral therapy, psycho education, and skills training. Explored symptoms and coping from a stress-vulnerability lens. Completed home practice, went on walk outside, using skills.     Meds be helpful for work or school.   Been almost 2 years since he's used marijuana. Doesn't think he will have paranoia as its legal now  Thinking of starting Lexapro - Selective serotonin reuptake inhibitor   Reviewed DBT emotion regulation skills: STOP, [place] Ahead and Opposite action.   Set home practice to review the remaining skills and try ONE. See secure email sent  "below following our session:    DBT Skills for home practice [[email or web address]]  Hi! Here is the website on the DBT skills we discussed. For next week, please review the remaining skills under Emotion Regulation ( ABC Please, Build Mastery, and Positive Self Talk). Thanks and see you Tuesday at 3pm.    LINK: Emotional Regulation Skills - Dialectical Behavior Therapy (DBT) Tools    Emotions are helpful and important. They communicate information to us about our environment and our experience. Goals of Emotional Regulation include: naming and understanding our own emotions, decrease the frequency of unpleasant emotions, decrease our vulnerability to emotions, and decrease emotional suffering. dbt.tools    SANTOS Mchugh, LEENA    13. Plan/Referrals:     Continue [facility]igate services, weekly IRT, scheduled next Tue at 3       Billing for \"Interactive Complexity\"?    No    MERCEDES Mchugh         "

## 2025-03-19 ENCOUNTER — VIRTUAL VISIT (OUTPATIENT)
Dept: PSYCHIATRY | Facility: CLINIC | Age: 30
End: 2025-03-19
Payer: COMMERCIAL

## 2025-03-19 DIAGNOSIS — F20.9 SCHIZOPHRENIA, UNSPECIFIED TYPE (H): ICD-10-CM

## 2025-03-19 DIAGNOSIS — F25.1 SCHIZOAFFECTIVE DISORDER, DEPRESSIVE TYPE (H): Primary | ICD-10-CM

## 2025-03-19 NOTE — PROGRESS NOTES
NAVIGAGLINDO Clinician Contact & Progress Note  For Individual Resiliency Training (IRT)  A Part of the [facility] First Episode of Psychosis Program    NAVIGATE Enrollee: Navi Morales (1995)     MRN: [id number]  Date:  3/11/25  Diagnosis: Psychosis, unspecified psychosis type (H) [F29]   Clinician: LILIANA Individual Resiliency Trainer, MERCEDES Mchugh     1. Type of contact: (majority of time spent)  IRT Session via telehealth  Mode of communication: [employer] (HIPAA compliant, secure platform). Patient consented verbally to this mode of therapy today.  Reason for telehealth: To reduce barriers in accessing services, due to but not limited to transportation, location, or scheduling reasons.    2. People present:   Writer  Client: Yes - Navi    3. Length of Actual Contact: Start Time: 3:03p; End Time: 4:00p     4. Location of contact:  Originating Location (patient location): [facility], located in [place], [place]  Distant Location (provider location): Remote location    5. Did the client complete the home practice option(s) from the previous session:  Completed home practice  6. Motivational Teaching Strategies:  Connect info and skills with personal goals  Promote hope and positive expectations  Explore pros and cons of change  Re-frame experiences in positive light    7. Educational Teaching Strategies:  Review of written material/education  Relate information to client's experience  Ask questions to check comprehension  Break down information into small chunks  Adopt client's language     8. CBT Teaching Strategies:    Reinforcement and shaping (positive feedback for steps towards goals and gains in knowledge & skills)  Cognitive restructuring (identify thoughts related to negative feelings)  Motivational interviewing    9. IRT Module(s) Addressed:  Coping with symptoms - anxiety - identifying emotions while brushing teeth    10. Techniques utilized:   [place] announced at beginning of  "session  Review of goal  Review of previous meeting  Present new material  Modeling  Help client choose a home practice option  Summarize progress made in current session    11. Measures:  None    Mental Status Exam  Alertness: alert   Behavior/Demeanor: cooperative, pleasant, and calm  Speech: regular rate and rhythm  Language: intact.   Mood: depressed and anxious  Thought Process/Associations: unremarkable  Thought Content:  Reports suicidal ideation;  Denies suicidal ideation with plan; with intent [details in Interim History]  Perception:  Reports auditory hallucinations;  Denies visual hallucinations  Insight: good  Judgment: adequate for safety  Cognition: does  appear grossly intact; formal cognitive testing was not done      12. Assessment/Progress Note:     Writer and client met for IRT visit via [employer]. Writer used relational and interpersonal approach to explore feelings, motivations, and behavior. Writer offered support, feedback, validation, and reinforced use of skills taught in IRT from modalities including cognitive behavioral therapy, psycho education, and skills training. Promoted understanding of their experiences of substance use, specifically marijuana. Discussed how this is preventing him from starting a long term treatment like an selective serotonin reuptake inhibitor. Navi reports \"I might be ready to take them soon though\". Reflected on client's strengths and resiliency factors and facilitated discussion on how these can assist in symptom management, recovery, and well-being. Explored symptoms and coping from a stress-vulnerability lens. Reports some suicidal ideation but no plan, method or intent. Navi said today he would like more structured skill building. Spent majority of session discussing Navi's daily tooth brushing process which has become bothersome to him (15-20 min/day, at most 30 min. Needs a new toothbrush every week). Identified thoughts and his behaviors related to this " "routine, examined difficulty with recognizing emotions.     Thoughts: I am not cleaning my teeth well enough --> my teeth will turn yellow and fall out --> I will be alone forever  Feelings:  anxiety, inadequacy, frustration  Behavior: more brushing, or avoidance    Analyze evidence for and against statement and replace with more hopeful/accurate statement such as: \"I brushed my teeth as best I could today\". Led to feelings of:  Adequacy, relief, leading to better more relaxed sleep. Wants to try home practice of reducing time by 2 min    13. Plan/Referrals:     Continue [facility]igate services, weekly IRT, scheduled next Tue at 3       Billing for \"Interactive Complexity\"?    No    Brandie Moran, MERCEDES     "

## 2025-03-20 RX ORDER — OLANZAPINE 10 MG/1
TABLET ORAL
Qty: 45 TABLET | Refills: 2 | Status: SHIPPED | OUTPATIENT
Start: 2025-03-20

## 2025-03-20 NOTE — TELEPHONE ENCOUNTER
Last seen: 3/17/25  RTC:   Cancel: none  No-show: none  Next appt: 4/14/25    Incoming refill from pharmacy via fax    Medication requested: Olanzapine 10 mg   Directions: Take 0.5 tablets by mouth daily and 1 tablet at bedtime, take with one of 15 mg tablets for a total of 25 mg at bedtime  Qty: 45  Last refilled: 12/11/24 with 2 refills     Routed to provider; last note unsigned

## 2025-03-23 NOTE — PROGRESS NOTES
NAVIGATE Clinician Contact & Progress Note   For Family Education Program    NAVIGATE Enrollee: Navi Morales (1995)     MRN: [id number]  Date:  3/19/25  Diagnosis(es):   Psychosis unspecified  Clinician: LILIANA Family Clinician, Laura Maldonado MA, LP     1. Type of contact: (majority of time spent)  Family Session via telehealth  Mode of communication:    Linwood Verduzco consented verbally to this mode of therapy today.  Reason for telehealth: To reduce barriers in accessing services, due to but not limited to transportation, location, or scheduling reasons.     2. People present:   Writer  Client: Yes   Significant Other/Family/Friend:  Mother    3. Length of Actual Contact: Start Time: 4:25 to 5pm      4. Location of contact:  Originating Location (patient location):[place]   Distant Location (provider location): Home office, located in [place], [place], using appropriate privacy considerations and procedures    5. Did the client complete the home practice option(s) from the previous session: Partially Completed    6. Motivational Teaching Strategies:  Connect info and skills with personal goals  Promote hope and positive expectations    7. Educational Teaching Strategies:  Review of written material/education  Relate information to client's experience  Ask questions to check comprehension  Break down information into small chunks    8. CBT Teaching Strategies:  Reinforcement and shaping (positive feedback for steps towards goals and gains in knowledge & skills)    9. Psychoeducational Topic(s) Addressed:  Just the Facts - Coping with Stress    10. Techniques utilized:   [place] announced at beginning of session  Review of previous meeting  Present new material  Family Therapy  Motivational Interviewing (Connect info and skills with personal goals, Promote hope and positive expectations, Explore pros and cons of change, Re-frame experiences in a positive light)  Educational Teaching  "Strategies (Review written material/education on: Psychosis, Medications for psychosis, Coping with stress, Strategies to build resiliency, Relapse prevention planning, Developing a collaboration with mental health professionals, Effective communication, A relative s guide to supporting recovery from psychosis, Basic facts about alcohol and drugs)  CBT (Reinforcement and shaping, Social skills training, Relapse prevention planning, Coping skills training, Relaxation training, Cognitive restructuring, Behavioral tailoring)    11. Assessment/Progress Note:     Writer met with Navi and Navi's mom on this day for family education and support. Navi's mom forgot about the appointment as the appointment was on a different day than typical, so she and Navi were late.    Navi and mom reported no urgent concerns. Writer set agenda to check in about the last two weeks and to start a new education module on communication.    Writer asked about when mom was on vacation and Navi stated, \"It could have gone better.\" Mom agreed, and reported that there was some stress with the dog. Writer reflected on how Navi positively handled the stress.     Mom reports that this week they have gone on two walks with the dog and went one time to the grocery stores.     Started module on communication. Writer asked both mom and Navi about the strengths of their communication. Navi reports that communication with mom definitely goes better than with dad and that he feels he can be honest. Mom also affirms this. For areas of growth, Navi noted that he felt like they could be more \"thoughtful\" and have more time to communicate.     Writer reflected both mom and Navi's willingness to discuss communication as a strength of their relationship.     Overall family and Navi seemed engaged in conversation. They did express interest in continuing to meet for family therapy and psychoeducation. As of today's appt their insight into Navi's mental illness " appears fair. They seem they would benefit from continued clinical intervention aimed at assisting them implement helpful strategies at home and increase their understanding of psychosis.    12. Plan/Referrals:     Will meet with family weekly as schedule allows for evidence based family psychoeducation and therapeutic support aimed at maximizing Navi's opportunity for recovery from psychosis.     Laura Maldonado MA, LP   NAVIGATE   The author of this note documented a reason for not sharing it with the patient.

## 2025-03-25 ENCOUNTER — VIRTUAL VISIT (OUTPATIENT)
Dept: PSYCHIATRY | Facility: CLINIC | Age: 30
End: 2025-03-25
Payer: COMMERCIAL

## 2025-03-25 DIAGNOSIS — F20.9 SCHIZOPHRENIA, UNSPECIFIED TYPE (H): Primary | ICD-10-CM

## 2025-03-25 NOTE — PROGRESS NOTES
NAVIGGALINDO Clinician Contact & Progress Note  For Individual Resiliency Training (IRT)  A Part of the [facility] First Episode of Psychosis Program    NAVIGATE Enrollee: Navi Morales (1995)     MRN: [id number]  Date:  3/25/25  Diagnosis: Psychosis, unspecified psychosis type (H) [F29]   Clinician: LILIANA Individual Resiliency Trainer, MERCEDES Mchugh     1. Type of contact: (majority of time spent)  IRT Session via telehealth  Mode of communication: [employer] (HIPAA compliant, secure platform). Patient consented verbally to this mode of therapy today.  Reason for telehealth: To reduce barriers in accessing services, due to but not limited to transportation, location, or scheduling reasons.    2. People present:   Writer  Client: Yes - Navi    3. Length of Actual Contact: Start Time: 3:05p; End Time: 4:20p     4. Location of contact:  Originating Location (patient location): [facility], located in [place], [place]  Distant Location (provider location): Remote location    5. Did the client complete the home practice option(s) from the previous session:  partly, read some of the DBT skills sheet  6. Motivational Teaching Strategies:  Connect info and skills with personal goals  Promote hope and positive expectations  Explore pros and cons of change  Re-frame experiences in positive light    7. Educational Teaching Strategies:  Review of written material/education  Relate information to client's experience  Ask questions to check comprehension  Break down information into small chunks  Adopt client's language     8. CBT Teaching Strategies:  Reinforcement and shaping (positive feedback for steps towards goals and gains in knowledge & skills)  Cognitive restructuring (identify thoughts related to negative feelings)  Motivational interviewing    9. IRT Module(s) Addressed:  Coping with symptoms - negative symptoms    10. Techniques utilized:   [place] announced at beginning of session  Review of goal  Review  "of previous meeting  Present new material  Modeling  Help client choose a home practice option  Summarize progress made in current session    11. Measures:  none  Mental Status Exam  Alertness: alert with some gazing throughout session  Behavior/Demeanor:  tired, restless  Speech: regular rate and rhythm  Language: intact.   Mood: depressed and anxious  Thought Process/Associations: unremarkable  Thought Content:  Reports suicidal ideation with plan of hanging;  Denies suicidal intent  Perception:  Reports auditory hallucinations;  Denies visual hallucinations  Insight: good  Judgment: adequate for safety  Cognition: does  appear grossly intact; formal cognitive testing was not done      12. Assessment/Progress Note:     Writer and client met for IRT visit via [employer]. Writer used relational and interpersonal approach to explore feelings, motivations, and behavior. Writer offered support, feedback, validation, and reinforced use of skills taught in IRT from modalities including cognitive behavioral therapy, psycho education, and skills training. Explored symptoms and coping from a stress-vulnerability lens. Navi reported an increase in suicidal thoughts over the last week. He reports them as intrusive though recently thought of a plan to hang himself. Navi denied any intent and contracted for safety today. Writer and Navi reviewed his safety plan and steps he can take if he feels these thoughts. Reviewed 2 additional DBT Skills, cope ahead and ABC PLEASE. Also Added Alban Andrews plan to chart, will review next week and ask Navi to share with mom.     13. Plan/Referrals:     Continue Navigate services, weekly IRT, scheduled next Wed at 3       Billing for \"Interactive Complexity\"?    Yes    MERCEDES Mchugh         "

## 2025-03-25 NOTE — PROGRESS NOTES
NAVIGATE Clinician Contact & Progress Note   For Family Education Program    NAVIGATE Enrollee: Navi Morales (1995)     MRN: [id number]  Date:  3/25/25  Diagnosis(es):   Psychosis unspecified  Clinician: LILIANA Family Clinician, Laura Maldonado MA, SELWYN     1. Type of contact: (majority of time spent)  Family Session via telehealth  Mode of communication:    Linwood Verduzco consented verbally to this mode of therapy today.  Reason for telehealth: To reduce barriers in accessing services, due to but not limited to transportation, location, or scheduling reasons.     2. People present:   Writer  Client: Yes   Significant Other/Family/Friend:  Mother    3. Length of Actual Contact: Start Time: 4:25 to 5pm      4. Location of contact:  Originating Location (patient location):[place]   Distant Location (provider location): Home office, located in [place], [place], using appropriate privacy considerations and procedures    5. Did the client complete the home practice option(s) from the previous session: Partially Completed    6. Motivational Teaching Strategies:  Connect info and skills with personal goals  Promote hope and positive expectations    7. Educational Teaching Strategies:  Review of written material/education  Relate information to client's experience  Ask questions to check comprehension  Break down information into small chunks    8. CBT Teaching Strategies:  Reinforcement and shaping (positive feedback for steps towards goals and gains in knowledge & skills)    9. Psychoeducational Topic(s) Addressed:  Just the Facts - Coping with Stress    10. Techniques utilized:   [place] announced at beginning of session  Review of previous meeting  Present new material  Family Therapy  Motivational Interviewing (Connect info and skills with personal goals, Promote hope and positive expectations, Explore pros and cons of change, Re-frame experiences in a positive light)  Educational Teaching  Strategies (Review written material/education on: Psychosis, Medications for psychosis, Coping with stress, Strategies to build resiliency, Relapse prevention planning, Developing a collaboration with mental health professionals, Effective communication, A relative s guide to supporting recovery from psychosis, Basic facts about alcohol and drugs)  CBT (Reinforcement and shaping, Social skills training, Relapse prevention planning, Coping skills training, Relaxation training, Cognitive restructuring, Behavioral tailoring)    11. Assessment/Progress Note:     Writer met with Navi and Navi's mom on this day for family education and support. Navi was late to the appointment due to having individual therapy go over time. Writer met briefly with mom; discussed her concerns about increased sleep.     Navi reported no urgent concerns at this time. Offered Navi to share with mom and updates about individual therapy and Navi disclosed plans made to increase exposures at the grocery store by going to a  rather than the self check out.     Continued module on communication. Navi disclosed concerns about communication with dad regarding the dog and Navi reported times of his dad calling him names. Provided supportive listening and empathy. Mom described increased stress related to the dog over the last week. Writer reflected how the dog's behavior has increased everyone's stress and the pattern of blame that appears to happen when the dog misbehaves. Offered compassion about the stress of the dog acting up and how this upsets dad which stresses out both mom and Navi.     Writer reflected ways that Navi and mom are trying to support each other; Navi had empathy for mom's work stress and work with the dog. Navi's mom had empathy of how Navi is trying to avoid conflict with dad.     Introduced skill of making positive requests as an alternative to assuming that someone knows what you need. Navi and mom agreed to try and  practice this skill over the next week. Both were also open to having more compassion for themselves and each other as they navigate this stressful experience.     Overall family and Navi seemed engaged in conversation. They did express interest in continuing to meet for family therapy and psychoeducation. As of today's appt their insight into Navi's mental illness appears fair. They seem they would benefit from continued clinical intervention aimed at assisting them implement helpful strategies at home and increase their understanding of psychosis.    12. Plan/Referrals:     Will meet with family weekly as schedule allows for evidence based family psychoeducation and therapeutic support aimed at maximizing Navi's opportunity for recovery from psychosis.     Laura Maldonado MA, LP   NAVIGATE   The author of this note documented a reason for not sharing it with the patient.

## 2025-03-26 ENCOUNTER — TELEPHONE (OUTPATIENT)
Dept: PSYCHIATRY | Facility: CLINIC | Age: 30
End: 2025-03-26

## 2025-03-26 NOTE — TELEPHONE ENCOUNTER
Left a NOMI introducing myself on the team as a peer specialist and as a colleague of Brandie Moran. Left my phone number and work hours for him to return call.

## 2025-04-01 ENCOUNTER — VIRTUAL VISIT (OUTPATIENT)
Dept: PSYCHIATRY | Facility: CLINIC | Age: 30
End: 2025-04-01
Payer: COMMERCIAL

## 2025-04-01 DIAGNOSIS — F20.9 SCHIZOPHRENIA, UNSPECIFIED TYPE (H): Primary | ICD-10-CM

## 2025-04-01 NOTE — PROGRESS NOTES
NAVIGATE Clinician Contact & Progress Note   For Family Education Program    NAVIGATE Enrollee: Navi Morales (1995)     MRN: [id number]  Date:  4/01/25  Diagnosis(es):   Psychosis unspecified  Clinician: LILIANA Family Clinician, Laura Maldonado MA, SELWYN     1. Type of contact: (majority of time spent)  Family Session via telehealth  Mode of communication:    Linwood Verduzco consented verbally to this mode of therapy today.  Reason for telehealth: To reduce barriers in accessing services, due to but not limited to transportation, location, or scheduling reasons.     2. People present:   Writer  Client: Yes   Significant Other/Family/Friend:  Mother    3. Length of Actual Contact: Start Time: 4:10 to 5:05pm      4. Location of contact:  Originating Location (patient location):[place]   Distant Location (provider location): Home office, located in [place], [place], using appropriate privacy considerations and procedures    5. Did the client complete the home practice option(s) from the previous session: Partially Completed    6. Motivational Teaching Strategies:  Connect info and skills with personal goals  Promote hope and positive expectations    7. Educational Teaching Strategies:  Review of written material/education  Relate information to client's experience  Ask questions to check comprehension  Break down information into small chunks    8. CBT Teaching Strategies:  Reinforcement and shaping (positive feedback for steps towards goals and gains in knowledge & skills)    9. Psychoeducational Topic(s) Addressed:  Just the Facts - Effective Communication    10. Techniques utilized:   [place] announced at beginning of session  Review of previous meeting  Present new material  Family Therapy  Motivational Interviewing (Connect info and skills with personal goals, Promote hope and positive expectations, Explore pros and cons of change, Re-frame experiences in a positive light)  Educational  Teaching Strategies (Review written material/education on: Psychosis, Medications for psychosis, Coping with stress, Strategies to build resiliency, Relapse prevention planning, Developing a collaboration with mental health professionals, Effective communication, A relative s guide to supporting recovery from psychosis, Basic facts about alcohol and drugs)  CBT (Reinforcement and shaping, Social skills training, Relapse prevention planning, Coping skills training, Relaxation training, Cognitive restructuring, Behavioral tailoring)    11. Assessment/Progress Note:     Writer met with Navi and Navi's mom on this day for family education and support. Navi was late to the appointment due to having individual therapy go over time. Writer met briefly with mom; discussed her concerns about increased sleep.     Navi reported no urgent concerns at this time. Mom reported no urgent concerns at this time. Discussed the last week and Navi reported that he walked the dog with mom and went to the pet food store.     Reviewed last week's session regarding responding to challenging communication patterns such as blaming when stress gets high. Mom reported doing her best to make changes. Provided supportive listening and empathy around changing these patterns and the potential value of self compassion. Explored struggles with self critical thinking patterns and Navi acknowledged that he struggles with this partially related to symptoms and also related to negative thoughts about self.     Offered information and education about the components of self compassion as an alternative to blame. Explored current stressors including dad's physical pain and surgeries, their dog's behavior, and Navi's struggles with sleep and symptoms. Validated the challenges of this time and also reflected on helpful strategies both Navi and mom have utilized to cope including deep breathing and exercise.    Navi stated his goals for the week are to go to  the grocery store on Friday/Saturday and also to make a meal.     Overall family and Navi seemed engaged in conversation. They did express interest in continuing to meet for family therapy and psychoeducation. As of today's appt their insight into Navi's mental illness appears fair. They seem they would benefit from continued clinical intervention aimed at assisting them implement helpful strategies at home and increase their understanding of psychosis.    12. Plan/Referrals:     Will meet with family weekly as schedule allows for evidence based family psychoeducation and therapeutic support aimed at maximizing Navi's opportunity for recovery from psychosis.     Laura Maldonado MA, LP   NAVIGATE   The author of this note documented a reason for not sharing it with the patient.

## 2025-04-02 ENCOUNTER — VIRTUAL VISIT (OUTPATIENT)
Dept: PSYCHIATRY | Facility: CLINIC | Age: 30
End: 2025-04-02
Payer: COMMERCIAL

## 2025-04-02 DIAGNOSIS — F29 PSYCHOSIS, UNSPECIFIED PSYCHOSIS TYPE (H): Primary | ICD-10-CM

## 2025-04-02 ASSESSMENT — ANXIETY QUESTIONNAIRES
3. WORRYING TOO MUCH ABOUT DIFFERENT THINGS: MORE THAN HALF THE DAYS
8. IF YOU CHECKED OFF ANY PROBLEMS, HOW DIFFICULT HAVE THESE MADE IT FOR YOU TO DO YOUR WORK, TAKE CARE OF THINGS AT HOME, OR GET ALONG WITH OTHER PEOPLE?: VERY DIFFICULT
7. FEELING AFRAID AS IF SOMETHING AWFUL MIGHT HAPPEN: MORE THAN HALF THE DAYS
2. NOT BEING ABLE TO STOP OR CONTROL WORRYING: MORE THAN HALF THE DAYS
4. TROUBLE RELAXING: NEARLY EVERY DAY
GAD7 TOTAL SCORE: 15
5. BEING SO RESTLESS THAT IT IS HARD TO SIT STILL: MORE THAN HALF THE DAYS
6. BECOMING EASILY ANNOYED OR IRRITABLE: MORE THAN HALF THE DAYS
1. FEELING NERVOUS, ANXIOUS, OR ON EDGE: MORE THAN HALF THE DAYS
GAD7 TOTAL SCORE: 15
GAD7 TOTAL SCORE: 15
7. FEELING AFRAID AS IF SOMETHING AWFUL MIGHT HAPPEN: MORE THAN HALF THE DAYS
IF YOU CHECKED OFF ANY PROBLEMS ON THIS QUESTIONNAIRE, HOW DIFFICULT HAVE THESE PROBLEMS MADE IT FOR YOU TO DO YOUR WORK, TAKE CARE OF THINGS AT HOME, OR GET ALONG WITH OTHER PEOPLE: VERY DIFFICULT

## 2025-04-02 NOTE — PROGRESS NOTES
NAVIGATE/ESMD Virtual Visit Progress Note  For Supported Employment & Education    NAVIGATE Enrollee: Navi Morales (1995)     MRN: [id number]  Date of Visit: 4/02/25  Contacted: ALICE  Appointment Length: 30    Discussed:   Writer met with Navi Morales for virtual visit check-in. Reason for telehealth: To reduce barriers in accessing services, due to but not limited to transportation, location, or scheduling reasons. Meeting agenda included Emailed cris manley to start communication again in regards to navi going back to school.     Up Next:     Delmi HOPSONATE/ESMD  Supported Employment & Education  Supported Employment & EducationThis is a non-billable encounter as it was solely for the purposes of outreach and/or care coordination. SEE services are non billable services

## 2025-04-08 ENCOUNTER — VIRTUAL VISIT (OUTPATIENT)
Dept: PSYCHIATRY | Facility: CLINIC | Age: 30
End: 2025-04-08
Payer: COMMERCIAL

## 2025-04-08 DIAGNOSIS — F29 PSYCHOSIS, UNSPECIFIED PSYCHOSIS TYPE (H): Primary | ICD-10-CM

## 2025-04-08 ASSESSMENT — PATIENT HEALTH QUESTIONNAIRE - PHQ9
SUM OF ALL RESPONSES TO PHQ QUESTIONS 1-9: 18
SUM OF ALL RESPONSES TO PHQ QUESTIONS 1-9: 18
10. IF YOU CHECKED OFF ANY PROBLEMS, HOW DIFFICULT HAVE THESE PROBLEMS MADE IT FOR YOU TO DO YOUR WORK, TAKE CARE OF THINGS AT HOME, OR GET ALONG WITH OTHER PEOPLE: VERY DIFFICULT
10. IF YOU CHECKED OFF ANY PROBLEMS, HOW DIFFICULT HAVE THESE PROBLEMS MADE IT FOR YOU TO DO YOUR WORK, TAKE CARE OF THINGS AT HOME, OR GET ALONG WITH OTHER PEOPLE: VERY DIFFICULT
SUM OF ALL RESPONSES TO PHQ QUESTIONS 1-9: 18
SUM OF ALL RESPONSES TO PHQ QUESTIONS 1-9: 18

## 2025-04-08 NOTE — PROGRESS NOTES
NAVIGGALINDO Clinician Contact & Progress Note  For Individual Resiliency Training (IRT)  A Part of the [facility] First Episode of Psychosis Program    NAVIGATE Enrollee: Navi Morales (1995)     MRN: [id number]  Date:  4/08/25  Diagnosis: Psychosis, unspecified psychosis type (H) [F29]   Clinician: LILIANA Individual Resiliency Trainer, MERCEDES Mchugh     1. Type of contact: (majority of time spent)  IRT Session via telehealth  Mode of communication: [employer] (HIPAA compliant, secure platform). Patient consented verbally to this mode of therapy today.  Reason for telehealth: To reduce barriers in accessing services, due to but not limited to transportation, location, or scheduling reasons.    2. People present:   Writer  Client: Yes - Navi    3. Length of Actual Contact: Start Time: 3:00p; End Time: 3:55p     4. Location of contact:  Originating Location (patient location): [facility], located in [place], [place]  Distant Location (provider location): Remote location    5. Did the client complete the home practice option(s) from the previous session: no, did not share safety plan yet    6. Motivational Teaching Strategies:  Connect info and skills with personal goals  Promote hope and positive expectations  Explore pros and cons of change  Re-frame experiences in positive light    7. Educational Teaching Strategies:  Review of written material/education  Relate information to client's experience  Ask questions to check comprehension  Break down information into small chunks  Adopt client's language     8. CBT Teaching Strategies:  Reinforcement and shaping (positive feedback for steps towards goals and gains in knowledge & skills)  Cognitive restructuring (identify thoughts related to negative feelings)  Motivational interviewing    9. IRT Module(s) Addressed:  Coping with symptoms - negative symptoms    10. Techniques utilized:   [place] announced at beginning of session  Review of goal  Review of  "previous meeting  Present new material  Modeling  Help client choose a home practice option  Summarize progress made in current session    11. Measures:  Answers submitted by the patient for this visit:  Patient Health Questionnaire (Submitted on 4/8/2025)  If you checked off any problems, how difficult have these problems made it for you to do your work, take care of things at home, or get along with other people?: Very difficult  PHQ9 TOTAL SCORE: 18    Mental Status Exam  Alertness: alert with some gazing throughout session  Behavior/Demeanor:  tired, restless  Speech: regular rate and rhythm  Language: intact.   Mood: depressed and anxious  Thought Process/Associations: unremarkable  Thought Content:  Reports suicidal ideation with plan of hanging;  Denies suicidal intent  Perception:  Reports auditory hallucinations;  Denies visual hallucinations  Insight: good  Judgment: adequate for safety  Cognition: does  appear grossly intact; formal cognitive testing was not done      12. Assessment/Progress Note:     Writer and client met for IRT visit via [employer]. Writer used relational and interpersonal approach to explore feelings, motivations, and behavior. Writer offered support, feedback, validation, and reinforced use of skills taught in IRT from modalities including cognitive behavioral therapy, psycho education, and skills training. Explored symptoms and coping from a stress-vulnerability lens. Started the session using a grounding technique as josé miguel was anxious and shaking after recently waking up. He reported this was helpful, particularly open hands. Majority of session was spent today discussing several good things from the week. José Miguel helped his dad install a water heater and asked him to stay and eat dinner with him. Set a small goal to stretch each day when he wakes up    13. Plan/Referrals:     Continue Navigate services, weekly IRT, scheduled next tue  at 3       Billing for \"Interactive Complexity\"?  "   Mona Moran, CRISTELASW

## 2025-04-08 NOTE — PROGRESS NOTES
NAVIGATE Clinician Contact & Progress Note   For Family Education Program    NAVIGATE Enrollee: Navi Morales (1995)     MRN: [id number]  Date:  4/08/25  Diagnosis(es):   Psychosis unspecified  Clinician: [facility] Family Clinician, Laura Maldonado MA, SELWYN     1. Type of contact: (majority of time spent)  Family Session via telehealth  Mode of communication:    Linwood Verduzco consented verbally to this mode of therapy today.  Reason for telehealth: To reduce barriers in accessing services, due to but not limited to transportation, location, or scheduling reasons.     2. People present:   Writer  Client: Yes   Significant Other/Family/Friend:  Mother    3. Length of Actual Contact: Start Time: 4:10 to 5:05pm      4. Location of contact:  Originating Location (patient location): Patient's home location; [place]   Distant Location (provider location): Home office, located in [place], [place], using appropriate privacy considerations and procedures    5. Did the client complete the home practice option(s) from the previous session: Partially Completed    6. Motivational Teaching Strategies:  Connect info and skills with personal goals  Promote hope and positive expectations    7. Educational Teaching Strategies:  Review of written material/education  Relate information to client's experience  Ask questions to check comprehension  Break down information into small chunks    8. CBT Teaching Strategies:  Reinforcement and shaping (positive feedback for steps towards goals and gains in knowledge & skills)    9. Psychoeducational Topic(s) Addressed:  Just the Facts - Effective Communication    10. Techniques utilized:   [place] announced at beginning of session  Review of previous meeting  Present new material  Family Therapy  Motivational Interviewing (Connect info and skills with personal goals, Promote hope and positive expectations, Explore pros and cons of change, Re-frame experiences in a  "positive light)  Educational Teaching Strategies (Review written material/education on: Psychosis, Medications for psychosis, Coping with stress, Strategies to build resiliency, Relapse prevention planning, Developing a collaboration with mental health professionals, Effective communication, A relative s guide to supporting recovery from psychosis, Basic facts about alcohol and drugs)  CBT (Reinforcement and shaping, Social skills training, Relapse prevention planning, Coping skills training, Relaxation training, Cognitive restructuring, Behavioral tailoring)    11. Assessment/Progress Note:     Writer met with Navi and Navi's mom on this day for family education and support.Set agenda to check-in about the last week, review last weeks' session, and continue education on communication. Family were agreeable to this agenda.    Navi reported no urgent concerns at this time. Mom reported no urgent concerns at this time. Discussed the last week and Navi reported no changes. Prior to Navi joining, mom reported that Navi did not take the dog on a walk or go to the grocery store.    Reviewed last week's session regarding self compassion. Navi reported he was not able to utilize this skill. Mom reported that she has made attempts to engage in self compassion. Explored what a self compassionate response to Navi's experience over the last week could sound like. Navi was unable to identify a response. Mom offered that self compassionate response could sound like, \"I am doing the best I can.\"     Navi reported numerous goals including wanting to start cooking more, change diet, take the dog for a walk, and go to the store. Writer provided supportive listening and reflected on Navi's desire to make change. Navi also reported that the primary barrier to accomplishing these goals is sleeping during the day. Explored Navi's thoughts about making changes to sleep schedule including taking medications earlier. Writer validated Navi's " need for sleep as being an essential part of recovery. Explored other reasons for wanting to be awake at night such as increased family stress when Navi is awake during the day. Family struggle to identify any current activities that family can do altogether which are low stress. Navi and mom indicate that they enjoy cooking together and going for walks.    Navi will continue to work with individual therapist to identify strategies for coping with low energy and anxiety. Explored options of meeting with mom alone every other week; however, Navi reports he would prefer to attend these meetings. Will plan to continue communication education next week with both mom and Navi as requested.     Overall family and Navi seemed engaged in conversation. They did express interest in continuing to meet for family therapy and psychoeducation. As of today's appt their insight into Navi's mental illness appears fair. They seem they would benefit from continued clinical intervention aimed at assisting them implement helpful strategies at home and increase their understanding of psychosis.    12. Plan/Referrals:     Will meet with family weekly as schedule allows for evidence based family psychoeducation and therapeutic support aimed at maximizing Navi's opportunity for recovery from psychosis.     Laura Maldonado MA, LP   NAVIGATE   The author of this note documented a reason for not sharing it with the patient.

## 2025-04-14 ENCOUNTER — VIRTUAL VISIT (OUTPATIENT)
Dept: PSYCHIATRY | Facility: CLINIC | Age: 30
End: 2025-04-14
Payer: COMMERCIAL

## 2025-04-14 DIAGNOSIS — F90.9 ATTENTION DEFICIT HYPERACTIVITY DISORDER (ADHD), UNSPECIFIED ADHD TYPE: ICD-10-CM

## 2025-04-14 DIAGNOSIS — F20.9 SCHIZOPHRENIA, UNSPECIFIED TYPE (H): Primary | ICD-10-CM

## 2025-04-14 RX ORDER — GUANFACINE 1 MG/1
1 TABLET, EXTENDED RELEASE ORAL AT BEDTIME
Qty: 30 TABLET | Refills: 1 | Status: SHIPPED | OUTPATIENT
Start: 2025-04-14

## 2025-04-14 RX ORDER — OLANZAPINE 10 MG/1
TABLET, FILM COATED ORAL
Qty: 45 TABLET | Refills: 2 | Status: SHIPPED | OUTPATIENT
Start: 2025-04-14

## 2025-04-14 NOTE — NURSING NOTE
Virtual Visit Details    Type of service:  Video Visit   Video Start Time:   Video End Time:    Originating Location (pt. Location): Home    Distant Location (provider location):  On-site  Platform used for Video Visit: [employer]

## 2025-04-14 NOTE — NURSING NOTE
NAVIGATE Patient Self-Rating Form    Since your last medication management visit--    Have you been feeling depressed, sad, or down? No  Have you been feeling anxious, worried or nervous? No  Have you been thinking about death or have you had any feelings that you would be better off dead? Yes   Have you been feeling particularly good? Yes  Have you been feeling annoyed, angry, or resentful (whether you showed it or not)? No  Did you do anything that could have gotten you in trouble? No  Have you felt dizzy or faint? No  Have you had blurred vision? No  Have you had dry mouth? Yes  Have you had too much saliva in your mouth or had drooling? No  Have you felt nauseous? No  Have you been constipated? No  Has you appetite for food been increased? No  Have you gained weight? Yes  Have you lost weight? No  Have you felt restless or like you cannot sit still? Yes  Any shaking of your hands, legs, or other muscles? Yes  Any problems walking or moving or any problems feeling stiff or rigid? No  Have you felt tired or fatigued? Yes  Have you felt drowsy during the day? Yes  Have you been sleeping too much at night? Yes  Have you been sleeping too little or had problems sleeping at night? No  Any decrease in your interest in sex? Yes  Any other problems with sex? No  Any problems with your breasts such as swelling or discharge? No  For women, any problems with your period? No  Are there other medical or side effect problems you wish to discuss with your prescriber? No  Since your last visit, how many days have you not taken your medication? 0  Have you had trouble remembering to take your medication? No  Do you find the number of medicines or the times when you are supposed to take then confusing or burdensome? No  Are you afraid of the medication? No  Do you think that you have an illness that requires taking medication? Yes  Do you think that other people would think poorly of you if they knew that you take medication?  No  On average, how many cigarettes do you smoke per day? 0  Since you last visit, did you drink alcohol? No  Since your last visit, have you used any marijuana? Yes  Since your last visit, have you used any stress drugs other than marijuana? No  Between now and your next visit, do you think we should keep your medication the same or consider changing the medications? Keep the Same

## 2025-04-14 NOTE — PROGRESS NOTES
"Virtual Visit Details    Type of service:  Video Visit   Video Start Time:  1530  Video End Time: 1600    Originating Location (pt. Location): Home    Distant Location (provider location):  On-site  Platform used for Video Visit: [employer]      LILIANA Medication Management Progress Note  A Part of the [facility] First Episode of Psychosis Program    NAVIGATE Enrollee: Navi Morales (1995)     MRN: [id number]  Date:  4/14/25         Contributors to the Assessment     Chart Reviewed.   Interview completed with Navi Morales.  Collateral information obtained from none.         Interim History    Navi Morales is a 29 year old male who was last seen in [facility] clinic a month ago at which time no changes were made to medications and Navi had not yet started Lexapro. The patient reports good treatment adherence with Lybalvie. History was provided by Navi who was a good historian.  Since the last visit:  Has been helping mom with her job, she is a health care . She works from home. He has been doing  and drug lists, making phone calls. Using breathing techniques beforehand to reduce anxiety, making sure he is prepared.  Thinking about taking organic chemistry and speech--finishing up the classes   Wants to get back on Vyvanse because he \"really is reminded that he has ADHD.\"    If he has an alarm set, he can wake up for it. Once he is asleep he can stay asleep for a long time. Going to bed about 4 AM or so, getting up around 1 PM.     Anxiety has been higher recently, has to make some phone calls so that is the biggest cause of anxiety recently. Hasn't heard any voices at the grocery store or on the phone or anything, just when he is at home. SI about the same, still thinking about it before he goes to sleep.    Has the Lexapro on his dresser, thinking about starting it.    Started meds when he was in college--diagnosed when he was in high school, started Ritalin, then Vyvanse in college. Ritalin " wasn't, Vyvanse seemed to work a lot better. A little bit irritable, a tiny bit restless. Appetite was low when he was on it.     PSYCH ROS:  See HPI     RECENT SOCIAL HISTORY:  SEE HPI    Medical ROS:  none         First Episode of Psychosis History      DUP (duration untreated psychosis):  4 years  Route to initial care: outpatient  Medication adherence overall:  See above, Clinician Rating Form in COMPASS Item 13  General frequency of visits:  weekly IRT, monthly med management  Participation in groups:  No  Cognitive Remediation:  No  Other treatment history: None    Reviewed for completion of First Episode work-up:  Yes  First episode workup:  Not Done (if completed, see LABS for results)  MATRICS Consensus Cognitive Battery:  Not Done (if completed, see LABS for results)       Medical/Surgical History     Patient has no known allergies.    Patient Active Problem List   Diagnosis    Alcohol use disorder, mild, abuse    MELY (generalized anxiety disorder)    Induration penis plastica    Moderate episode of recurrent major depressive disorder (H)    Open displaced fracture of neck of second metacarpal bone of right hand    Psychosis (H)    Social anxiety disorder    TMJ (temporomandibular joint syndrome)    Traumatic compression fracture of T3 vertebra (H)          Medications     Current Outpatient Medications   Medication Sig Dispense Refill    cholecalciferol, vitamin D3, 1,000 unit (25 mcg) tablet [CHOLECALCIFEROL, VITAMIN D3, 1,000 UNIT (25 MCG) TABLET] Take 2,000 Units by mouth daily.      dutasteride (AVODART) 0.5 MG capsule Take 0.5 mg by mouth daily      escitalopram (LEXAPRO) 5 MG tablet Take 1 tablet (5 mg) by mouth daily. 90 tablet 0    ketoconazole (NIZORAL) 2 % external shampoo Apply topically daily as needed LATHER INTO SCALP AND RINSE AFTER 2-3 MINUTES. USE THREE TIMES A WEEK.      magnesium chloride (SLOW-MAG) 64 mg TbEC delayed-release tablet [MAGNESIUM CHLORIDE (SLOW-MAG) 64 MG TBEC  "DELAYED-RELEASE TABLET] Take 64 mg by mouth daily.      OLANZapine (ZYPREXA) 10 MG tablet Take 0.5 tablets (5 mg) by mouth daily AND 1 tablet (10 mg) at bedtime. Take with one of the 15 mg tabs at bedtime for a total of 25 mg at bedtime.. 45 tablet 2    OLANZapine-samidorphan (LYBALVI) 20-10 MG TABS tablet Take 1 tablet by mouth at bedtime. Take with 1/2 of a 10 mg pill for a total of 25 mg of olanzapine at bedtime 30 tablet 2    QUEtiapine (SEROQUEL) 100 MG tablet Take 1 tablet (100 mg) by mouth at bedtime. May also take 0.5-1 tablets ( mg) 2 times daily as needed (anxiety). 60 tablet 3    tadalafil (CIALIS) 10 MG tablet Take 10 mg by mouth daily      zinc gluconate 50 mg tablet [ZINC GLUCONATE 50 MG TABLET] Take 100 mg by mouth daily.            Vitals     There were no vitals taken for this visit.      Weight prior to medication: unknown         Mental Status Exam     Alertness: alert  and oriented  Appearance: well groomed  Behavior/Demeanor: cooperative, pleasant, and calm, with good  eye contact   Speech: regular rate and rhythm, not pressured  Language: no obvious problem  Psychomotor: normal or unremarkable  Mood:  \"OK\"    Affect: blunted; was congruent to mood; was congruent to content  Thought Process/Associations:  Somewhat concrete  Thought Content:  Reports intermittent passive suicidal ideation without plan; without intent [details in Interim History];  Denies suicidal and violent ideation and delusions  Perception:  Reports auditory hallucinations;  Denies visual hallucinations  Insight: good  Judgment: fair and adequate for safety  Cognition: does  appear grossly intact; formal cognitive testing was not done         Labs and Data     RATING SCALES:  AIMS: next in person visit    PHQ9 TODAY =       3/17/2025     3:18 PM 4/2/2025     2:32 PM 4/8/2025     2:53 PM   PHQ-9 SCORE   PHQ-9 Total Score [facility] 18 (Moderately severe depression) 20 (Severe depression) 18 (Moderately severe depression) " "  PHQ-9 Total Score 18  20  18        Patient-reported     ANTIPSYCHOTIC LABS ROUTINE    [glu, A1C, lipids (focus LDL), liver enzymes, WBC, ANEU, Hgb, plts]   q12 mo  Recent Labs   Lab Test 06/30/23  1048   GLC 84     No lab results found.  Recent Labs   Lab Test 06/30/23  1048   AST 39   ALT 40   ALKPHOS 88     Recent Labs   Lab Test 06/30/23  1048   WBC 7.7   HGB 14.9             Psychiatric Diagnoses     Psychosis, schizophrenia vs schizoaffective disorder  AUD, moderate, in early remission  Cannabis use disorder with active use  Generalized anxiety disorder  Agoraphobia         Assessment   Navi HO Andrew is a 28 year old male with first onset of psychiatric symptoms at age 7 (\"social anxiety\"), and psychotic symptoms at age 23. The above duration of untreated psychosis was approximately 4 years.  Prodromal symptoms seem to have been present since at least college (notable substance use) though patient does note a substantially longer history of depression and physical health concerns. Presenting symptoms appear to include hallucinations, delusional thoughts. Navi attributes symptoms to physical health issues and history of trauma.  Substance use does seem to be a present concern. There are possible medical comorbidities which impact this treatment [suicide attempt, suicidal ideation, SIB, psychosis, aggression, and substance use: alcohol].     Today,  Navi reports he has been working with his mom including making phone calls, and has little anxiety when he goes to the grocery store. We celebrated the tremendous progress he has made with anxiety since AH have been well-controlled and he quit drinking. Today he inquired about restarting Vyanse, which he had used prior to psychosis onset in college, and experienced significant increase in focus with some increased irritability. His last set of neuropsych testing argued against ADHD as the cause of his cognitive concerns, but was done while he was very " symptomatic with severe AH. I advised him that stimulants are not recommended until at least 1 year of remission from psychosis, but we could try a nonstimulant. He was amenable to trying guanfacine. Also contemplative stage regarding starting Lexapro. He describes continuing delayed sleep phase, waking up at 1 PM when he sets an alarm, as late as 7 PM when he doesn't.               SUICIDE RISK ASSESSMENT:  Risk factors for self-harm: previous suicide attempt, substance use/pending treatment, and hallucinations.  Mitigating factors: describes a safety plan, h/o seeking help , future oriented, commitment to family, and stable housing.  The patient does not appear to be at imminent risk for self-harm, hospitalization is not recommended which the pt does  agree with. No hospitalization will be arranged. Based on degree of symptoms close psych follow-up was/were recommended which the pt does  agree to. Additional steps to minimize risk: med changes.      [place] PRESCRIPTION MONITORING PROGRAM [] was not checked today: not using controlled substances.    PSYCHOTROPIC DRUG INTERACTIONS:   Quetiapine/olanzapine: additive CNS depression, QTc prolongation.    MANAGEMENT:  use lowest therapeutic doses of both and routine monitoring         Plan     1) PSYCHOTROPIC MEDICATIONS:  - Continue Olanzapine 5mg qAM  and olanzapine-samidorphan 20mg QHS  - continue escitalopram 5 mg PO daily prescription, but has not started yet  - start guanfacine ER 1 mg PO at bedtime     2) THERAPY:  Continue IRT with Brandie Moran    3) NEXT DUE:    Labs: FEP workup due  Rating Scales: AIMS due    4) REFERRALS:    none    5) RTC: 4 weeks    6) CRISIS NUMBERS:   Provided routinely in AVS.    TREATMENT RISK STATEMENT:  The risks, benefits, alternatives and potential adverse effects have been discussed and are understood by the pt. The pt understands the risks of using street drugs or alcohol. There are no medical contraindications, the pt agrees  to treatment with the ability to do so. The pt knows to call the clinic for any problems or to access emergency care if needed.  Medical and substance use concerns are documented above.  Psychotropic drug interaction check was done, including changes made today.    PROVIDER:   Patient seen and discussed with attending physician, Dr. Vega, who is in agreement with my assessment and plan.    Heidi Vega MD  [facility] Psychiat[facility]  , Department of Psychiatry and Behavioral Sciences  [employer]      The longitudinal plan of care for the diagnosis(es)/condition(s) as documented were addressed during this visit. Due to the added complexity in care, I will continue to support Navi in the subsequent management and with ongoing continuity of care.

## 2025-04-14 NOTE — NURSING NOTE
Is the patient currently in the state of [place]? YES    Visit mode:VIDEO    If the visit is dropped, the patient can be reconnected by: VIDEO VISIT: Send to e-mail at: [email or web address]    Will anyone else be joining the visit? NO      How would you like to obtain your AVS? MyChart    Are changes needed to the allergy or medication list? NO    Reason for visit: Follow Up

## 2025-04-15 ENCOUNTER — VIRTUAL VISIT (OUTPATIENT)
Dept: PSYCHIATRY | Facility: CLINIC | Age: 30
End: 2025-04-15
Payer: COMMERCIAL

## 2025-04-15 DIAGNOSIS — F20.9 SCHIZOPHRENIA, UNSPECIFIED TYPE (H): Primary | ICD-10-CM

## 2025-04-16 ENCOUNTER — TELEPHONE (OUTPATIENT)
Dept: PSYCHIATRY | Facility: CLINIC | Age: 30
End: 2025-04-16

## 2025-04-16 ENCOUNTER — VIRTUAL VISIT (OUTPATIENT)
Dept: PSYCHIATRY | Facility: CLINIC | Age: 30
End: 2025-04-16
Payer: COMMERCIAL

## 2025-04-16 DIAGNOSIS — F29 PSYCHOSIS, UNSPECIFIED PSYCHOSIS TYPE (H): Primary | ICD-10-CM

## 2025-04-16 NOTE — PROGRESS NOTES
NAVIGATE/ESMD Virtual Visit Progress Note  For Supported Employment & Education    NAVIGATE Enrollee: José Miguel Morales (1995)     MRN: [id number]  Date of Visit: 4/16/25  Contacted: mendel  Appointment Length: 15 min    Discussed:   Writer met with José Miguel Morales for virtual visit check-in. Reason for telehealth: To reduce barriers in accessing services, due to but not limited to transportation, location, or scheduling reasons. Meeting agenda included josé miguel would like to update his resume. Were going to meet in 2 weeks and he plans to send me his updates by then so I can make edits and we can talk about it at our next meeting. We also emailed the school for an update    Up Next:     Delmi HOPSONATE/ESMD  Supported Employment & Education  Supported Employment & EducationThis is a non-billable encounter as it was solely for the purposes of outreach and/or care coordination. SEE services are non billable services

## 2025-04-16 NOTE — PROGRESS NOTES
NAVIGATE Clinician Contact & Progress Note   For Family Education Program    NAVIGATE Enrollee: Navi Morales (1995)     MRN: [id number]  Date:  4/15/25  Diagnosis(es):   Psychosis unspecified  Clinician: LILIANA Family Clinician, Laura Maldonado MA, SELWYN     1. Type of contact: (majority of time spent)  Family Session via telehealth  Mode of communication:    Linwood Verduzco consented verbally to this mode of therapy today.  Reason for telehealth: To reduce barriers in accessing services, due to but not limited to transportation, location, or scheduling reasons.     2. People present:   Writer  Client: Yes   Significant Other/Family/Friend:  Mother    3. Length of Actual Contact: Start Time: 4:10 to 5:00pm      4. Location of contact:  Originating Location (patient location): Patient's home location; [place]   Distant Location (provider location): Home office, located in [place], [place], using appropriate privacy considerations and procedures    5. Did the client complete the home practice option(s) from the previous session: Partially Completed    6. Motivational Teaching Strategies:  Connect info and skills with personal goals  Promote hope and positive expectations    7. Educational Teaching Strategies:  Review of written material/education  Relate information to client's experience  Ask questions to check comprehension  Break down information into small chunks    8. CBT Teaching Strategies:  Reinforcement and shaping (positive feedback for steps towards goals and gains in knowledge & skills)    9. Psychoeducational Topic(s) Addressed:  Just the Facts - Effective Communication    10. Techniques utilized:   [place] announced at beginning of session  Review of previous meeting  Present new material  Family Therapy  Motivational Interviewing (Connect info and skills with personal goals, Promote hope and positive expectations, Explore pros and cons of change, Re-frame experiences in a  "positive light)  Educational Teaching Strategies (Review written material/education on: Psychosis, Medications for psychosis, Coping with stress, Strategies to build resiliency, Relapse prevention planning, Developing a collaboration with mental health professionals, Effective communication, A relative s guide to supporting recovery from psychosis, Basic facts about alcohol and drugs)  CBT (Reinforcement and shaping, Social skills training, Relapse prevention planning, Coping skills training, Relaxation training, Cognitive restructuring, Behavioral tailoring)    11. Assessment/Progress Note:     Writer met with Navi and Navi's mom on this day for family education and support.Set agenda to check-in about the last week, review last weeks' session, and continue education on communication. Family were agreeable to this agenda.    Navi and mom reported no urgent concerns at this time. Navi reported no updates from the last week. Explored last week's session and plan to go grocery shopping and to go for a walk. Navi and mom did engage in both of these tasks. Verbally reinforced Navi's follow through with his plans.     Reviewed last week's session regarding self compassion.Inquired to see if Navi or mom were able to practice self compassion in the last week. Navi and mom reported that they were able to practice this; Navi was unclear how he practiced self compassion other than \"just tried to be more overall more accepting.\" Discussed using self talk can be useful in practicing self compassion by saying to self, \"I am doing the best I can.\"     Continued with communications module with mom and Navi. Reviewed tips for communication including, \"speaking for self\", \"using I statements\", and \"focusing on behavior rather than personality.\" Discussed how blaming has been a pattern that has interrupted communication and how this can also increase stress and anxiety for everyone in the family.     Provided teaching on \"expressing " "positive emotions\" and both mom and Navi were agreeable to practicing this skill at least one time in the next week.     Overall family and Navi seemed engaged in conversation. They did express interest in continuing to meet for family therapy and psychoeducation. As of today's appt their insight into Navi's mental illness appears fair. They seem they would benefit from continued clinical intervention aimed at assisting them implement helpful strategies at home and increase their understanding of psychosis.    12. Plan/Referrals:     Will meet with family weekly as schedule allows for evidence based family psychoeducation and therapeutic support aimed at maximizing Navi's opportunity for recovery from psychosis.     Laura Maldonado MA, LP   NAVIGATE   The author of this note documented a reason for not sharing it with the patient.    "

## 2025-04-16 NOTE — TELEPHONE ENCOUNTER
Left VM introducing myself; asked if he'd be willing to take dating skills survey for possible group in future.

## 2025-04-22 ENCOUNTER — VIRTUAL VISIT (OUTPATIENT)
Dept: PSYCHIATRY | Facility: CLINIC | Age: 30
End: 2025-04-22
Payer: COMMERCIAL

## 2025-04-22 DIAGNOSIS — F29 PSYCHOSIS, UNSPECIFIED PSYCHOSIS TYPE (H): Primary | ICD-10-CM

## 2025-04-22 DIAGNOSIS — F25.1 SCHIZOAFFECTIVE DISORDER, DEPRESSIVE TYPE (H): Primary | ICD-10-CM

## 2025-04-22 NOTE — PROGRESS NOTES
LILIANA Clinician Contact & Progress Note  For Individual Resiliency Training (IRT)  A Part of the [facility] First Episode of Psychosis Program    NAVIGATE Enrollee: Navi Morales (1995)     MRN: [id number]  Date:  4/22/25  Diagnosis: Psychosis, unspecified psychosis type (H) [F29]   Clinician: LILIANA Individual Resiliency Trainer, MERCEDES Mchugh     1. Type of contact: (majority of time spent)  IRT Session via telehealth  Mode of communication: [employer] (HIPAA compliant, secure platform). Patient consented verbally to this mode of therapy today.  Reason for telehealth: To reduce barriers in accessing services, due to but not limited to transportation, location, or scheduling reasons.    2. People present:   Writer  Client: Yes - Navi  LILIANA : Gisela Cheema    3. Length of Actual Contact: Start Time: 3p; End Time: 4     4. Location of contact:  Originating Location (patient location): [facility], located in [place], [place]  Distant Location (provider location): Remote location    5. Did the client complete the home practice option(s) from the previous session: yes completed basic stretches    6. Motivational Teaching Strategies:  Connect info and skills with personal goals  Promote hope and positive expectations  Explore pros and cons of change  Re-frame experiences in positive light    7. Educational Teaching Strategies:  Review of written material/education  Relate information to client's experience  Ask questions to check comprehension  Break down information into small chunks  Adopt client's language     8. CBT Teaching Strategies:  Reinforcement and shaping (positive feedback for steps towards goals and gains in knowledge & skills)  Cognitive restructuring (identify thoughts related to negative feelings)  Motivational interviewing    9. IRT Module(s) Addressed:  Coping with symptoms - negative symptoms  Mindfulness  Goal setting and review    10. Techniques utilized:  "  [place] announced at beginning of session  Review of goal  Review of previous meeting  Present new material  Modeling  Help client choose a home practice option  Summarize progress made in current session    11. Measures:    Mental Status Exam  Alertness: alert with some gazing throughout session  Behavior/Demeanor:  tired, restless  Speech: regular rate and rhythm  Language: intact.   Mood: depressed and anxious  Thought Process/Associations: unremarkable  Thought Content:  Reports suicidal ideation with plan of hanging;  Denies suicidal intent  Perception:  Reports auditory hallucinations;  Denies visual hallucinations  Insight: good  Judgment: adequate for safety  Cognition: does  appear grossly intact; formal cognitive testing was not done      12. Assessment/Progress Note:     Writer and client met for IRT visit via [employer]. Writer used relational and interpersonal approach to explore feelings, motivations, and behavior. Writer offered support, feedback, validation, and reinforced use of skills taught in IRT from modalities including cognitive behavioral therapy, psycho education, and skills training. Explored symptoms and coping from a stress-vulnerability lens.    Majority of session was spent today discussing several good things from the week. Navi had a  over today and learned a few new tricks to help train his dog. Reports an increase in anxiety when walking in the front yard. Writer normalized and validated his feelings as well as noted the positives of this experience. Navi shared some thoughts about the situation and was able to reframe into a more positive/neutral language.     Reports voices are \"almost non-existent\" but has anxiety about them returning-enoc walking to the park near his house or being around neighbors. Denied suicidal ideation. Continued sleep problems. Briefly discussed selective serotonin reuptake inhibitor for anxiety symptoms.    PSS Gisela Cheema joined our session to " "introduce her services and schedule with Navi.    13. Plan/Referrals:       Continue Navigate services, weekly IRT, scheduled next tue  at 3.       Billing for \"Interactive Complexity\"?    No    MERCEDES Mchugh        "

## 2025-04-22 NOTE — Clinical Note
Hi team, Gisela and I met with Navi today, they are scheduled for a few weeks out :) He reports almost no voices but persistent anxiety/paranoia about neighbors. Feels he has PTSD from his episode - we will explore more next time. Of note, Navi was more willing to discuss SSRIs and said he would take it if helps with anxiety (rather than depression). We talked about this, but an FYI for your next visit, Heidi. Thanks Brandie Moran, LIASW

## 2025-04-23 NOTE — PROGRESS NOTES
NAVIGATE Clinician Contact & Progress Note   For Family Education Program    NAVIGATE Enrollee: Navi Morales (1995)     MRN: [id number]  Date:  4/22/25  Diagnosis(es):   Psychosis unspecified  Clinician: LILIANA Family Clinician, Laura Maldonado MA, SELWYN     1. Type of contact: (majority of time spent)  Family Session via telehealth  Mode of communication:    Linwood Verduzco consented verbally to this mode of therapy today.  Reason for telehealth: To reduce barriers in accessing services, due to but not limited to transportation, location, or scheduling reasons.     2. People present:   Writer  Client: Yes   Significant Other/Family/Friend:  Mother    3. Length of Actual Contact: Start Time: 4:05 to 4:55pm      4. Location of contact:  Originating Location (patient location): Patient's home location; [place]   Distant Location (provider location): Home office, located in [place], [place], using appropriate privacy considerations and procedures    5. Did the client complete the home practice option(s) from the previous session: Partially Completed    6. Motivational Teaching Strategies:  Connect info and skills with personal goals  Promote hope and positive expectations    7. Educational Teaching Strategies:  Review of written material/education  Relate information to client's experience  Ask questions to check comprehension  Break down information into small chunks    8. CBT Teaching Strategies:  Reinforcement and shaping (positive feedback for steps towards goals and gains in knowledge & skills)    9. Psychoeducational Topic(s) Addressed:  Just the Facts - Effective Communication    10. Techniques utilized:   [place] announced at beginning of session  Review of previous meeting  Present new material  Family Therapy  Motivational Interviewing (Connect info and skills with personal goals, Promote hope and positive expectations, Explore pros and cons of change, Re-frame experiences in a  "positive light)  Educational Teaching Strategies (Review written material/education on: Psychosis, Medications for psychosis, Coping with stress, Strategies to build resiliency, Relapse prevention planning, Developing a collaboration with mental health professionals, Effective communication, A relative s guide to supporting recovery from psychosis, Basic facts about alcohol and drugs)  CBT (Reinforcement and shaping, Social skills training, Relapse prevention planning, Coping skills training, Relaxation training, Cognitive restructuring, Behavioral tailoring)    11. Assessment/Progress Note:     Writer met with Navi and Navi's mom on this day for family education and support.Set agenda to check-in about the last week, review last weeks' session, and continue education on communication. Family were agreeable to this agenda.    Navi and mom reported no urgent concerns at this time. Navi reported that he saw family on the easter holiday. Navi reports struggling with knowing what to say when talking about work. Mom reports that Navi did a good job of asking questions. Navi reports that he struggles with being \"expressive\" with babies and dogs. Explore how this type of expression involves vulnerability and so this can feel challenging.     Reviewed last week's session regarding \"expressing positive emotions\" and how practicing this skill can feel vulnerable as well.  Review how practice went last week. Mom reports that she texted Navi with expressing positive feelings. Reviewed the steps which include looking the person in the eye. Mom and Navi agreed to practice giving/receiving in session. Mom and Navi were able to describe positive feelings both using this skill and receiving positive feedback.     Plan is for Navi and mom to practice expressing positive emotions over the next week.     Overall family and Navi seemed engaged in conversation. They did express interest in continuing to meet for family therapy and " psychoeducation. As of today's appt their insight into Navi's mental illness appears fair. They seem they would benefit from continued clinical intervention aimed at assisting them implement helpful strategies at home and increase their understanding of psychosis.    12. Plan/Referrals:     Will meet with family weekly as schedule allows for evidence based family psychoeducation and therapeutic support aimed at maximizing Navi's opportunity for recovery from psychosis.     Laura Maldonado MA, LP   NAVIGATE   The author of this note documented a reason for not sharing it with the patient.

## 2025-04-29 ENCOUNTER — VIRTUAL VISIT (OUTPATIENT)
Dept: PSYCHIATRY | Facility: CLINIC | Age: 30
End: 2025-04-29
Payer: COMMERCIAL

## 2025-04-29 DIAGNOSIS — F25.1 SCHIZOAFFECTIVE DISORDER, DEPRESSIVE TYPE (H): Primary | ICD-10-CM

## 2025-04-29 ASSESSMENT — PATIENT HEALTH QUESTIONNAIRE - PHQ9
SUM OF ALL RESPONSES TO PHQ QUESTIONS 1-9: 19
10. IF YOU CHECKED OFF ANY PROBLEMS, HOW DIFFICULT HAVE THESE PROBLEMS MADE IT FOR YOU TO DO YOUR WORK, TAKE CARE OF THINGS AT HOME, OR GET ALONG WITH OTHER PEOPLE: VERY DIFFICULT
10. IF YOU CHECKED OFF ANY PROBLEMS, HOW DIFFICULT HAVE THESE PROBLEMS MADE IT FOR YOU TO DO YOUR WORK, TAKE CARE OF THINGS AT HOME, OR GET ALONG WITH OTHER PEOPLE: VERY DIFFICULT

## 2025-04-29 ASSESSMENT — ANXIETY QUESTIONNAIRES
GAD7 TOTAL SCORE: 15
7. FEELING AFRAID AS IF SOMETHING AWFUL MIGHT HAPPEN: MORE THAN HALF THE DAYS
7. FEELING AFRAID AS IF SOMETHING AWFUL MIGHT HAPPEN: MORE THAN HALF THE DAYS
5. BEING SO RESTLESS THAT IT IS HARD TO SIT STILL: MORE THAN HALF THE DAYS
GAD7 TOTAL SCORE: 15
3. WORRYING TOO MUCH ABOUT DIFFERENT THINGS: MORE THAN HALF THE DAYS
4. TROUBLE RELAXING: NEARLY EVERY DAY
8. IF YOU CHECKED OFF ANY PROBLEMS, HOW DIFFICULT HAVE THESE MADE IT FOR YOU TO DO YOUR WORK, TAKE CARE OF THINGS AT HOME, OR GET ALONG WITH OTHER PEOPLE?: VERY DIFFICULT
5. BEING SO RESTLESS THAT IT IS HARD TO SIT STILL: MORE THAN HALF THE DAYS
7. FEELING AFRAID AS IF SOMETHING AWFUL MIGHT HAPPEN: MORE THAN HALF THE DAYS
GAD7 TOTAL SCORE: 15
7. FEELING AFRAID AS IF SOMETHING AWFUL MIGHT HAPPEN: MORE THAN HALF THE DAYS
6. BECOMING EASILY ANNOYED OR IRRITABLE: MORE THAN HALF THE DAYS
6. BECOMING EASILY ANNOYED OR IRRITABLE: MORE THAN HALF THE DAYS
7. FEELING AFRAID AS IF SOMETHING AWFUL MIGHT HAPPEN: MORE THAN HALF THE DAYS
GAD7 TOTAL SCORE: 15
IF YOU CHECKED OFF ANY PROBLEMS ON THIS QUESTIONNAIRE, HOW DIFFICULT HAVE THESE PROBLEMS MADE IT FOR YOU TO DO YOUR WORK, TAKE CARE OF THINGS AT HOME, OR GET ALONG WITH OTHER PEOPLE: VERY DIFFICULT
1. FEELING NERVOUS, ANXIOUS, OR ON EDGE: MORE THAN HALF THE DAYS
6. BECOMING EASILY ANNOYED OR IRRITABLE: MORE THAN HALF THE DAYS
IF YOU CHECKED OFF ANY PROBLEMS ON THIS QUESTIONNAIRE, HOW DIFFICULT HAVE THESE PROBLEMS MADE IT FOR YOU TO DO YOUR WORK, TAKE CARE OF THINGS AT HOME, OR GET ALONG WITH OTHER PEOPLE: VERY DIFFICULT
1. FEELING NERVOUS, ANXIOUS, OR ON EDGE: MORE THAN HALF THE DAYS
3. WORRYING TOO MUCH ABOUT DIFFERENT THINGS: MORE THAN HALF THE DAYS
GAD7 TOTAL SCORE: 15
GAD7 TOTAL SCORE: 15
1. FEELING NERVOUS, ANXIOUS, OR ON EDGE: MORE THAN HALF THE DAYS
GAD7 TOTAL SCORE: 15
4. TROUBLE RELAXING: NEARLY EVERY DAY
GAD7 TOTAL SCORE: 15
GAD7 TOTAL SCORE: 15
7. FEELING AFRAID AS IF SOMETHING AWFUL MIGHT HAPPEN: MORE THAN HALF THE DAYS
2. NOT BEING ABLE TO STOP OR CONTROL WORRYING: MORE THAN HALF THE DAYS
2. NOT BEING ABLE TO STOP OR CONTROL WORRYING: MORE THAN HALF THE DAYS
5. BEING SO RESTLESS THAT IT IS HARD TO SIT STILL: MORE THAN HALF THE DAYS
7. FEELING AFRAID AS IF SOMETHING AWFUL MIGHT HAPPEN: MORE THAN HALF THE DAYS
2. NOT BEING ABLE TO STOP OR CONTROL WORRYING: MORE THAN HALF THE DAYS
GAD7 TOTAL SCORE: 15
1. FEELING NERVOUS, ANXIOUS, OR ON EDGE: MORE THAN HALF THE DAYS
3. WORRYING TOO MUCH ABOUT DIFFERENT THINGS: MORE THAN HALF THE DAYS
8. IF YOU CHECKED OFF ANY PROBLEMS, HOW DIFFICULT HAVE THESE MADE IT FOR YOU TO DO YOUR WORK, TAKE CARE OF THINGS AT HOME, OR GET ALONG WITH OTHER PEOPLE?: VERY DIFFICULT
8. IF YOU CHECKED OFF ANY PROBLEMS, HOW DIFFICULT HAVE THESE MADE IT FOR YOU TO DO YOUR WORK, TAKE CARE OF THINGS AT HOME, OR GET ALONG WITH OTHER PEOPLE?: VERY DIFFICULT
4. TROUBLE RELAXING: NEARLY EVERY DAY
5. BEING SO RESTLESS THAT IT IS HARD TO SIT STILL: MORE THAN HALF THE DAYS
4. TROUBLE RELAXING: NEARLY EVERY DAY
3. WORRYING TOO MUCH ABOUT DIFFERENT THINGS: MORE THAN HALF THE DAYS
GAD7 TOTAL SCORE: 15
GAD7 TOTAL SCORE: 15
8. IF YOU CHECKED OFF ANY PROBLEMS, HOW DIFFICULT HAVE THESE MADE IT FOR YOU TO DO YOUR WORK, TAKE CARE OF THINGS AT HOME, OR GET ALONG WITH OTHER PEOPLE?: VERY DIFFICULT
7. FEELING AFRAID AS IF SOMETHING AWFUL MIGHT HAPPEN: MORE THAN HALF THE DAYS
2. NOT BEING ABLE TO STOP OR CONTROL WORRYING: MORE THAN HALF THE DAYS
IF YOU CHECKED OFF ANY PROBLEMS ON THIS QUESTIONNAIRE, HOW DIFFICULT HAVE THESE PROBLEMS MADE IT FOR YOU TO DO YOUR WORK, TAKE CARE OF THINGS AT HOME, OR GET ALONG WITH OTHER PEOPLE: VERY DIFFICULT
IF YOU CHECKED OFF ANY PROBLEMS ON THIS QUESTIONNAIRE, HOW DIFFICULT HAVE THESE PROBLEMS MADE IT FOR YOU TO DO YOUR WORK, TAKE CARE OF THINGS AT HOME, OR GET ALONG WITH OTHER PEOPLE: VERY DIFFICULT
6. BECOMING EASILY ANNOYED OR IRRITABLE: MORE THAN HALF THE DAYS

## 2025-04-29 NOTE — PROGRESS NOTES
NAVIGATE Clinician Contact & Progress Note   For Family Education Program    NAVIGATE Enrollee: Navi Morales (1995)     MRN: [id number]  Date:  4/29/25  Diagnosis(es):   Psychosis unspecified  Clinician: LILIANA Family Clinician, Laura Maldonado MA, SELWYN     1. Type of contact: (majority of time spent)  Family Session via telehealth  Mode of communication:    Linwood Verduzco consented verbally to this mode of therapy today.  Reason for telehealth: To reduce barriers in accessing services, due to but not limited to transportation, location, or scheduling reasons.     2. People present:   Writer  Client: Yes   Significant Other/Family/Friend:  Mother    3. Length of Actual Contact: Start Time: 4:05 to 4:55pm      4. Location of contact:  Originating Location (patient location): Patient's home location; [place]   Distant Location (provider location): Home office, located in [place], [place], using appropriate privacy considerations and procedures    5. Did the client complete the home practice option(s) from the previous session: Partially Completed    6. Motivational Teaching Strategies:  Connect info and skills with personal goals  Promote hope and positive expectations    7. Educational Teaching Strategies:  Review of written material/education  Relate information to client's experience  Ask questions to check comprehension  Break down information into small chunks    8. CBT Teaching Strategies:  Reinforcement and shaping (positive feedback for steps towards goals and gains in knowledge & skills)    9. Psychoeducational Topic(s) Addressed:  Just the Facts - Effective Communication    10. Techniques utilized:   [place] announced at beginning of session  Review of previous meeting  Present new material  Family Therapy  Motivational Interviewing (Connect info and skills with personal goals, Promote hope and positive expectations, Explore pros and cons of change, Re-frame experiences in a  positive light)  Educational Teaching Strategies (Review written material/education on: Psychosis, Medications for psychosis, Coping with stress, Strategies to build resiliency, Relapse prevention planning, Developing a collaboration with mental health professionals, Effective communication, A relative s guide to supporting recovery from psychosis, Basic facts about alcohol and drugs)  CBT (Reinforcement and shaping, Social skills training, Relapse prevention planning, Coping skills training, Relaxation training, Cognitive restructuring, Behavioral tailoring)    11. Assessment/Progress Note:     Writer met with Navi and Navi's mom on this day for family education and support.Set agenda to check-in about the last week, review last weeks' session, and continue education on communication. Family were agreeable to this agenda.    Navi and mom reported no urgent concerns at this time. Navi and mom reported going to the grocery store, a walk with the dog, and going to get food last week. Navi reported having talked with his therapist about increasing his engagement in public including talking with a  at the grocery store. Navi was open to practicing this goal on this day with his mom.    Writer continued education on communication strategies with specific exploration of expressing positive emotions and making a positive request. Mom and Navi practiced expressing positive emotions to each other. Writer provided positive feedback and supportive listening. Reflected on mom and Navi's courage in practicing a new skill. Provided education on the steps of making a positive request. Mom and Navi will plan to practice this skill during the upcoming week.     Overall family and Navi seemed engaged in conversation. They did express interest in continuing to meet for family therapy and psychoeducation. As of today's appt their insight into Nell[place] mental illness appears fair. They seem they would benefit from continued  clinical intervention aimed at assisting them implement helpful strategies at home and increase their understanding of psychosis.    12. Plan/Referrals:     Will meet with family weekly as schedule allows for evidence based family psychoeducation and therapeutic support aimed at maximizing Navi's opportunity for recovery from psychosis.     Laura Maldonado MA, LP   NAVIGATE   The author of this note documented a reason for not sharing it with the patient.

## 2025-04-29 NOTE — PROGRESS NOTES
NAVIGGALINDO Clinician Contact & Progress Note  For Individual Resiliency Training (IRT)  A Part of the [facility] First Episode of Psychosis Program    NAVIGATE Enrollee: Navi Morales (1995)     MRN: [id number]  Date:  4/29/25  Diagnosis: Psychosis, unspecified psychosis type (H) [F29]   Clinician: LILIANA Individual Resiliency Trainer, MERCEDES Mchugh     1. Type of contact: (majority of time spent)  IRT Session via telehealth  Mode of communication: [employer] (HIPAA compliant, secure platform). Patient consented verbally to this mode of therapy today.  Reason for telehealth: To reduce barriers in accessing services, due to but not limited to transportation, location, or scheduling reasons.    2. People present:   Writer  Client: Yes - Navi    3. Length of Actual Contact: Start Time: 3p; End Time: 4     4. Location of contact:  Originating Location (patient location): [facility], located in [place], [place]  Distant Location (provider location): Remote location    5. Did the client complete the home practice option(s) from the previous session: yes     6. Motivational Teaching Strategies:  Connect info and skills with personal goals  Promote hope and positive expectations  Explore pros and cons of change  Re-frame experiences in positive light    7. Educational Teaching Strategies:  Review of written material/education  Relate information to client's experience  Ask questions to check comprehension  Break down information into small chunks  Adopt client's language     8. CBT Teaching Strategies:  Reinforcement and shaping (positive feedback for steps towards goals and gains in knowledge & skills)  Cognitive restructuring (identify thoughts related to negative feelings)  Motivational interviewing    9. IRT Module(s) Addressed:  Coping with symptoms - negative symptoms  Mindfulness  Goal setting and review    10. Techniques utilized:   [place] announced at beginning of session  Review of goal  Review of  "previous meeting  Present new material  Modeling  Help client choose a home practice option  Summarize progress made in current session    11. Measures:  Answers submitted by the patient for this visit:  Patient Health Questionnaire (Submitted on 4/29/2025)  If you checked off any problems, how difficult have these problems made it for you to do your work, take care of things at home, or get along with other people?: Very difficult  PHQ9 TOTAL SCORE: 19  Patient Health Questionnaire (G7) (Submitted on 4/29/2025)  MELY 7 TOTAL SCORE: 15    Mental Status Exam  Alertness: alert and oriented  Behavior/Demeanor:  calm, pleasant  Speech: regular rate and rhythm  Language: intact.   Mood: depressed and anxious  Thought Process/Associations: unremarkable  Thought Content:  Reports suicidal ideation with no plan or intent;  Denies suicidal intent  Perception:  Reports auditory hallucinations;  Denies visual hallucinations  Insight: good  Judgment: adequate for safety  Cognition: does  appear grossly intact; formal cognitive testing was not done      12. Assessment/Progress Note:     Writer and client met for IRT visit via [employer]. Writer used relational and interpersonal approach to explore feelings, motivations, and behavior. Writer offered support, feedback, validation, and reinforced use of skills taught in IRT from modalities including cognitive behavioral therapy, psycho education, and skills training. Explored symptoms and coping from a stress-vulnerability lens.    Today Navi reports he and his mom visited a new park with their dog. It was busy with people but Navi was able to use cognitive restructuring to replace his hopeless thoughts \"I will see someone I know and they'll know I'm unemployed\" to something more positive / neutral \"maybe I will see someone I know but I can handle it\". Writer reinforced Navi's skills and provided reminders to practice his skills during those moments. Set goal to try a new entrance " "and/or eat in front of parents. Provided reminders of why we are taking these steps. \"I want to finish my degree\". Navi reported this would be helpful when he hesitates trying new exposures. Today Navi denied any auditory hallucinations and no suicidal ideation, plan or intent.     Symptom assessment, safety assessment, discussion and identification of coping strategies, and exploration of material in ACT and Step Down materials was all in support of Navi's self-identified goal(s) as identified in most recent BEH Treatment Plan. Progress toward goal completion seems good.       13. Plan/Referrals:   Continue Navigate services, weekly IRT, scheduled next tue  at 3.       Billing for \"Interactive Complexity\"?    No    MERCEDES Mchugh        "

## 2025-05-01 ENCOUNTER — VIRTUAL VISIT (OUTPATIENT)
Dept: PSYCHIATRY | Facility: CLINIC | Age: 30
End: 2025-05-01
Payer: COMMERCIAL

## 2025-05-01 DIAGNOSIS — F29 PSYCHOSIS, UNSPECIFIED PSYCHOSIS TYPE (H): Primary | ICD-10-CM

## 2025-05-01 NOTE — PROGRESS NOTES
NAVIGATE/ESMD Virtual Visit Progress Note  For Supported Employment & Education    NAVIGATE Enrollee: Navi Morales (1995)     MRN: [id number]  Date of Visit: 5/01/25  Contacted: ALICE  Appointment Length: 15    Discussed:   Writer met with Navi Morales for virtual visit check-in. Reason for telehealth: To reduce barriers in accessing services, due to but not limited to transportation, location, or scheduling reasons. Meeting agenda included guided hi through the website to request his transcripts from his HS. This is all we are waiting in for [facility].     Up Next:     Delmi Davis  NAVIGATE/ESMD  Supported Employment & Education  Supported Employment & EducationThis is a non-billable encounter as it was solely for the purposes of outreach and/or care coordination. SEE services are non billable services

## 2025-05-05 ENCOUNTER — VIRTUAL VISIT (OUTPATIENT)
Dept: PSYCHIATRY | Facility: CLINIC | Age: 30
End: 2025-05-05
Payer: COMMERCIAL

## 2025-05-05 DIAGNOSIS — F20.9 SCHIZOPHRENIA, UNSPECIFIED TYPE (H): ICD-10-CM

## 2025-05-05 DIAGNOSIS — F90.9 ATTENTION DEFICIT HYPERACTIVITY DISORDER (ADHD), UNSPECIFIED ADHD TYPE: ICD-10-CM

## 2025-05-05 RX ORDER — GUANFACINE 2 MG/1
2 TABLET, EXTENDED RELEASE ORAL AT BEDTIME
Qty: 30 TABLET | Refills: 1 | Status: SHIPPED | OUTPATIENT
Start: 2025-05-05

## 2025-05-05 NOTE — PROGRESS NOTES
Virtual Visit Details    Type of service:  Video Visit   Video Start Time:  1500  Video End Time: 1530    Originating Location (pt. Location): Home    Distant Location (provider location):  On-site  Platform used for Video Visit: [employer]      [facility] Medication Management Progress Note  A Part of the [facility] First Episode of Psychosis Program    NAVIGATE Enrollee: Navi Morales (1995)     MRN: [id number]  Date:  5/05/25         Contributors to the Assessment     Chart Reviewed.   Interview completed with Navi Morales.  Collateral information obtained from none.         Interim History    Navi Morales is a 29 year old male who was last seen in [facility] clinic a month ago at which time no changes were made to medications and Navi had not yet started Lexapro. The patient reports good treatment adherence with Lybalvie. History was provided by Navi who was a good historian.  Since the last visit:  [place] a new Park    If he has an alarm set, he can wake up for it. Once he is asleep he can stay asleep for a long time. Going to bed about 4 AM or so, getting up around 1 PM.     Anxiety has been higher recently, has to make some phone calls so that is the biggest cause of anxiety recently. Hasn't heard any voices at the grocery store or on the phone or anything, just when he is at home. SI about the same, still thinking about it before he goes to sleep.    Has the Lexapro on his dresser, thinking about starting it.    Started meds when he was in college--diagnosed when he was in high school, started Ritalin, then Vyvanse in college. Ritalin wasn't, Vyvanse seemed to work a lot better. A little bit irritable, a tiny bit restless. Appetite was low when he was on it.     PSYCH ROS:  See HPI     RECENT SOCIAL HISTORY:  SEE HPI    Medical ROS:  none         First Episode of Psychosis History      DUP (duration untreated psychosis):  4 years  Route to initial care: outpatient  Medication adherence overall:  See above, Clinician  Rating Form in COMPASS Item 13  General frequency of visits:  weekly IRT, monthly med management  Participation in groups:  No  Cognitive Remediation:  No  Other treatment history: None    Reviewed for completion of First Episode work-up:  Yes  First episode workup:  Not Done (if completed, see LABS for results)  MATRICS Consensus Cognitive Battery:  Not Done (if completed, see LABS for results)       Medical/Surgical History     Patient has no known allergies.    Patient Active Problem List   Diagnosis    Alcohol use disorder, mild, abuse    MELY (generalized anxiety disorder)    Induration penis plastica    Moderate episode of recurrent major depressive disorder (H)    Open displaced fracture of neck of second metacarpal bone of right hand    Psychosis (H)    Social anxiety disorder    TMJ (temporomandibular joint syndrome)    Traumatic compression fracture of T3 vertebra (H)          Medications     Current Outpatient Medications   Medication Sig Dispense Refill    cholecalciferol, vitamin D3, 1,000 unit (25 mcg) tablet [CHOLECALCIFEROL, VITAMIN D3, 1,000 UNIT (25 MCG) TABLET] Take 2,000 Units by mouth daily.      dutasteride (AVODART) 0.5 MG capsule Take 0.5 mg by mouth daily      escitalopram (LEXAPRO) 5 MG tablet Take 1 tablet (5 mg) by mouth daily. 90 tablet 0    guanFACINE (INTUNIV) 1 MG TB24 24 hr tablet Take 1 tablet (1 mg) by mouth at bedtime. 30 tablet 1    ketoconazole (NIZORAL) 2 % external shampoo Apply topically daily as needed LATHER INTO SCALP AND RINSE AFTER 2-3 MINUTES. USE THREE TIMES A WEEK.      magnesium chloride (SLOW-MAG) 64 mg TbEC delayed-release tablet [MAGNESIUM CHLORIDE (SLOW-MAG) 64 MG TBEC DELAYED-RELEASE TABLET] Take 64 mg by mouth daily.      OLANZapine (ZYPREXA) 10 MG tablet Take 0.5 tablets (5 mg) by mouth daily AND 1 tablet (10 mg) at bedtime. Take with one of the 15 mg tabs at bedtime for a total of 25 mg at bedtime.. 45 tablet 2    OLANZapine-samidorphan (LYBALVI) 20-10 MG TABS  "tablet Take 1 tablet by mouth at bedtime. Take with 1/2 of a 10 mg pill for a total of 25 mg of olanzapine at bedtime 30 tablet 2    QUEtiapine (SEROQUEL) 100 MG tablet Take 1 tablet (100 mg) by mouth at bedtime. May also take 0.5-1 tablets ( mg) 2 times daily as needed (anxiety). 60 tablet 3    tadalafil (CIALIS) 10 MG tablet Take 10 mg by mouth daily      zinc gluconate 50 mg tablet [ZINC GLUCONATE 50 MG TABLET] Take 100 mg by mouth daily.            Vitals     There were no vitals taken for this visit.      Weight prior to medication: unknown         Mental Status Exam     Alertness: alert  and oriented  Appearance: well groomed  Behavior/Demeanor: cooperative, pleasant, and calm, with good  eye contact   Speech: regular rate and rhythm, not pressured  Language: no obvious problem  Psychomotor: normal or unremarkable  Mood:  \"OK\"    Affect: blunted; was congruent to mood; was congruent to content  Thought Process/Associations:  Somewhat concrete  Thought Content:  Reports intermittent passive suicidal ideation without plan; without intent [details in Interim History];  Denies suicidal and violent ideation and delusions  Perception:  Reports auditory hallucinations;  Denies visual hallucinations  Insight: good  Judgment: fair and adequate for safety  Cognition: does  appear grossly intact; formal cognitive testing was not done         Labs and Data     RATING SCALES:  AIMS: next in person visit    PHQ9 TODAY =       4/8/2025     2:53 PM 4/14/2025     2:57 PM 4/29/2025     2:58 PM   PHQ-9 SCORE   PHQ-9 Total Score MyChart 18 (Moderately severe depression) 18 (Moderately severe depression) 19 (Moderately severe depression)   PHQ-9 Total Score 18  18  19        Patient-reported     ANTIPSYCHOTIC LABS ROUTINE    [glu, A1C, lipids (focus LDL), liver enzymes, WBC, ANEU, Hgb, plts]   q12 mo  Recent Labs   Lab Test 06/30/23  1048   GLC 84     No lab results found.  Recent Labs   Lab Test 06/30/23  1048   AST 39 " "  ALT 40   ALKPHOS 88     Recent Labs   Lab Test 06/30/23  1048   WBC 7.7   ANEU 5.0   HGB 14.9             Psychiatric Diagnoses     Psychosis, schizophrenia vs schizoaffective disorder  AUD, moderate, in early remission  Cannabis use disorder with active use  Generalized anxiety disorder  Agoraphobia         Assessment   Navi HO Andrew is a 28 year old male with first onset of psychiatric symptoms at age 7 (\"social anxiety\"), and psychotic symptoms at age 23. The above duration of untreated psychosis was approximately 4 years.  Prodromal symptoms seem to have been present since at least college (notable substance use) though patient does note a substantially longer history of depression and physical health concerns. Presenting symptoms appear to include hallucinations, delusional thoughts. Navi attributes symptoms to physical health issues and history of trauma.  Substance use does seem to be a present concern. There are possible medical comorbidities which impact this treatment [suicide attempt, suicidal ideation, SIB, psychosis, aggression, and substance use: alcohol].     Today,  Navi reports he has been working with his mom including making phone calls, and has little anxiety when he goes to the grocery store. We celebrated the tremendous progress he has made with anxiety since AH have been well-controlled and he quit drinking. Today he inquired about restarting Vyanse, which he had used prior to psychosis onset in college, and experienced significant increase in focus with some increased irritability. His last set of neuropsych testing argued against ADHD as the cause of his cognitive concerns, but was done while he was very symptomatic with severe AH. I advised him that stimulants are not recommended until at least 1 year of remission from psychosis, but we could try a nonstimulant. He was amenable to trying guanfacine. Also contemplative stage regarding starting Lexapro. He describes continuing " delayed sleep phase, waking up at 1 PM when he sets an alarm, as late as 7 PM when he doesn't.               SUICIDE RISK ASSESSMENT:  Risk factors for self-harm: previous suicide attempt, substance use/pending treatment, and hallucinations.  Mitigating factors: describes a safety plan, h/o seeking help , future oriented, commitment to family, and stable housing.  The patient does not appear to be at imminent risk for self-harm, hospitalization is not recommended which the pt does  agree with. No hospitalization will be arranged. Based on degree of symptoms close psych follow-up was/were recommended which the pt does  agree to. Additional steps to minimize risk: med changes.      [place] PRESCRIPTION MONITORING PROGRAM [] was not checked today: not using controlled substances.    PSYCHOTROPIC DRUG INTERACTIONS:   Quetiapine/olanzapine: additive CNS depression, QTc prolongation.    MANAGEMENT:  use lowest therapeutic doses of both and routine monitoring         Plan     1) PSYCHOTROPIC MEDICATIONS:  - Continue Olanzapine 5mg qAM  and olanzapine-samidorphan 20mg QHS  - continue escitalopram 5 mg PO daily prescription, but has not started yet  - start guanfacine ER 1 mg PO at bedtime     2) THERAPY:  Continue IRT with Brandie Moran    3) NEXT DUE:    Labs: FEP workup due  Rating Scales: AIMS due    4) REFERRALS:    none    5) RTC: 4 weeks    6) CRISIS NUMBERS:   Provided routinely in AVS.    TREATMENT RISK STATEMENT:  The risks, benefits, alternatives and potential adverse effects have been discussed and are understood by the pt. The pt understands the risks of using street drugs or alcohol. There are no medical contraindications, the pt agrees to treatment with the ability to do so. The pt knows to call the clinic for any problems or to access emergency care if needed.  Medical and substance use concerns are documented above.  Psychotropic drug interaction check was done, including changes made today.    PROVIDER:    Patient seen and discussed with attending physician, Dr. Vega, who is in agreement with my assessment and plan.    Heidi Vega MD  [facility] Psychiatrist  , Department of Psychiatry and Behavioral Sciences  [facility] [employer]      The longitudinal plan of care for the diagnosis(es)/condition(s) as documented were addressed during this visit. Due to the added complexity in care, I will continue to support Navi in the subsequent management and with ongoing continuity of care.

## 2025-05-05 NOTE — PROGRESS NOTES
Virtual Visit Details    Type of service:  Video Visit   Video Start Time: 3:17  Video End Time:3:44    Originating Location (pt. Location): Home    Distant Location (provider location):  On-site  Platform used for Video Visit: [employer] Medication Management Progress Note  A Part of the [facility] First Episode of Psychosis Program    NAVIGATE Enrollee: Navi Morales (1995)     MRN: [id number]  Date:  5/05/25         Contributors to the Assessment     Chart Reviewed.   Interview completed with Navi Morales.  Collateral information obtained from none.         Interim History    Navi Morales is a 29 year old male who was last seen in [facility] clinic a month ago at which guanfacine ER 1mg QHS was started to address ADHD symptoms and Navi had not yet started Lexapro. The patient reports good treatment adherence with Lybalvie and guanfacine. History was provided by Navi who was a good historian.  Since the last visit:  Navi was able to go to a new park and able to do it without feeling too much excessive anxiety. Visited his brother and his family and helped them to move in a new dining table. Still doing a little to help his mom with work, but has been slow lately. Met with Delmi last week and she has been helping him with steps to get back into [employer], planning to start some classes coming up this year, potentially in the fall. Feels a bit nervous about the amount of interaction with other people once starting classes start again, thinks it may be a combination of online and in-person.     Not sure how much guanfacine has been helping for ADHD symptoms, hasn't noticed much difference when trying to help his mom with work.     Hasn't started Lexapro, considering starting it sometime soon, but hasn't decided to at this point.     Sleep has been about the same. Continues to go to sleep late, around 4-6AM most days. Notes that he is able to wake up to an alarm when he needs to should he need to get up at  noon or something similar. Has been setting an alarm for around this time for the past few days, but hasn't changed the time he goes to bed as of now. Reports that he winds down around 3AM and is in bed by 4AM, but then takes a while to fall asleep.    PSYCH ROS:  See HPI     RECENT SOCIAL HISTORY:  SEE HPI    Medical ROS:  none         First Episode of Psychosis History      DUP (duration untreated psychosis):  4 years  Route to initial care: outpatient  Medication adherence overall:  See above, Clinician Rating Form in COMPASS Item 13  General frequency of visits:  weekly IRT, monthly med management  Participation in groups:  No  Cognitive Remediation:  No  Other treatment history: None    Reviewed for completion of First Episode work-up:  Yes  First episode workup:  Not Done (if completed, see LABS for results)  MATRICS Consensus Cognitive Battery:  Not Done (if completed, see LABS for results)       Medical/Surgical History     Patient has no known allergies.    Patient Active Problem List   Diagnosis    Alcohol use disorder, mild, abuse    MELY (generalized anxiety disorder)    Induration penis plastica    Moderate episode of recurrent major depressive disorder (H)    Open displaced fracture of neck of second metacarpal bone of right hand    Psychosis (H)    Social anxiety disorder    TMJ (temporomandibular joint syndrome)    Traumatic compression fracture of T3 vertebra (H)          Medications     Current Outpatient Medications   Medication Sig Dispense Refill    cholecalciferol, vitamin D3, 1,000 unit (25 mcg) tablet [CHOLECALCIFEROL, VITAMIN D3, 1,000 UNIT (25 MCG) TABLET] Take 2,000 Units by mouth daily.      dutasteride (AVODART) 0.5 MG capsule Take 0.5 mg by mouth daily      escitalopram (LEXAPRO) 5 MG tablet Take 1 tablet (5 mg) by mouth daily. 90 tablet 0    guanFACINE (INTUNIV) 1 MG TB24 24 hr tablet Take 1 tablet (1 mg) by mouth at bedtime. 30 tablet 1    ketoconazole (NIZORAL) 2 % external shampoo  "Apply topically daily as needed LATHER INTO SCALP AND RINSE AFTER 2-3 MINUTES. USE THREE TIMES A WEEK.      magnesium chloride (SLOW-MAG) 64 mg TbEC delayed-release tablet [MAGNESIUM CHLORIDE (SLOW-MAG) 64 MG TBEC DELAYED-RELEASE TABLET] Take 64 mg by mouth daily.      OLANZapine (ZYPREXA) 10 MG tablet Take 0.5 tablets (5 mg) by mouth daily AND 1 tablet (10 mg) at bedtime. Take with one of the 15 mg tabs at bedtime for a total of 25 mg at bedtime.. 45 tablet 2    OLANZapine-samidorphan (LYBALVI) 20-10 MG TABS tablet Take 1 tablet by mouth at bedtime. Take with 1/2 of a 10 mg pill for a total of 25 mg of olanzapine at bedtime 30 tablet 2    QUEtiapine (SEROQUEL) 100 MG tablet Take 1 tablet (100 mg) by mouth at bedtime. May also take 0.5-1 tablets ( mg) 2 times daily as needed (anxiety). 60 tablet 3    tadalafil (CIALIS) 10 MG tablet Take 10 mg by mouth daily      zinc gluconate 50 mg tablet [ZINC GLUCONATE 50 MG TABLET] Take 100 mg by mouth daily.            Vitals     There were no vitals taken for this visit.      Weight prior to medication: unknown         Mental Status Exam     Alertness: alert  and oriented  Appearance: well groomed  Behavior/Demeanor: cooperative, pleasant, and calm, with good  eye contact   Speech: regular rate and rhythm, not pressured  Language: no obvious problem  Psychomotor: normal or unremarkable  Mood: \"OK\"   Affect: blunted; was congruent to mood; was congruent to content  Thought Process/Associations: Somewhat concrete  Thought Content:  Reports intermittent passive suicidal ideation without plan; without intent [details in Interim History];  Denies suicidal and violent ideation and delusions  Perception:  Reports auditory hallucinations;  Denies visual hallucinations  Insight: good  Judgment: fair and adequate for safety  Cognition: does  appear grossly intact; formal cognitive testing was not done         Labs and Data     RATING SCALES:  AIMS: next in person visit    PHQ9 " "TODAY =       4/8/2025     2:53 PM 4/14/2025     2:57 PM 4/29/2025     2:58 PM   PHQ-9 SCORE   PHQ-9 Total Score MyChart 18 (Moderately severe depression) 18 (Moderately severe depression) 19 (Moderately severe depression)   PHQ-9 Total Score 18  18  19        Patient-reported     ANTIPSYCHOTIC LABS ROUTINE    [glu, A1C, lipids (focus LDL), liver enzymes, WBC, ANEU, Hgb, plts]   q12 mo  Recent Labs   Lab Test 06/30/23  1048   GLC 84     No lab results found.  Recent Labs   Lab Test 06/30/23  1048   AST 39   ALT 40   ALKPHOS 88     Recent Labs   Lab Test 06/30/23  1048   WBC 7.7   ANEU 5.0   HGB 14.9             Psychiatric Diagnoses     Psychosis, schizophrenia vs schizoaffective disorder  AUD, moderate, in early remission  Cannabis use disorder with active use  Generalized anxiety disorder  Agoraphobia         Assessment   Navi HO Andrew is a 28 year old male with first onset of psychiatric symptoms at age 7 (\"social anxiety\"), and psychotic symptoms at age 23. The above duration of untreated psychosis was approximately 4 years.  Prodromal symptoms seem to have been present since at least college (notable substance use) though patient does note a substantially longer history of depression and physical health concerns. Presenting symptoms appear to include hallucinations, delusional thoughts. Navi attributes symptoms to physical health issues and history of trauma.  Substance use does seem to be a present concern. There are possible medical comorbidities which impact this treatment [suicide attempt, suicidal ideation, SIB, psychosis, aggression, and substance use: alcohol].     Today,  Navi describes having moved bedtime back by 1 hour but it is still much later than he would like.  Hasn't really noticed any benefit to guanfacine but tolerating well. Continues to express a hope that he will be prescribed Vyvanse in the future to optimize academic function, but has also not yet started escitalopram due to " concerns that it will interfere with him getting high if he decides to resume using THC. Given that THC is now legal but the main barrier he describes to returning to use is legality, I have had difficulty in challenging this paradox. I suspect that he is having trouble letting go of substances as a coping mechanism, so by delaying starting this medication he leaves the door open to himself to hypothetically return to use.              SUICIDE RISK ASSESSMENT:  Risk factors for self-harm: previous suicide attempt, substance use/pending treatment, and hallucinations.  Mitigating factors: describes a safety plan, h/o seeking help , future oriented, commitment to family, and stable housing.  The patient does not appear to be at imminent risk for self-harm, hospitalization is not recommended which the pt does  agree with. No hospitalization will be arranged. Based on degree of symptoms close psych follow-up was/were recommended which the pt does  agree to. Additional steps to minimize risk: med changes.      [place] PRES[place] MONITOR[place] [] was not checked today: not using controlled substances.    PSYCHOTROPIC DRUG INTERACTIONS:   Quetiapine/olanzapine: additive CNS depression, QTc prolongation.    MANAGEMENT:  use lowest therapeutic doses of both and routine monitoring         Plan     1) PSYCHOTROPIC MEDICATIONS:  - Continue Olanzapine 5mg qAM  and olanzapine-samidorphan 20mg QHS  - continue escitalopram 5 mg PO daily prescription, but has not started yet  - Increase guanfacine ER to 2 mg PO at bedtime     2) THERAPY:  Continue IRT with Brandie Moran    3) NEXT DUE:    Labs: FEP workup due  Rating Scales: AIMS due    4) REFERRALS:    none    5) RTC: 4 weeks    6) CRISIS NUMBERS:   Provided routinely in AVS.    TREATMENT RISK STATEMENT:  The risks, benefits, alternatives and potential adverse effects have been discussed and are understood by the pt. The pt understands the risks of using street drugs or  alcohol. There are no medical contraindications, the pt agrees to treatment with the ability to do so. The pt knows to call the clinic for any problems or to access emergency care if needed.  Medical and substance use concerns are documented above.  Psychotropic drug interaction check was done, including changes made today.    PROVIDER:     Heidi Vega MD  [facility] Psychiatrist  , Department of Psychiatry and Behavioral Sciences  [employer] [facility] [employer]    The longitudinal plan of care for the diagnosis(es)/condition(s) as documented were addressed during this visit. Due to the added complexity in care, I will continue to support Navi in the subsequent management and with ongoing continuity of care.

## 2025-05-06 ENCOUNTER — VIRTUAL VISIT (OUTPATIENT)
Dept: PSYCHIATRY | Facility: CLINIC | Age: 30
End: 2025-05-06
Payer: COMMERCIAL

## 2025-05-06 DIAGNOSIS — F20.9 SCHIZOPHRENIA, UNSPECIFIED TYPE (H): Primary | ICD-10-CM

## 2025-05-06 ASSESSMENT — PATIENT HEALTH QUESTIONNAIRE - PHQ9
SUM OF ALL RESPONSES TO PHQ QUESTIONS 1-9: 19
SUM OF ALL RESPONSES TO PHQ QUESTIONS 1-9: 19
10. IF YOU CHECKED OFF ANY PROBLEMS, HOW DIFFICULT HAVE THESE PROBLEMS MADE IT FOR YOU TO DO YOUR WORK, TAKE CARE OF THINGS AT HOME, OR GET ALONG WITH OTHER PEOPLE: VERY DIFFICULT
SUM OF ALL RESPONSES TO PHQ QUESTIONS 1-9: 19
10. IF YOU CHECKED OFF ANY PROBLEMS, HOW DIFFICULT HAVE THESE PROBLEMS MADE IT FOR YOU TO DO YOUR WORK, TAKE CARE OF THINGS AT HOME, OR GET ALONG WITH OTHER PEOPLE: VERY DIFFICULT
SUM OF ALL RESPONSES TO PHQ QUESTIONS 1-9: 19

## 2025-05-06 NOTE — PROGRESS NOTES
NAVIGGALINDO Clinician Contact & Progress Note  For Individual Resiliency Training (IRT)  A Part of the [facility] First Episode of Psychosis Program    NAVIGATE Enrollee: Navi Morales (1995)     MRN: [id number]  Date:  5/06/25  Diagnosis: Psychosis, unspecified psychosis type (H) [F29]   Clinician: LILIANA Individual Resiliency Trainer, MERCEDES Mchugh     1. Type of contact: (majority of time spent)  IRT Session via telehealth  Mode of communication: [employer] (HIPAA compliant, secure platform). Patient consented verbally to this mode of therapy today.  Reason for telehealth: To reduce barriers in accessing services, due to but not limited to transportation, location, or scheduling reasons.    2. People present:   Writer  Client: Yes - Navi    3. Length of Actual Contact: Start Time: 3:04p; End Time: 4     4. Location of contact:  Originating Location (patient location): [facility], located in [place], [place]  Distant Location (provider location): Remote location    5. Did the client complete the home practice option(s) from the previous session: yes     6. Motivational Teaching Strategies:  Connect info and skills with personal goals  Promote hope and positive expectations  Explore pros and cons of change  Re-frame experiences in positive light    7. Educational Teaching Strategies:  Review of written material/education  Relate information to client's experience  Ask questions to check comprehension  Break down information into small chunks  Adopt client's language     8. CBT Teaching Strategies:  Reinforcement and shaping (positive feedback for steps towards goals and gains in knowledge & skills)  Cognitive restructuring (identify thoughts related to negative feelings)  Motivational interviewing    9. IRT Module(s) Addressed:  Coping with symptoms - negative symptoms  Mindfulness  Goal setting and review    10. Techniques utilized:   [place] announced at beginning of session  Review of goal  Review  of previous meeting  Present new material  Modeling  Help client choose a home practice option  Summarize progress made in current session    11. Measures:  Answers submitted by the patient for this visit:  Patient Health Questionnaire (Submitted on 5/6/2025)  If you checked off any problems, how difficult have these problems made it for you to do your work, take care of things at home, or get along with other people?: Very difficult  PHQ9 TOTAL SCORE: 19      Mental Status Exam  Alertness: alert and oriented  Behavior/Demeanor:  shaky, some gazing, engaged and pleasant   Speech: regular rate and rhythm  Language: intact.   Mood: depressed and anxious  Thought Process/Associations: unremarkable  Thought Content:  Reports suicidal ideation with no plan or intent;  Denies suicidal intent  Perception:  Reports auditory hallucinations;  Denies visual hallucinations  Insight: good  Judgment: adequate for safety  Cognition: does  appear grossly intact; formal cognitive testing was not done      12. Assessment/Progress Note:     Writer and client met for IRT visit via [employer]. Writer used relational and interpersonal approach to explore feelings, motivations, and behavior. Writer offered support, feedback, validation, and reinforced use of skills taught in IRT from modalities including cognitive behavioral therapy, psycho education, and skills training. Explored symptoms and coping from a stress-vulnerability lens.    Today Navi reports this week he met with his SEE and discussed plans for school. We spent the majority of the session addressing negative thoughts Navi has about starting school and how he can restructure those thoughts into more neutral, positive or hopeful language. Navi reports thinking about finishing his degree is motivating. He said he would like to work on social skills and study skills to help prepare.Navi identified a few things he could try: reading a new book (fiction, lighthearted), take his  "medication and practice social skills. Navi set his own self practice to read and to say hello to  at grocery store. Denied suicidal intention or plan and says thoughts in general have decreased this week.     Symptom assessment, safety assessment, discussion and identification of coping strategies, and exploration of material in ACT and Step Down materials was all in support of Navi's self-identified goal(s) as identified in most recent BEH Treatment Plan. Progress toward goal completion seems good.       13. Plan/Referrals:   Continue Navigate services, weekly IRT, scheduled next tue  at 3.       Billing for \"Interactive Complexity\"?    No    Brandie Moran, LEENA        "

## 2025-05-06 NOTE — PROGRESS NOTES
NAVIGATE Clinician Contact & Progress Note   For Family Education Program    NAVIGATE Enrollee: Navi Morales (1995)     MRN: [id number]  Date:  5/06/25  Diagnosis(es):   Psychosis unspecified  Clinician: LILIANA Family Clinician, Laura Maldonado MA, SELWYN     1. Type of contact: (majority of time spent)  Family Session via telehealth  Mode of communication:   Linwood Verduzco consented verbally to this mode of therapy today.  Reason for telehealth: To reduce barriers in accessing services, due to but not limited to transportation, location, or scheduling reasons.     2. People present:   Writer  Client: Yes   Significant Other/Family/Friend:  Mother    3. Length of Actual Contact: Start Time: 4:05 to 4:55pm      4. Location of contact:  Originating Location (patient location): Patient's home location; [place]   Distant Location (provider location): Home office, located in [place], [place], using appropriate privacy considerations and procedures    5. Did the client complete the home practice option(s) from the previous session: Partially Completed    6. Motivational Teaching Strategies:  Connect info and skills with personal goals  Promote hope and positive expectations    7. Educational Teaching Strategies:  Review of written material/education  Relate information to client's experience  Ask questions to check comprehension  Break down information into small chunks    8. CBT Teaching Strategies:  Reinforcement and shaping (positive feedback for steps towards goals and gains in knowledge & skills)    9. Psychoeducational Topic(s) Addressed:  Just the Facts - Effective Communication    10. Techniques utilized:   [place] announced at beginning of session  Review of previous meeting  Present new material  Family Therapy  Motivational Interviewing (Connect info and skills with personal goals, Promote hope and positive expectations, Explore pros and cons of change, Re-frame experiences in a positive  light)  Educational Teaching Strategies (Review written material/education on: Psychosis, Medications for psychosis, Coping with stress, Strategies to build resiliency, Relapse prevention planning, Developing a collaboration with mental health professionals, Effective communication, A relative s guide to supporting recovery from psychosis, Basic facts about alcohol and drugs)  CBT (Reinforcement and shaping, Social skills training, Relapse prevention planning, Coping skills training, Relaxation training, Cognitive restructuring, Behavioral tailoring)    11. Assessment/Progress Note:     Writer met with Navi and Navi's mom on this day for family education and support.Set agenda to check-in about the last week, review last weeks' session, and continue education on communication. Family were agreeable to this agenda.    Navi and mom reported no urgent concerns at this time. Mom reported no significant changes; reports that they went for a walk two different times. Navi also reported spending the afternoon with brother and his family. Discussed challenges related to communication. Explored Navi's goals for exposure to social situations.    Writer continued education on communication strategies. Discussed last weeks progress on expressing positive emotions and making a positive request. Both lucille and Navi reported that they had used the skill of expressing positive emotions, but struggled to make eye contact. They were open to practicing the skill again using eye contact in this session; writer provided positive verbal feedback. Explored making a positive request and Navi and lucille were both able to make a positive request of the other in this session. Writer provided positive feedback and also validated the challenge of trying something new and the courage they both exhibited on this day.     Discussed home practice to include noticing negative emotions when they arise in preparation for the next skill of expressing  negative emotions.     Overall family and Navi seemed engaged in conversation. They did express interest in continuing to meet for family therapy and psychoeducation. As of today's appt their insight into Navi's mental illness appears fair. They seem they would benefit from continued clinical intervention aimed at assisting them implement helpful strategies at home and increase their understanding of psychosis.    12. Plan/Referrals:     Will meet with family weekly as schedule allows for evidence based family psychoeducation and therapeutic support aimed at maximizing Navi's opportunity for recovery from psychosis.     Laura Maldonado MA, LP   NAVIGATE   The author of this note documented a reason for not sharing it with the patient.

## 2025-05-07 ENCOUNTER — TELEPHONE (OUTPATIENT)
Dept: PSYCHIATRY | Facility: CLINIC | Age: 30
End: 2025-05-07

## 2025-05-08 ENCOUNTER — TELEPHONE (OUTPATIENT)
Dept: PSYCHIATRY | Facility: CLINIC | Age: 30
End: 2025-05-08

## 2025-05-08 NOTE — TELEPHONE ENCOUNTER
NAVIGATE Family Peer Support  A Part of the [facility] First Episode of Psychosis Program     Patient Name: Navi Morales  /Age:  1995 (29 year old)  Date of Encounter: 25  Length of Contact: 1 minute    This writer received a message from patient's mother, Mona, requesting a phone call for family peer support.     Jyothi Finley CFPS  NAVIGATE Family Peer    [Billing Code 65538 for No Billable Service as family peer support is a nonbillable service]

## 2025-05-08 NOTE — TELEPHONE ENCOUNTER
NAVIGATE Family Peer Support  A Part of the [facility] First Episode of Psychosis Program     Patient Name: Navi Morales  /Age:  1995 (29 year old)  Date of Encounter: 25  Length of Contact: 59 minutes    This writer reached out to offer resources and support to patient's mother, Mona.     Topics of conversation included communication strategies, creating a sleep schedule for Navi, diet, exercise, and parental self-care.    Jyothi Finley CFPS  NAVIGATE Family Peer    [Billing Code 49949 for No Billable Service as family peer support is a nonbillable service]

## 2025-05-13 ENCOUNTER — VIRTUAL VISIT (OUTPATIENT)
Dept: PSYCHIATRY | Facility: CLINIC | Age: 30
End: 2025-05-13
Payer: COMMERCIAL

## 2025-05-13 DIAGNOSIS — F20.9 SCHIZOPHRENIA, UNSPECIFIED TYPE (H): Primary | ICD-10-CM

## 2025-05-13 ASSESSMENT — ANXIETY QUESTIONNAIRES
GAD7 TOTAL SCORE: 17
GAD7 TOTAL SCORE: 17
3. WORRYING TOO MUCH ABOUT DIFFERENT THINGS: NEARLY EVERY DAY
8. IF YOU CHECKED OFF ANY PROBLEMS, HOW DIFFICULT HAVE THESE MADE IT FOR YOU TO DO YOUR WORK, TAKE CARE OF THINGS AT HOME, OR GET ALONG WITH OTHER PEOPLE?: VERY DIFFICULT
1. FEELING NERVOUS, ANXIOUS, OR ON EDGE: MORE THAN HALF THE DAYS
5. BEING SO RESTLESS THAT IT IS HARD TO SIT STILL: MORE THAN HALF THE DAYS
IF YOU CHECKED OFF ANY PROBLEMS ON THIS QUESTIONNAIRE, HOW DIFFICULT HAVE THESE PROBLEMS MADE IT FOR YOU TO DO YOUR WORK, TAKE CARE OF THINGS AT HOME, OR GET ALONG WITH OTHER PEOPLE: VERY DIFFICULT
7. FEELING AFRAID AS IF SOMETHING AWFUL MIGHT HAPPEN: MORE THAN HALF THE DAYS
GAD7 TOTAL SCORE: 17
2. NOT BEING ABLE TO STOP OR CONTROL WORRYING: NEARLY EVERY DAY
6. BECOMING EASILY ANNOYED OR IRRITABLE: MORE THAN HALF THE DAYS
GAD7 TOTAL SCORE: 17
4. TROUBLE RELAXING: NEARLY EVERY DAY
GAD7 TOTAL SCORE: 17
7. FEELING AFRAID AS IF SOMETHING AWFUL MIGHT HAPPEN: MORE THAN HALF THE DAYS
1. FEELING NERVOUS, ANXIOUS, OR ON EDGE: MORE THAN HALF THE DAYS
GAD7 TOTAL SCORE: 17
6. BECOMING EASILY ANNOYED OR IRRITABLE: MORE THAN HALF THE DAYS
5. BEING SO RESTLESS THAT IT IS HARD TO SIT STILL: MORE THAN HALF THE DAYS
3. WORRYING TOO MUCH ABOUT DIFFERENT THINGS: NEARLY EVERY DAY
4. TROUBLE RELAXING: NEARLY EVERY DAY
GAD7 TOTAL SCORE: 17
6. BECOMING EASILY ANNOYED OR IRRITABLE: MORE THAN HALF THE DAYS
GAD7 TOTAL SCORE: 17
2. NOT BEING ABLE TO STOP OR CONTROL WORRYING: NEARLY EVERY DAY
2. NOT BEING ABLE TO STOP OR CONTROL WORRYING: NEARLY EVERY DAY
5. BEING SO RESTLESS THAT IT IS HARD TO SIT STILL: MORE THAN HALF THE DAYS
8. IF YOU CHECKED OFF ANY PROBLEMS, HOW DIFFICULT HAVE THESE MADE IT FOR YOU TO DO YOUR WORK, TAKE CARE OF THINGS AT HOME, OR GET ALONG WITH OTHER PEOPLE?: VERY DIFFICULT
8. IF YOU CHECKED OFF ANY PROBLEMS, HOW DIFFICULT HAVE THESE MADE IT FOR YOU TO DO YOUR WORK, TAKE CARE OF THINGS AT HOME, OR GET ALONG WITH OTHER PEOPLE?: VERY DIFFICULT
1. FEELING NERVOUS, ANXIOUS, OR ON EDGE: MORE THAN HALF THE DAYS
7. FEELING AFRAID AS IF SOMETHING AWFUL MIGHT HAPPEN: MORE THAN HALF THE DAYS
7. FEELING AFRAID AS IF SOMETHING AWFUL MIGHT HAPPEN: MORE THAN HALF THE DAYS
4. TROUBLE RELAXING: NEARLY EVERY DAY
GAD7 TOTAL SCORE: 17
7. FEELING AFRAID AS IF SOMETHING AWFUL MIGHT HAPPEN: MORE THAN HALF THE DAYS
7. FEELING AFRAID AS IF SOMETHING AWFUL MIGHT HAPPEN: MORE THAN HALF THE DAYS
3. WORRYING TOO MUCH ABOUT DIFFERENT THINGS: NEARLY EVERY DAY

## 2025-05-13 ASSESSMENT — PATIENT HEALTH QUESTIONNAIRE - PHQ9
SUM OF ALL RESPONSES TO PHQ QUESTIONS 1-9: 20
10. IF YOU CHECKED OFF ANY PROBLEMS, HOW DIFFICULT HAVE THESE PROBLEMS MADE IT FOR YOU TO DO YOUR WORK, TAKE CARE OF THINGS AT HOME, OR GET ALONG WITH OTHER PEOPLE: VERY DIFFICULT
SUM OF ALL RESPONSES TO PHQ QUESTIONS 1-9: 20
10. IF YOU CHECKED OFF ANY PROBLEMS, HOW DIFFICULT HAVE THESE PROBLEMS MADE IT FOR YOU TO DO YOUR WORK, TAKE CARE OF THINGS AT HOME, OR GET ALONG WITH OTHER PEOPLE: VERY DIFFICULT

## 2025-05-13 NOTE — PROGRESS NOTES
NAVIGATE Clinician Contact & Progress Note   For Family Education Program    NAVIGATE Enrollee: Navi Morales (1995)     MRN: [id number]  Date:  5/13/25  Diagnosis(es):   Psychosis unspecified  Clinician: LILIANA Family Clinician, Laura Maldonado MA, SELWYN     1. Type of contact: (majority of time spent)  Family Session via telehealth  Mode of communication:   Linwood Verduzco consented verbally to this mode of therapy today.  Reason for telehealth: To reduce barriers in accessing services, due to but not limited to transportation, location, or scheduling reasons.     2. People present:   Writer  Client: Yes   Significant Other/Family/Friend:  Mother    3. Length of Actual Contact: Start Time: 4:05 to 5pm      4. Location of contact:  Originating Location (patient location): Patient's home location; [place]   Distant Location (provider location): Home office, located in [place], [place], using appropriate privacy considerations and procedures    5. Did the client complete the home practice option(s) from the previous session: Partially Completed    6. Motivational Teaching Strategies:  Connect info and skills with personal goals  Promote hope and positive expectations    7. Educational Teaching Strategies:  Review of written material/education  Relate information to client's experience  Ask questions to check comprehension  Break down information into small chunks    8. CBT Teaching Strategies:  Reinforcement and shaping (positive feedback for steps towards goals and gains in knowledge & skills)    9. Psychoeducational Topic(s) Addressed:  Just the Facts - Effective Communication    10. Techniques utilized:   [place] announced at beginning of session  Review of previous meeting  Present new material  Family Therapy  Motivational Interviewing (Connect info and skills with personal goals, Promote hope and positive expectations, Explore pros and cons of change, Re-frame experiences in a positive  light)  Educational Teaching Strategies (Review written material/education on: Psychosis, Medications for psychosis, Coping with stress, Strategies to build resiliency, Relapse prevention planning, Developing a collaboration with mental health professionals, Effective communication, A relative s guide to supporting recovery from psychosis, Basic facts about alcohol and drugs)  CBT (Reinforcement and shaping, Social skills training, Relapse prevention planning, Coping skills training, Relaxation training, Cognitive restructuring, Behavioral tailoring)    11. Assessment/Progress Note:     Writer met with Navi and Navi's mom on this day for family education and support.Set agenda to check-in about the last week, review last weeks' session, and continue education on communication. Family were agreeable to this agenda.    Navi and mom reported no urgent concerns at this time. Mom and Navi reported no significant changes; reports that they went for a walk two different times. Navi also reported going to the grocery store where he needed to ask the  a question two times.Navi reported feeling that the  was irritated. Mom stated that she didn't think that was the case.    Mom also reported that Navi helped with getting a large load of mulch. Navi reported observing that he had difficulty communicating with a neighbor. Navi's mom noted that she thinks this woman has hearing problems.    Explored communication patterns observed in this conversation and offered an introduction to active listening versus giving advice and problem solving. Navi states that he is ok with mom problem solving, but is also open to learning active listening skills.     Writer introduce paraphrasing as a skill where the person repeats back in a different way what the person says. Writer role played with Navi and mom ways of paraphrasing. Navi and mom will try to practice paraphrasing over the next week.     Overall family and Navi seemed  engaged in conversation. They did express interest in continuing to meet for family therapy and psychoeducation. As of today's appt their insight into Navi's mental illness appears fair. They seem they would benefit from continued clinical intervention aimed at assisting them implement helpful strategies at home and increase their understanding of psychosis.    12. Plan/Referrals:     Will meet with family weekly as schedule allows for evidence based family psychoeducation and therapeutic support aimed at maximizing Navi's opportunity for recovery from psychosis.     Laura Maldonado MA, LP   NAVIGATE   The author of this note documented a reason for not sharing it with the patient.

## 2025-05-19 NOTE — PROGRESS NOTES
NAVIGGALINDO Clinician Contact & Progress Note  For Individual Resiliency Training (IRT)  A Part of the [facility] First Episode of Psychosis Program    NAVIGATE Enrollee: Navi Morales (1995)     MRN: [id number]  Date:  5/13/25  Diagnosis: Psychosis, unspecified psychosis type (H) [F29]   Clinician: LILIANA Individual Resiliency Trainer, MERCEDES Mchugh     1. Type of contact: (majority of time spent)  IRT Session via telehealth  Mode of communication: [employer] (HIPAA compliant, secure platform). Patient consented verbally to this mode of therapy today.  Reason for telehealth: To reduce barriers in accessing services, due to but not limited to transportation, location, or scheduling reasons.    2. People present:   Writer  Client: Yes - Navi    3. Length of Actual Contact: Start Time: 3:00p; End Time: 4     4. Location of contact:  Originating Location (patient location): [facility], located in [place], [place]  Distant Location (provider location): Remote location    5. Did the client complete the home practice option(s) from the previous session: yes     6. Motivational Teaching Strategies:  Connect info and skills with personal goals  Promote hope and positive expectations  Explore pros and cons of change  Re-frame experiences in positive light    7. Educational Teaching Strategies:  Review of written material/education  Relate information to client's experience  Ask questions to check comprehension  Break down information into small chunks  Adopt client's language     8. CBT Teaching Strategies:  Reinforcement and shaping (positive feedback for steps towards goals and gains in knowledge & skills)  Cognitive restructuring (identify thoughts related to negative feelings)  Motivational interviewing    9. IRT Module(s) Addressed:  Coping with symptoms - negative symptoms  Goal setting and review    10. Techniques utilized:   [place] announced at beginning of session  Review of goal  Review of previous  "meeting  Present new material  Modeling  Help client choose a home practice option  Summarize progress made in current session    11. Measures:  Answers submitted by the patient for this visit:  Patient Health Questionnaire (Submitted on 5/13/2025)  If you checked off any problems, how difficult have these problems made it for you to do your work, take care of things at home, or get along with other people?: Very difficult  PHQ9 TOTAL SCORE: 20  Patient Health Questionnaire (G7) (Submitted on 5/13/2025)  MELY 7 TOTAL SCORE: 17    Mental Status Exam  Alertness: alert and oriented  Behavior/Demeanor:  shaky, some gazing, engaged and pleasant   Speech: regular rate and rhythm  Language: intact.   Mood: depressed and anxious  Thought Process/Associations: unremarkable  Thought Content:  Reports suicidal ideation with no plan or intent;  Denies suicidal intent  Perception:  Reports auditory hallucinations;  Denies visual hallucinations  Insight: good  Judgment: adequate for safety  Cognition: does  appear grossly intact; formal cognitive testing was not done      12. Assessment/Progress Note:     Writer and client met for IRT visit via [employer]. Writer used relational and interpersonal approach to explore feelings, motivations, and behavior. Writer offered support, feedback, validation, and reinforced use of skills taught in IRT from modalities including cognitive behavioral therapy, psycho education, and skills training. Explored symptoms and coping from a stress-vulnerability lens.    Today Navi reports this week he has been doing \"okay\" he reports he completed his home practice and spoke with the  at the grocery store. We discussed his thought processes, feelings and behavior related to this instance. Writer reflected Navi's strengths and how he utilized different skills. He was able to identify this thoughts and how they could be restructured into different, more neutral language. Denied positive symptoms of " "psychosis today. Also denied suicidal thoughts. Set home practice to walk in the park, continue to work on sleep hygiene and gentle stretching.     Symptom assessment, safety assessment, discussion and identification of coping strategies, and exploration of material in ACT and Step Down materials was all in support of Navi's self-identified goal(s) as identified in most recent BEH Treatment Plan. Progress toward goal completion seems good.       13. Plan/Referrals:   Continue Navigate services, weekly IRT, scheduled next tue  at 3.       Billing for \"Interactive Complexity\"?    No    Brandie Moran, LEENA        "

## 2025-05-20 ENCOUNTER — VIRTUAL VISIT (OUTPATIENT)
Dept: PSYCHIATRY | Facility: CLINIC | Age: 30
End: 2025-05-20
Payer: COMMERCIAL

## 2025-05-20 DIAGNOSIS — F20.9 SCHIZOPHRENIA, UNSPECIFIED TYPE (H): Primary | ICD-10-CM

## 2025-05-20 NOTE — PROGRESS NOTES
NAVIGATE Clinician Contact & Progress Note  For Individual Resiliency Training (IRT)  A Part of the [facility] First Episode of Psychosis Program    NAVIGATE Enrollee: Navi Morales (1995)     MRN: [id number]  Date:  5/20/25  Diagnosis: Psychosis, unspecified psychosis type (H) [F29]   Clinician: LILIANA Individual Resiliency Trainer, LEENA Mchugh    1. Type of contact: (majority of time spent)  IRT Session via telehealth  Mode of communication: [employer] (HIPAA compliant, secure platform). Patient consented verbally to this mode of therapy today.  Reason for telehealth: To reduce barriers in accessing services, due to but not limited to transportation, location, or scheduling reasons.    2. People present:   Writer  Client: Yes - Navi    3. Length of Actual Contact: Start Time: 3:00p; End Time: 3:56pm     4. Location of contact:  Originating Location (patient location): [facility], located in [place], [place]  Distant Location (provider location): Remote location using best privacy and confidentiality practices    5. Did the client complete the home practice option(s) from the previous session: partly, did not stretch but went to grocery store and spoke with      6. Motivational Teaching Strategies:  Connect info and skills with personal goals  Promote hope and positive expectations  Explore pros and cons of change  Re-frame experiences in positive light    7. Educational Teaching Strategies:  Review of written material/education  Relate information to client's experience  Ask questions to check comprehension  Break down information into small chunks  Adopt client's language     8. CBT Teaching Strategies:  Reinforcement and shaping (positive feedback for steps towards goals and gains in knowledge & skills)  Cognitive restructuring (identify thoughts related to negative feelings)  Motivational interviewing    9. IRT Module(s) Addressed:  Coping with symptoms - negative symptoms    10.  "Techniques utilized:   [place] announced at beginning of session  Review of goal  Review of previous meeting  Present new material  Modeling  Help client choose a home practice option  Summarize progress made in current session    11. Measures:  Answers submitted by the patient for this visit:  Patient Health Questionnaire (G7) (Submitted on 5/13/2025)  MELY 7 TOTAL SCORE: 17    Mental Status Exam  Alertness: alert and oriented  Behavior/Demeanor:  shaky, some gazing, engaged and pleasant   Speech: regular rate and rhythm  Language: intact.   Mood: depressed and anxious  Thought Process/Associations: unremarkable  Thought Content:  Reports suicidal ideation with no plan or intent;  Denies suicidal intent  Perception:  Reports auditory hallucinations;  Denies visual hallucinations  Insight: good  Judgment: adequate for safety  Cognition: does  appear grossly intact; formal cognitive testing was not done      12. Assessment/Progress Note:     Writer and client met for IRT visit via [employer]. Writer used relational and interpersonal approach to explore feelings, motivations, and behavior. Writer offered support, feedback, validation, and reinforced use of skills taught in IRT from modalities including cognitive behavioral therapy, psycho education, and skills training. Explored symptoms and coping from a stress-vulnerability lens.    Today Navi reports this week he has been doing \"okay\". Completed his home practice of speaking with rebeka at grocery store. Writer and @PREFNAME reflected on the situation using CBT techniques. We identified effective aspects of the interaction, explored associated thoughts, feelings, and behaviors, and recognized negative thinking patterns. A specific thought was examined; we evaluated evidence for/against it and generated either more neutral or positive alternative language. Discussed action steps and collaboratively identified home practice goals including reminders to self that he is not " "bothering staff or others. Today we also discussed a situation that arose about communication with parents. Navi discussed why he ignored a certain request. Opened discussion for communication strategies.        Symptom assessment, safety assessment, discussion and identification of coping strategies, and exploration of material in ACT and Step Down materials was all in support of Navi's self-identified goal(s) as identified in most recent BEH Treatment Plan. Progress toward goal completion seems good.       13. Plan/Referrals:   Continue [facility]igate services, weekly IRT, scheduled next tue  at 3.       Billing for \"Interactive Complexity\"?    No    Brandie Moran, LEENA        "

## 2025-05-21 NOTE — PROGRESS NOTES
NAVIGATE Clinician Contact & Progress Note   For Family Education Program    NAVIGATE Enrollee: Navi Morales (1995)     MRN: [id number]  Date:  5/20/25  Diagnosis(es):   Psychosis unspecified  Clinician: LILIANA Family Clinician, Laura Maldonado MA, SELWYN     1. Type of contact: (majority of time spent)  Family Session via telehealth  Mode of communication:   Linwood Verduzco consented verbally to this mode of therapy today.  Reason for telehealth: To reduce barriers in accessing services, due to but not limited to transportation, location, or scheduling reasons.     2. People present:   Writer  Client: Yes   Significant Other/Family/Friend:  Mother    3. Length of Actual Contact: Start Time: 4:10 to 5pm      4. Location of contact:  Originating Location (patient location): Patient's home location; [place]   Distant Location (provider location): Home office, located in [place], [place], using appropriate privacy considerations and procedures    5. Did the client complete the home practice option(s) from the previous session: Partially Completed    6. Motivational Teaching Strategies:  Connect info and skills with personal goals  Promote hope and positive expectations    7. Educational Teaching Strategies:  Review of written material/education  Relate information to client's experience  Ask questions to check comprehension  Break down information into small chunks    8. CBT Teaching Strategies:  Reinforcement and shaping (positive feedback for steps towards goals and gains in knowledge & skills)    9. Psychoeducational Topic(s) Addressed:  Just the Facts - Effective Communication    10. Techniques utilized:   [place] announced at beginning of session  Review of previous meeting  Present new material  Family Therapy  Motivational Interviewing (Connect info and skills with personal goals, Promote hope and positive expectations, Explore pros and cons of change, Re-frame experiences in a positive  light)  Educational Teaching Strategies (Review written material/education on: Psychosis, Medications for psychosis, Coping with stress, Strategies to build resiliency, Relapse prevention planning, Developing a collaboration with mental health professionals, Effective communication, A relative s guide to supporting recovery from psychosis, Basic facts about alcohol and drugs)  CBT (Reinforcement and shaping, Social skills training, Relapse prevention planning, Coping skills training, Relaxation training, Cognitive restructuring, Behavioral tailoring)    11. Assessment/Progress Note:     Writer met with Navi and Navi's mom on this day for family education and support.Set agenda to check-in about the last week, review last weeks' session, and continue education on communication. Family were agreeable to this agenda.    Navi and mom reported no urgent concerns at this time. Writer met with mom for about 20 minutes and Navi joined later. Mom reported high distress over the last week due to numerous factors including Navi's grandfather's medical needs, Navi's dad's stress, and Navi being more isolative. Writer provided supportive listening and empathy. Mom reported feeling sadness and grief that Navi forgot her birthday. Explored this experience for mom. Writer also attempted to reflect back what  psychosis experience may be like for Navi and what might make it challenging for Navi to engage with mom. Mom will continue to expand her communication efforts with Navi and redefine her expectations of Navi .    Navi joined the session. Navi reported no urgent concerns or any information to share with mom and writer.     Continued communication skill education in regard to active listening including both paraphrasing and clarifying. Mom and Navi had not practiced this paraphrasing. Reviewed paraphrasing skill; provided education on clarifying skill. Mom and Navi both practiced these skills. Writer provided positive feedback.  Mom and Navi agreed to practice this skill over the course of the next week.     Overall family and Navi seemed engaged in conversation. They did express interest in continuing to meet for family therapy and psychoeducation. As of today's appt their insight into Navi's mental illness appears fair. They seem they would benefit from continued clinical intervention aimed at assisting them implement helpful strategies at home and increase their understanding of psychosis.    12. Plan/Referrals:     Will meet with family weekly as schedule allows for evidence based family psychoeducation and therapeutic support aimed at maximizing Navi's opportunity for recovery from psychosis.     Laura Maldonado MA, LP   NAVIGATE   The author of this note documented a reason for not sharing it with the patient.

## 2025-05-27 ENCOUNTER — VIRTUAL VISIT (OUTPATIENT)
Dept: PSYCHIATRY | Facility: CLINIC | Age: 30
End: 2025-05-27
Payer: COMMERCIAL

## 2025-05-27 DIAGNOSIS — F20.9 SCHIZOPHRENIA, UNSPECIFIED TYPE (H): Primary | ICD-10-CM

## 2025-05-27 ASSESSMENT — ANXIETY QUESTIONNAIRES
IF YOU CHECKED OFF ANY PROBLEMS ON THIS QUESTIONNAIRE, HOW DIFFICULT HAVE THESE PROBLEMS MADE IT FOR YOU TO DO YOUR WORK, TAKE CARE OF THINGS AT HOME, OR GET ALONG WITH OTHER PEOPLE: VERY DIFFICULT
GAD7 TOTAL SCORE: 17
2. NOT BEING ABLE TO STOP OR CONTROL WORRYING: NEARLY EVERY DAY
6. BECOMING EASILY ANNOYED OR IRRITABLE: MORE THAN HALF THE DAYS
4. TROUBLE RELAXING: NEARLY EVERY DAY
GAD7 TOTAL SCORE: 17
7. FEELING AFRAID AS IF SOMETHING AWFUL MIGHT HAPPEN: MORE THAN HALF THE DAYS
IF YOU CHECKED OFF ANY PROBLEMS ON THIS QUESTIONNAIRE, HOW DIFFICULT HAVE THESE PROBLEMS MADE IT FOR YOU TO DO YOUR WORK, TAKE CARE OF THINGS AT HOME, OR GET ALONG WITH OTHER PEOPLE: VERY DIFFICULT
GAD7 TOTAL SCORE: 17
4. TROUBLE RELAXING: NEARLY EVERY DAY
3. WORRYING TOO MUCH ABOUT DIFFERENT THINGS: NEARLY EVERY DAY
3. WORRYING TOO MUCH ABOUT DIFFERENT THINGS: NEARLY EVERY DAY
GAD7 TOTAL SCORE: 17
GAD7 TOTAL SCORE: 17
2. NOT BEING ABLE TO STOP OR CONTROL WORRYING: NEARLY EVERY DAY
1. FEELING NERVOUS, ANXIOUS, OR ON EDGE: MORE THAN HALF THE DAYS
1. FEELING NERVOUS, ANXIOUS, OR ON EDGE: MORE THAN HALF THE DAYS
8. IF YOU CHECKED OFF ANY PROBLEMS, HOW DIFFICULT HAVE THESE MADE IT FOR YOU TO DO YOUR WORK, TAKE CARE OF THINGS AT HOME, OR GET ALONG WITH OTHER PEOPLE?: VERY DIFFICULT
7. FEELING AFRAID AS IF SOMETHING AWFUL MIGHT HAPPEN: MORE THAN HALF THE DAYS
5. BEING SO RESTLESS THAT IT IS HARD TO SIT STILL: MORE THAN HALF THE DAYS
5. BEING SO RESTLESS THAT IT IS HARD TO SIT STILL: MORE THAN HALF THE DAYS
6. BECOMING EASILY ANNOYED OR IRRITABLE: MORE THAN HALF THE DAYS
7. FEELING AFRAID AS IF SOMETHING AWFUL MIGHT HAPPEN: MORE THAN HALF THE DAYS
GAD7 TOTAL SCORE: 17
8. IF YOU CHECKED OFF ANY PROBLEMS, HOW DIFFICULT HAVE THESE MADE IT FOR YOU TO DO YOUR WORK, TAKE CARE OF THINGS AT HOME, OR GET ALONG WITH OTHER PEOPLE?: VERY DIFFICULT
7. FEELING AFRAID AS IF SOMETHING AWFUL MIGHT HAPPEN: MORE THAN HALF THE DAYS

## 2025-05-27 ASSESSMENT — PATIENT HEALTH QUESTIONNAIRE - PHQ9
10. IF YOU CHECKED OFF ANY PROBLEMS, HOW DIFFICULT HAVE THESE PROBLEMS MADE IT FOR YOU TO DO YOUR WORK, TAKE CARE OF THINGS AT HOME, OR GET ALONG WITH OTHER PEOPLE: VERY DIFFICULT
SUM OF ALL RESPONSES TO PHQ QUESTIONS 1-9: 20
SUM OF ALL RESPONSES TO PHQ QUESTIONS 1-9: 20

## 2025-05-27 NOTE — PROGRESS NOTES
NAVIGATE Clinician Contact & Progress Note   For Family Education Program    NAVIGATE Enrollee: Navi Morales (1995)     MRN: [id number]  Date:  5/27/25  Diagnosis(es):   Psychosis unspecified  Clinician: LILIANA Family Clinician, Laura Maldonado MA, SELWYN     1. Type of contact: (majority of time spent)  Family Session via telehealth  Mode of communication:   Linwood Verduzco consented verbally to this mode of therapy today.  Reason for telehealth: To reduce barriers in accessing services, due to but not limited to transportation, location, or scheduling reasons.     2. People present:   Writer  Client: Yes   Significant Other/Family/Friend:  Mother    3. Length of Actual Contact: Start Time: 4:10 to 5pm      4. Location of contact:  Originating Location (patient location): Patient's home location; [place]   Distant Location (provider location): Home office, located in [place], [place], using appropriate privacy considerations and procedures    5. Did the client complete the home practice option(s) from the previous session: Partially Completed    6. Motivational Teaching Strategies:  Connect info and skills with personal goals  Promote hope and positive expectations    7. Educational Teaching Strategies:  Review of written material/education  Relate information to client's experience  Ask questions to check comprehension  Break down information into small chunks    8. CBT Teaching Strategies:  Reinforcement and shaping (positive feedback for steps towards goals and gains in knowledge & skills)    9. Psychoeducational Topic(s) Addressed:  Just the Facts - Effective Communication    10. Techniques utilized:   [place] announced at beginning of session  Review of previous meeting  Present new material  Family Therapy  Motivational Interviewing (Connect info and skills with personal goals, Promote hope and positive expectations, Explore pros and cons of change, Re-frame experiences in a positive  "light)  Educational Teaching Strategies (Review written material/education on: Psychosis, Medications for psychosis, Coping with stress, Strategies to build resiliency, Relapse prevention planning, Developing a collaboration with mental health professionals, Effective communication, A relative s guide to supporting recovery from psychosis, Basic facts about alcohol and drugs)  CBT (Reinforcement and shaping, Social skills training, Relapse prevention planning, Coping skills training, Relaxation training, Cognitive restructuring, Behavioral tailoring)    11. Assessment/Progress Note:     Writer met with Navi and Navi's mom on this day for family education and support.Set agenda to check-in about the last week, review last weeks' session, and continue education on communication. Family were agreeable to this agenda.    Before Navi joins, mom reports some frustration with Navi not being awake before 7pm in the evening. Mom states that she will not be able to discuss her concerns without getting upset. She reports multiple sources of stress including the dog being neutered.    Navi joins the visit and reports no urgent concerns to address. Mom and Navi report that they did cook a meal together and walked the dog one time.However, Naiv states that he is sorry he did not attend a [employer] day gathering. Navi also apologizes for isolating and not responding to mom's text and states he would like to help mom with work. Navi reports plans to get up by 3pm to help mom. Mom responds to Navi with using her active listening skills of paraphrasing and thanking Navi. Writer also positively reflects on Navi's courage to discuss these concerns and apologize.     Offer that there are also resources and supports to help Navi engage more with mom and isolate less including types of talk therapy and medications. Navi is not currently interested in discussing these options, but would like to try \"harder\" to get up by 3pm this week. "     Overall family and Navi seemed engaged in conversation. They did express interest in continuing to meet for family therapy and psychoeducation. As of today's appt their insight into Navi's mental illness appears fair. They seem they would benefit from continued clinical intervention aimed at assisting them implement helpful strategies at home and increase their understanding of psychosis.    12. Plan/Referrals:     Will meet with family weekly as schedule allows for evidence based family psychoeducation and therapeutic support aimed at maximizing Navi's opportunity for recovery from psychosis.     Laura Maldonado MA, LP   NAVIGATE   The author of this note documented a reason for not sharing it with the patient.

## 2025-06-02 ENCOUNTER — TELEPHONE (OUTPATIENT)
Dept: PSYCHIATRY | Facility: CLINIC | Age: 30
End: 2025-06-02

## 2025-06-02 NOTE — TELEPHONE ENCOUNTER
Writer left a voicemail for Navi saying she has openings if he'd like to meet to get a little more structure in his days.

## 2025-06-03 ENCOUNTER — VIRTUAL VISIT (OUTPATIENT)
Dept: PSYCHIATRY | Facility: CLINIC | Age: 30
End: 2025-06-03
Payer: COMMERCIAL

## 2025-06-03 DIAGNOSIS — F20.9 SCHIZOPHRENIA, UNSPECIFIED TYPE (H): Primary | ICD-10-CM

## 2025-06-03 ASSESSMENT — PATIENT HEALTH QUESTIONNAIRE - PHQ9
10. IF YOU CHECKED OFF ANY PROBLEMS, HOW DIFFICULT HAVE THESE PROBLEMS MADE IT FOR YOU TO DO YOUR WORK, TAKE CARE OF THINGS AT HOME, OR GET ALONG WITH OTHER PEOPLE: VERY DIFFICULT
SUM OF ALL RESPONSES TO PHQ QUESTIONS 1-9: 20
10. IF YOU CHECKED OFF ANY PROBLEMS, HOW DIFFICULT HAVE THESE PROBLEMS MADE IT FOR YOU TO DO YOUR WORK, TAKE CARE OF THINGS AT HOME, OR GET ALONG WITH OTHER PEOPLE: VERY DIFFICULT
10. IF YOU CHECKED OFF ANY PROBLEMS, HOW DIFFICULT HAVE THESE PROBLEMS MADE IT FOR YOU TO DO YOUR WORK, TAKE CARE OF THINGS AT HOME, OR GET ALONG WITH OTHER PEOPLE: VERY DIFFICULT

## 2025-06-03 NOTE — PROGRESS NOTES
NAVIGGALINDO Clinician Contact & Progress Note  For Individual Resiliency Training (IRT)  A Part of the [facility] First Episode of Psychosis Program    NAVIGATE Enrollee: Navi Morales (1995)     MRN: [id number]  Date:  6/03/25  Diagnosis: Psychosis, unspecified psychosis type (H) [F29]   Clinician: LILIANA Individual Resiliency Trainer, LEENA Mchugh    1. Type of contact: (majority of time spent)  IRT Session via telehealth  Mode of communication: [employer] Well (HIPAA compliant, secure platform). Patient consented verbally to this mode of therapy today.  Reason for telehealth: To reduce barriers in accessing services, due to but not limited to transportation, location, or scheduling reasons.    2. People present:   Writer  Client: Yes - Nvai    3. Length of Actual Contact: Start Time: 3:05p; End Time: 4:00pm     4. Location of contact:  Originating Location (patient location): [facility], located in [place], [place]  Distant Location (provider location): [facility]    5. Did the client complete the home practice option(s) from the previous session: no    6. Motivational Teaching Strategies:  Connect info and skills with personal goals  Promote hope and positive expectations  Explore pros and cons of change  Re-frame experiences in positive light    7. Educational Teaching Strategies:  Review of written material/education  Relate information to client's experience  Ask questions to check comprehension  Break down information into small chunks  Adopt client's language     8. CBT Teaching Strategies:  Reinforcement and shaping (positive feedback for steps towards goals and gains in knowledge & skills)  Cognitive restructuring (identify thoughts related to negative feelings)  Motivational interviewing    9. IRT Module(s) Addressed:  Coping with symptoms - negative symptoms    10. Techniques utilized:   [place] announced at beginning of session  Review of goal  Review of previous meeting  Present  new material  Modeling  Help client choose a home practice option  Summarize progress made in current session    11. Measures:  Answers submitted by the patient for this visit:  Patient Health Questionnaire (Submitted on 6/3/2025)  If you checked off any problems, how difficult have these problems made it for you to do your work, take care of things at home, or get along with other people?: Very difficult  PHQ9 TOTAL SCORE: 20  Depression Screening Follow-up        6/3/2025     1:42 AM   PHQ   PHQ-9 Total Score 20    Q9: Thoughts of better off dead/self-harm past 2 weeks More than half the days   F/U: Thoughts of suicide or self-harm Yes   F/U: Self harm-plan Yes   F/U: Self-harm action No   F/U: Safety concerns No       Patient-reported     [place] Protocol Risk Identification  1) Have you wished you were dead or wished you could go to sleep and not wake up? Yes  2) Have you actually had any thoughts about killing yourself? No  If YES to 2, answer questions 3, 4, 5, 6  If NO to 2, go directly to question 6  3) Have you thought about how you might do this? No  4) Have you had any intension of acting on these thoughts of killing yourself, as opposed to you have the thoughts but you definitely would not act on them? No  5) Have you started to work out or worked out the details of how to kill yourself? Do you intend to carry out this plan? No  Always Ask Question 6  6) Have you done anything, started to do anything, or prepared to do anything to end your life? No  Examples: collected pills, obtained a gun, gave away valuables, wrote a will or suicide note, held a gun but changed your mind, cut yourself, tried to hang yourself, etc.  RISK INDICATOR & ACTION BY CLINICIAN: LOW: Suicidal Ideation Without method, intent, plan, or behavior (Yes to C-SSRS Suicidal Ideation #1 or #2 and No to #3,4,5). ACTION: Complete/Review/Update Safety Plan, Inform relevant Care Team/ Psychiatry/ Program Staff, Document risk factors and  "interventions to mitigate risk factors, and Per clinical judgment, provider is making the following recommendations : to continue meeting weekly with IRT, place IOP [place] referral for day program related to depression, open to starting/discussing starting lexapro, meeting with Dr. Nelson tomorrow, 6/4/25      Follow Up     Follow Up Actions Taken  Crisis resource information provided in the After Visit Summary  Mental Health Referral placed -IOP for depression and/or psychosis    Discussed the following ways the patient can remain in a safe environment:  remove alcohol, remove drugs, remove things I could use to hurt myself: rope, cords, be around others, and talk with mom    I have reviewed the results of the [place] Screening and proposed plan of care. The patient agrees with the follow up and safety plan.    Brandie Moran, [facility]        Mental Status Exam  Alertness: alert and oriented  Behavior/Demeanor:  shaky, some gazing, engaged, and pleasant   Speech: regular rate and rhythm  Language: intact.   Mood: depressed and anxious  Thought Process/Associations: unremarkable  Thought Content:  Reports suicidal ideation with no plan or intent \"like intrusive thoughts when I go to bed\";  Denies suicidal intent or any planning  Perception:  Reports mild paranoia;  Denies visual hallucinations and auditory hallucinations  Insight: limited  Judgment: adequate for safety  Cognition: does  appear grossly intact; formal cognitive testing was not done      12. Assessment/Progress Note:     Writer and client met for IRT visit via [employer]. Writer used relational and interpersonal approach to explore feelings, motivations, and behavior. Writer offered support, feedback, validation, and reinforced use of skills taught in IRT from modalities including cognitive behavioral therapy, psycho education, and skills training. Explored symptoms and coping from a stress-vulnerability lens.    Today Navi reports that most of this week " "he slept, and when he is awake its between 2am-9am. He reports continued difficulty falling asleep, experiencing low mood and motivation, social anxiety and increase in potential psychotic symptoms such as paranoia and auditory hallucinations. He reports \"they're not like voices, its just a feeling that there are conversations going around about me\". Together we discussed this experience potentially being paranoia and Navi said \"it might\". Reported passive suicidal thoughts that he would be better off dead, but no plan or intent.     We discussed a recent stressor in the family and Navi's thoughts, feelings and behaviors regarding the situation. Navi was agreeable that he is experiencing black and white thinking, especially regarding cannabis use and communication with mom. Discussed pros and cons of attending an IOP, starting Lexapro and/or adding supports such as an [facility] worker. Today, Navi said he was agreeable for an IOP referral and \"is leaning towards taking the Lexapro\". Navi reports wanting to use cannabis to address symptoms before trying the Lexapro. Since he does not currently have means to access it, he would like to try the IOP.  He reports wanting to work and finish his degree as motivation.    Symptom assessment, safety assessment, discussion and identification of coping strategies, and exploration of material in IRT materials was all in support of Navi's self-identified goal(s) as identified in most recent BEH Treatment Plan. Over the last several weeks, progress has slowed, with still achieving small goals such as communicating briefly with mom, attending his therapy appointments. Navi may benefit from a reassessment of current therapeutic strategies and is open to an IOP referral today.      13. Plan/Referrals:   Continue [facility] services, weekly IRT- increase to weekly SEE and peer. Will meet in person in clinic in June. Navi and writer are scheduled for a follow up therapy session next tue at " "3pm, virtually. Internal Referral placed      Billing for \"Interactive Complexity\"?    No    LEENA Mchugh        "

## 2025-06-04 ENCOUNTER — VIRTUAL VISIT (OUTPATIENT)
Dept: PSYCHIATRY | Facility: CLINIC | Age: 30
End: 2025-06-04
Payer: COMMERCIAL

## 2025-06-04 DIAGNOSIS — F90.9 ATTENTION DEFICIT HYPERACTIVITY DISORDER (ADHD), UNSPECIFIED ADHD TYPE: ICD-10-CM

## 2025-06-04 DIAGNOSIS — Z79.899 ENCOUNTER FOR LONG-TERM CURRENT USE OF MEDICATION: Primary | ICD-10-CM

## 2025-06-04 DIAGNOSIS — F41.1 GAD (GENERALIZED ANXIETY DISORDER): ICD-10-CM

## 2025-06-04 DIAGNOSIS — F20.9 SCHIZOPHRENIA, UNSPECIFIED TYPE (H): ICD-10-CM

## 2025-06-04 NOTE — PATIENT INSTRUCTIONS
Navi-It was nice to meet with you today. Here is what we discussed:    -Do not expect to fall asleep after 12 hours awake. That is not a normal sleep schedule and you will not be tired--this does not mean you have a sleep problem. The treatment for this is not a sleeping medication; it is setting an alarm and waking up earlier.     -If you are ready to change your sleep habits, you can do so by setting an alarm (or several) and getting up around the same time each day.    -To advance your sleep cycle by 1 hour per week:    Week 1: set an alarm for 1 hour earlier than your current latest wake-up time. If you are not already up by then, get up. DO NOT go to bed until you are sleepy. Go to bed when you are sleepy and then wake up to your alarm. You can use your light therapy lamp to help you wake up after your alarm goes off. Do not use it at night.    Week 2: set the alarm 1 hour earlier than the previous week. Repeat all steps above.    Continue moving the alarm back by 1 hour each day until you reach your goal wake-up time. Do not force yourself to get in bed before you are sleepy.    -See you on 7/2.    Heidi Nelson MD  [facility] Psychiatrist  , Department of Psychiatry and Behavioral Sciences  [employer] [place] [employer]

## 2025-06-04 NOTE — PROGRESS NOTES
NAVIGATE Clinician Contact & Progress Note   For Family Education Program    NAVIGATE Enrollee: Navi Morales (1995)     MRN: [id number]  Date:  6/03/25  Diagnosis(es):   Psychosis unspecified  Clinician: LILIANA Family Clinician, Laura Maldonado MA, SELWYN     1. Type of contact: (majority of time spent)  Family Session via telehealth  Mode of communication:   Linwood Verduzco consented verbally to this mode of therapy today.  Reason for telehealth: To reduce barriers in accessing services, due to but not limited to transportation, location, or scheduling reasons.     2. People present:   Writer  Client: Yes   Significant Other/Family/Friend:  Mother    3. Length of Actual Contact: Start Time: 4:05 to 5pm      4. Location of contact:  Originating Location (patient location): Patient's home location; [place]   Distant Location (provider location): Home office, located in [place], [place], using appropriate privacy considerations and procedures    5. Did the client complete the home practice option(s) from the previous session: Partially Completed    6. Motivational Teaching Strategies:  Connect info and skills with personal goals  Promote hope and positive expectations    7. Educational Teaching Strategies:  Review of written material/education  Relate information to client's experience  Ask questions to check comprehension  Break down information into small chunks    8. CBT Teaching Strategies:  Reinforcement and shaping (positive feedback for steps towards goals and gains in knowledge & skills)    9. Psychoeducational Topic(s) Addressed:  Just the Facts - Effective Communication    10. Techniques utilized:   [place] announced at beginning of session  Review of previous meeting  Present new material  Family Therapy  Motivational Interviewing (Connect info and skills with personal goals, Promote hope and positive expectations, Explore pros and cons of change, Re-frame experiences in a positive  "light)  Educational Teaching Strategies (Review written material/education on: Psychosis, Medications for psychosis, Coping with stress, Strategies to build resiliency, Relapse prevention planning, Developing a collaboration with mental health professionals, Effective communication, A relative s guide to supporting recovery from psychosis, Basic facts about alcohol and drugs)  CBT (Reinforcement and shaping, Social skills training, Relapse prevention planning, Coping skills training, Relaxation training, Cognitive restructuring, Behavioral tailoring)    11. Assessment/Progress Note:     Writer met with Navi and Navi's mom on this day for family education and support.Set agenda to check-in about the last week, review last weeks' session, and continue education on communication. Family were agreeable to this agenda.    Before Navi joins, mom and writer discuss mom's recent concerns related to Navi's sleeping during the day and avoiding communication with family. Provide supportive listening; explore strategies for communicating concerns to Navi along with support.     Navi joins the visit and reports no urgent concerns to address. He states that he talked with his therapist today and that he is open to seeking medications for sleep, a medication for depression, and agreed to a referral for IOP. Writer coached mom on providing active listening. Writer also modeled providing support to Navi in his decision to increase engagement in treatment.    Mom asks about being more present in the home with family. Navi declines this request due to his plan to focus on changing his sleep schedule. Navi states that being around family is \"stressful\" for him. Explore strategies for negotiating the request to spend more time with family to include possibly going out with mom to get food or walk the dog. Navi agrees to engage with mom on this day.     Overall family and Navi seemed engaged in conversation. They did express interest in " continuing to meet for family therapy and psychoeducation. As of today's appt their insight into Navi's mental illness appears fair. They seem they would benefit from continued clinical intervention aimed at assisting them implement helpful strategies at home and increase their understanding of psychosis.    12. Plan/Referrals:     Will meet with family weekly as schedule allows for evidence based family psychoeducation and therapeutic support aimed at maximizing Navi's opportunity for recovery from psychosis.     Laura Maldonado MA, LP   NAVIGATE   The author of this note documented a reason for not sharing it with the patient.

## 2025-06-04 NOTE — PROGRESS NOTES
NAVIGATE Patient Self-Rating Form    Since your last medication management visit--    Have you been feeling depressed, sad, or down? Yes  Have you been feeling anxious, worried or nervous? Yes  Have you been thinking about death or have you had any feelings that you would be better off dead? No   Have you been feeling particularly good? Yes  Have you been feeling annoyed, angry, or resentful (whether you showed it or not)? No  Did you do anything that could have gotten you in trouble? No  Have you felt dizzy or faint? No  Have you had blurred vision? No  Have you had dry mouth? Yes  Have you had too much saliva in your mouth or had drooling? No  Have you felt nauseous? No  Have you been constipated? No  Has you appetite for food been increased? No  Have you gained weight? Yes  Have you lost weight? No  Have you felt restless or like you cannot sit still? No  Any shaking of your hands, legs, or other muscles? No  Any problems walking or moving or any problems feeling stiff or rigid? No  Have you felt tired or fatigued? No  Have you felt drowsy during the day? No  Have you been sleeping too much at night? Yes  Have you been sleeping too little or had problems sleeping at night? Yes  Any decrease in your interest in sex? Yes  Any other problems with sex? No  Any problems with your breasts such as swelling or discharge? No  For women, any problems with your period? N/A  Are there other medical or side effect problems you wish to discuss with your prescriber? No  Since your last visit, how many days have you not taken your medication? 0  Have you had trouble remembering to take your medication? No  Do you find the number of medicines or the times when you are supposed to take then confusing or burdensome? No  Are you afraid of the medication? No  Do you think that you have an illness that requires taking medication? Yes  Do you think that other people would think poorly of you if they knew that you take medication?  No  On average, how many cigarettes do you smoke per day? 0  Since you last visit, did you drink alcohol? No  Since your last visit, have you used any marijuana? No  Since your last visit, have you used any stress drugs other than marijuana? No  Between now and your next visit, do you think we should keep your medication the same or consider changing the medications? Keep it the same.

## 2025-06-04 NOTE — PROGRESS NOTES
"Virtual Visit Details    Type of service:  Video Visit   Video Start Time: 3:08pM  Video End Time:3:40 PM    Originating Location (pt. Location): Home    Distant Location (provider location):  On-site  Platform used for Video Visit: Linwood FORD Medication Management Progress Note  A Part of the [facility] First Episode of Psychosis Program    NAVIGATE Enrollee: Navi Morales (1995)     MRN: [id number]  Date:  6/04/25         Contributors to the Assessment     Chart Reviewed.   Interview completed with Navi Morales.  Collateral information obtained from none.         Interim History    Navi Morales is a 29 year old male who was last seen in [facility] a month ago at which guanfacine ER 1mg QHS was started to address ADHD symptoms and Navi had not yet started Lexapro. The patient reports good treatment adherence with Lybalvie and guanfacine. History was provided by Navi who was a good historian.  Since the last visit:  Navi reports he has been doing well overall.  Sleep has been \"kind of a nightmare.\" Going to bed earlier, but can't seem to fall asleep.   Gets up around 2:30 or 3, sometimes will sleep until 11 PM.  Today he got up at 12:30 by setting an alarm. Has been drinking these energy drinks called C4 which have about 200 mg of caffeine in the them.   Hasn't really been working on anything in particular with Brandie, thinking \"if I can fix my sleep then maybe I'll give it a try.\"  Some days when he doesn't have anything going on he doesn't want to sleep  Sometimes late at night he has an \"instinct\" that people are listening to what he is doing.  Anxiety before he goes to sleep tends to trigger his SI  Hasn't started Lexapro, considering starting it sometime soon, but hasn't decided to at this point.   Has such a high tolerance, he vapes but the nicotine has no effect.  Plan is to take some classes at [employer] and then transfer   Driving--used to get anxious, still kind of gets anxious when he " drives     PSYCH ROS:  See HPI     RECENT SOCIAL HISTORY:  SEE HPI    Medical ROS:  none         First Episode of Psychosis History      DUP (duration untreated psychosis):  4 years  Route to initial care: outpatient  Medication adherence overall:  See above, Clinician Rating Form in COMPASS Item 13  General frequency of visits:  weekly IRT, monthly med management  Participation in groups:  No  Cognitive Remediation:  No  Other treatment history: None    Reviewed for completion of First Episode work-up:  Yes  First episode workup:  Not Done (if completed, see LABS for results)  MATRICS Consensus Cognitive Battery:  Not Done (if completed, see LABS for results)       Medical/Surgical History     Patient has no known allergies.    Patient Active Problem List   Diagnosis    Alcohol use disorder, mild, abuse    MELY (generalized anxiety disorder)    Induration penis plastica    Moderate episode of recurrent major depressive disorder (H)    Open displaced fracture of neck of second metacarpal bone of right hand    Psychosis (H)    Social anxiety disorder    TMJ (temporomandibular joint syndrome)    Traumatic compression fracture of T3 vertebra (H)          Medications     Current Outpatient Medications   Medication Sig Dispense Refill    cholecalciferol, vitamin D3, 1,000 unit (25 mcg) tablet [CHOLECALCIFEROL, VITAMIN D3, 1,000 UNIT (25 MCG) TABLET] Take 2,000 Units by mouth daily.      dutasteride (AVODART) 0.5 MG capsule Take 0.5 mg by mouth daily      escitalopram (LEXAPRO) 5 MG tablet Take 1 tablet (5 mg) by mouth daily. 90 tablet 0    guanFACINE (INTUNIV) 2 MG TB24 24 hr tablet Take 1 tablet (2 mg) by mouth at bedtime. 30 tablet 1    ketoconazole (NIZORAL) 2 % external shampoo Apply topically daily as needed LATHER INTO SCALP AND RINSE AFTER 2-3 MINUTES. USE THREE TIMES A WEEK.      magnesium chloride (SLOW-MAG) 64 mg TbEC delayed-release tablet [MAGNESIUM CHLORIDE (SLOW-MAG) 64 MG TBEC DELAYED-RELEASE TABLET] Take  "64 mg by mouth daily.      OLANZapine (ZYPREXA) 10 MG tablet Take 0.5 tablets (5 mg) by mouth daily AND 1 tablet (10 mg) at bedtime. Take with one of the 15 mg tabs at bedtime for a total of 25 mg at bedtime.. 45 tablet 2    OLANZapine-samidorphan (LYBALVI) 20-10 MG TABS tablet Take 1 tablet by mouth at bedtime. Take with 1/2 of a 10 mg pill for a total of 25 mg of olanzapine at bedtime 90 tablet 2    QUEtiapine (SEROQUEL) 100 MG tablet Take 1 tablet (100 mg) by mouth at bedtime. May also take 0.5-1 tablets ( mg) 2 times daily as needed (anxiety). 60 tablet 3    tadalafil (CIALIS) 10 MG tablet Take 10 mg by mouth daily      zinc gluconate 50 mg tablet [ZINC GLUCONATE 50 MG TABLET] Take 100 mg by mouth daily.            Vitals     There were no vitals taken for this visit.      Weight prior to medication: unknown         Mental Status Exam     Alertness: alert  and oriented  Appearance: well groomed  Behavior/Demeanor: cooperative, pleasant, and calm, with good  eye contact   Speech: regular rate and rhythm, not pressured  Language: no obvious problem  Psychomotor: normal or unremarkable  Mood: \"OK\"   Affect: blunted; was congruent to mood; was congruent to content  Thought Process/Associations: Somewhat concrete  Thought Content:  Reports intermittent passive suicidal ideation without plan; without intent [details in Interim History];  Denies suicidal and violent ideation and delusions  Perception:  Reports auditory hallucinations;  Denies visual hallucinations  Insight: good  Judgment: fair and adequate for safety  Cognition: does  appear grossly intact; formal cognitive testing was not done         Labs and Data     RATING SCALES:  AIMS: next in person visit    PHQ9 TODAY =       5/13/2025     2:59 PM 5/27/2025     2:59 PM 6/3/2025     1:42 AM   PHQ-9 SCORE   PHQ-9 Total Score MyChart 20 (Severe depression) 20 (Severe depression) 20 (Severe depression)   PHQ-9 Total Score 20  20  20        Patient-reported " "    ANTIPSYCHOTIC LABS ROUTINE    [glu, A1C, lipids (focus LDL), liver enzymes, WBC, ANEU, Hgb, plts]   q12 mo  Recent Labs   Lab Test 06/30/23  1048   GLC 84     No lab results found.  Recent Labs   Lab Test 06/30/23  1048   AST 39   ALT 40   ALKPHOS 88     Recent Labs   Lab Test 06/30/23  1048   WBC 7.7   ANEU 5.0   HGB 14.9             Psychiatric Diagnoses     Psychosis, schizophrenia vs schizoaffective disorder  AUD, moderate, in early remission  Cannabis use disorder  Generalized anxiety disorder  Agoraphobia         Assessment   Navi HO Bascom is a 28 year old male with first onset of psychiatric symptoms at age 7 (\"social anxiety\"), and psychotic symptoms at age 23. The above duration of untreated psychosis was approximately 4 years.  Prodromal symptoms seem to have been present since at least college (notable substance use) though patient does note a substantially longer history of depression and physical health concerns. Presenting symptoms appear to include hallucinations, delusional thoughts. Navi attributes symptoms to physical health issues and history of trauma.  Substance use does seem to be a present concern. There are possible medical comorbidities which impact this treatment [suicide attempt, suicidal ideation, SIB, psychosis, aggression, and substance use: alcohol].     Today,  Navi has continued difficulty falling asleep, but then describes waking up in the mid-afternoon (3 PM) and attempting to fall asleep at 3 AM. I advised him that I do not expect him to be tired after only 12 hours awake, especially as he spends a large portion of his day sedentary. He hesitantly expressed some interested in using an alarm to wake up, and I once again instructed him in its use to advance his sleep phase. We discussed sleep hygiene as well. He once again told me that he will start the escitalopram for anxiety.              SUICIDE RISK ASSESSMENT:  Risk factors for self-harm: previous suicide attempt, " substance use/pending treatment, and hallucinations.  Mitigating factors: describes a safety plan, h/o seeking help , future oriented, commitment to family, and stable housing.  The patient does not appear to be at imminent risk for self-harm, hospitalization is not recommended which the pt does  agree with. No hospitalization will be arranged. Based on degree of symptoms close psych follow-up was/were recommended which the pt does  agree to. Additional steps to minimize risk: med changes.      [place] PRESCRIPTION MONITORING PROGRAM [] was not checked today: not using controlled substances.    PSYCHOTROPIC DRUG INTERACTIONS:   Quetiapine/olanzapine: additive CNS depression, QTc prolongation.    MANAGEMENT:  use lowest therapeutic doses of both and routine monitoring         Plan     1) PSYCHOTROPIC MEDICATIONS:  - Continue Olanzapine 5mg qAM  and olanzapine-samidorphan 20mg QHS  - continue escitalopram 5 mg PO daily prescription, but has not started yet  - Continue guanfacine ER 2 mg PO at bedtime     2) THERAPY:  Continue IRT with Brandie Moran    3) NEXT DUE:    Labs: FEP workup due  Rating Scales: AIMS due    4) REFERRALS:    none    5) RTC: 4 weeks    6) CRISIS NUMBERS:   Provided routinely in AVS.    TREATMENT RISK STATEMENT:  The risks, benefits, alternatives and potential adverse effects have been discussed and are understood by the pt. The pt understands the risks of using street drugs or alcohol. There are no medical contraindications, the pt agrees to treatment with the ability to do so. The pt knows to call the clinic for any problems or to access emergency care if needed.  Medical and substance use concerns are documented above.  Psychotropic drug interaction check was done, including changes made today.    PROVIDER:     Heidi Vega MD  [facility] Psychiatrist  , Department of Psychiatry and Behavioral Sciences  [facility]    The longitudinal plan of  care for the diagnosis(es)/condition(s) as documented were addressed during this visit. Due to the added complexity in care, I will continue to support Navi in the subsequent management and with ongoing continuity of care.

## 2025-06-10 ENCOUNTER — PATIENT OUTREACH (OUTPATIENT)
Dept: PSYCHIATRY | Facility: CLINIC | Age: 30
End: 2025-06-10

## 2025-06-10 ENCOUNTER — VIRTUAL VISIT (OUTPATIENT)
Dept: PSYCHIATRY | Facility: CLINIC | Age: 30
End: 2025-06-10
Payer: COMMERCIAL

## 2025-06-10 DIAGNOSIS — F20.9 SCHIZOPHRENIA, UNSPECIFIED TYPE (H): Primary | ICD-10-CM

## 2025-06-10 NOTE — PROGRESS NOTES
Outgoing Telephone Call/Email/Message  For Care Coordination  First Episode Psychosis Programs    Patient Name: Navi Morales (1995)     MRN: [id number]  Type of Contact: text  Date of Contact: 6/10/2025  Contacted/by/patient relation: self  Contact Length: na    Discussed:   Texted Navi offering available therapy time slots this week    [facility]  [facility]

## 2025-06-12 NOTE — PROGRESS NOTES
NAVIGATE Clinician Contact & Progress Note   For Family Education Program    NAVIGATE Enrollee: Navi Morales (1995)     MRN: [id number]  Date:  6/10/25  Diagnosis(es):   Psychosis unspecified  Clinician: LILIANA Family Clinician, Laura Maldonado MA, SELWYN     1. Type of contact: (majority of time spent)  Family Session via telehealth  Mode of communication:   Linwood Verduzco consented verbally to this mode of therapy today.  Reason for telehealth: To reduce barriers in accessing services, due to but not limited to transportation, location, or scheduling reasons.     2. People present:   Writer  Client: no  Significant Other/Family/Friend:  Mother    3. Length of Actual Contact: Start Time: 4:05 to 5pm      4. Location of contact:  Originating Location (patient location): Patient's home location; [place]   Distant Location (provider location): Home office, located in [place], [place], using appropriate privacy considerations and procedures    5. Did the client complete the home practice option(s) from the previous session: Partially Completed    6. Motivational Teaching Strategies:  Connect info and skills with personal goals  Promote hope and positive expectations    7. Educational Teaching Strategies:  Review of written material/education  Relate information to client's experience  Ask questions to check comprehension  Break down information into small chunks    8. CBT Teaching Strategies:  Reinforcement and shaping (positive feedback for steps towards goals and gains in knowledge & skills)    9. Psychoeducational Topic(s) Addressed:  Just the Facts - Effective Communication    10. Techniques utilized:   [place] announced at beginning of session  Review of previous meeting  Present new material  Family Therapy  Motivational Interviewing (Connect info and skills with personal goals, Promote hope and positive expectations, Explore pros and cons of change, Re-frame experiences in a positive  "light)  Educational Teaching Strategies (Review written material/education on: Psychosis, Medications for psychosis, Coping with stress, Strategies to build resiliency, Relapse prevention planning, Developing a collaboration with mental health professionals, Effective communication, A relative s guide to supporting recovery from psychosis, Basic facts about alcohol and drugs)  CBT (Reinforcement and shaping, Social skills training, Relapse prevention planning, Coping skills training, Relaxation training, Cognitive restructuring, Behavioral tailoring)    11. Assessment/Progress Note:     Writer met with Navi's mom on this day for family education and support.Set agenda to check-in about the last week, review last weeks' session, and continue education on communication. Family were agreeable to this agenda.    Before Navi joins, mom and writer discuss mom's recent concerns related to Navi's sleeping during the day and avoiding communication with family. Provide supportive listening; explore strategies for communicating concerns to Navi along with support.     Navi joins the visit and reports no urgent concerns to address. He states that he talked with his therapist today and that he is open to seeking medications for sleep, a medication for depression, and agreed to a referral for IOP. Writer coached mom on providing active listening. Writer also modeled providing support to Navi in his decision to increase engagement in treatment.    Mom asks about being more present in the home with family. Navi declines this request due to his plan to focus on changing his sleep schedule. Navi states that being around family is \"stressful\" for him. Explore strategies for negotiating the request to spend more time with family to include possibly going out with mom to get food or walk the dog. Navi agrees to engage with mom on this day.     Overall family and Navi seemed engaged in conversation. They did express interest in " continuing to meet for family therapy and psychoeducation. As of today's appt their insight into Navi's mental illness appears fair. They seem they would benefit from continued clinical intervention aimed at assisting them implement helpful strategies at home and increase their understanding of psychosis.    12. Plan/Referrals:     Will meet with family weekly as schedule allows for evidence based family psychoeducation and therapeutic support aimed at maximizing Navi's opportunity for recovery from psychosis.     Laura Maldonado MA, LP   NAVIGATE   The author of this note documented a reason for not sharing it with the patient.

## 2025-06-17 ENCOUNTER — VIRTUAL VISIT (OUTPATIENT)
Dept: PSYCHIATRY | Facility: CLINIC | Age: 30
End: 2025-06-17
Payer: COMMERCIAL

## 2025-06-17 DIAGNOSIS — F20.9 SCHIZOPHRENIA, UNSPECIFIED TYPE (H): Primary | ICD-10-CM

## 2025-06-18 NOTE — PROGRESS NOTES
"Outgoing Telephone Call/Email/Message  For Care Coordination  First Episode Psychosis Programs    Patient Name: Navi Morales (1995)     MRN: [id number]  Type of Contact: phone  Date of Contact: 6/18/2025  Contacted/by/patient relation: Navi  Contact Length: 10 min    Discussed:   Writer checked in with Navi via text then phone, as he hadn't signed on to our scheduled visit. Writer reached Navi via phone and checked in. Navi reports \"no real changes this week\" been sleeping 7am-11pm. Offered to join his family session and Navi was agreeable. Talked with mom and Laura, family clinician, about follow up steps and who to call to enroll in [facility].   Navi denied any suicidal thoughts and expressed concern for groups at [facility]. Reminded pt reasons for this including finishing his degree, making friends, getting a job.     Follow up needed:  Na - Navi was agreeable to call [facility] to schedule eval.   See Laura Moncada Wengered note from today for more detail.     Brandie Moran, LICSW  "

## 2025-06-19 NOTE — PROGRESS NOTES
NAVIGATE Clinician Contact & Progress Note   For Family Education Program    NAVIGATE Enrollee: Navi Morales (1995)     MRN: [id number]  Date:  6/17/25  Diagnosis(es):   Psychosis unspecified  Clinician: EMILIANAATE Family Clinician, Laura Maldonado MA, SELWYN     1. Type of contact: (majority of time spent)  Family Session via telehealth  Mode of communication:   Linwood Verduzco consented verbally to this mode of therapy today.  Reason for telehealth: To reduce barriers in accessing services, due to but not limited to transportation, location, or scheduling reasons.     2. People present:   Writer  Brandie Moran (IRT for Navi) for part of the visit  Client: Yes  Significant Other/Family/Friend:  Mother    3. Length of Actual Contact: Start Time: 4:05 to 5pm      4. Location of contact:  Originating Location (patient location): Patient's home location; [place]   Distant Location (provider location): Home office, located in [place], [place], using appropriate privacy considerations and procedures    5. Did the client complete the home practice option(s) from the previous session: Partially Completed    6. Motivational Teaching Strategies:  Connect info and skills with personal goals  Promote hope and positive expectations    7. Educational Teaching Strategies:  Review of written material/education  Relate information to client's experience  Ask questions to check comprehension  Break down information into small chunks    8. CBT Teaching Strategies:  Reinforcement and shaping (positive feedback for steps towards goals and gains in knowledge & skills)    9. Psychoeducational Topic(s) Addressed:  Just the Facts - Effective Communication    10. Techniques utilized:   [place] announced at beginning of session  Review of previous meeting  Present new material  Family Therapy  Motivational Interviewing (Connect info and skills with personal goals, Promote hope and positive expectations, Explore pros and  cons of change, Re-frame experiences in a positive light)  Educational Teaching Strategies (Review written material/education on: Psychosis, Medications for psychosis, Coping with stress, Strategies to build resiliency, Relapse prevention planning, Developing a collaboration with mental health professionals, Effective communication, A relative s guide to supporting recovery from psychosis, Basic facts about alcohol and drugs)  CBT (Reinforcement and shaping, Social skills training, Relapse prevention planning, Coping skills training, Relaxation training, Cognitive restructuring, Behavioral tailoring)    11. Assessment/Progress Note:     Writer met initially with Navi's mom on this day for family education and support. Mom had previously sent writer an email with concerns about Navi and his increased isolation and avoidance of family. Mom reports that she has not seen Navi on this day.     Mom reports increased difficulty managing her own emotions and that this has impacted her interactions with Navi. Discuss the potential negative impacts of overly self sacrificing behavior and ways to improve self care. Explore what expectations of Navi are reasonable from mom's perspective and these include: being awake during the day, helping with the dog, getting his own prescriptions, and following up with his benefits.     Mom leaves to prompt Navi for this visit after approximately 15 minutes. When mom returns, writer offers that Navi's individual therapist can join this session as well to discuss recommended treatment. Manohar is agreeable to this and states that Navi is on his way to the visit.     Navi attends visit on this day along with Brandie Moran. Navi appears groggy and exhibits some inappropriate smiling. However, Navi is agreeable to this joint visit and open to his therapist's treatment recommendations which include scheduling an intake for Intensive Outpatient Program. Navi is concerned about group therapy due to  "social anxiety, but is agreeable to discussing his concerns with the program. Navi acknowledges that his sleep schedule has become untenable and that having the structure of IOP could be helpful for recovery.     Brandie leaves this session after approximately 20 minutes. Writer continues meeting with Navi and mom. Navi reports that he has been \"doing ok\" and that he has experienced \"no real changes.\" Writer inquires for mom's feedback and she reports that Navi has been increasingly isolative and has not gone on walks or gone to the grocery store in the last week. Navi affirms that this is true. Explore what factors have changed and neither mom nor Navi are able to identify any changes. However, Navi does report feeling more depressed due to sleeping more during the day. Writer inquires about safety concerns and Navi denies safety concerns at this time. Writer reviews crisis resources including calling [phone] if having SI and or calling 911 or going to the ED if having an emergency. Family and Navi are in agreement with this plan.     Discuss plan for the upcoming week and Navi is open to going for a walk with the dog and also calling for the IOP intake. Mom is in agreement to help Navi call for an intake and also to walk the dog with Navi.     Overall family and Navi seemed engaged in conversation. They did express interest in continuing to meet for family therapy and psychoeducation. As of today's appt their insight into Navi's mental illness appears fair. They seem they would benefit from continued clinical intervention aimed at assisting them implement helpful strategies at home and increase their understanding of psychosis.    12. Plan/Referrals:     Will meet with family weekly as schedule allows for evidence based family psychoeducation and therapeutic support aimed at maximizing Navi's opportunity for recovery from psychosis.     Laura Maldonado MA, LP   NAVIGATE   The author of this note " documented a reason for not sharing it with the patient.

## 2025-06-25 DIAGNOSIS — F25.1 SCHIZOAFFECTIVE DISORDER, DEPRESSIVE TYPE (H): ICD-10-CM

## 2025-06-25 RX ORDER — ESCITALOPRAM OXALATE 5 MG/1
5 TABLET ORAL DAILY
Qty: 90 TABLET | Refills: 0 | Status: SHIPPED | OUTPATIENT
Start: 2025-06-25

## 2025-07-01 ENCOUNTER — VIRTUAL VISIT (OUTPATIENT)
Dept: PSYCHIATRY | Facility: CLINIC | Age: 30
End: 2025-07-01
Payer: COMMERCIAL

## 2025-07-01 DIAGNOSIS — F25.1 SCHIZOAFFECTIVE DISORDER, DEPRESSIVE TYPE (H): Primary | ICD-10-CM

## 2025-07-01 NOTE — PROGRESS NOTES
NAVIGGALINDO Clinician Contact & Progress Note  For Individual Resiliency Training (IRT)  A Part of the [facility] First Episode of Psychosis Program    NAVIGATE Enrollee: Navi Morales (1995)     MRN: [id number]  Date:  7/01/25  Diagnosis: Psychosis, unspecified psychosis type (H) [F29]   Clinician: LILIANA Individual Resiliency Trainer, LEENA Mchugh    1. Type of contact: (majority of time spent)  IRT Session via telehealth  Mode of communication: [employer] (HIPAA compliant, secure platform). Patient consented verbally to this mode of therapy today.  Reason for telehealth: To reduce barriers in accessing services, due to but not limited to transportation, location, or scheduling reasons.    2. People present:   Writer  Client: Yes - Navi    3. Length of Actual Contact: Start Time: 3:05p; End Time: 4:00pm     4. Location of contact:  Originating Location (patient location): family home, located in [place], [place]  Distant Location (provider location): Home office located in [place], [place]    5. Did the client complete the home practice option(s) from the previous session: yes, scheduled IOP intake    6. Motivational Teaching Strategies:  Connect info and skills with personal goals  Promote hope and positive expectations  Explore pros and cons of change  Re-frame experiences in positive light    7. Educational Teaching Strategies:  Review of written material/education  Relate information to client's experience  Ask questions to check comprehension  Break down information into small chunks  Adopt client's language     8. CBT Teaching Strategies:  Reinforcement and shaping (positive feedback for steps towards goals and gains in knowledge & skills)  Cognitive restructuring (identify thoughts related to negative feelings)  Motivational interviewing    9. IRT Module(s) Addressed:  Coping with symptoms - negative symptoms    10. Techniques utilized:   [place] announced at beginning of  "session  Review of goal  Review of previous meeting  Present new material  Modeling  Help client choose a home practice option  Summarize progress made in current session    11. Measures:      Mental Status Exam  Alertness: alert and oriented  Behavior/Demeanor:  shaky, some gazing, engaged, and pleasant   Speech: regular rate and rhythm  Language: intact.   Mood: depressed and anxious  Thought Process/Associations: unremarkable  Thought Content:  Reports none;  Denies suicidal intent or plan  Perception:  Reports mild paranoia;  Denies visual hallucinations and auditory hallucinations  Insight: limited  Judgment: adequate for safety  Cognition: does  appear grossly intact; formal cognitive testing was not done      12. Assessment/Progress Note:     Writer and client met for IRT visit via [employer]. Writer used relational and interpersonal approach to explore feelings, motivations, and behavior. Writer offered support, feedback, validation, and reinforced use of skills taught in IRT from modalities including cognitive behavioral therapy, psycho education, and skills training. Explored symptoms and coping from a stress-vulnerability lens. Upon check in, Navi reports his week has \"gone well, nothing really\" and reports he scheduled an intake with [facility]. Writer reflected on the strengths it took to schedule. Discussed strategies for making the appt including: wake up 9, Aim for falling asleep at 4am, In bed by 3 with phone, ask mom for help. Writer and Navi discussed coping strategies. Specifically, discussed substance use. Discussed reasons for use as well as provided psychoeducation on substance use and its effects on mental and physical health.    Symptom assessment, safety assessment, discussion and identification of coping strategies, and exploration of material in IRT materials was all in support of Navi's self-identified goal(s) as identified in most recent BEH Treatment Plan. Progress toward goal completion seems " "good.       13. Plan/Referrals:     Continue [facility] services, weekly IRT, Supported Employment as needed, weekly family  Attend IOP for next 6-8 wks w therapy check ins      Billing for \"Interactive Complexity\"?    No    LEENA Mchugh        "

## 2025-07-02 ENCOUNTER — VIRTUAL VISIT (OUTPATIENT)
Dept: BEHAVIORAL HEALTH | Facility: CLINIC | Age: 30
End: 2025-07-02
Attending: PSYCHIATRY & NEUROLOGY
Payer: COMMERCIAL

## 2025-07-02 ENCOUNTER — VIRTUAL VISIT (OUTPATIENT)
Dept: PSYCHIATRY | Facility: CLINIC | Age: 30
End: 2025-07-02
Payer: COMMERCIAL

## 2025-07-02 DIAGNOSIS — F25.1 SCHIZOAFFECTIVE DISORDER, DEPRESSIVE TYPE (H): Primary | ICD-10-CM

## 2025-07-02 DIAGNOSIS — F33.2 MAJOR DEPRESSIVE DISORDER, RECURRENT EPISODE, SEVERE WITH ANXIOUS DISTRESS (H): Primary | ICD-10-CM

## 2025-07-02 DIAGNOSIS — F40.10 SOCIAL ANXIETY DISORDER: ICD-10-CM

## 2025-07-02 DIAGNOSIS — F41.1 GENERALIZED ANXIETY DISORDER: ICD-10-CM

## 2025-07-02 DIAGNOSIS — F12.90 CANNABIS USE DISORDER: ICD-10-CM

## 2025-07-02 DIAGNOSIS — F20.9 SCHIZOPHRENIA, UNSPECIFIED TYPE (H): ICD-10-CM

## 2025-07-02 ASSESSMENT — ANXIETY QUESTIONNAIRES
GAD7 TOTAL SCORE: 17
2. NOT BEING ABLE TO STOP OR CONTROL WORRYING: NEARLY EVERY DAY
1. FEELING NERVOUS, ANXIOUS, OR ON EDGE: MORE THAN HALF THE DAYS
7. FEELING AFRAID AS IF SOMETHING AWFUL MIGHT HAPPEN: MORE THAN HALF THE DAYS
3. WORRYING TOO MUCH ABOUT DIFFERENT THINGS: NEARLY EVERY DAY
2. NOT BEING ABLE TO STOP OR CONTROL WORRYING: NEARLY EVERY DAY
5. BEING SO RESTLESS THAT IT IS HARD TO SIT STILL: MORE THAN HALF THE DAYS
IF YOU CHECKED OFF ANY PROBLEMS ON THIS QUESTIONNAIRE, HOW DIFFICULT HAVE THESE PROBLEMS MADE IT FOR YOU TO DO YOUR WORK, TAKE CARE OF THINGS AT HOME, OR GET ALONG WITH OTHER PEOPLE: VERY DIFFICULT
IF YOU CHECKED OFF ANY PROBLEMS ON THIS QUESTIONNAIRE, HOW DIFFICULT HAVE THESE PROBLEMS MADE IT FOR YOU TO DO YOUR WORK, TAKE CARE OF THINGS AT HOME, OR GET ALONG WITH OTHER PEOPLE: VERY DIFFICULT
4. TROUBLE RELAXING: NEARLY EVERY DAY
7. FEELING AFRAID AS IF SOMETHING AWFUL MIGHT HAPPEN: MORE THAN HALF THE DAYS
GAD7 TOTAL SCORE: 17
4. TROUBLE RELAXING: NEARLY EVERY DAY
GAD7 TOTAL SCORE: 17
8. IF YOU CHECKED OFF ANY PROBLEMS, HOW DIFFICULT HAVE THESE MADE IT FOR YOU TO DO YOUR WORK, TAKE CARE OF THINGS AT HOME, OR GET ALONG WITH OTHER PEOPLE?: VERY DIFFICULT
5. BEING SO RESTLESS THAT IT IS HARD TO SIT STILL: MORE THAN HALF THE DAYS
4. TROUBLE RELAXING: NEARLY EVERY DAY
2. NOT BEING ABLE TO STOP OR CONTROL WORRYING: NEARLY EVERY DAY
7. FEELING AFRAID AS IF SOMETHING AWFUL MIGHT HAPPEN: MORE THAN HALF THE DAYS
1. FEELING NERVOUS, ANXIOUS, OR ON EDGE: MORE THAN HALF THE DAYS
6. BECOMING EASILY ANNOYED OR IRRITABLE: MORE THAN HALF THE DAYS
7. FEELING AFRAID AS IF SOMETHING AWFUL MIGHT HAPPEN: MORE THAN HALF THE DAYS
1. FEELING NERVOUS, ANXIOUS, OR ON EDGE: MORE THAN HALF THE DAYS
5. BEING SO RESTLESS THAT IT IS HARD TO SIT STILL: MORE THAN HALF THE DAYS
3. WORRYING TOO MUCH ABOUT DIFFERENT THINGS: NEARLY EVERY DAY
6. BECOMING EASILY ANNOYED OR IRRITABLE: MORE THAN HALF THE DAYS
3. WORRYING TOO MUCH ABOUT DIFFERENT THINGS: NEARLY EVERY DAY
GAD7 TOTAL SCORE: 17
6. BECOMING EASILY ANNOYED OR IRRITABLE: MORE THAN HALF THE DAYS
IF YOU CHECKED OFF ANY PROBLEMS ON THIS QUESTIONNAIRE, HOW DIFFICULT HAVE THESE PROBLEMS MADE IT FOR YOU TO DO YOUR WORK, TAKE CARE OF THINGS AT HOME, OR GET ALONG WITH OTHER PEOPLE: VERY DIFFICULT
7. FEELING AFRAID AS IF SOMETHING AWFUL MIGHT HAPPEN: MORE THAN HALF THE DAYS
GAD7 TOTAL SCORE: 17
8. IF YOU CHECKED OFF ANY PROBLEMS, HOW DIFFICULT HAVE THESE MADE IT FOR YOU TO DO YOUR WORK, TAKE CARE OF THINGS AT HOME, OR GET ALONG WITH OTHER PEOPLE?: VERY DIFFICULT
GAD7 TOTAL SCORE: 17
8. IF YOU CHECKED OFF ANY PROBLEMS, HOW DIFFICULT HAVE THESE MADE IT FOR YOU TO DO YOUR WORK, TAKE CARE OF THINGS AT HOME, OR GET ALONG WITH OTHER PEOPLE?: VERY DIFFICULT
7. FEELING AFRAID AS IF SOMETHING AWFUL MIGHT HAPPEN: MORE THAN HALF THE DAYS
GAD7 TOTAL SCORE: 17

## 2025-07-02 ASSESSMENT — COLUMBIA-SUICIDE SEVERITY RATING SCALE - C-SSRS
1. HAVE YOU WISHED YOU WERE DEAD OR WISHED YOU COULD GO TO SLEEP AND NOT WAKE UP?: YES
1. IN THE PAST MONTH, HAVE YOU WISHED YOU WERE DEAD OR WISHED YOU COULD GO TO SLEEP AND NOT WAKE UP?: NO
ATTEMPT LIFETIME: NO
TOTAL  NUMBER OF INTERRUPTED ATTEMPTS LIFETIME: NO
6. HAVE YOU EVER DONE ANYTHING, STARTED TO DO ANYTHING, OR PREPARED TO DO ANYTHING TO END YOUR LIFE?: NO
TOTAL  NUMBER OF ABORTED OR SELF INTERRUPTED ATTEMPTS LIFETIME: NO

## 2025-07-02 NOTE — PROGRESS NOTES
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 [address] [place], [place] [address] [phone]        Suicide Prevention Lifeline Phone: Call or Text 331  Crisis Text Line: Text HOME to 177786     Step 6: Making the environment safer (plan for lethal means safety):   Not having items at home or accessible such as Rope, poisons. If I purchase these items I will tell my therapist and/or my mom.      Optional: What is most important to me and worth living for?:   Music, Nature, my mom     Jaya Safety Plan. Brie Phoenix and Arden Andrews. Used with permission of the authors.             Provider Name/ Credentials:  Perla CALDERON Psychotherapist  July 2, 2025

## 2025-07-02 NOTE — PROGRESS NOTES
"Virtual Visit Details    Type of service:  Video Visit   Video Start Time: 3:00 PM  Video End Time:3:40 PM    Originating Location (pt. Location): Home    Distant Location (provider location):  On-site  Platform used for Video Visit: [employer]      LILIANA Medication Management Progress Note  A Part of the [facility] First Episode of Psychosis Program    NAVIGATE Enrollee: Navi Morales (1995)     MRN: [id number]  Date:  7/02/25         Contributors to the Assessment     Chart Reviewed.   Interview completed with Navi Morales.  Collateral information obtained from none.         Interim History    Navi Morales is a 29 year old male who was last seen in [facility] clinic a month ago at which guanfacine ER 1mg QHS was started to address ADHD symptoms and Navi had not yet started Lexapro. The patient reports good treatment adherence with Lybalvie and guanfacine. History was provided by Navi who was a good historian.  Since the last visit:  Navi reports he has been doing OK recently. Had intake with IOP this morning. Got a phone call asking if he wanted to do in-person but that seemed kind of demanding, wants to do virtual. Seems like it is group work. Had a phone call asking if he would be available to go in-person,     Went to get weed, wanted to experiment and see \"what it would do to me, I guess.\" He got high and he wasn't really that high. Then it didn't have any effects afterward, which he thought might happen. It was so disappointing that he couldn't get high. He was looking for relief, \"the relaxation part.\"     \"I'm not saying I'm a drug addict, but I would work my ass off for drugs.\" Feels able to \"take more risk\" to get the cannabis. Reflected on how he gets motivated; has to be something that is rewarding. Remembers that in college, he would never go to class but would always make it for the test day. Was able to make himself go to PT because the knowledge, learning from the therapist. Learning about his body and " "helping treat his T syndrome. Hard to stick to the program. Learning about it really helps. Maintenance PT.    Wants to start exercising, used to rely on the treadmill but it's on its last legs right now. If he exercises he is not going to eat at [employer].     Sleep hasn't been the best, thinks this is worsening his mood a little bit. Keeps having trouble with his sleep, \"going to bed on time but having trouble falling asleep.\" Today he got up at 8:45 AM. Last night he got in bed around midnight but then lay awake until 4:30 AM.     SI is worse when he is lying awake, thinks he is ruminating more. Just lots of \"depressing thoughts, like about where I am.\"    PSYCH ROS:  See HPI     RECENT SOCIAL HISTORY:  SEE HPI    Medical ROS:  none         First Episode of Psychosis History      DUP (duration untreated psychosis):  4 years  Route to initial care: outpatient  Medication adherence overall:  See above, Clinician Rating Form in COMPASS Item 13  General frequency of visits:  weekly IRT, monthly med management  Participation in groups:  No  Cognitive Remediation:  No  Other treatment history: None    Reviewed for completion of First Episode work-up:  Yes  First episode workup:  Not Done (if completed, see LABS for results)  MATRICS Consensus Cognitive Battery:  Not Done (if completed, see LABS for results)       Medical/Surgical History     Patient has no known allergies.    Patient Active Problem List   Diagnosis    Alcohol use disorder, mild, abuse    MELY (generalized anxiety disorder)    Induration penis plastica    Moderate episode of recurrent major depressive disorder (H)    Open displaced fracture of neck of second metacarpal bone of right hand    Psychosis (H)    Social anxiety disorder    TMJ (temporomandibular joint syndrome)    Traumatic compression fracture of T3 vertebra (H)          Medications     Current Outpatient Medications   Medication Sig Dispense Refill    cholecalciferol, vitamin D3, 1,000 unit " "(25 mcg) tablet [CHOLECALCIFEROL, VITAMIN D3, 1,000 UNIT (25 MCG) TABLET] Take 2,000 Units by mouth daily.      dutasteride (AVODART) 0.5 MG capsule Take 0.5 mg by mouth daily      escitalopram (LEXAPRO) 5 MG tablet Take 1 tablet (5 mg) by mouth daily. 90 tablet 0    guanFACINE (INTUNIV) 2 MG TB24 24 hr tablet Take 1 tablet (2 mg) by mouth at bedtime. 30 tablet 1    ketoconazole (NIZORAL) 2 % external shampoo Apply topically daily as needed LATHER INTO SCALP AND RINSE AFTER 2-3 MINUTES. USE THREE TIMES A WEEK.      magnesium chloride (SLOW-MAG) 64 mg TbEC delayed-release tablet [MAGNESIUM CHLORIDE (SLOW-MAG) 64 MG TBEC DELAYED-RELEASE TABLET] Take 64 mg by mouth daily.      OLANZapine (ZYPREXA) 10 MG tablet Take 0.5 tablets (5 mg) by mouth daily AND 1 tablet (10 mg) at bedtime. Take with one of the 15 mg tabs at bedtime for a total of 25 mg at bedtime.. 45 tablet 2    OLANZapine-samidorphan (LYBALVI) 20-10 MG TABS tablet Take 1 tablet by mouth at bedtime. Take with 1/2 of a 10 mg pill for a total of 25 mg of olanzapine at bedtime 90 tablet 2    QUEtiapine (SEROQUEL) 100 MG tablet Take 1 tablet (100 mg) by mouth at bedtime. May also take 0.5-1 tablets ( mg) 2 times daily as needed (anxiety). 60 tablet 3    tadalafil (CIALIS) 10 MG tablet Take 10 mg by mouth daily      zinc gluconate 50 mg tablet [ZINC GLUCONATE 50 MG TABLET] Take 100 mg by mouth daily.            Vitals     There were no vitals taken for this visit.      Weight prior to medication: unknown         Mental Status Exam     Alertness: alert  and oriented  Appearance: well groomed  Behavior/Demeanor: cooperative, pleasant, and calm, with good  eye contact   Speech: regular rate and rhythm, not pressured  Language: no obvious problem  Psychomotor: normal or unremarkable  Mood: \"OK\"   Affect: blunted; was congruent to mood; was congruent to content  Thought Process/Associations: Somewhat concrete  Thought Content:  Reports intermittent passive " "suicidal ideation without plan; without intent [details in Interim History];  Denies suicidal and violent ideation and delusions  Perception:  Reports auditory hallucinations;  Denies visual hallucinations  Insight: good  Judgment: fair and adequate for safety  Cognition: does  appear grossly intact; formal cognitive testing was not done         Labs and Data     RATING SCALES:  AIMS: next in person visit    PHQ9 TODAY =       5/27/2025     2:59 PM 6/3/2025     1:42 AM 7/2/2025     9:10 AM   PHQ-9 SCORE   PHQ-9 Total Score MyChart 20 (Severe depression) 20 (Severe depression) 20 (Severe depression)   PHQ-9 Total Score 20  20  20        Patient-reported     ANTIPSYCHOTIC LABS ROUTINE    [glu, A1C, lipids (focus LDL), liver enzymes, WBC, ANEU, Hgb, plts]   q12 mo  Recent Labs   Lab Test 06/30/23  1048   GLC 84     No lab results found.  Recent Labs   Lab Test 06/30/23  1048   AST 39   ALT 40   ALKPHOS 88     Recent Labs   Lab Test 06/30/23  1048   WBC 7.7   ANEU 5.0   HGB 14.9             Psychiatric Diagnoses     Psychosis, schizophrenia vs schizoaffective disorder  AUD, moderate, in early remission  Cannabis use disorder  Generalized anxiety disorder  Agoraphobia         Assessment   Navi HO McCune is a 28 year old male with first onset of psychiatric symptoms at age 7 (\"social anxiety\"), and psychotic symptoms at age 23. The above duration of untreated psychosis was approximately 4 years.  Prodromal symptoms seem to have been present since at least college (notable substance use) though patient does note a substantially longer history of depression and physical health concerns. Presenting symptoms appear to include hallucinations, delusional thoughts. Navi attributes symptoms to physical health issues and history of trauma.  Substance use does seem to be a present concern. There are possible medical comorbidities which impact this treatment [suicide attempt, suicidal ideation, SIB, psychosis, aggression, and " substance use: alcohol].     Today,  we discussed Navi's remarkable shift in behavior in seeking out cannabis and procuring it in spite of his social anxiety, and he reflected on how when there is a reward (especially a chemical one) he finds it so much easier to stay motivated and tolerate discomfort while working toward a goal. We brainstormed potential ideas of rewards that he can implement to apply toward goals of waking up earlier, staying active, working on goals.    Navi has continued difficulty falling asleep, but continues to find using an alarm to wake up challenging. We discussed sleep hygiene as well. He once again told me that he will start the escitalopram for anxiety.              SUICIDE RISK ASSESSMENT:  Risk factors for self-harm: previous suicide attempt, substance use/pending treatment, and hallucinations.  Mitigating factors: describes a safety plan, h/o seeking help , future oriented, commitment to family, and stable housing.  The patient does not appear to be at imminent risk for self-harm, hospitalization is not recommended which the pt does  agree with. No hospitalization will be arranged. Based on degree of symptoms close psych follow-up was/were recommended which the pt does  agree to. Additional steps to minimize risk: med changes.      [place] PRESCRIPTION MONITORING PROGRAM [] was not checked today: not using controlled substances.    PSYCHOTROPIC DRUG INTERACTIONS:   Quetiapine/olanzapine: additive CNS depression, QTc prolongation.    MANAGEMENT:  use lowest therapeutic doses of both and routine monitoring         Plan     1) PSYCHOTROPIC MEDICATIONS:  - Continue Olanzapine 5mg qAM  and olanzapine-samidorphan 20mg QHS  - continue escitalopram 5 mg PO daily prescription, but has not started yet  - Continue guanfacine ER 2 mg PO at bedtime     2) THERAPY:  Continue IRT with Brandie Moran    3) NEXT DUE:    Labs: FEP workup due  Rating Scales: AIMS due    4) REFERRALS:    none    5) RTC:  4 weeks    6) CRISIS NUMBERS:   Provided routinely in AVS.    TREATMENT RISK STATEMENT:  The risks, benefits, alternatives and potential adverse effects have been discussed and are understood by the pt. The pt understands the risks of using street drugs or alcohol. There are no medical contraindications, the pt agrees to treatment with the ability to do so. The pt knows to call the clinic for any problems or to access emergency care if needed.  Medical and substance use concerns are documented above.  Psychotropic drug interaction check was done, including changes made today.    PROVIDER:     Heidi Vega MD  [facility] Psychiatrist  , Department of Psychiatry and Behavioral Sciences  [facility]    The longitudinal plan of care for the diagnosis(es)/condition(s) as documented were addressed during this visit. Due to the added complexity in care, I will continue to support Navi in the subsequent management and with ongoing continuity of care.

## 2025-07-02 NOTE — NURSING NOTE
Virtual Visit Details    Type of service:  Video Visit   Originating Location (pt. Location): Home    Distant Location (provider location):  On-site  Platform used for Video Visit: [employer]    NAVIGATE Patient Self-Rating Form    Since your last medication management visit--    Have you been feeling depressed, sad, or down? Yes  Have you been feeling anxious, worried or nervous? Yes  Have you been thinking about death or have you had any feelings that you would be better off dead? Yes   Have you been feeling particularly good? No  Have you been feeling annoyed, angry, or resentful (whether you showed it or not)? No  Did you do anything that could have gotten you in trouble? No  Have you felt dizzy or faint? No  Have you had blurred vision? No  Have you had dry mouth? No  Have you had too much saliva in your mouth or had drooling? No  Have you felt nauseous? No  Have you been constipated? No  Has you appetite for food been increased? Yes  Have you gained weight? Yes  Have you lost weight? No  Have you felt restless or like you cannot sit still? No  Any shaking of your hands, legs, or other muscles? No  Any problems walking or moving or any problems feeling stiff or rigid? No  Have you felt tired or fatigued? Yes  Have you felt drowsy during the day? No  Have you been sleeping too much at night? No  Have you been sleeping too little or had problems sleeping at night? Yes  Any decrease in your interest in sex? Yes  Any other problems with sex? No  Any problems with your breasts such as swelling or discharge? No  For women, any problems with your period? N/A  Are there other medical or side effect problems you wish to discuss with your prescriber? No  Since your last visit, how many days have you not taken your medication? 0  Have you had trouble remembering to take your medication? No  Do you find the number of medicines or the times when you are supposed to take then confusing or burdensome? No  Are you afraid of the  medication? No  Do you think that you have an illness that requires taking medication? Yes  Do you think that other people would think poorly of you if they knew that you take medication? No  On average, how many cigarettes do you smoke per day? 0  Since you last visit, did you drink alcohol? No  Since your last visit, have you used any marijuana? Yes  Since your last visit, have you used any stress drugs other than marijuana? No  Between now and your next visit, do you think we should keep your medication the same or consider changing the medications? Keep it the same

## 2025-07-03 NOTE — PROGRESS NOTES
NAVIGATE Clinician Contact & Progress Note   For Family Education Program    NAVIGATE Enrollee: Navi Morales (1995)     MRN: [id number]  Date:  7/01/25  Diagnosis(es):   Psychosis unspecified  Clinician: LILIANA Family Clinician, Laura Maldonado MA, SELWNY     1. Type of contact: (majority of time spent)  Family Session via telehealth  Mode of communication:   Linwood Verduzco consented verbally to this mode of therapy today.  Reason for telehealth: To reduce barriers in accessing services, due to but not limited to transportation, location, or scheduling reasons.     2. People present:   Writer  Remi Chinchilla: new family clinician for part of the appointment  Client: Yes  Significant Other/Family/Friend:  Mother    3. Length of Actual Contact: Start Time: 4:05 to 5pm      4. Location of contact:  Originating Location (patient location): Patient's home location; [place]   Distant Location (provider location): Home office, located in [place], [place], using appropriate privacy considerations and procedures    5. Did the client complete the home practice option(s) from the previous session: Partially Completed    6. Motivational Teaching Strategies:  Connect info and skills with personal goals  Promote hope and positive expectations    7. Educational Teaching Strategies:  Review of written material/education  Relate information to client's experience  Ask questions to check comprehension  Break down information into small chunks    8. CBT Teaching Strategies:  Reinforcement and shaping (positive feedback for steps towards goals and gains in knowledge & skills)    9. Psychoeducational Topic(s) Addressed:  Just the Facts - Effective Communication    10. Techniques utilized:   [place] announced at beginning of session  Review of previous meeting  Present new material  Family Therapy  Motivational Interviewing (Connect info and skills with personal goals, Promote hope and positive expectations,  Explore pros and cons of change, Re-frame experiences in a positive light)  Educational Teaching Strategies (Review written material/education on: Psychosis, Medications for psychosis, Coping with stress, Strategies to build resiliency, Relapse prevention planning, Developing a collaboration with mental health professionals, Effective communication, A relative s guide to supporting recovery from psychosis, Basic facts about alcohol and drugs)  CBT (Reinforcement and shaping, Social skills training, Relapse prevention planning, Coping skills training, Relaxation training, Cognitive restructuring, Behavioral tailoring)    11. Assessment/Progress Note:      met initially with Navi's mom on this day for family education and support. Mom reports that Navi has been continuing to isolate and that she and her  have not seen Navi much over the last week. As mom has not seen Navi, she has not had the opportunity to talk with him about her concerns. Mom did note that Navi has been unresponsive to texts.     Discuss having family clinician, Remi Chinchilla join part of this visit so that she and Navi can meet the clinician who will continue to work with family after the writer's departure. Mom would like Remi to join after Navi joins this virtual appt.     Remi and Navi join the appointment at approximately 4:25. Introduce family to Remi Chinchilla. Provide brief background on work with family and structure of sessions. Plan is for Remi to reach out to mom to schedule once his schedule is available via epic.      meets with Navi and mom. Explore the last couple weeks from Navi's perspective. Navi reports stress related to his computer difficulty and anxiety about tomorrow's intake with day treatment. Mom reports having questions about her attendance and is frustrated regarding Navi's lack of communication regarding what he needs from her for this appointment. Navi reports that he will try to be more responsive  to text messages. Explore any factors that could be contributing to Navi's increased isolation and avoidance. Navi does not provide additional information.     Plan is for mom to help Navi wake up on the next day for the intake and Navi will plan to attend. Mom reinforces that she agrees that day treatment would be a good next step so as to increase Navi's structure and routine. Validate Navi's efforts to make changes in his sleep schedule by increasing routine and mom's efforts to improve communication with Navi.      Overall family and Navi seemed engaged in conversation. They did express interest in continuing to meet for family therapy and psychoeducation. As of today's appt their insight into Navi's mental illness appears fair. They seem they would benefit from continued clinical intervention aimed at assisting them implement helpful strategies at home and increase their understanding of psychosis.    12. Plan/Referrals:     Will meet with family weekly as schedule allows for evidence based family psychoeducation and therapeutic support aimed at maximizing Navi's opportunity for recovery from psychosis.     Laura Maldonado MA, LP   NAVIGATE   The author of this note documented a reason for not sharing it with the patient.

## 2025-07-08 ENCOUNTER — VIRTUAL VISIT (OUTPATIENT)
Dept: PSYCHIATRY | Facility: CLINIC | Age: 30
End: 2025-07-08
Payer: COMMERCIAL

## 2025-07-08 DIAGNOSIS — F25.1 SCHIZOAFFECTIVE DISORDER, DEPRESSIVE TYPE (H): Primary | ICD-10-CM

## 2025-07-08 ASSESSMENT — PATIENT HEALTH QUESTIONNAIRE - PHQ9
SUM OF ALL RESPONSES TO PHQ QUESTIONS 1-9: 21
10. IF YOU CHECKED OFF ANY PROBLEMS, HOW DIFFICULT HAVE THESE PROBLEMS MADE IT FOR YOU TO DO YOUR WORK, TAKE CARE OF THINGS AT HOME, OR GET ALONG WITH OTHER PEOPLE: VERY DIFFICULT
SUM OF ALL RESPONSES TO PHQ QUESTIONS 1-9: 21

## 2025-07-09 NOTE — PROGRESS NOTES
NAVIGATE Clinician Contact & Progress Note   For Family Education Program    NAVIGATE Enrollee: Navi Morales (1995)     MRN: [id number]  Date:  7/08/25  Diagnosis(es):   Psychosis unspecified  Clinician: LILIANA Family Clinician, Laura Maldonado MA, SELWYN     1. Type of contact: (majority of time spent)  Family Session via telehealth  Mode of communication:   Linwood Verduzco consented verbally to this mode of therapy today.  Reason for telehealth: To reduce barriers in accessing services, due to but not limited to transportation, location, or scheduling reasons.     2. People present:   Writer  Client: Yes  Significant Other/Family/Friend:  Mother    3. Length of Actual Contact: Start Time: 4:05 to 5pm      4. Location of contact:  Originating Location (patient location): Patient's home location; [place]   Distant Location (provider location): Home office, located in [place], [place], using appropriate privacy considerations and procedures    5. Did the client complete the home practice option(s) from the previous session: Partially Completed    6. Motivational Teaching Strategies:  Connect info and skills with personal goals  Promote hope and positive expectations    7. Educational Teaching Strategies:  Review of written material/education  Relate information to client's experience  Ask questions to check comprehension  Break down information into small chunks    8. CBT Teaching Strategies:  Reinforcement and shaping (positive feedback for steps towards goals and gains in knowledge & skills)    9. Psychoeducational Topic(s) Addressed:  Just the Facts - Effective Communication    10. Techniques utilized:   [place] announced at beginning of session  Review of previous meeting  Present new material  Family Therapy  Motivational Interviewing (Connect info and skills with personal goals, Promote hope and positive expectations, Explore pros and cons of change, Re-frame experiences in a positive  "light)  Educational Teaching Strategies (Review written material/education on: Psychosis, Medications for psychosis, Coping with stress, Strategies to build resiliency, Relapse prevention planning, Developing a collaboration with mental health professionals, Effective communication, A relative s guide to supporting recovery from psychosis, Basic facts about alcohol and drugs)  CBT (Reinforcement and shaping, Social skills training, Relapse prevention planning, Coping skills training, Relaxation training, Cognitive restructuring, Behavioral tailoring)    11. Assessment/Progress Note:      met initially with Navi's mom and Navi for family education and support. Navi presented to this appt at approximately 4:15 with writer and mom having met until that time.     Neither mom nor Navi reports any urgent mental health concerns. Mom reports that Navi attended a cookout with dad, Navi's brother, wife and child. Mom states that she also went on a walk with Navi and the dog following this gathering. Mom continues to have concerns about Navi's social isolation.     [place] meets with Navi and mom. Explore the last couple weeks from Navi's perspective. Navi reports he has \"been okay.\" He states that he completed the IOP intake, but does not want to do in person group therapy. He has called about alternative options for him. Navi reports going to family cookout, and his hope to increase his activity so he can return to school.     Explore strategies for behavioral activation; reflect on Navi's activities on days when he does one activity and how he is more likely to engage in another activity. Discuss idea of attending social skills group which is virtual and or trying in person group as part of the IOP program. Navi agrees to consider this; reflect on Navi's flexibility as being a considerable strength.     Reflect on mom's support of Navi and Navi's willingness to keep persisting with treatment. Writer encourages family " in their efforts to strengthen their relationship. Writer encourages family in their plan to continue family education and support with family clinician, Remi Chinchilla.      Overall family and Navi seemed engaged in conversation. They did express interest in continuing to meet for family therapy and psychoeducation. As of today's appt their insight into Navi's mental illness appears fair. They seem they would benefit from continued clinical intervention aimed at assisting them implement helpful strategies at home and increase their understanding of psychosis.    12. Plan/Referrals:     Remi Chinchilla will meet with family weekly as schedule allows for evidence based family psychoeducation and therapeutic support aimed at maximizing Navi's opportunity for recovery from psychosis.     Laura Maldonado MA, LP   NAVIGATE   The author of this note documented a reason for not sharing it with the patient.

## 2025-07-15 ENCOUNTER — TELEPHONE (OUTPATIENT)
Dept: PSYCHIATRY | Facility: CLINIC | Age: 30
End: 2025-07-15

## 2025-07-16 ENCOUNTER — VIRTUAL VISIT (OUTPATIENT)
Dept: PSYCHIATRY | Facility: CLINIC | Age: 30
End: 2025-07-16
Payer: COMMERCIAL

## 2025-07-16 DIAGNOSIS — F25.1 SCHIZOAFFECTIVE DISORDER, DEPRESSIVE TYPE (H): Primary | ICD-10-CM

## 2025-07-16 NOTE — TELEPHONE ENCOUNTER
EMILIANAATE Family Peer Support  A Part of the [facility] First Episode of Psychosis Program     Patient Name: Navi Morales  /Age:  1995 (29 year old)  Date of Encounter: 7/15/25  Length of Contact: 34 minutes    This writer reached out to offer resources and support to patient's mother, Mona, as follow-up to previous outreach.     Family needs help with mowing the lawn and other chores due to father's recent carpal tunnel surgery.  Engagement strategies were discussed.    Parents are planning an [employer] cruise with another couple in May 2026. Mona will reach out to other family members about possibly staying at the house with Navi and caring for the dog in their absence (approximately 10 days).   This writer shared that the NAVIGATE team can assist with putting a plan in place.    Family will be meeting with LEENA Castro, in the future for family education and support.    Mona was grateful for the outreach and this writer will follow-up with her in a few weeks.    ALYSIA Abdalla Family Peer    [Billing Code 10956 for No Billable Service as family peer support is a nonbillable service]

## 2025-07-16 NOTE — PROGRESS NOTES
NAVIGGALINDO Clinician Contact & Progress Note  For Individual Resiliency Training (IRT)  A Part of the [facility] First Episode of Psychosis Program    NAVIGATE Enrollee: Navi Morales (1995)     MRN: [id number]  Date:  7/16/25  Diagnosis: Psychosis, unspecified psychosis type (H) [F29]   Clinician: LILIANA Individual Resiliency Trainer, LEENA Mchugh    1. Type of contact: (majority of time spent)  IRT Session via telehealth  Mode of communication: [employer] (HIPAA compliant, secure platform). Patient consented verbally to this mode of therapy today.  Reason for telehealth: To reduce barriers in accessing services, due to but not limited to transportation, location, or scheduling reasons.    2. People present:   Writer  Client: Yes - Navi    3. Length of Actual Contact: Start Time: 3:21p; End Time: 4:00pm     4. Location of contact:  Originating Location (patient location): family home, located in [place], [place]  Distant Location (provider location): Home office located in [place], [place]    5. Did the client complete the home practice option(s) from the previous session: yes, scheduled IOP intake    6. Motivational Teaching Strategies:  Connect info and skills with personal goals  Promote hope and positive expectations  Explore pros and cons of change  Re-frame experiences in positive light    7. Educational Teaching Strategies:  Review of written material/education  Relate information to client's experience  Ask questions to check comprehension  Break down information into small chunks  Adopt client's language     8. CBT Teaching Strategies:  Reinforcement and shaping (positive feedback for steps towards goals and gains in knowledge & skills)  Cognitive restructuring (identify thoughts related to negative feelings)  Motivational interviewing    9. IRT Module(s) Addressed:   Module 8 - Dealing with Negative Feelings      10. Techniques utilized:   [place] announced at beginning of  session  Review of goal  Review of previous meeting  Present new material  Modeling  Help client choose a home practice option  Summarize progress made in current session    11. Measures:    Answers submitted by the patient for this visit:  Patient Health Questionnaire (Submitted on 7/8/2025)  If you checked off any problems, how difficult have these problems made it for you to do your work, take care of things at home, or get along with other people?: Very difficult  PHQ9 TOTAL SCORE: 21  Patient Health Questionnaire (G7) (Submitted on 7/2/2025)  MELY 7 TOTAL SCORE: 17    Mental Status Exam  Alertness: alert and oriented  Behavior/Demeanor:  shaky, some gazing, engaged, and pleasant   Speech: regular rate and rhythm  Language: intact.   Mood: depressed and anxious  Thought Process/Associations: unremarkable  Thought Content:  Reports none;  Denies suicidal intent or plan  Perception:  Reports mild paranoia;  Denies visual hallucinations and auditory hallucinations  Insight: adequate for safety  Judgment: adequate for safety  Cognition: does  appear grossly intact; formal cognitive testing was not done      12. Assessment/Progress Note:     Writer used relational and interpersonal approach to explore feelings, motivations, and behavior. Writer offered support, feedback, validation, and reinforced use of skills taught in IRT from modalities including cognitive behavioral therapy, psycho education, and skills training. Explored symptoms and coping from a stress-vulnerability lens. Upon check in, Navi reports no calls from [facility], he says he's still waiting for a call. Navi discussed a recent family stressor. Writer and Navi reflected on the situation using CBT techniques. We identified effective aspects of the interaction, explored associated thoughts, feelings, and behaviors, and recognized negative thinking patterns. A specific thought was examined; we evaluated evidence for/against it and generated either more neutral  "or positive alternative language. Discussed action steps and collaboratively identified home practice goals including following up with mom and discussing his concern, asking dad for clarification on his expectations. Today Jack denied any med changes. He reports mild suicidal ideation without plan or intent \"it happens at night mostly\". Continues to be agreeable to safety plan.     Symptom assessment, safety assessment, discussion and identification of coping strategies, and exploration of material in IRT materials was all in support of Jack's self-identified goal(s) as identified in most recent BEH Treatment Plan. Progress toward goal completion seems good.       13. Plan/Referrals:     Continue Navigate services, weekly IRT, Supported Employment as needed, weekly family  Attend [facility] for next 6-8 wks w therapy check ins      Billing for \"Interactive Complexity\"?    No    LEENA Mchugh        "

## 2025-07-30 ENCOUNTER — VIRTUAL VISIT (OUTPATIENT)
Dept: PSYCHIATRY | Facility: CLINIC | Age: 30
End: 2025-07-30
Payer: COMMERCIAL

## 2025-07-30 DIAGNOSIS — Z72.0 NICOTINE USE: ICD-10-CM

## 2025-07-30 DIAGNOSIS — F12.90 CANNABIS USE DISORDER: ICD-10-CM

## 2025-07-30 DIAGNOSIS — F90.9 ATTENTION DEFICIT HYPERACTIVITY DISORDER (ADHD), UNSPECIFIED ADHD TYPE: ICD-10-CM

## 2025-07-30 DIAGNOSIS — F10.90 ALCOHOL USE DISORDER: ICD-10-CM

## 2025-07-30 DIAGNOSIS — F40.10 SOCIAL ANXIETY DISORDER: ICD-10-CM

## 2025-07-30 DIAGNOSIS — F29 PSYCHOSIS, UNSPECIFIED PSYCHOSIS TYPE (H): Primary | ICD-10-CM

## 2025-07-30 ASSESSMENT — ANXIETY QUESTIONNAIRES
7. FEELING AFRAID AS IF SOMETHING AWFUL MIGHT HAPPEN: MORE THAN HALF THE DAYS
4. TROUBLE RELAXING: NEARLY EVERY DAY
8. IF YOU CHECKED OFF ANY PROBLEMS, HOW DIFFICULT HAVE THESE MADE IT FOR YOU TO DO YOUR WORK, TAKE CARE OF THINGS AT HOME, OR GET ALONG WITH OTHER PEOPLE?: VERY DIFFICULT
6. BECOMING EASILY ANNOYED OR IRRITABLE: MORE THAN HALF THE DAYS
3. WORRYING TOO MUCH ABOUT DIFFERENT THINGS: NEARLY EVERY DAY
5. BEING SO RESTLESS THAT IT IS HARD TO SIT STILL: MORE THAN HALF THE DAYS
GAD7 TOTAL SCORE: 18
GAD7 TOTAL SCORE: 18
7. FEELING AFRAID AS IF SOMETHING AWFUL MIGHT HAPPEN: MORE THAN HALF THE DAYS
2. NOT BEING ABLE TO STOP OR CONTROL WORRYING: NEARLY EVERY DAY
3. WORRYING TOO MUCH ABOUT DIFFERENT THINGS: NEARLY EVERY DAY
2. NOT BEING ABLE TO STOP OR CONTROL WORRYING: NEARLY EVERY DAY
GAD7 TOTAL SCORE: 18
6. BECOMING EASILY ANNOYED OR IRRITABLE: MORE THAN HALF THE DAYS
IF YOU CHECKED OFF ANY PROBLEMS ON THIS QUESTIONNAIRE, HOW DIFFICULT HAVE THESE PROBLEMS MADE IT FOR YOU TO DO YOUR WORK, TAKE CARE OF THINGS AT HOME, OR GET ALONG WITH OTHER PEOPLE: VERY DIFFICULT
GAD7 TOTAL SCORE: 18
GAD7 TOTAL SCORE: 18
7. FEELING AFRAID AS IF SOMETHING AWFUL MIGHT HAPPEN: MORE THAN HALF THE DAYS
1. FEELING NERVOUS, ANXIOUS, OR ON EDGE: NEARLY EVERY DAY
7. FEELING AFRAID AS IF SOMETHING AWFUL MIGHT HAPPEN: MORE THAN HALF THE DAYS
GAD7 TOTAL SCORE: 18
IF YOU CHECKED OFF ANY PROBLEMS ON THIS QUESTIONNAIRE, HOW DIFFICULT HAVE THESE PROBLEMS MADE IT FOR YOU TO DO YOUR WORK, TAKE CARE OF THINGS AT HOME, OR GET ALONG WITH OTHER PEOPLE: VERY DIFFICULT
5. BEING SO RESTLESS THAT IT IS HARD TO SIT STILL: MORE THAN HALF THE DAYS
8. IF YOU CHECKED OFF ANY PROBLEMS, HOW DIFFICULT HAVE THESE MADE IT FOR YOU TO DO YOUR WORK, TAKE CARE OF THINGS AT HOME, OR GET ALONG WITH OTHER PEOPLE?: VERY DIFFICULT
1. FEELING NERVOUS, ANXIOUS, OR ON EDGE: NEARLY EVERY DAY
4. TROUBLE RELAXING: NEARLY EVERY DAY

## 2025-08-04 ENCOUNTER — TELEPHONE (OUTPATIENT)
Dept: PSYCHIATRY | Facility: CLINIC | Age: 30
End: 2025-08-04

## 2025-08-05 ENCOUNTER — VIRTUAL VISIT (OUTPATIENT)
Dept: PSYCHIATRY | Facility: CLINIC | Age: 30
End: 2025-08-05
Payer: COMMERCIAL

## 2025-08-05 DIAGNOSIS — F25.1 SCHIZOAFFECTIVE DISORDER, DEPRESSIVE TYPE (H): Primary | ICD-10-CM

## 2025-08-05 DIAGNOSIS — F29 PSYCHOSIS, UNSPECIFIED PSYCHOSIS TYPE (H): Primary | ICD-10-CM

## 2025-08-06 ENCOUNTER — TELEPHONE (OUTPATIENT)
Dept: PSYCHIATRY | Facility: CLINIC | Age: 30
End: 2025-08-06

## 2025-08-08 ENCOUNTER — TELEPHONE (OUTPATIENT)
Dept: PSYCHIATRY | Facility: CLINIC | Age: 30
End: 2025-08-08

## 2025-08-13 ENCOUNTER — VIRTUAL VISIT (OUTPATIENT)
Dept: PSYCHIATRY | Facility: CLINIC | Age: 30
End: 2025-08-13
Payer: COMMERCIAL

## 2025-08-13 DIAGNOSIS — F25.1 SCHIZOAFFECTIVE DISORDER, DEPRESSIVE TYPE (H): Primary | ICD-10-CM

## 2025-08-13 ASSESSMENT — ANXIETY QUESTIONNAIRES
7. FEELING AFRAID AS IF SOMETHING AWFUL MIGHT HAPPEN: MORE THAN HALF THE DAYS
4. TROUBLE RELAXING: MORE THAN HALF THE DAYS
IF YOU CHECKED OFF ANY PROBLEMS ON THIS QUESTIONNAIRE, HOW DIFFICULT HAVE THESE PROBLEMS MADE IT FOR YOU TO DO YOUR WORK, TAKE CARE OF THINGS AT HOME, OR GET ALONG WITH OTHER PEOPLE: VERY DIFFICULT
GAD7 TOTAL SCORE: 14
1. FEELING NERVOUS, ANXIOUS, OR ON EDGE: MORE THAN HALF THE DAYS
GAD7 TOTAL SCORE: 14
3. WORRYING TOO MUCH ABOUT DIFFERENT THINGS: MORE THAN HALF THE DAYS
8. IF YOU CHECKED OFF ANY PROBLEMS, HOW DIFFICULT HAVE THESE MADE IT FOR YOU TO DO YOUR WORK, TAKE CARE OF THINGS AT HOME, OR GET ALONG WITH OTHER PEOPLE?: VERY DIFFICULT
GAD7 TOTAL SCORE: 14
5. BEING SO RESTLESS THAT IT IS HARD TO SIT STILL: MORE THAN HALF THE DAYS
7. FEELING AFRAID AS IF SOMETHING AWFUL MIGHT HAPPEN: MORE THAN HALF THE DAYS
6. BECOMING EASILY ANNOYED OR IRRITABLE: MORE THAN HALF THE DAYS
2. NOT BEING ABLE TO STOP OR CONTROL WORRYING: MORE THAN HALF THE DAYS

## 2025-08-19 ENCOUNTER — VIRTUAL VISIT (OUTPATIENT)
Dept: PSYCHIATRY | Facility: CLINIC | Age: 30
End: 2025-08-19
Payer: COMMERCIAL

## 2025-08-19 DIAGNOSIS — F25.0 SCHIZOAFFECTIVE DISORDER, BIPOLAR TYPE (H): Primary | ICD-10-CM

## 2025-08-21 ENCOUNTER — VIRTUAL VISIT (OUTPATIENT)
Dept: PSYCHIATRY | Facility: CLINIC | Age: 30
End: 2025-08-21
Payer: COMMERCIAL

## 2025-08-21 DIAGNOSIS — F29 PSYCHOSIS, UNSPECIFIED PSYCHOSIS TYPE (H): Primary | ICD-10-CM

## 2025-08-26 ENCOUNTER — VIRTUAL VISIT (OUTPATIENT)
Dept: PSYCHIATRY | Facility: CLINIC | Age: 30
End: 2025-08-26
Payer: COMMERCIAL

## 2025-08-26 DIAGNOSIS — F29 PSYCHOSIS, UNSPECIFIED PSYCHOSIS TYPE (H): Primary | ICD-10-CM

## 2025-08-29 ENCOUNTER — TELEPHONE (OUTPATIENT)
Dept: PSYCHIATRY | Facility: CLINIC | Age: 30
End: 2025-08-29

## 2025-09-02 ENCOUNTER — VIRTUAL VISIT (OUTPATIENT)
Dept: PSYCHIATRY | Facility: CLINIC | Age: 30
End: 2025-09-02
Payer: COMMERCIAL

## 2025-09-02 DIAGNOSIS — F29 PSYCHOSIS, UNSPECIFIED PSYCHOSIS TYPE (H): Primary | ICD-10-CM

## 2025-09-04 ENCOUNTER — VIRTUAL VISIT (OUTPATIENT)
Dept: PSYCHIATRY | Facility: CLINIC | Age: 30
End: 2025-09-04
Payer: COMMERCIAL

## 2025-09-04 DIAGNOSIS — F29 PSYCHOSIS, UNSPECIFIED PSYCHOSIS TYPE (H): Primary | ICD-10-CM
